# Patient Record
Sex: MALE | Race: WHITE | Employment: OTHER | URBAN - METROPOLITAN AREA
[De-identification: names, ages, dates, MRNs, and addresses within clinical notes are randomized per-mention and may not be internally consistent; named-entity substitution may affect disease eponyms.]

---

## 2018-06-25 ENCOUNTER — HOSPITAL ENCOUNTER (OUTPATIENT)
Dept: GENERAL RADIOLOGY | Age: 78
Discharge: HOME | End: 2018-06-27
Payer: MEDICARE

## 2018-06-25 ENCOUNTER — HOSPITAL ENCOUNTER (OUTPATIENT)
Age: 78
Discharge: HOME | End: 2018-06-27
Payer: MEDICARE

## 2018-06-25 DIAGNOSIS — M25.551 PAIN IN RIGHT HIP: ICD-10-CM

## 2018-06-25 PROCEDURE — 73502 X-RAY EXAM HIP UNI 2-3 VIEWS: CPT

## 2018-07-19 ENCOUNTER — OFFICE VISIT (OUTPATIENT)
Dept: VASCULAR SURGERY | Age: 78
End: 2018-07-19
Payer: MEDICARE

## 2018-07-19 ENCOUNTER — HOSPITAL ENCOUNTER (OUTPATIENT)
Dept: CARDIOLOGY | Age: 78
Discharge: HOME | End: 2018-07-19
Admitting: SURGERY
Payer: MEDICARE

## 2018-07-19 DIAGNOSIS — I71.40 ABDOMINAL AORTIC ANEURYSM (AAA) WITHOUT RUPTURE: ICD-10-CM

## 2018-07-19 DIAGNOSIS — R09.89 CAROTID BRUIT, UNSPECIFIED LATERALITY: ICD-10-CM

## 2018-07-19 DIAGNOSIS — Z98.890 S/P CAROTID ENDARTERECTOMY: Primary | ICD-10-CM

## 2018-07-19 PROCEDURE — G8419 CALC BMI OUT NRM PARAM NOF/U: HCPCS | Performed by: SURGERY

## 2018-07-19 PROCEDURE — 99213 OFFICE O/P EST LOW 20 MIN: CPT | Performed by: SURGERY

## 2018-07-19 PROCEDURE — G8427 DOCREV CUR MEDS BY ELIG CLIN: HCPCS | Performed by: SURGERY

## 2018-07-19 PROCEDURE — 93880 EXTRACRANIAL BILAT STUDY: CPT

## 2018-07-19 PROCEDURE — G8598 ASA/ANTIPLAT THER USED: HCPCS | Performed by: SURGERY

## 2018-07-19 PROCEDURE — 1123F ACP DISCUSS/DSCN MKR DOCD: CPT | Performed by: SURGERY

## 2018-07-19 PROCEDURE — 1101F PT FALLS ASSESS-DOCD LE1/YR: CPT | Performed by: SURGERY

## 2018-07-19 PROCEDURE — 93978 VASCULAR STUDY: CPT

## 2018-07-19 PROCEDURE — 4040F PNEUMOC VAC/ADMIN/RCVD: CPT | Performed by: SURGERY

## 2018-07-19 PROCEDURE — 1036F TOBACCO NON-USER: CPT | Performed by: SURGERY

## 2018-07-19 NOTE — PROGRESS NOTES
 DIAGNOSTIC CARDIAC CATH LAB PROCEDURE      EYE SURGERY Left Feb 2013, June 2013    glaucoma    EYE SURGERY Left     cataract    HERNIA REPAIR      SIGMOIDOSCOPY      TONSILLECTOMY      VASCULAR SURGERY Right 10/16/2013    RIGHT CAROTID ENDARTERECTOMY       Family History   Problem Relation Age of Onset    Heart Disease Father     Heart Disease Brother        Social History     Social History    Marital status:      Spouse name: N/A    Number of children: N/A    Years of education: N/A     Occupational History    Not on file. Social History Main Topics    Smoking status: Former Smoker     Packs/day: 2.00     Types: Cigarettes     Quit date: 10/9/1992    Smokeless tobacco: Never Used    Alcohol use Yes      Comment: rare    Drug use: No    Sexual activity: Not on file     Other Topics Concern    Not on file     Social History Narrative    No narrative on file       Review of Systems:    Eyes:  No blurring, diplopia or vision loss  Respiratory:  No cough, pleuritic chest pain, dyspnea, or wheezing  Cardiovascular: No angina, palpitations   Musculoskeletal:  No arthritis or weakness  Neurologic:  No paralysis, paresis, paresthesia, seizures or headach        Physical Exam:  General appearance:  Alert, awake, oriented x 3. No distress. Eyes:  Conjunctivae appear normal; PERRL  Neck:  No jugular venous distention, lymphadenopathy or thyromegaly. Carotid bruit noted  Lungs:  Clear to ausculation bilaterally. No rhonchi, crackles, wheezes  Heart:  Regular rate and rhythm. No rub or murmur  Abdomen:  Soft, non-tender. No masses, organomegaly. Minimally prominent pulse noted, nontender  Musculoskeletal : No joint effusions, tenderness swelling    Neuro: Speech is intact. Moving all extremities. No focal motor or sensory deficits      Extremities:  Both feet are warm to touch.  The color of both feet is normal.        Pulses Right  Left    Brachial 3 3    Radial    3=normal   Femoral 2 2 2=diminished   Popliteal    1=barely palpable   Dorsalis pedis    0=absent   Posterior tibial    4=aneurysmal             Other pertinent information:1. The past medical records were reviewed. Assessment:    1. S/P carotid endarterectomy    2. Carotid bruit, unspecified laterality    3. Abdominal aortic aneurysm (AAA) without rupture ([facility] [address]              Plan:       Discussed the patient regarding the results of the ultrasound abdominal aorta, small abdominal aortic aneurysm 3.1 cm, no change since last year, carotid ultrasound, minimal disease on the left side, right side widely patent, unchanged prostate, patient is reassured and was instructed to call and come to the hospital if any abdominal or back pain          Patient was instructed to continue walking program and to call if any worsening of symptoms and to call if any focal lateralizing neurological symptoms like loss of speech, vision or loss of function of extremity. All the questions were answered. Indicated follow-up: Return in about 1 year (around 7/19/2019), or if symptoms worsen or fail to improve.

## 2018-07-19 NOTE — PATIENT INSTRUCTIONS
artery over time. This damage may be caused by high blood pressure, high cholesterol, diabetes, or smoking. Sometimes plaque can break loose from the carotid artery and move to the brain. This can cause a stroke or transient ischemic attack (TIA). The goal of treatment is to lower your risk of having a stroke or TIA. You can lower your risk by making healthy lifestyle changes and taking medicine. Sometimes a surgery or procedure is done. Follow-up care is a key part of your treatment and safety. Be sure to make and go to all appointments, and call your doctor if you are having problems. It's also a good idea to know your test results and keep a list of the medicines you take. How can you care for yourself at home? · Take your medicines exactly as prescribed. Call your doctor if you think you are having a problem with your medicine. You may take medicine to lower your blood pressure, to lower your cholesterol, or to prevent blood clots. · If you take a blood thinner, such as aspirin, be sure to get instructions about how to take your medicine safely. Blood thinners can cause serious bleeding problems. · Do not smoke. People who smoke have a higher chance of stroke than those who quit. If you need help quitting, talk to your doctor about stop-smoking programs and medicines. These can increase your chances of quitting for good. · Eat a healthy diet that is low in saturated fat and salt. Eat lots of fresh fruits and vegetables and foods high in fiber. · Stay at a healthy weight. Lose weight if you need to. · Talk to your doctor about starting an exercise program. Regular exercise lowers your chance of stroke. · Limit alcohol to 2 drinks a day for men and 1 drink a day for women. Too much alcohol can cause health problems. · Work with your doctor to control high blood pressure, high cholesterol, diabetes, and other conditions that increase your chance of a stroke.  A healthy diet, exercise, weight loss (if needed), and medicines can help. · Avoid colds and flu. Get the flu vaccine every year. When should you call for help? Call 911 anytime you think you may need emergency care. For example, call if:    · You passed out (lost consciousness).     · You have symptoms of a stroke. These may include:  ¨ Sudden numbness, tingling, weakness, or loss of movement in your face, arm, or leg, especially on only one side of your body. ¨ Sudden vision changes. ¨ Sudden trouble speaking. ¨ Sudden confusion or trouble understanding simple statements. ¨ Sudden problems with walking or balance. ¨ A sudden, severe headache that is different from past headaches.    Call your doctor now or seek immediate medical care if:    · You are dizzy or lightheaded, or you feel like you may faint.    Watch closely for changes in your health, and be sure to contact your doctor if you have any problems. Where can you learn more? Go to [email or web address] and sign in to your travelfox account. Enter J499 in the [employer] box to learn more about \"Carotid Stenosis: Care Instructions. \"     If you do not have an account, please click on the \"Sign Up Now\" link. Current as of: December 6, 2017  Content Version: 11.6  © 6868-2921 [employer]. Care instructions adapted under license by [facility] ([facility]). If you have questions about a medical condition or this instruction, always ask your healthcare professional. [place] [address] any warranty or liability for your use of this information.

## 2019-01-29 ENCOUNTER — OFFICE VISIT (OUTPATIENT)
Dept: CARDIOLOGY CLINIC | Age: 79
End: 2019-01-29
Payer: MEDICARE

## 2019-01-29 VITALS
DIASTOLIC BLOOD PRESSURE: 70 MMHG | HEIGHT: 73 IN | WEIGHT: 215 LBS | SYSTOLIC BLOOD PRESSURE: 122 MMHG | BODY MASS INDEX: 28.49 KG/M2 | HEART RATE: 56 BPM | RESPIRATION RATE: 16 BRPM

## 2019-01-29 DIAGNOSIS — I25.10 CORONARY ARTERY DISEASE INVOLVING NATIVE CORONARY ARTERY WITHOUT ANGINA PECTORIS, UNSPECIFIED WHETHER NATIVE OR TRANSPLANTED HEART: Primary | ICD-10-CM

## 2019-01-29 PROCEDURE — 99214 OFFICE O/P EST MOD 30 MIN: CPT | Performed by: INTERNAL MEDICINE

## 2019-01-29 PROCEDURE — 93000 ELECTROCARDIOGRAM COMPLETE: CPT | Performed by: INTERNAL MEDICINE

## 2019-07-22 DIAGNOSIS — I65.23 BILATERAL CAROTID ARTERY STENOSIS: ICD-10-CM

## 2019-07-22 DIAGNOSIS — I71.40 ABDOMINAL AORTIC ANEURYSM (AAA) WITHOUT RUPTURE: Primary | ICD-10-CM

## 2019-07-25 ENCOUNTER — HOSPITAL ENCOUNTER (OUTPATIENT)
Dept: CARDIOLOGY | Age: 79
Discharge: HOME | End: 2019-07-25
Payer: MEDICARE

## 2019-07-25 ENCOUNTER — OFFICE VISIT (OUTPATIENT)
Dept: VASCULAR SURGERY | Age: 79
End: 2019-07-25
Payer: MEDICARE

## 2019-07-25 DIAGNOSIS — I71.40 ABDOMINAL AORTIC ANEURYSM (AAA) WITHOUT RUPTURE: ICD-10-CM

## 2019-07-25 DIAGNOSIS — R09.89 CAROTID BRUIT, UNSPECIFIED LATERALITY: Primary | ICD-10-CM

## 2019-07-25 DIAGNOSIS — Z98.890 S/P CAROTID ENDARTERECTOMY: ICD-10-CM

## 2019-07-25 DIAGNOSIS — I65.23 BILATERAL CAROTID ARTERY STENOSIS: ICD-10-CM

## 2019-07-25 PROCEDURE — 93880 EXTRACRANIAL BILAT STUDY: CPT

## 2019-07-25 PROCEDURE — 93978 VASCULAR STUDY: CPT

## 2019-07-25 PROCEDURE — 99213 OFFICE O/P EST LOW 20 MIN: CPT | Performed by: SURGERY

## 2019-07-25 NOTE — PROGRESS NOTES
[facility] Heart & Vascular Lab - [facility]    This is a pre read worksheet - prior to official physician interpretation    Antony Do  1940  Date of study: 7/25/19  [id number]    Indication for study:  Carotid artery stenosis  Study : Bilateral Carotid Artery Duplex Examination    Duplex examination of the RIGHT carotid artery system identifies atherosclerotic plaque. The peak systolic velocity in internal carotid artery was 112 centimeters / second. The maximum end diastolic velocity was 30 centimeters / second. The ICA/CCA ratio is 1.3. The right vertebral artery has antegrade flow. Duplex examination of the LEFT carotid artery system identifies atherosclerotic plaque. The peak systolic velocity in internal carotid artery was 158 centimeters / second. The maximum end diastolic velocity was 36 centimeters / second. The ICA/CCA ratio is 1.8. The left vertebral artery has antegrade flow.     RIGHT mild thickening  LEFT 30%   Distal ICA tortuous        LAST STUDY  7/19/2018  Rt mild  Lt 30

## 2019-07-25 NOTE — PROGRESS NOTES
or weakness  Neurologic:  No paralysis, paresis, paresthesia, seizures or headach        Physical Exam:  General appearance:  Alert, awake, oriented x 3. No distress. Eyes:  Conjunctivae appear normal; PERRL  Neck:  No jugular venous distention, lymphadenopathy or thyromegaly. Carotid bruit noted  Lungs:  Clear to ausculation bilaterally. No rhonchi, crackles, wheezes  Heart:  Regular rate and rhythm. No rub or murmur  Abdomen:  Soft, non-tender. No masses, organomegaly. Slightly prominent pulse noted  Musculoskeletal : No joint effusions, tenderness swelling    Neuro: Speech is intact. Moving all extremities. No focal motor or sensory deficits      Extremities:  Both feet are warm to touch. The color of both feet is normal.        Pulses Right  Left    Brachial 3 3    Radial    3=normal   Femoral 2 2  2=diminished   Popliteal    1=barely palpable   Dorsalis pedis    0=absent   Posterior tibial    4=aneurysmal             Other pertinent information:1. The past medical records were reviewed. Assessment:    1. Carotid bruit, unspecified laterality    2. Abdominal aortic aneurysm (AAA) without rupture ([address]    3. S/P carotid endarterectomy              Plan:       Discussed with the patient regarding the results of the ultrasound, stable aneurysm 3.1 cm, unchanged from last year, the carotid ultrasound revealed widely patent artery on the right with 30% stenosis on the left, unchanged from last year    Patient was reassured, instructed to call and come to the hospital with any abdominal pain or back pain          Patient was instructed to continue walking program and to call if any worsening of symptoms and to call if any focal lateralizing neurological symptoms like loss of speech, vision or loss of function of extremity. All the questions were answered. No orders of the defined types were placed in this encounter.     No orders of the defined types were placed in this

## 2019-07-26 NOTE — PROCEDURES
[address]                  [address] [facility],  [facility]                                VASCULAR REPORT    PATIENT NAME: Tk Culp                     :        1940  MED REC NO:   [id number]                            ROOM:  ACCOUNT NO:   [de-identified]                           ADMIT DATE: 2019  PROVIDER:     Favio Lr MD    DATE OF PROCEDURE:  2019    ULTRASOUND OF ABDOMINAL AORTA    INDICATION:  History of abdominal aortic aneurysm. FINDINGS:  Duplex scanning of the abdominal aorta revealed dilatation of  the aorta 3.1 x 3.1 cm in diameter without involvement of the iliac  artery. IMPRESSION:  Abdominal aortic aneurysm with a maximum diameter of 3.1 cm  diameter without any significant change from last year.         Joshua Montero MD    D: 2019 18:31:21       T: 2019 3:16:13     [email or web address]  Job#: [id number]     Doc#: [id number]    CC:

## 2019-10-18 ENCOUNTER — OFFICE VISIT (OUTPATIENT)
Dept: SURGERY | Age: 79
End: 2019-10-18
Payer: MEDICARE

## 2019-10-18 ENCOUNTER — HOSPITAL ENCOUNTER (OUTPATIENT)
Age: 79
Discharge: HOME | End: 2019-10-20
Payer: MEDICARE

## 2019-10-18 VITALS
DIASTOLIC BLOOD PRESSURE: 85 MMHG | HEIGHT: 73 IN | SYSTOLIC BLOOD PRESSURE: 159 MMHG | OXYGEN SATURATION: 98 % | RESPIRATION RATE: 16 BRPM | HEART RATE: 48 BPM | TEMPERATURE: 97.8 F | BODY MASS INDEX: 27.83 KG/M2 | WEIGHT: 210 LBS

## 2019-10-18 DIAGNOSIS — L98.9 SKIN LESION: Primary | ICD-10-CM

## 2019-10-18 PROCEDURE — 11102 TANGNTL BX SKIN SINGLE LES: CPT | Performed by: PHYSICIAN ASSISTANT

## 2019-10-18 PROCEDURE — 88305 TISSUE EXAM BY PATHOLOGIST: CPT

## 2019-10-18 PROCEDURE — 99203 OFFICE O/P NEW LOW 30 MIN: CPT | Performed by: PHYSICIAN ASSISTANT

## 2019-10-18 RX ORDER — LIDOCAINE HYDROCHLORIDE AND EPINEPHRINE 10; 10 MG/ML; UG/ML
3 INJECTION, SOLUTION INFILTRATION; PERINEURAL ONCE
Status: COMPLETED | OUTPATIENT
Start: 2019-10-18 | End: 2019-10-18

## 2019-10-18 RX ADMIN — LIDOCAINE HYDROCHLORIDE AND EPINEPHRINE 3 ML: 10; 10 INJECTION, SOLUTION INFILTRATION; PERINEURAL at 14:03

## 2019-10-25 ENCOUNTER — OFFICE VISIT (OUTPATIENT)
Dept: SURGERY | Age: 79
End: 2019-10-25
Payer: MEDICARE

## 2019-10-25 ENCOUNTER — HOSPITAL ENCOUNTER (OUTPATIENT)
Age: 79
Discharge: HOME | End: 2019-10-27
Payer: MEDICARE

## 2019-10-25 VITALS
BODY MASS INDEX: 28.49 KG/M2 | HEART RATE: 80 BPM | RESPIRATION RATE: 12 BRPM | DIASTOLIC BLOOD PRESSURE: 76 MMHG | HEIGHT: 73 IN | TEMPERATURE: 98.1 F | WEIGHT: 215 LBS | SYSTOLIC BLOOD PRESSURE: 136 MMHG | OXYGEN SATURATION: 97 %

## 2019-10-25 DIAGNOSIS — L98.9 SKIN LESION OF RIGHT ARM: Primary | ICD-10-CM

## 2019-10-25 PROCEDURE — 11102 TANGNTL BX SKIN SINGLE LES: CPT | Performed by: PHYSICIAN ASSISTANT

## 2019-10-25 PROCEDURE — 99212 OFFICE O/P EST SF 10 MIN: CPT | Performed by: PHYSICIAN ASSISTANT

## 2019-10-25 PROCEDURE — 88305 TISSUE EXAM BY PATHOLOGIST: CPT

## 2019-10-25 RX ORDER — LIDOCAINE HYDROCHLORIDE AND EPINEPHRINE 10; 10 MG/ML; UG/ML
20 INJECTION, SOLUTION INFILTRATION; PERINEURAL ONCE
Status: COMPLETED | OUTPATIENT
Start: 2019-10-25 | End: 2019-10-25

## 2019-10-25 RX ADMIN — LIDOCAINE HYDROCHLORIDE AND EPINEPHRINE 20 ML: 10; 10 INJECTION, SOLUTION INFILTRATION; PERINEURAL at 14:27

## 2019-11-07 ENCOUNTER — TELEPHONE (OUTPATIENT)
Dept: SURGERY | Age: 79
End: 2019-11-07

## 2019-11-13 ENCOUNTER — TELEPHONE (OUTPATIENT)
Dept: SURGERY | Age: 79
End: 2019-11-13

## 2019-12-10 ENCOUNTER — ANESTHESIA EVENT (OUTPATIENT)
Dept: OPERATING ROOM | Age: 79
End: 2019-12-10
Payer: MEDICARE

## 2019-12-10 ENCOUNTER — HOSPITAL ENCOUNTER (OUTPATIENT)
Age: 79
Setting detail: OUTPATIENT SURGERY
Discharge: HOME | End: 2019-12-10
Attending: PLASTIC SURGERY | Admitting: PLASTIC SURGERY
Payer: MEDICARE

## 2019-12-10 ENCOUNTER — ANESTHESIA (OUTPATIENT)
Dept: OPERATING ROOM | Age: 79
End: 2019-12-10
Payer: MEDICARE

## 2019-12-10 VITALS
HEIGHT: 73 IN | TEMPERATURE: 98 F | WEIGHT: 208 LBS | BODY MASS INDEX: 27.57 KG/M2 | SYSTOLIC BLOOD PRESSURE: 140 MMHG | RESPIRATION RATE: 17 BRPM | HEART RATE: 59 BPM | OXYGEN SATURATION: 94 % | DIASTOLIC BLOOD PRESSURE: 45 MMHG

## 2019-12-10 VITALS
RESPIRATION RATE: 17 BRPM | SYSTOLIC BLOOD PRESSURE: 122 MMHG | OXYGEN SATURATION: 98 % | DIASTOLIC BLOOD PRESSURE: 64 MMHG

## 2019-12-10 DIAGNOSIS — G89.18 POST-OP PAIN: Primary | ICD-10-CM

## 2019-12-10 PROCEDURE — 88305 TISSUE EXAM BY PATHOLOGIST: CPT

## 2019-12-10 PROCEDURE — 88331 PATH CONSLTJ SURG 1 BLK 1SPC: CPT

## 2019-12-10 PROCEDURE — 2500000003 HC RX 250 WO HCPCS: Performed by: PLASTIC SURGERY

## 2019-12-10 PROCEDURE — 11402 EXC TR-EXT B9+MARG 1.1-2 CM: CPT | Performed by: PLASTIC SURGERY

## 2019-12-10 PROCEDURE — 3600000012 HC SURGERY LEVEL 2 ADDTL 15MIN: Performed by: PLASTIC SURGERY

## 2019-12-10 PROCEDURE — 7100000011 HC PHASE II RECOVERY - ADDTL 15 MIN: Performed by: PLASTIC SURGERY

## 2019-12-10 PROCEDURE — 13121 CMPLX RPR S/A/L 2.6-7.5 CM: CPT | Performed by: PLASTIC SURGERY

## 2019-12-10 PROCEDURE — 3700000000 HC ANESTHESIA ATTENDED CARE: Performed by: PLASTIC SURGERY

## 2019-12-10 PROCEDURE — 14040 TIS TRNFR F/C/C/M/N/A/G/H/F: CPT | Performed by: PLASTIC SURGERY

## 2019-12-10 PROCEDURE — 2709999900 HC NON-CHARGEABLE SUPPLY: Performed by: PLASTIC SURGERY

## 2019-12-10 PROCEDURE — 6360000002 HC RX W HCPCS

## 2019-12-10 PROCEDURE — 7100000010 HC PHASE II RECOVERY - FIRST 15 MIN: Performed by: PLASTIC SURGERY

## 2019-12-10 PROCEDURE — 6360000002 HC RX W HCPCS: Performed by: PHYSICIAN ASSISTANT

## 2019-12-10 PROCEDURE — 3700000001 HC ADD 15 MINUTES (ANESTHESIA): Performed by: PLASTIC SURGERY

## 2019-12-10 PROCEDURE — 3600000002 HC SURGERY LEVEL 2 BASE: Performed by: PLASTIC SURGERY

## 2019-12-10 PROCEDURE — 2580000003 HC RX 258: Performed by: PHYSICIAN ASSISTANT

## 2019-12-10 RX ORDER — SODIUM CHLORIDE 9 MG/ML
INJECTION, SOLUTION INTRAVENOUS CONTINUOUS
Status: DISCONTINUED | OUTPATIENT
Start: 2019-12-10 | End: 2019-12-10 | Stop reason: HOSPADM

## 2019-12-10 RX ORDER — HYDROCODONE BITARTRATE AND ACETAMINOPHEN 5; 325 MG/1; MG/1
1 TABLET ORAL EVERY 6 HOURS PRN
Qty: 12 TABLET | Refills: 0 | Status: SHIPPED | OUTPATIENT
Start: 2019-12-10 | End: 2019-12-17

## 2019-12-10 RX ORDER — FENTANYL CITRATE 50 UG/ML
INJECTION, SOLUTION INTRAMUSCULAR; INTRAVENOUS PRN
Status: DISCONTINUED | OUTPATIENT
Start: 2019-12-10 | End: 2019-12-10 | Stop reason: SDUPTHER

## 2019-12-10 RX ORDER — PROPOFOL 10 MG/ML
INJECTION, EMULSION INTRAVENOUS CONTINUOUS PRN
Status: DISCONTINUED | OUTPATIENT
Start: 2019-12-10 | End: 2019-12-10 | Stop reason: SDUPTHER

## 2019-12-10 RX ORDER — LIDOCAINE HYDROCHLORIDE AND EPINEPHRINE 10; 10 MG/ML; UG/ML
INJECTION, SOLUTION INFILTRATION; PERINEURAL PRN
Status: DISCONTINUED | OUTPATIENT
Start: 2019-12-10 | End: 2019-12-10 | Stop reason: ALTCHOICE

## 2019-12-10 RX ORDER — CEPHALEXIN 500 MG/1
500 CAPSULE ORAL 4 TIMES DAILY
Qty: 28 CAPSULE | Refills: 0 | Status: SHIPPED | OUTPATIENT
Start: 2019-12-10 | End: 2019-12-17

## 2019-12-10 RX ORDER — MIDAZOLAM HYDROCHLORIDE 1 MG/ML
INJECTION INTRAMUSCULAR; INTRAVENOUS PRN
Status: DISCONTINUED | OUTPATIENT
Start: 2019-12-10 | End: 2019-12-10 | Stop reason: SDUPTHER

## 2019-12-10 RX ORDER — SODIUM CHLORIDE 0.9 % (FLUSH) 0.9 %
10 SYRINGE (ML) INJECTION EVERY 12 HOURS SCHEDULED
Status: DISCONTINUED | OUTPATIENT
Start: 2019-12-10 | End: 2019-12-10 | Stop reason: HOSPADM

## 2019-12-10 RX ORDER — BACITRACIN 500 [USP'U]/G
OINTMENT OPHTHALMIC 3 TIMES DAILY
Qty: 1 TUBE | Refills: 0 | Status: SHIPPED | OUTPATIENT
Start: 2019-12-10 | End: 2019-12-20

## 2019-12-10 RX ORDER — CEFAZOLIN SODIUM 2 G/50ML
2 SOLUTION INTRAVENOUS
Status: COMPLETED | OUTPATIENT
Start: 2019-12-10 | End: 2019-12-10

## 2019-12-10 RX ORDER — SODIUM CHLORIDE 0.9 % (FLUSH) 0.9 %
10 SYRINGE (ML) INJECTION PRN
Status: DISCONTINUED | OUTPATIENT
Start: 2019-12-10 | End: 2019-12-10 | Stop reason: HOSPADM

## 2019-12-10 RX ADMIN — SODIUM CHLORIDE: 9 INJECTION, SOLUTION INTRAVENOUS at 06:53

## 2019-12-10 RX ADMIN — FENTANYL CITRATE 50 MCG: 50 INJECTION, SOLUTION INTRAMUSCULAR; INTRAVENOUS at 08:13

## 2019-12-10 RX ADMIN — PROPOFOL 120 MCG/KG/MIN: 10 INJECTION, EMULSION INTRAVENOUS at 08:04

## 2019-12-10 RX ADMIN — MIDAZOLAM 2 MG: 1 INJECTION INTRAMUSCULAR; INTRAVENOUS at 07:59

## 2019-12-10 RX ADMIN — CEFAZOLIN SODIUM 2 G: 2 SOLUTION INTRAVENOUS at 08:07

## 2019-12-10 ASSESSMENT — PULMONARY FUNCTION TESTS
PIF_VALUE: 0
PIF_VALUE: 1
PIF_VALUE: 25
PIF_VALUE: 0
PIF_VALUE: 1
PIF_VALUE: 0
PIF_VALUE: 1
PIF_VALUE: 0
PIF_VALUE: 45
PIF_VALUE: 0
PIF_VALUE: 0
PIF_VALUE: 1
PIF_VALUE: 0
PIF_VALUE: 1
PIF_VALUE: 0
PIF_VALUE: 1
PIF_VALUE: 0
PIF_VALUE: 24
PIF_VALUE: 0
PIF_VALUE: 1
PIF_VALUE: 0

## 2019-12-10 ASSESSMENT — PAIN SCALES - GENERAL
PAINLEVEL_OUTOF10: 0

## 2019-12-10 ASSESSMENT — PAIN - FUNCTIONAL ASSESSMENT: PAIN_FUNCTIONAL_ASSESSMENT: 0-10

## 2019-12-20 ENCOUNTER — OFFICE VISIT (OUTPATIENT)
Dept: SURGERY | Age: 79
End: 2019-12-20

## 2019-12-20 VITALS
HEIGHT: 73 IN | BODY MASS INDEX: 27.57 KG/M2 | TEMPERATURE: 97.2 F | DIASTOLIC BLOOD PRESSURE: 70 MMHG | SYSTOLIC BLOOD PRESSURE: 145 MMHG | WEIGHT: 208 LBS | RESPIRATION RATE: 16 BRPM | OXYGEN SATURATION: 98 % | HEART RATE: 53 BPM

## 2019-12-20 DIAGNOSIS — C44.310 BCC (BASAL CELL CARCINOMA), FACE: Primary | ICD-10-CM

## 2019-12-20 PROCEDURE — 99024 POSTOP FOLLOW-UP VISIT: CPT | Performed by: PHYSICIAN ASSISTANT

## 2020-01-30 NOTE — PROGRESS NOTES
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 Denies fever, nausea, vomiting, leg pain or swelling, pain is absent. He presents today for wound examination. He states he has not used any Efudex. He states that he has gone to a new dermatologist who did several \"freezing of the lesions\". Objective: There were no vitals taken for this visit. Right brow Wound: Clean, dry, and intact, no drainage. Well healed. Right forearm Wound: Clean, dry, and intact, no drainage. Well healed.     Assessment:    Patient Active Problem List   Diagnosis    CAD (coronary artery disease)    LV dysfunction    Hyperlipidemia    Hypertension    Aneurysm of abdominal aorta (HCC)    Glaucoma    S/P carotid endarterectomy    Carotid bruit    H/O: CVA (cerebrovascular accident)    Post-op pain       Labs: CBC:   Lab Results   Component Value Date    WBC 8.5 10/17/2013    RBC 4.09 10/17/2013    HGB 13.2 10/17/2013    HCT 38.3 10/17/2013    MCV 93.5 10/17/2013    MCH 32.2 10/17/2013    MCHC 34.4 10/17/2013    RDW 13.4 10/17/2013     10/17/2013    MPV 10.2 10/17/2013     BMP:    Lab Results   Component Value Date     10/17/2013    K 3.9 10/17/2013     10/17/2013    CO2 26 10/17/2013    BUN 11 10/17/2013    LABALBU 4.2 10/09/2013    LABALBU 4.6 06/03/2012    CREATININE 0.7 10/17/2013    CALCIUM 8.7 10/17/2013    LABGLOM >60 10/17/2013    GLUCOSE 126 10/17/2013    GLUCOSE 125 06/03/2012         Pathology Report- [address] [facility] [address]     [address]            [address]                                                   [address]  [place], [address] [place] [address]         part is labeled with the patient's name Bri Yuen. \"  A. \"right brow basal cell carcinoma. \"  The specimen consists of an ovoid  portion of tan-gray skin measuring 1.4 x 1.2 x 0.3 cm.  The specimen has  previously been inked red-superior, black-inferior, orange-lateral and  blue-medial.  The specimen has been previously sectioned to reveal a  tan-gray cut surface.  The specimen is entirely submitted following  frozen section consultation as follows:  AFS1: representative quadrant margins following frozen section  consultation  A1-A2: remainder of the specimen    B. \"right arm squamous cell. \"  The specimen consists of an unoriented  tan-gray, wrinkled, elliptical portion of skin that measures 4.2 x 1.7 x  0.3 cm.  On the cutaneous surface is a poorly-circumscribed,  hypopigmented area circled in purple marker that measures 1.1 x 0.8 cm  that comes within 0.4 cm from the closest margin.  The surgical resection  margin is inked blue and the specimen is sectioned to reveal a tan-gray  cut surface.  The specimen is entirely submitted for processing as  follows:  B1: longitudinally sectioned tip of ellipse  B2-B5: transversely sectioned mid portion  B6: opposite longitudinally sectioned tip of ellipse     ([address])    CODES:   [id number]; 24020;    Plan:     Status post excision of right brow and right forearm squamous cell carcinoma with complex closure. As the patient states he has not started Efudex as previously directed and has started care with a new dermatologist I informed him that he should continue treatment care with his dermatologist at this time and he can follow-up with our office as needed. Scar Care Instructions      Sunscreen Recommendations for Scars   We recommend that all patients use a sunscreen when outside but especially on new scars (that are exposed to sunlight) for a year after their procedure.  The SPF should be at least 28. Follow the application directions on the bottle.    Why is it so Important to Use Sunscreen on Scars?  A scar is new and more fragile than the surrounding skin.  If you do not use sunscreen, the scar line will react differently to the sun than the surrounding skin.  If you don't use sunscreen, the scar tissue will become darker than surrounding skin. This is a hyper-pigmented scar and will remain darker than the other skin.  After one year, the scar and surrounding skin should react equally to sun. Superficial Scar Massage   Scar massage desensitizes and reduces scar adhesions so skin glides freely.  Rub in a circular motion on and around the scar with firm, even pressure for 5 minutes four times per day    You can start scar massage once incision is completely healed and strong enough to handle the motion (usually 10 - 14 days post operatively).  You use lotion to do the scar massage to allow ease with motion over the scar and prevent friction at the area. Patient had no further Questions. Paper instructions given to patient. Educated patient that his wounds are well healed. F/U PRN    Call office with concerns or signs of infection.     Francoise Zamora

## 2020-01-31 ENCOUNTER — OFFICE VISIT (OUTPATIENT)
Dept: SURGERY | Age: 80
End: 2020-01-31

## 2020-01-31 VITALS
TEMPERATURE: 97.9 F | OXYGEN SATURATION: 97 % | HEART RATE: 54 BPM | SYSTOLIC BLOOD PRESSURE: 126 MMHG | DIASTOLIC BLOOD PRESSURE: 70 MMHG | RESPIRATION RATE: 16 BRPM

## 2020-01-31 PROCEDURE — 99024 POSTOP FOLLOW-UP VISIT: CPT | Performed by: PHYSICIAN ASSISTANT

## 2020-02-06 ENCOUNTER — OFFICE VISIT (OUTPATIENT)
Dept: CARDIOLOGY CLINIC | Age: 80
End: 2020-02-06
Payer: MEDICARE

## 2020-02-06 VITALS
BODY MASS INDEX: 27.7 KG/M2 | DIASTOLIC BLOOD PRESSURE: 64 MMHG | HEIGHT: 73 IN | SYSTOLIC BLOOD PRESSURE: 128 MMHG | WEIGHT: 209 LBS | HEART RATE: 51 BPM

## 2020-02-06 PROCEDURE — 99214 OFFICE O/P EST MOD 30 MIN: CPT | Performed by: INTERNAL MEDICINE

## 2020-02-06 PROCEDURE — 93000 ELECTROCARDIOGRAM COMPLETE: CPT | Performed by: INTERNAL MEDICINE

## 2020-02-06 NOTE — PROGRESS NOTES
Financial resource strain: Not on file    Food insecurity:     Worry: Not on file     Inability: Not on file    Transportation needs:     Medical: Not on file     Non-medical: Not on file   Tobacco Use    Smoking status: Former Smoker     Packs/day: 2.00     Types: Cigarettes     Last attempt to quit: 10/9/1992     Years since quittin.3    Smokeless tobacco: Never Used   Substance and Sexual Activity    Alcohol use: Yes     Comment: rare    Drug use: No    Sexual activity: Not on file   Lifestyle    Physical activity:     Days per week: Not on file     Minutes per session: Not on file    Stress: Not on file   Relationships    Social connections:     Talks on phone: Not on file     Gets together: Not on file     Attends [employer] service: Not on file     Active member of club or organization: Not on file     Attends meetings of clubs or organizations: Not on file     Relationship status: Not on file    Intimate partner violence:     Fear of current or ex partner: Not on file     Emotionally abused: Not on file     Physically abused: Not on file     Forced sexual activity: Not on file   Other Topics Concern    Not on file   Social History Narrative    Not on file       Family History   Problem Relation Age of Onset    Heart Disease Father     Heart Disease Brother      Review of Systems:  Heart: as above   Lungs: as above   Eyes: denies changes in vision or discharge. Ears: denies changes in hearing or pain. Nose: denies epistaxis or masses   Throat: denies sore throat or trouble swallowing. Neuro: denies numbness, tingling, tremors. Skin: denies rashes or itching. : denies hematuria, dysuria   GI: denies vomiting, diarrhea   Psych: denies mood changed, anxiety, depression. Physical Exam   /64   Pulse 51   Ht 6' 1\" (1.854 m)   Wt 209 lb (94.8 kg)   BMI 27.57 kg/m²   Constitutional: Oriented to person, place, and time. Well-developed and well-nourished. No distress.     Head:

## 2020-07-30 ENCOUNTER — OFFICE VISIT (OUTPATIENT)
Dept: VASCULAR SURGERY | Age: 80
End: 2020-07-30
Payer: MEDICARE

## 2020-07-30 ENCOUNTER — HOSPITAL ENCOUNTER (OUTPATIENT)
Dept: CARDIOLOGY | Age: 80
Discharge: HOME | End: 2020-07-30
Payer: MEDICARE

## 2020-07-30 PROCEDURE — 93978 VASCULAR STUDY: CPT

## 2020-07-30 PROCEDURE — 99213 OFFICE O/P EST LOW 20 MIN: CPT | Performed by: SURGERY

## 2020-07-30 PROCEDURE — 93880 EXTRACRANIAL BILAT STUDY: CPT

## 2020-07-30 NOTE — PROGRESS NOTES
[facility] Heart & Vascular Lab - [facility]    This is a pre read worksheet - prior to official physician interpretation    Missy Cornelius  1940  Date of study: 7/30/20  [id number]    Indication for study:  Carotid artery stenosis  Study : Bilateral Carotid Artery Duplex Examination    Duplex examination of the RIGHT carotid artery system identifies atherosclerotic plaque. The peak systolic velocity in internal carotid artery was 118 centimeters / second. The maximum end diastolic velocity was 28 centimeters / second. The ICA/CCA ratio is 1.6. The right vertebral artery has antegrade flow. Duplex examination of the LEFT carotid artery system identifies atherosclerotic plaque. The peak systolic velocity in internal carotid artery was 146 centimeters / second. The maximum end diastolic velocity was 35 centimeters / second. The ICA/CCA ratio is 1.7. The left vertebral artery has antegrade flow.     RIGHT mild thickening  LEFT 30%   Distal ICA tortuous        LAST STUDY  7/25/2019  Rt mild  Lt 30

## 2020-07-30 NOTE — PROGRESS NOTES
[facility] Heart & Vascular Lab - [facility]    This is a pre read worksheet - prior to official physician interpretation    Liam Steven  1940  Date of study: 7/30/20  [id number]    Indication for study:  AAA  Study :  Aortic Ultrasound    Diameter Aorta:     Proximal:   cm     Mid:   cm     Distal:  3.2 x 3.1 cm     Right iliac: 1.3 x 1.3 cm     Left iliac: 1.1 x 1.2 cm    Additional comments: slightly limited d/t bowel gas      LAST STUDY  7/25/2019  3.1 cm  Rt 1.3  Lt 1.3

## 2020-07-30 NOTE — PROGRESS NOTES
Chief Complaint:   Chief Complaint   Patient presents with    Circulatory Problem     Follow up carotid stenosis, AAA         HPI: Patient came to the office for evaluation of multiple vascular issues, history of right carotid surgery many years ago, with mild left carotid stenosis, also has small abdominal aortic aneurysm doing well, denies any abdominal pain or back pain    Patient works in a golf course      Patient denies any focal lateralizing neurological symptoms like loss of speech, vision or loss of function of extremity    Patient can walk a few blocks , and denies any symptoms of rest pain    No Known Allergies    Current Outpatient Medications   Medication Sig Dispense Refill    metoprolol succinate (TOPROL XL) 25 MG extended release tablet TAKE 1 TABLET DAILY 90 tablet 3    magnesium oxide (MAG-OX) 400 MG tablet Take 400 mg by mouth daily.  amLODIPine (NORVASC) 2.5 MG tablet Take 2.5 mg by mouth daily.  aspirin 81 MG chewable tablet Take 81 mg by mouth daily       omeprazole (PRILOSEC) 20 MG capsule Take 10 mg by mouth daily.  atorvastatin (LIPITOR) 10 MG tablet Take 10 mg by mouth daily. No current facility-administered medications for this visit.         Past Medical History:   Diagnosis Date    Aneurysm [facility])     AAA    [facility]18/2013    CAD (coronary artery disease)     Carotid bruit 5/14/2014    Carotid stenosis, right 9/18/2013    GERD (gastroesophageal reflux disease)     Glaucoma     (L) eye    H/O: CVA (cerebrovascular accident) 6/23/2016    Hyperlipidemia     Hypertension     [facility]ipital infarction [facility] [address] 8/26/2015    S/P carotid endarterectomy 10/24/2013    TIA (transient ischemic attack) 10/9/2013    Unspecified cerebral artery occlusion with cerebral infarction     TIA       Past Surgical History:   Procedure Laterality Date    CAROTID ENDARTERECTOMY Right     Dr. Andrei Garza  Not on file   Social History Narrative    Not on file       Review of Systems:    Eyes:  No blurring, diplopia or vision loss. Respiratory:  No cough, pleuritic chest pain, dyspnea, or wheezing. Cardiovascular: No angina, palpitations . Musculoskeletal:  No arthritis or weakness. Neurologic:  No paralysis, paresis, paresthesia, seizures or headache. Physical Exam:  General appearance:  Alert, awake, oriented x 3. No distress. Eyes:  Conjunctivae appear normal; PERRL  Neck:  No jugular venous distention, lymphadenopathy or thyromegaly. Faint left carotid bruit  Lungs:  Clear to ausculation bilaterally. No rhonchi, crackles, wheezes  Heart:  Regular rate and rhythm. No rub or murmur  Abdomen:  Soft, non-tender. No masses, organomegaly. Prominent pulse noted  Musculoskeletal : No joint effusions, tenderness swelling    Neuro: Speech is intact. Moving all extremities. No focal motor or sensory deficits      Extremities:  Both feet are warm to touch. The color of both feet is normal.        Pulses Right  Left    Brachial 3 3    Radial    3=normal   Femoral 2 2  2=diminished   Popliteal    1=barely palpable   Dorsalis pedis    0=absent   Posterior tibial    4=aneurysmal             Other pertinent information:1. The past medical records were reviewed. Assessment:    1. Abdominal aortic aneurysm (AAA) without rupture (HCC)    2. Carotid bruit, unspecified laterality    3.  S/P carotid endarterectomy              Plan:       Discussed the patient regarding results of the ultrasound abdomen, stable aortic aneurysm 3.1 x 3.2 cm, minimal increase of 1 mm, patient is reassured, was instructed call and come to the hospital if any severe abdominal pain or back pain    The carotid ultrasound, did not change, normal on the right, 30% was on the left, patient was reassured              Patient was instructed to continue walking program and to call if any worsening of symptoms and to call if any focal lateralizing neurological symptoms like loss of speech, vision or loss of function of extremity. All the questions were answered. Indicated follow-up: Return in about 1 year (around 7/30/2021), or if symptoms worsen or fail to improve.

## 2020-08-02 NOTE — PROCEDURES
[address]                  [address] [facility]  [facility]                                VASCULAR REPORT    PATIENT NAME: Jan Ac                     :        1940  MED REC NO:   [id number]                            ROOM:  ACCOUNT NO:   [de-identified]                           ADMIT DATE: 2020  PROVIDER:     Brandin Rivera MD    DATE OF PROCEDURE:  2020    CAROTID ULTRASOUND REPORT    INDICATION:  Carotid bruit, post carotid endarterectomy. FINDINGS:  Duplex scanning of the right carotid artery revealed the  patient has mild intimal thickening with a peak internal carotid  velocity of 357, diastolic velocity of 30 cm/sec with a widely patent  right carotid artery. Duplex scanning of the left carotid artery revealed moderate intimal  thickening with a peak internal carotid velocity of 035, diastolic  velocity of 35 cm/sec with 30% stenosis. IMPRESSION:  Widely patent right carotid artery, post right carotid  endarterectomy associated with 30% stenosis on the left, unchanged from  last year. Princess Leo MD    D: 2020 17:38:17       T: 2020 19:58:14     DREW/[email or web address]  Job#: [id number]     Doc#: [id number]    CC:    Michael Velasquez.  Maritza Ac MD

## 2020-08-02 NOTE — PROCEDURES
[address]                  [address] [facility]  [facility]                                VASCULAR REPORT    PATIENT NAME: Domenic Hardin                     :        1940  MED REC NO:   [id number]                            ROOM:  ACCOUNT NO:   [de-identified]                           ADMIT DATE: 2020  PROVIDER:     Marlee Marks MD    DATE OF PROCEDURE:  2020    ULTRASOUND OF THE ABDOMINAL AORTA    INDICATION:  History of abdominal aortic aneurysm. FINDINGS:  Duplex scanning of the abdominal aorta revealed dilatation of  the aorta, 3.1 x 3.2 cm, without involving the iliac artery. IMPRESSION:  Abdominal aortic aneurysm, maximum diameter of 3.2 cm,  without any significant change compared to last year. Faye Valentine MD    D: 2020 17:37:29       T: 2020 19:39:41     DREW/[email or web address]_SALTY  Job#: [id number]     Doc#: [id number]    CC:    Zayda Jameson.  Arvel Spurling, MD

## 2020-08-18 ENCOUNTER — HOSPITAL ENCOUNTER (OUTPATIENT)
Dept: CT IMAGING | Age: 80
Discharge: HOME | End: 2020-08-20
Payer: MEDICARE

## 2020-08-18 PROCEDURE — 2580000003 HC RX 258: Performed by: RADIOLOGY

## 2020-08-18 PROCEDURE — 6360000004 HC RX CONTRAST MEDICATION: Performed by: RADIOLOGY

## 2020-08-18 PROCEDURE — 74177 CT ABD & PELVIS W/CONTRAST: CPT

## 2020-08-18 RX ORDER — SODIUM CHLORIDE 0.9 % (FLUSH) 0.9 %
10 SYRINGE (ML) INJECTION 2 TIMES DAILY
Status: DISCONTINUED | OUTPATIENT
Start: 2020-08-18 | End: 2020-08-21 | Stop reason: HOSPADM

## 2020-08-18 RX ADMIN — Medication 10 ML: at 11:00

## 2020-08-18 RX ADMIN — IOHEXOL 50 ML: 240 INJECTION, SOLUTION INTRATHECAL; INTRAVASCULAR; INTRAVENOUS; ORAL at 11:00

## 2020-08-18 RX ADMIN — IOPAMIDOL 100 ML: 755 INJECTION, SOLUTION INTRAVENOUS at 11:00

## 2020-08-24 ENCOUNTER — HOSPITAL ENCOUNTER (OUTPATIENT)
Age: 80
Discharge: HOME | End: 2020-08-26
Payer: MEDICARE

## 2020-08-24 PROCEDURE — U0003 INFECTIOUS AGENT DETECTION BY NUCLEIC ACID (DNA OR RNA); SEVERE ACUTE RESPIRATORY SYNDROME CORONAVIRUS 2 (SARS-COV-2) (CORONAVIRUS DISEASE [COVID-19]), AMPLIFIED PROBE TECHNIQUE, MAKING USE OF HIGH THROUGHPUT TECHNOLOGIES AS DESCRIBED BY CMS-2020-01-R: HCPCS

## 2020-08-26 ENCOUNTER — PREP FOR PROCEDURE (OUTPATIENT)
Dept: SURGERY | Age: 80
End: 2020-08-26

## 2020-08-26 LAB
SARS-COV-2: NOT DETECTED
SOURCE: NORMAL

## 2020-08-26 RX ORDER — SODIUM CHLORIDE 0.9 % (FLUSH) 0.9 %
10 SYRINGE (ML) INJECTION EVERY 12 HOURS SCHEDULED
Status: CANCELLED | OUTPATIENT
Start: 2020-08-26

## 2020-08-26 RX ORDER — SODIUM CHLORIDE, SODIUM LACTATE, POTASSIUM CHLORIDE, CALCIUM CHLORIDE 600; 310; 30; 20 MG/100ML; MG/100ML; MG/100ML; MG/100ML
INJECTION, SOLUTION INTRAVENOUS CONTINUOUS
Status: CANCELLED | OUTPATIENT
Start: 2020-08-26

## 2020-08-26 NOTE — PROGRESS NOTES
[facility] PRE-ADMISSION TESTING INSTRUCTIONS    The Preadmission Testing patient is instructed accordingly using the following criteria (check applicable):    ARRIVAL INSTRUCTIONS:  [x] Parking the day of Surgery is located in the Main Entrance lot. Upon entering the door, make an immediate right to the surgery reception desk    [x] Bring photo ID and insurance card    [] Bring in a copy of Living will or Durable Power of  papers. [x] Please be sure to arrange transportation to and from the hospital    [x] Please arrange for someone to be with you the remainder of the day due to having anesthesia      GENERAL INSTRUCTIONS:    [x] Nothing by mouth after midnight, including gum, candy, mints or water    [x] You may brush your teeth, but do not swallow any water    [x] Take medications as instructed with 1-2 oz of water    [x] Stop herbal supplements and vitamins 5 days prior to procedure    [x] Follow preop dosing of blood thinners per physician instructions    [] Do not take insulin or oral diabetic medications    [] If diabetic and have low blood sugar or feel symptomatic, take 1-2oz apple juice or glucose tablets    [] Bring inhalers day of surgery    [] Bring C-PAP/ Bi-Pap day of surgery    [] Bring urine specimen day of surgery    [x] Antibacterial Soap shower or bath AM of Surgery, no lotion, powders or creams to surgical site    [] Follow bowel prep as instructed per surgeon    [] No tobacco products within 24 hours of surgery     [] No alcohol or illegal drug use within 24 hours of surgery.     [x] Jewelry, body piercing's, eyeglasses, contact lenses and dentures are not permitted into surgery (bring cases)      [] Please do not wear any nail polish or make up on the day of surgery    [x] If not already done, you can expect a call from registration    [x] If surgeon requests a time change you will be notified the day prior to surgery    [x] If you receive a survey after

## 2020-08-26 NOTE — PROGRESS NOTES
Have you been tested for COVID  Yes           Have you been told you were positive for COVID No  Have you had any known exposure to someone that is positive for COVID No  Do you have a cough                   No              Do you have shortness of breath No                 Do you have a sore throat            No                Are you having chills                    No                Are you having muscle aches. No                    Please come to the hospital wearing a mask and have your significant other wear a mask as well. Both of you should check your temperature before leaving to come here,  if it is 100 or higher please call [phone] for instruction.

## 2020-08-27 ENCOUNTER — ANESTHESIA EVENT (OUTPATIENT)
Dept: OPERATING ROOM | Age: 80
End: 2020-08-27
Payer: MEDICARE

## 2020-08-28 ENCOUNTER — HOSPITAL ENCOUNTER (OUTPATIENT)
Age: 80
Setting detail: OUTPATIENT SURGERY
Discharge: HOME | End: 2020-08-28
Attending: SURGERY | Admitting: SURGERY
Payer: MEDICARE

## 2020-08-28 ENCOUNTER — ANESTHESIA (OUTPATIENT)
Dept: OPERATING ROOM | Age: 80
End: 2020-08-28
Payer: MEDICARE

## 2020-08-28 VITALS
BODY MASS INDEX: 26.41 KG/M2 | DIASTOLIC BLOOD PRESSURE: 71 MMHG | OXYGEN SATURATION: 94 % | HEART RATE: 44 BPM | SYSTOLIC BLOOD PRESSURE: 143 MMHG | WEIGHT: 195 LBS | TEMPERATURE: 96.8 F | HEIGHT: 72 IN | RESPIRATION RATE: 20 BRPM

## 2020-08-28 VITALS
SYSTOLIC BLOOD PRESSURE: 136 MMHG | RESPIRATION RATE: 3 BRPM | OXYGEN SATURATION: 99 % | DIASTOLIC BLOOD PRESSURE: 68 MMHG

## 2020-08-28 LAB
ANION GAP SERPL CALCULATED.3IONS-SCNC: 5 MMOL/L (ref 7–16)
BUN BLDV-MCNC: 21 MG/DL (ref 8–23)
CALCIUM SERPL-MCNC: 9.1 MG/DL (ref 8.6–10.2)
CHLORIDE BLD-SCNC: 105 MMOL/L (ref 98–107)
CO2: 29 MMOL/L (ref 22–29)
CREAT SERPL-MCNC: 0.9 MG/DL (ref 0.7–1.2)
EKG ATRIAL RATE: 52 BPM
EKG P AXIS: 37 DEGREES
EKG P-R INTERVAL: 184 MS
EKG Q-T INTERVAL: 404 MS
EKG QRS DURATION: 88 MS
EKG QTC CALCULATION (BAZETT): 375 MS
EKG R AXIS: 12 DEGREES
EKG T AXIS: 93 DEGREES
EKG VENTRICULAR RATE: 52 BPM
GFR AFRICAN AMERICAN: >60
GFR NON-AFRICAN AMERICAN: >60 ML/MIN/1.73
GLUCOSE BLD-MCNC: 97 MG/DL (ref 74–99)
HCT VFR BLD CALC: 39.1 % (ref 37–54)
HEMOGLOBIN: 13.3 G/DL (ref 12.5–16.5)
MCH RBC QN AUTO: 31 PG (ref 26–35)
MCHC RBC AUTO-ENTMCNC: 34 % (ref 32–34.5)
MCV RBC AUTO: 91.1 FL (ref 80–99.9)
PDW BLD-RTO: 13.4 FL (ref 11.5–15)
PLATELET # BLD: 145 E9/L (ref 130–450)
PMV BLD AUTO: 12 FL (ref 7–12)
POTASSIUM SERPL-SCNC: 4.2 MMOL/L (ref 3.5–5)
RBC # BLD: 4.29 E12/L (ref 3.8–5.8)
SODIUM BLD-SCNC: 139 MMOL/L (ref 132–146)
WBC # BLD: 4.9 E9/L (ref 4.5–11.5)

## 2020-08-28 PROCEDURE — C1773 RET DEV, INSERTABLE: HCPCS | Performed by: SURGERY

## 2020-08-28 PROCEDURE — 88305 TISSUE EXAM BY PATHOLOGIST: CPT

## 2020-08-28 PROCEDURE — 87081 CULTURE SCREEN ONLY: CPT

## 2020-08-28 PROCEDURE — 2580000003 HC RX 258

## 2020-08-28 PROCEDURE — 7100000010 HC PHASE II RECOVERY - FIRST 15 MIN: Performed by: SURGERY

## 2020-08-28 PROCEDURE — 85027 COMPLETE CBC AUTOMATED: CPT

## 2020-08-28 PROCEDURE — 6360000002 HC RX W HCPCS

## 2020-08-28 PROCEDURE — 88342 IMHCHEM/IMCYTCHM 1ST ANTB: CPT

## 2020-08-28 PROCEDURE — 3700000001 HC ADD 15 MINUTES (ANESTHESIA): Performed by: SURGERY

## 2020-08-28 PROCEDURE — 88341 IMHCHEM/IMCYTCHM EA ADD ANTB: CPT

## 2020-08-28 PROCEDURE — 80048 BASIC METABOLIC PNL TOTAL CA: CPT

## 2020-08-28 PROCEDURE — 7100000011 HC PHASE II RECOVERY - ADDTL 15 MIN: Performed by: SURGERY

## 2020-08-28 PROCEDURE — 93010 ELECTROCARDIOGRAM REPORT: CPT | Performed by: INTERNAL MEDICINE

## 2020-08-28 PROCEDURE — 3600000004 HC SURGERY LEVEL 4 BASE: Performed by: SURGERY

## 2020-08-28 PROCEDURE — 7100000001 HC PACU RECOVERY - ADDTL 15 MIN: Performed by: SURGERY

## 2020-08-28 PROCEDURE — 36415 COLL VENOUS BLD VENIPUNCTURE: CPT

## 2020-08-28 PROCEDURE — 7100000000 HC PACU RECOVERY - FIRST 15 MIN: Performed by: SURGERY

## 2020-08-28 PROCEDURE — 2709999900 HC NON-CHARGEABLE SUPPLY: Performed by: SURGERY

## 2020-08-28 PROCEDURE — 2500000003 HC RX 250 WO HCPCS

## 2020-08-28 PROCEDURE — 93005 ELECTROCARDIOGRAM TRACING: CPT | Performed by: SURGERY

## 2020-08-28 PROCEDURE — 6360000002 HC RX W HCPCS: Performed by: SURGERY

## 2020-08-28 PROCEDURE — 3600000014 HC SURGERY LEVEL 4 ADDTL 15MIN: Performed by: SURGERY

## 2020-08-28 PROCEDURE — 3700000000 HC ANESTHESIA ATTENDED CARE: Performed by: SURGERY

## 2020-08-28 PROCEDURE — 2720000010 HC SURG SUPPLY STERILE: Performed by: SURGERY

## 2020-08-28 PROCEDURE — 88360 TUMOR IMMUNOHISTOCHEM/MANUAL: CPT

## 2020-08-28 RX ORDER — MIDAZOLAM HYDROCHLORIDE 1 MG/ML
INJECTION INTRAMUSCULAR; INTRAVENOUS PRN
Status: DISCONTINUED | OUTPATIENT
Start: 2020-08-28 | End: 2020-08-28 | Stop reason: SDUPTHER

## 2020-08-28 RX ORDER — SODIUM CHLORIDE 0.9 % (FLUSH) 0.9 %
10 SYRINGE (ML) INJECTION EVERY 12 HOURS SCHEDULED
Status: DISCONTINUED | OUTPATIENT
Start: 2020-08-28 | End: 2020-08-28 | Stop reason: HOSPADM

## 2020-08-28 RX ORDER — DEXAMETHASONE SODIUM PHOSPHATE 4 MG/ML
INJECTION, SOLUTION INTRA-ARTICULAR; INTRALESIONAL; INTRAMUSCULAR; INTRAVENOUS; SOFT TISSUE PRN
Status: DISCONTINUED | OUTPATIENT
Start: 2020-08-28 | End: 2020-08-28 | Stop reason: SDUPTHER

## 2020-08-28 RX ORDER — FENTANYL CITRATE 50 UG/ML
INJECTION, SOLUTION INTRAMUSCULAR; INTRAVENOUS PRN
Status: DISCONTINUED | OUTPATIENT
Start: 2020-08-28 | End: 2020-08-28 | Stop reason: SDUPTHER

## 2020-08-28 RX ORDER — GLYCOPYRROLATE 1 MG/5 ML
SYRINGE (ML) INTRAVENOUS PRN
Status: DISCONTINUED | OUTPATIENT
Start: 2020-08-28 | End: 2020-08-28 | Stop reason: SDUPTHER

## 2020-08-28 RX ORDER — OXYCODONE HYDROCHLORIDE AND ACETAMINOPHEN 5; 325 MG/1; MG/1
1 TABLET ORAL EVERY 6 HOURS PRN
Qty: 28 TABLET | Refills: 0 | Status: SHIPPED | OUTPATIENT
Start: 2020-08-28 | End: 2020-09-04

## 2020-08-28 RX ORDER — PROPOFOL 10 MG/ML
INJECTION, EMULSION INTRAVENOUS PRN
Status: DISCONTINUED | OUTPATIENT
Start: 2020-08-28 | End: 2020-08-28 | Stop reason: SDUPTHER

## 2020-08-28 RX ORDER — ONDANSETRON 2 MG/ML
INJECTION INTRAMUSCULAR; INTRAVENOUS PRN
Status: DISCONTINUED | OUTPATIENT
Start: 2020-08-28 | End: 2020-08-28 | Stop reason: SDUPTHER

## 2020-08-28 RX ORDER — ROCURONIUM BROMIDE 10 MG/ML
INJECTION, SOLUTION INTRAVENOUS PRN
Status: DISCONTINUED | OUTPATIENT
Start: 2020-08-28 | End: 2020-08-28 | Stop reason: SDUPTHER

## 2020-08-28 RX ORDER — LIDOCAINE HYDROCHLORIDE 20 MG/ML
INJECTION, SOLUTION EPIDURAL; INFILTRATION; INTRACAUDAL; PERINEURAL PRN
Status: DISCONTINUED | OUTPATIENT
Start: 2020-08-28 | End: 2020-08-28 | Stop reason: SDUPTHER

## 2020-08-28 RX ORDER — SODIUM CHLORIDE, SODIUM LACTATE, POTASSIUM CHLORIDE, CALCIUM CHLORIDE 600; 310; 30; 20 MG/100ML; MG/100ML; MG/100ML; MG/100ML
INJECTION, SOLUTION INTRAVENOUS CONTINUOUS
Status: DISCONTINUED | OUTPATIENT
Start: 2020-08-28 | End: 2020-08-28 | Stop reason: HOSPADM

## 2020-08-28 RX ORDER — NEOSTIGMINE METHYLSULFATE 1 MG/ML
INJECTION, SOLUTION INTRAVENOUS PRN
Status: DISCONTINUED | OUTPATIENT
Start: 2020-08-28 | End: 2020-08-28 | Stop reason: SDUPTHER

## 2020-08-28 RX ORDER — SODIUM CHLORIDE, SODIUM LACTATE, POTASSIUM CHLORIDE, CALCIUM CHLORIDE 600; 310; 30; 20 MG/100ML; MG/100ML; MG/100ML; MG/100ML
INJECTION, SOLUTION INTRAVENOUS CONTINUOUS PRN
Status: DISCONTINUED | OUTPATIENT
Start: 2020-08-28 | End: 2020-08-28 | Stop reason: SDUPTHER

## 2020-08-28 RX ADMIN — LIDOCAINE HYDROCHLORIDE 80 MG: 20 INJECTION, SOLUTION EPIDURAL; INFILTRATION; INTRACAUDAL; PERINEURAL at 09:44

## 2020-08-28 RX ADMIN — SODIUM CHLORIDE, POTASSIUM CHLORIDE, SODIUM LACTATE AND CALCIUM CHLORIDE: 600; 310; 30; 20 INJECTION, SOLUTION INTRAVENOUS at 10:00

## 2020-08-28 RX ADMIN — DEXAMETHASONE SODIUM PHOSPHATE 10 MG: 4 INJECTION, SOLUTION INTRAMUSCULAR; INTRAVENOUS at 09:44

## 2020-08-28 RX ADMIN — ONDANSETRON 4 MG: 2 INJECTION INTRAMUSCULAR; INTRAVENOUS at 09:51

## 2020-08-28 RX ADMIN — PROPOFOL 180 MG: 10 INJECTION, EMULSION INTRAVENOUS at 09:44

## 2020-08-28 RX ADMIN — FENTANYL CITRATE 100 MCG: 50 INJECTION, SOLUTION INTRAMUSCULAR; INTRAVENOUS at 09:44

## 2020-08-28 RX ADMIN — Medication 2 G: at 09:43

## 2020-08-28 RX ADMIN — SODIUM CHLORIDE, POTASSIUM CHLORIDE, SODIUM LACTATE AND CALCIUM CHLORIDE: 600; 310; 30; 20 INJECTION, SOLUTION INTRAVENOUS at 09:36

## 2020-08-28 RX ADMIN — Medication 0.6 MG: at 10:14

## 2020-08-28 RX ADMIN — Medication 3 MG: at 10:14

## 2020-08-28 RX ADMIN — MIDAZOLAM 2 MG: 1 INJECTION INTRAMUSCULAR; INTRAVENOUS at 09:37

## 2020-08-28 RX ADMIN — ROCURONIUM BROMIDE 40 MG: 10 INJECTION, SOLUTION INTRAVENOUS at 09:44

## 2020-08-28 ASSESSMENT — PULMONARY FUNCTION TESTS
PIF_VALUE: 22
PIF_VALUE: 20
PIF_VALUE: 23
PIF_VALUE: 20
PIF_VALUE: 6
PIF_VALUE: 22
PIF_VALUE: 22
PIF_VALUE: 3
PIF_VALUE: 2
PIF_VALUE: 22
PIF_VALUE: 6
PIF_VALUE: 0
PIF_VALUE: 12
PIF_VALUE: 17
PIF_VALUE: 23
PIF_VALUE: 23
PIF_VALUE: 17
PIF_VALUE: 23
PIF_VALUE: 22
PIF_VALUE: 22
PIF_VALUE: 21
PIF_VALUE: 5
PIF_VALUE: 23
PIF_VALUE: 1
PIF_VALUE: 19
PIF_VALUE: 6
PIF_VALUE: 1
PIF_VALUE: 17
PIF_VALUE: 1
PIF_VALUE: 6
PIF_VALUE: 17
PIF_VALUE: 6
PIF_VALUE: 14
PIF_VALUE: 24
PIF_VALUE: 18
PIF_VALUE: 17
PIF_VALUE: 22
PIF_VALUE: 22
PIF_VALUE: 13
PIF_VALUE: 2
PIF_VALUE: 13
PIF_VALUE: 17
PIF_VALUE: 22
PIF_VALUE: 22
PIF_VALUE: 16
PIF_VALUE: 7
PIF_VALUE: 22
PIF_VALUE: 1
PIF_VALUE: 6
PIF_VALUE: 22
PIF_VALUE: 23
PIF_VALUE: 2
PIF_VALUE: 22

## 2020-08-28 ASSESSMENT — PAIN DESCRIPTION - LOCATION
LOCATION: ABDOMEN

## 2020-08-28 ASSESSMENT — PAIN SCALES - GENERAL
PAINLEVEL_OUTOF10: 2

## 2020-08-28 ASSESSMENT — PAIN DESCRIPTION - PAIN TYPE
TYPE: SURGICAL PAIN

## 2020-08-28 ASSESSMENT — PAIN - FUNCTIONAL ASSESSMENT: PAIN_FUNCTIONAL_ASSESSMENT: 0-10

## 2020-08-28 NOTE — PROGRESS NOTES
CLINICAL PHARMACY NOTE: MEDS TO [address] Select Patient?: No  Total # of Prescriptions Filled: 1   The following medications were delivered to the patient:  · Percocet 5-325 mg  Total # of Interventions Completed: 7  Time Spent (min): 60    Additional Documentation:

## 2020-08-28 NOTE — ANESTHESIA POSTPROCEDURE EVALUATION
Department of Anesthesiology  Postprocedure Note    Patient: Paul Xie  MRN: [id number]  YOB: 1940  Date of evaluation: 8/28/2020  Time:  3:09 PM     Procedure Summary     Date:  08/28/20 Room / Location:  [facility] OR 10 / [facility]    Anesthesia Start:  9716 Anesthesia Stop:  2563    Procedure:  DIAGNOSTIC LAPAROSCOPY WITH BIOPSY (N/A Abdomen) Diagnosis:  (ABNORMAL CT SCAN)    Surgeon:  Fernanda Peterson MD Responsible Provider:  Misael Prieto DO    Anesthesia Type:  general ASA Status:  3          Anesthesia Type: general    Will Phase I: Will Score: 10    Will Phase II: Will Score: 10    Last vitals: Reviewed and per EMR flowsheets.        Anesthesia Post Evaluation    Patient location during evaluation: PACU  Patient participation: complete - patient participated  Level of consciousness: awake and alert  Airway patency: patent  Nausea & Vomiting: no nausea and no vomiting  Complications: no  Cardiovascular status: hemodynamically stable  Respiratory status: acceptable  Hydration status: euvolemic

## 2020-08-28 NOTE — OP NOTE
Operative Note      Patient: Antione Faustin  YOB: 1940  MRN: [id number]    Date of Procedure: 8/28/2020    Pre-Op Diagnosis: ABNORMAL CT SCAN    Post-Op Diagnosis: Same       Procedure(s):  DIAGNOSTIC LAPAROSCOPY WITH BIOPSY    Surgeon(s):  Hilda Lugo MD    Assistant:   Resident: Adriano Bacon MD    Anesthesia: General    Estimated Blood Loss (mL): Minimal    Complications: None    Specimens:   ID Type Source Tests Collected by Time Destination   A : PERITONEAL IMPLANT  Specimen Abdomen SURGICAL PATHOLOGY Hilda Lugo MD 8/28/2020 1003    B : OMENTAL MASS Specimen Abdomen SURGICAL PATHOLOGY Hilda Lugo MD 8/28/2020 1008        Implants:  * No implants in log *      Drains: * No LDAs found *    Findings: peritoneal studding, omental caking    INDICATIONS FOR PROCEDURE:  Antione Faustin is a 67 y.o. male with abdominal masses. Diagnostic laparoscopy with biopsies was recommended to the patient. The risks, benefits, and alternatives were discussed. He stated his understanding and agreed to proceed. PROCEDURE: The patient was brought to the operating room and positioned supine on the OR table. Sequential compression devices were placed on the patient's lower extremities and functioning. Preoperative antibiotics were administered. Anesthesia was obtained without complication as per the anesthesia record. Immediately prior to the procedure a time-out was called and the surgical checklist was reviewed and agreed upon by all present. The patient was prepped and draped in the usual sterile fashion and the procedure went forth with strict aseptic technique under maximal barrier precautions. A 5 mm left upper quadrant incision was made. The Veress needle was inserted through the incision into the peritoneal cavity and the abdomen was insufflated to 15mm Hg. A 5 mm trocar was inserted into the peritoneal cavity. Two more 5 mm ports were placed in the left side of the abdomen.  The abdomen was inspected and multiple areas of peritoneal studding were noted. A large omental mass was noted as well. A biopsy of the peritoneum was taken sharply and sent for pathology. 2 pieces of the omentum were taken sharply and sent for pathology. Hemostasis was noted. The ports were removed. The port sites were then closed using  4-0 Vicryl sutures. Dermaflex was applied to the incisions. Needle, sponge, and instrument counts were reported as correct x2. The patient tolerated the procedure well without complications. Dr. Gisselle Castro was present and scrubbed throughout the case. DISPOSITION: Anesthesia was discontinued and the patient was extubated prior to leaving the OR. He was transferred to the recovery area in good condition. Discharge is expected following appropriate post-anesthesia recovery.       Electronically signed by Nba Cai MD on 8/28/2020 at 10:26 AM

## 2020-08-28 NOTE — ANESTHESIA PRE PROCEDURE
Department of Anesthesiology  Preprocedure Note       Name:  James Perez   Age:  67 y.o.  :  1940                                          MRN:  [id number]         Date:  2020      Surgeon: Reagan Aguilar):  Smooth Decker MD    Procedure: DIAGNOSTIC LAPAROSCOPY WITH BIOPSY (N/A Abdomen)    Medications prior to admission:   Prior to Admission medications    Medication Sig Start Date End Date Taking? Authorizing Provider   metoprolol succinate (TOPROL XL) 25 MG extended release tablet TAKE 1 TABLET DAILY 20  Yes Sinan Cruz MD   magnesium oxide (MAG-OX) 400 MG tablet Take 400 mg by mouth daily. Yes Historical Provider, MD   amLODIPine (NORVASC) 2.5 MG tablet Take 2.5 mg by mouth daily. Yes Historical Provider, MD   aspirin 81 MG chewable tablet Take 81 mg by mouth daily    Yes Historical Provider, MD   omeprazole (PRILOSEC) 20 MG capsule Take 10 mg by mouth daily. Yes Historical Provider, MD   atorvastatin (LIPITOR) 10 MG tablet Take 10 mg by mouth daily.      Yes Historical Provider, MD       Current medications:    Current Facility-Administered Medications   Medication Dose Route Frequency Provider Last Rate Last Dose    ceFAZolin (ANCEF) 2 g in sterile water 20 mL IV syringe  2 g Intravenous On Call to [facility] Maureen Abdalla MD        lactated ringers infusion   Intravenous Continuous Smooth Decker MD        sodium chloride flush 0.9 % injection 10 mL  10 mL Intravenous 2 times per day Smooth Decker MD           Allergies:  No Known Allergies    Problem List:    Patient Active Problem List   Diagnosis Code    CAD (coronary artery disease) I25.10    LV dysfunction I51.9    Hyperlipidemia E78.5    Hypertension I10    Aneurysm of abdominal aorta (HCC) I71.4    Glaucoma H40.9    S/P carotid endarterectomy Z98.890    Carotid bruit R09.89    H/O: CVA (cerebrovascular accident) Z86.73    Post-op pain G89.18       Past Medical History:        Diagnosis Date    Aneurysm of abdominal

## 2020-08-28 NOTE — INTERVAL H&P NOTE
Update History & Physical    The patient's History and Physical of August 26, was reviewed with the patient and I examined the patient. There was no change. The surgical site was confirmed by the patient and me. Plan: The risks, benefits, expected outcome, and alternative to the recommended procedure have been discussed with the patient. Patient understands and wants to proceed with the procedure.      Electronically signed by Cliff Biggs MD on 8/28/2020 at 9:14 AM

## 2020-08-29 LAB — MRSA CULTURE ONLY: NORMAL

## 2020-09-30 ENCOUNTER — HOSPITAL ENCOUNTER (INPATIENT)
Age: 80
LOS: 4 days | Discharge: ANOTHER HOSPITAL | DRG: 845 | End: 2020-10-04
Attending: SURGERY | Admitting: SURGERY
Payer: MEDICARE

## 2020-09-30 ENCOUNTER — APPOINTMENT (OUTPATIENT)
Dept: CT IMAGING | Age: 80
DRG: 845 | End: 2020-09-30
Attending: SURGERY
Payer: MEDICARE

## 2020-09-30 PROBLEM — R10.9 ABDOMINAL PAIN: Status: ACTIVE | Noted: 2020-09-30

## 2020-09-30 LAB
ANION GAP SERPL CALCULATED.3IONS-SCNC: 11 MMOL/L (ref 7–16)
BASOPHILS ABSOLUTE: 0.01 E9/L (ref 0–0.2)
BASOPHILS RELATIVE PERCENT: 0.1 % (ref 0–2)
BUN BLDV-MCNC: 26 MG/DL (ref 8–23)
CALCIUM SERPL-MCNC: 9.3 MG/DL (ref 8.6–10.2)
CHLORIDE BLD-SCNC: 99 MMOL/L (ref 98–107)
CO2: 27 MMOL/L (ref 22–29)
CREAT SERPL-MCNC: 0.7 MG/DL (ref 0.7–1.2)
EOSINOPHILS ABSOLUTE: 0 E9/L (ref 0.05–0.5)
EOSINOPHILS RELATIVE PERCENT: 0 % (ref 0–6)
GFR AFRICAN AMERICAN: >60
GFR NON-AFRICAN AMERICAN: >60 ML/MIN/1.73
GLUCOSE BLD-MCNC: 116 MG/DL (ref 74–99)
HCT VFR BLD CALC: 46.7 % (ref 37–54)
HEMOGLOBIN: 16 G/DL (ref 12.5–16.5)
IMMATURE GRANULOCYTES #: 0.04 E9/L
IMMATURE GRANULOCYTES %: 0.5 % (ref 0–5)
LYMPHOCYTES ABSOLUTE: 0.41 E9/L (ref 1.5–4)
LYMPHOCYTES RELATIVE PERCENT: 4.8 % (ref 20–42)
MCH RBC QN AUTO: 30.7 PG (ref 26–35)
MCHC RBC AUTO-ENTMCNC: 34.3 % (ref 32–34.5)
MCV RBC AUTO: 89.6 FL (ref 80–99.9)
MONOCYTES ABSOLUTE: 0.59 E9/L (ref 0.1–0.95)
MONOCYTES RELATIVE PERCENT: 6.9 % (ref 2–12)
NEUTROPHILS ABSOLUTE: 7.46 E9/L (ref 1.8–7.3)
NEUTROPHILS RELATIVE PERCENT: 87.7 % (ref 43–80)
PDW BLD-RTO: 13.2 FL (ref 11.5–15)
PLATELET # BLD: 184 E9/L (ref 130–450)
PMV BLD AUTO: 12 FL (ref 7–12)
POTASSIUM SERPL-SCNC: 4.3 MMOL/L (ref 3.5–5)
RBC # BLD: 5.21 E12/L (ref 3.8–5.8)
RBC # BLD: NORMAL 10*6/UL
SODIUM BLD-SCNC: 137 MMOL/L (ref 132–146)
WBC # BLD: 8.5 E9/L (ref 4.5–11.5)

## 2020-09-30 PROCEDURE — 85025 COMPLETE CBC W/AUTO DIFF WBC: CPT

## 2020-09-30 PROCEDURE — 1200000000 HC SEMI PRIVATE

## 2020-09-30 PROCEDURE — 80048 BASIC METABOLIC PNL TOTAL CA: CPT

## 2020-09-30 PROCEDURE — 36415 COLL VENOUS BLD VENIPUNCTURE: CPT

## 2020-09-30 PROCEDURE — 2580000003 HC RX 258: Performed by: STUDENT IN AN ORGANIZED HEALTH CARE EDUCATION/TRAINING PROGRAM

## 2020-09-30 PROCEDURE — 2580000003 HC RX 258: Performed by: RADIOLOGY

## 2020-09-30 PROCEDURE — 6370000000 HC RX 637 (ALT 250 FOR IP): Performed by: STUDENT IN AN ORGANIZED HEALTH CARE EDUCATION/TRAINING PROGRAM

## 2020-09-30 PROCEDURE — 74177 CT ABD & PELVIS W/CONTRAST: CPT

## 2020-09-30 PROCEDURE — 6360000004 HC RX CONTRAST MEDICATION: Performed by: RADIOLOGY

## 2020-09-30 RX ORDER — BACLOFEN 10 MG/1
10 TABLET ORAL 3 TIMES DAILY
Status: DISCONTINUED | OUTPATIENT
Start: 2020-09-30 | End: 2020-10-04 | Stop reason: HOSPADM

## 2020-09-30 RX ORDER — PANTOPRAZOLE SODIUM 40 MG/1
40 TABLET, DELAYED RELEASE ORAL
Status: DISCONTINUED | OUTPATIENT
Start: 2020-10-01 | End: 2020-10-04

## 2020-09-30 RX ORDER — ACETAMINOPHEN 325 MG/1
650 TABLET ORAL EVERY 4 HOURS PRN
Status: DISCONTINUED | OUTPATIENT
Start: 2020-09-30 | End: 2020-10-04 | Stop reason: HOSPADM

## 2020-09-30 RX ORDER — SODIUM CHLORIDE 0.9 % (FLUSH) 0.9 %
10 SYRINGE (ML) INJECTION ONCE
Status: COMPLETED | OUTPATIENT
Start: 2020-09-30 | End: 2020-09-30

## 2020-09-30 RX ORDER — ONDANSETRON 2 MG/ML
4 INJECTION INTRAMUSCULAR; INTRAVENOUS EVERY 6 HOURS PRN
Status: DISCONTINUED | OUTPATIENT
Start: 2020-09-30 | End: 2020-10-04 | Stop reason: HOSPADM

## 2020-09-30 RX ORDER — SODIUM CHLORIDE 0.9 % (FLUSH) 0.9 %
10 SYRINGE (ML) INJECTION EVERY 12 HOURS SCHEDULED
Status: DISCONTINUED | OUTPATIENT
Start: 2020-09-30 | End: 2020-10-04 | Stop reason: HOSPADM

## 2020-09-30 RX ORDER — SODIUM CHLORIDE 0.9 % (FLUSH) 0.9 %
10 SYRINGE (ML) INJECTION PRN
Status: DISCONTINUED | OUTPATIENT
Start: 2020-09-30 | End: 2020-10-04 | Stop reason: HOSPADM

## 2020-09-30 RX ORDER — ONDANSETRON 4 MG/1
4 TABLET, ORALLY DISINTEGRATING ORAL EVERY 8 HOURS PRN
Status: DISCONTINUED | OUTPATIENT
Start: 2020-09-30 | End: 2020-10-04 | Stop reason: HOSPADM

## 2020-09-30 RX ORDER — METOPROLOL SUCCINATE 25 MG/1
25 TABLET, EXTENDED RELEASE ORAL DAILY
Status: DISCONTINUED | OUTPATIENT
Start: 2020-09-30 | End: 2020-10-04 | Stop reason: HOSPADM

## 2020-09-30 RX ORDER — SODIUM CHLORIDE, SODIUM LACTATE, POTASSIUM CHLORIDE, CALCIUM CHLORIDE 600; 310; 30; 20 MG/100ML; MG/100ML; MG/100ML; MG/100ML
INJECTION, SOLUTION INTRAVENOUS CONTINUOUS
Status: DISCONTINUED | OUTPATIENT
Start: 2020-09-30 | End: 2020-10-02

## 2020-09-30 RX ORDER — OXYCODONE HYDROCHLORIDE 5 MG/1
5 TABLET ORAL EVERY 4 HOURS PRN
Status: DISCONTINUED | OUTPATIENT
Start: 2020-09-30 | End: 2020-10-01

## 2020-09-30 RX ORDER — AMLODIPINE BESYLATE 2.5 MG/1
2.5 TABLET ORAL DAILY
Status: DISCONTINUED | OUTPATIENT
Start: 2020-09-30 | End: 2020-10-04 | Stop reason: HOSPADM

## 2020-09-30 RX ADMIN — SODIUM CHLORIDE, PRESERVATIVE FREE 10 ML: 5 INJECTION INTRAVENOUS at 19:12

## 2020-09-30 RX ADMIN — SODIUM CHLORIDE, POTASSIUM CHLORIDE, SODIUM LACTATE AND CALCIUM CHLORIDE: 600; 310; 30; 20 INJECTION, SOLUTION INTRAVENOUS at 17:31

## 2020-09-30 RX ADMIN — BACLOFEN 10 MG: 10 TABLET ORAL at 21:00

## 2020-09-30 RX ADMIN — IOPAMIDOL 110 ML: 755 INJECTION, SOLUTION INTRAVENOUS at 19:11

## 2020-09-30 RX ADMIN — OXYCODONE HYDROCHLORIDE 5 MG: 5 TABLET ORAL at 21:01

## 2020-09-30 ASSESSMENT — PAIN SCALES - GENERAL
PAINLEVEL_OUTOF10: 4
PAINLEVEL_OUTOF10: 8

## 2020-09-30 NOTE — PLAN OF CARE
Problem: Pain:  Description: Pain management should include both nonpharmacologic and pharmacologic interventions.   Goal: Pain level will decrease  Description: Pain level will decrease  Outcome: Not Met This Shift  Goal: Control of acute pain  Description: Control of acute pain  Outcome: Not Met This Shift  Goal: Control of chronic pain  Description: Control of chronic pain  Outcome: Not Met This Shift

## 2020-09-30 NOTE — H&P
Josie Funez MD at [facility] VASCULAR SURGERY Right 10/16/2013    RIGHT CAROTID ENDARTERECTOMY       Prior to Admission medications    Medication Sig Start Date End Date Taking? Authorizing Provider   metoprolol succinate (TOPROL XL) 25 MG extended release tablet TAKE 1 TABLET DAILY 20   Lulu Matthews MD   magnesium oxide (MAG-OX) 400 MG tablet Take 500 mg by mouth daily     Historical Provider, MD   amLODIPine (NORVASC) 2.5 MG tablet Take 2.5 mg by mouth daily. Historical Provider, MD   aspirin 81 MG chewable tablet Take 81 mg by mouth daily     Historical Provider, MD   omeprazole (PRILOSEC) 20 MG capsule Take 10 mg by mouth daily. Historical Provider, MD   atorvastatin (LIPITOR) 10 MG tablet Take 10 mg by mouth daily. Historical Provider, MD       No Known Allergies    Family History   Problem Relation Age of Onset    Heart Disease Father     Heart Disease Brother        Social History     Tobacco Use    Smoking status: Former Smoker     Packs/day: 2.00     Types: Cigarettes     Last attempt to quit: 10/9/1992     Years since quittin.9    Smokeless tobacco: Never Used   Substance Use Topics    Alcohol use: Yes     Comment: rare    Drug use: No         Review of Systems: pertinent ROS listed in HPI, all others negative       PHYSICAL EXAM:    Vitals:    20 1600   BP: (!) 150/68   Pulse: 61   Resp: 16   Temp: 98.4 °F (36.9 °C)   SpO2: 98%       GENERAL:  NAD. A&Ox3. LUNGS:  No increased work of breathing. CARDIOVASCULAR: RR  ABDOMEN:  Soft, non-distended, non-tender. No guarding, rigidity, rebound. LABS:  CBC  No results for input(s): WBC, HGB, HCT, PLT in the last 72 hours. BMP  No results for input(s): NA, K, CL, CO2, BUN, CREATININE, CALCIUM in the last 72 hours. Invalid input(s): GLU  Liver Function  No results for input(s): AMYLASE, LIPASE, BILITOT, BILIDIR, AST, ALT, ALKPHOS, PROT, LABALBU in the last 72 hours.   No results for input(s): LACTATE in the last 72 hours. No results for input(s): INR, PTT in the last 72 hours. Invalid input(s): PT    RADIOLOGY    No results found. ASSESSMENT/PLAN:  79 y.o. male with abdominal pain, anorexia with known metastatic neuroendocrine neoplasm with carcinomatosis. - CT abd/pelv  - NPO until imaging  - pain/nausea control prn  - restart home medications      Plan discussed with Dr. Bret Mehta.     Astrid Ramos MD  Resident, PGY-2  9/30/2020  4:48 PM

## 2020-09-30 NOTE — PROGRESS NOTES
Page out to Dr. Justen Rene for admission orders. [facility] also notified that pt is here and on the floor.    Electronically signed by Femi Yen RN on 9/30/2020 at 4:42 PM

## 2020-10-01 LAB
ALBUMIN SERPL-MCNC: 3.4 G/DL (ref 3.5–5.2)
ALP BLD-CCNC: 108 U/L (ref 40–129)
ALT SERPL-CCNC: 6 U/L (ref 0–40)
ANION GAP SERPL CALCULATED.3IONS-SCNC: 8 MMOL/L (ref 7–16)
AST SERPL-CCNC: 12 U/L (ref 0–39)
BASOPHILS ABSOLUTE: 0.01 E9/L (ref 0–0.2)
BASOPHILS RELATIVE PERCENT: 0.1 % (ref 0–2)
BILIRUB SERPL-MCNC: 0.7 MG/DL (ref 0–1.2)
BUN BLDV-MCNC: 28 MG/DL (ref 8–23)
CALCIUM SERPL-MCNC: 9 MG/DL (ref 8.6–10.2)
CHLORIDE BLD-SCNC: 100 MMOL/L (ref 98–107)
CO2: 25 MMOL/L (ref 22–29)
CREAT SERPL-MCNC: 0.7 MG/DL (ref 0.7–1.2)
EOSINOPHILS ABSOLUTE: 0.01 E9/L (ref 0.05–0.5)
EOSINOPHILS RELATIVE PERCENT: 0.1 % (ref 0–6)
GFR AFRICAN AMERICAN: >60
GFR NON-AFRICAN AMERICAN: >60 ML/MIN/1.73
GLUCOSE BLD-MCNC: 121 MG/DL (ref 74–99)
HCT VFR BLD CALC: 43.4 % (ref 37–54)
HEMOGLOBIN: 14.9 G/DL (ref 12.5–16.5)
IMMATURE GRANULOCYTES #: 0.05 E9/L
IMMATURE GRANULOCYTES %: 0.6 % (ref 0–5)
LYMPHOCYTES ABSOLUTE: 0.45 E9/L (ref 1.5–4)
LYMPHOCYTES RELATIVE PERCENT: 5.1 % (ref 20–42)
MCH RBC QN AUTO: 30.3 PG (ref 26–35)
MCHC RBC AUTO-ENTMCNC: 34.3 % (ref 32–34.5)
MCV RBC AUTO: 88.4 FL (ref 80–99.9)
MONOCYTES ABSOLUTE: 0.69 E9/L (ref 0.1–0.95)
MONOCYTES RELATIVE PERCENT: 7.8 % (ref 2–12)
NEUTROPHILS ABSOLUTE: 7.66 E9/L (ref 1.8–7.3)
NEUTROPHILS RELATIVE PERCENT: 86.3 % (ref 43–80)
PDW BLD-RTO: 13.2 FL (ref 11.5–15)
PLATELET # BLD: 175 E9/L (ref 130–450)
PMV BLD AUTO: 11.4 FL (ref 7–12)
POTASSIUM REFLEX MAGNESIUM: 4.4 MMOL/L (ref 3.5–5)
RBC # BLD: 4.91 E12/L (ref 3.8–5.8)
SODIUM BLD-SCNC: 133 MMOL/L (ref 132–146)
TOTAL PROTEIN: 6.2 G/DL (ref 6.4–8.3)
WBC # BLD: 8.9 E9/L (ref 4.5–11.5)

## 2020-10-01 PROCEDURE — 6370000000 HC RX 637 (ALT 250 FOR IP): Performed by: STUDENT IN AN ORGANIZED HEALTH CARE EDUCATION/TRAINING PROGRAM

## 2020-10-01 PROCEDURE — 85025 COMPLETE CBC W/AUTO DIFF WBC: CPT

## 2020-10-01 PROCEDURE — 80053 COMPREHEN METABOLIC PANEL: CPT

## 2020-10-01 PROCEDURE — 2580000003 HC RX 258: Performed by: STUDENT IN AN ORGANIZED HEALTH CARE EDUCATION/TRAINING PROGRAM

## 2020-10-01 PROCEDURE — 1200000000 HC SEMI PRIVATE

## 2020-10-01 RX ORDER — OXYCODONE HYDROCHLORIDE 5 MG/1
5 TABLET ORAL EVERY 4 HOURS PRN
Status: DISCONTINUED | OUTPATIENT
Start: 2020-10-01 | End: 2020-10-02

## 2020-10-01 RX ORDER — OXYCODONE HYDROCHLORIDE 5 MG/1
10 TABLET ORAL EVERY 4 HOURS PRN
Status: DISCONTINUED | OUTPATIENT
Start: 2020-10-01 | End: 2020-10-04 | Stop reason: HOSPADM

## 2020-10-01 RX ADMIN — OXYCODONE HYDROCHLORIDE 10 MG: 5 TABLET ORAL at 10:48

## 2020-10-01 RX ADMIN — OXYCODONE HYDROCHLORIDE 10 MG: 5 TABLET ORAL at 19:37

## 2020-10-01 RX ADMIN — BACLOFEN 10 MG: 10 TABLET ORAL at 15:00

## 2020-10-01 RX ADMIN — BACLOFEN 10 MG: 10 TABLET ORAL at 19:37

## 2020-10-01 RX ADMIN — OXYCODONE HYDROCHLORIDE 10 MG: 5 TABLET ORAL at 06:21

## 2020-10-01 RX ADMIN — METOPROLOL SUCCINATE 25 MG: 25 TABLET, EXTENDED RELEASE ORAL at 08:15

## 2020-10-01 RX ADMIN — OXYCODONE HYDROCHLORIDE 5 MG: 5 TABLET ORAL at 01:02

## 2020-10-01 RX ADMIN — BACLOFEN 10 MG: 10 TABLET ORAL at 08:15

## 2020-10-01 RX ADMIN — PANTOPRAZOLE SODIUM 40 MG: 40 TABLET, DELAYED RELEASE ORAL at 06:00

## 2020-10-01 RX ADMIN — SODIUM CHLORIDE, POTASSIUM CHLORIDE, SODIUM LACTATE AND CALCIUM CHLORIDE: 600; 310; 30; 20 INJECTION, SOLUTION INTRAVENOUS at 23:46

## 2020-10-01 RX ADMIN — AMLODIPINE BESYLATE 2.5 MG: 2.5 TABLET ORAL at 08:14

## 2020-10-01 ASSESSMENT — PAIN DESCRIPTION - PAIN TYPE: TYPE: ACUTE PAIN

## 2020-10-01 ASSESSMENT — PAIN SCALES - GENERAL
PAINLEVEL_OUTOF10: 10
PAINLEVEL_OUTOF10: 10
PAINLEVEL_OUTOF10: 8
PAINLEVEL_OUTOF10: 6
PAINLEVEL_OUTOF10: 6
PAINLEVEL_OUTOF10: 8

## 2020-10-01 ASSESSMENT — PAIN - FUNCTIONAL ASSESSMENT: PAIN_FUNCTIONAL_ASSESSMENT: ACTIVITIES ARE NOT PREVENTED

## 2020-10-01 ASSESSMENT — PAIN DESCRIPTION - DESCRIPTORS: DESCRIPTORS: ACHING;DISCOMFORT;SHARP

## 2020-10-01 ASSESSMENT — PAIN DESCRIPTION - FREQUENCY: FREQUENCY: CONTINUOUS

## 2020-10-01 ASSESSMENT — PAIN DESCRIPTION - PROGRESSION: CLINICAL_PROGRESSION: GRADUALLY WORSENING

## 2020-10-01 ASSESSMENT — PAIN DESCRIPTION - ONSET: ONSET: ON-GOING

## 2020-10-01 ASSESSMENT — PAIN DESCRIPTION - LOCATION: LOCATION: ABDOMEN

## 2020-10-01 NOTE — CARE COORDINATION
Met with patient at bedside, introduced myself and explained my role as RN case manager. Pt stated that he had increased stomach pain and was directed to the hospital for admission. Discussed plan of care (general surgery consult, labs/testing, medications, discharge planning, etc.). Pt verbalized understanding. Pt noted that he lives at home with his wife. He stated that he is independent and does not use at DME. Pt stated that he follows with the [facility], however he has an appointment with oncology Monday at the [facility]. Pt denies any discharge needs.      Informed patient that case management and social work will continue to follow to assist with the transition of care

## 2020-10-01 NOTE — PROGRESS NOTES
[facility] Quality Flow/Interdisciplinary Rounds Progress Note        Quality Flow Rounds held on October 1, 2020    Disciplines Attending:  Bedside Nurse, ,  and Nursing Unit Leadership    Africa Moody was admitted on 9/30/2020  3:49 PM    Anticipated Discharge Date:  Expected Discharge Date: 10/05/20    Disposition:    Reid Score:  Reid Scale Score: 20    Readmission Risk              Risk of Unplanned Readmission:        9           Discussed patient goal for the day, patient clinical progression, and barriers to discharge.   The following Goal(s) of the Day/Commitment(s) have been identified:  Diagnostics - Report Results and Labs - Report Results      Makeda Benavides  October 1, 2020

## 2020-10-01 NOTE — PLAN OF CARE
Problem: Pain:  Description: Pain management should include both nonpharmacologic and pharmacologic interventions.   Goal: Pain level will decrease  Description: Pain level will decrease  10/1/2020 0755 by Liz Reyes RN  Outcome: Met This Shift  10/1/2020 0444 by Debbi King RN  Outcome: Met This Shift  9/30/2020 1825 by Liz Reyes RN  Outcome: Not Met This Shift  Goal: Control of acute pain  Description: Control of acute pain  10/1/2020 0755 by Liz Reyes RN  Outcome: Met This Shift  10/1/2020 0444 by Debbi King RN  Outcome: Met This Shift  9/30/2020 1825 by Liz Reyes RN  Outcome: Not Met This Shift  Goal: Control of chronic pain  Description: Control of chronic pain  10/1/2020 0755 by Liz Reyes RN  Outcome: Met This Shift  9/30/2020 1825 by Liz Reyes RN  Outcome: Not Met This Shift

## 2020-10-01 NOTE — PROGRESS NOTES
GENERAL SURGERY  DAILY PROGRESS NOTE  10/1/2020  CC: abdominal pain    Subjective:  Still with pain and hiccups. Same as yesterday. No BM  Objective:  BP (!) 154/88 Comment: manual  Pulse 66   Temp 98.9 °F (37.2 °C) (Oral)   Resp 16   Ht 6' (1.829 m)   Wt 187 lb (84.8 kg)   SpO2 98%   BMI 25.36 kg/m²     GENERAL:  Laying in bed, awake, alert, cooperative  HEAD: Normocephalic, atraumatic  EYES: No sclera icterus, pupils equal  LUNGS:  No increased work of breathing  CARDIOVASCULAR:  RRR  ABDOMEN:  Soft, mild tender TP , non-distended  EXTREMITIES: No edema or swelling  SKIN: Warm and dry    Assessment/Plan:  79 y.o. male with abdominal pain, anorexia with known metastatic neuroendocrine neoplasm with carcinomatosis.      - will discuss diet  - pain/nausea control prn  - restart home medications  - baclofen for hiccups     Electronically signed by Dominick Whitten MD on 10/1/2020 at 6:03 AM       The patient was seen and examined  The chart was reviewed  Agree with the assessment and plan  Advance diet

## 2020-10-01 NOTE — PLAN OF CARE
Problem: Pain:  Goal: Pain level will decrease  Description: Pain level will decrease  10/1/2020 0444 by Fely Rose RN  Outcome: Met This Shift     Problem: Pain:  Goal: Control of acute pain  Description: Control of acute pain  10/1/2020 0444 by Fely Rose RN  Outcome: Met This Shift

## 2020-10-02 LAB
ALBUMIN SERPL-MCNC: 2.9 G/DL (ref 3.5–5.2)
ALP BLD-CCNC: 88 U/L (ref 40–129)
ALT SERPL-CCNC: 6 U/L (ref 0–40)
ANION GAP SERPL CALCULATED.3IONS-SCNC: 8 MMOL/L (ref 7–16)
AST SERPL-CCNC: 11 U/L (ref 0–39)
BASOPHILS ABSOLUTE: 0.01 E9/L (ref 0–0.2)
BASOPHILS RELATIVE PERCENT: 0.2 % (ref 0–2)
BILIRUB SERPL-MCNC: 0.7 MG/DL (ref 0–1.2)
BUN BLDV-MCNC: 27 MG/DL (ref 8–23)
CALCIUM SERPL-MCNC: 8.6 MG/DL (ref 8.6–10.2)
CHLORIDE BLD-SCNC: 101 MMOL/L (ref 98–107)
CO2: 27 MMOL/L (ref 22–29)
CREAT SERPL-MCNC: 0.8 MG/DL (ref 0.7–1.2)
EOSINOPHILS ABSOLUTE: 0.03 E9/L (ref 0.05–0.5)
EOSINOPHILS RELATIVE PERCENT: 0.5 % (ref 0–6)
GFR AFRICAN AMERICAN: >60
GFR NON-AFRICAN AMERICAN: >60 ML/MIN/1.73
GLUCOSE BLD-MCNC: 97 MG/DL (ref 74–99)
HCT VFR BLD CALC: 39.6 % (ref 37–54)
HEMOGLOBIN: 13.5 G/DL (ref 12.5–16.5)
IMMATURE GRANULOCYTES #: 0.03 E9/L
IMMATURE GRANULOCYTES %: 0.5 % (ref 0–5)
LYMPHOCYTES ABSOLUTE: 0.66 E9/L (ref 1.5–4)
LYMPHOCYTES RELATIVE PERCENT: 10 % (ref 20–42)
MCH RBC QN AUTO: 30.8 PG (ref 26–35)
MCHC RBC AUTO-ENTMCNC: 34.1 % (ref 32–34.5)
MCV RBC AUTO: 90.2 FL (ref 80–99.9)
MONOCYTES ABSOLUTE: 0.75 E9/L (ref 0.1–0.95)
MONOCYTES RELATIVE PERCENT: 11.4 % (ref 2–12)
NEUTROPHILS ABSOLUTE: 5.1 E9/L (ref 1.8–7.3)
NEUTROPHILS RELATIVE PERCENT: 77.4 % (ref 43–80)
PDW BLD-RTO: 13.2 FL (ref 11.5–15)
PLATELET # BLD: 161 E9/L (ref 130–450)
PMV BLD AUTO: 12 FL (ref 7–12)
POTASSIUM REFLEX MAGNESIUM: 4 MMOL/L (ref 3.5–5)
RBC # BLD: 4.39 E12/L (ref 3.8–5.8)
SODIUM BLD-SCNC: 136 MMOL/L (ref 132–146)
TOTAL PROTEIN: 5.2 G/DL (ref 6.4–8.3)
WBC # BLD: 6.6 E9/L (ref 4.5–11.5)

## 2020-10-02 PROCEDURE — 85025 COMPLETE CBC W/AUTO DIFF WBC: CPT

## 2020-10-02 PROCEDURE — 1200000000 HC SEMI PRIVATE

## 2020-10-02 PROCEDURE — 80053 COMPREHEN METABOLIC PANEL: CPT

## 2020-10-02 PROCEDURE — 99221 1ST HOSP IP/OBS SF/LOW 40: CPT | Performed by: STUDENT IN AN ORGANIZED HEALTH CARE EDUCATION/TRAINING PROGRAM

## 2020-10-02 PROCEDURE — 6370000000 HC RX 637 (ALT 250 FOR IP): Performed by: STUDENT IN AN ORGANIZED HEALTH CARE EDUCATION/TRAINING PROGRAM

## 2020-10-02 PROCEDURE — 2580000003 HC RX 258: Performed by: STUDENT IN AN ORGANIZED HEALTH CARE EDUCATION/TRAINING PROGRAM

## 2020-10-02 PROCEDURE — 6360000002 HC RX W HCPCS: Performed by: STUDENT IN AN ORGANIZED HEALTH CARE EDUCATION/TRAINING PROGRAM

## 2020-10-02 RX ORDER — SENNA AND DOCUSATE SODIUM 50; 8.6 MG/1; MG/1
2 TABLET, FILM COATED ORAL DAILY PRN
Status: DISCONTINUED | OUTPATIENT
Start: 2020-10-02 | End: 2020-10-02

## 2020-10-02 RX ORDER — POLYETHYLENE GLYCOL 3350 17 G/17G
17 POWDER, FOR SOLUTION ORAL DAILY
Status: DISCONTINUED | OUTPATIENT
Start: 2020-10-02 | End: 2020-10-04 | Stop reason: HOSPADM

## 2020-10-02 RX ORDER — BISACODYL 10 MG
10 SUPPOSITORY, RECTAL RECTAL DAILY
Status: DISCONTINUED | OUTPATIENT
Start: 2020-10-02 | End: 2020-10-02

## 2020-10-02 RX ORDER — SENNA AND DOCUSATE SODIUM 50; 8.6 MG/1; MG/1
1 TABLET, FILM COATED ORAL 2 TIMES DAILY
Status: DISCONTINUED | OUTPATIENT
Start: 2020-10-02 | End: 2020-10-04 | Stop reason: HOSPADM

## 2020-10-02 RX ORDER — SENNA AND DOCUSATE SODIUM 50; 8.6 MG/1; MG/1
1 TABLET, FILM COATED ORAL 2 TIMES DAILY
Qty: 60 TABLET | Refills: 0 | Status: CANCELLED | OUTPATIENT
Start: 2020-10-02 | End: 2020-11-01

## 2020-10-02 RX ORDER — DOCUSATE SODIUM 100 MG/1
100 CAPSULE, LIQUID FILLED ORAL 2 TIMES DAILY
Status: DISCONTINUED | OUTPATIENT
Start: 2020-10-02 | End: 2020-10-02

## 2020-10-02 RX ORDER — POLYETHYLENE GLYCOL 3350 17 G/17G
17 POWDER, FOR SOLUTION ORAL DAILY
Qty: 527 G | Refills: 1 | Status: CANCELLED | OUTPATIENT
Start: 2020-10-03 | End: 2020-11-02

## 2020-10-02 RX ADMIN — PANTOPRAZOLE SODIUM 40 MG: 40 TABLET, DELAYED RELEASE ORAL at 06:03

## 2020-10-02 RX ADMIN — ACETAMINOPHEN 650 MG: 325 TABLET ORAL at 17:59

## 2020-10-02 RX ADMIN — DOCUSATE SODIUM 50 MG AND SENNOSIDES 8.6 MG 1 TABLET: 8.6; 5 TABLET, FILM COATED ORAL at 09:52

## 2020-10-02 RX ADMIN — MAGNESIUM HYDROXIDE 30 ML: 400 SUSPENSION ORAL at 08:21

## 2020-10-02 RX ADMIN — OXYCODONE HYDROCHLORIDE 10 MG: 5 TABLET ORAL at 09:52

## 2020-10-02 RX ADMIN — ONDANSETRON 4 MG: 2 INJECTION INTRAMUSCULAR; INTRAVENOUS at 14:30

## 2020-10-02 RX ADMIN — AMLODIPINE BESYLATE 2.5 MG: 2.5 TABLET ORAL at 08:21

## 2020-10-02 RX ADMIN — POLYETHYLENE GLYCOL 3350 17 G: 17 POWDER, FOR SOLUTION ORAL at 08:21

## 2020-10-02 RX ADMIN — DOCUSATE SODIUM 50 MG AND SENNOSIDES 8.6 MG 1 TABLET: 8.6; 5 TABLET, FILM COATED ORAL at 20:25

## 2020-10-02 RX ADMIN — OXYCODONE HYDROCHLORIDE 10 MG: 5 TABLET ORAL at 16:37

## 2020-10-02 RX ADMIN — BACLOFEN 10 MG: 10 TABLET ORAL at 08:21

## 2020-10-02 RX ADMIN — SODIUM CHLORIDE, PRESERVATIVE FREE 10 ML: 5 INJECTION INTRAVENOUS at 19:27

## 2020-10-02 RX ADMIN — SODIUM CHLORIDE, POTASSIUM CHLORIDE, SODIUM LACTATE AND CALCIUM CHLORIDE: 600; 310; 30; 20 INJECTION, SOLUTION INTRAVENOUS at 16:41

## 2020-10-02 RX ADMIN — BACLOFEN 10 MG: 10 TABLET ORAL at 20:25

## 2020-10-02 RX ADMIN — OXYCODONE HYDROCHLORIDE 10 MG: 5 TABLET ORAL at 22:25

## 2020-10-02 RX ADMIN — METOPROLOL SUCCINATE 25 MG: 25 TABLET, EXTENDED RELEASE ORAL at 08:21

## 2020-10-02 RX ADMIN — BACLOFEN 10 MG: 10 TABLET ORAL at 14:30

## 2020-10-02 ASSESSMENT — PAIN - FUNCTIONAL ASSESSMENT: PAIN_FUNCTIONAL_ASSESSMENT: PREVENTS OR INTERFERES SOME ACTIVE ACTIVITIES AND ADLS

## 2020-10-02 ASSESSMENT — PAIN SCALES - GENERAL
PAINLEVEL_OUTOF10: 10
PAINLEVEL_OUTOF10: 9
PAINLEVEL_OUTOF10: 9
PAINLEVEL_OUTOF10: 10
PAINLEVEL_OUTOF10: 7
PAINLEVEL_OUTOF10: 3
PAINLEVEL_OUTOF10: 0

## 2020-10-02 ASSESSMENT — PAIN DESCRIPTION - PAIN TYPE: TYPE: ACUTE PAIN

## 2020-10-02 ASSESSMENT — PAIN DESCRIPTION - FREQUENCY: FREQUENCY: CONTINUOUS

## 2020-10-02 ASSESSMENT — PAIN DESCRIPTION - ORIENTATION: ORIENTATION: LEFT;RIGHT

## 2020-10-02 ASSESSMENT — PAIN DESCRIPTION - DESCRIPTORS: DESCRIPTORS: ACHING;DISCOMFORT;SHARP

## 2020-10-02 ASSESSMENT — PAIN DESCRIPTION - ONSET: ONSET: ON-GOING

## 2020-10-02 ASSESSMENT — PAIN DESCRIPTION - LOCATION: LOCATION: ABDOMEN

## 2020-10-02 ASSESSMENT — PAIN DESCRIPTION - PROGRESSION: CLINICAL_PROGRESSION: NOT CHANGED

## 2020-10-02 NOTE — PROGRESS NOTES
Subjective: The patient is awake and alert. Complains of ongoing abdominal pain. He has not had a bowel movement properly for many weeks. He is having some loose stool liquid output. No problems overnight. Denies chest pain, angina, dyspnea. No nausea or vomiting. Tolerating diet. Objective:    BP (!) 160/73   Pulse 60   Temp 98.8 °F (37.1 °C) (Oral)   Resp 16   Ht 6' (1.829 m)   Wt 190 lb 11.2 oz (86.5 kg)   SpO2 96%   BMI 25.86 kg/m²     General: NAD  HEENT: No thrush or mucositis, EOMI, PERRLA  Heart:  RRR, no murmurs, gallops, or rubs. Lungs:  CTA bilaterally, no wheeze, rales or rhonchi  Abd: Mildly distended and ttp.   BS present, no masses  Extrem:  No clubbing, cyanosis, or edema  Lymphatics: No palpable adenopathy in cervical and supraclavicular regions  Skin: Intact, no petechia or purpura    CBC with Differential:    Lab Results   Component Value Date    WBC 6.6 10/02/2020    RBC 4.39 10/02/2020    HGB 13.5 10/02/2020    HCT 39.6 10/02/2020     10/02/2020    MCV 90.2 10/02/2020    MCH 30.8 10/02/2020    MCHC 34.1 10/02/2020    RDW 13.2 10/02/2020    LYMPHOPCT 10.0 10/02/2020    MONOPCT 11.4 10/02/2020    BASOPCT 0.2 10/02/2020    MONOSABS 0.75 10/02/2020    LYMPHSABS 0.66 10/02/2020    EOSABS 0.03 10/02/2020    BASOSABS 0.01 10/02/2020     CMP:    Lab Results   Component Value Date     10/02/2020    K 4.0 10/02/2020     10/02/2020    CO2 27 10/02/2020    BUN 27 10/02/2020    CREATININE 0.8 10/02/2020    GFRAA >60 10/02/2020    LABGLOM >60 10/02/2020    GLUCOSE 97 10/02/2020    GLUCOSE 125 06/03/2012    PROT 5.2 10/02/2020    LABALBU 2.9 10/02/2020    LABALBU 4.6 06/03/2012    CALCIUM 8.6 10/02/2020    BILITOT 0.7 10/02/2020    ALKPHOS 88 10/02/2020    AST 11 10/02/2020    ALT 6 10/02/2020      [facility]:[id number]}     Current Facility-Administered Medications:     magnesium hydroxide (MILK OF MAGNESIA) 400 MG/5ML suspension 30 mL, 30 mL, Oral, Daily, Windy Cramer, Assessment:    Active Problems:    Abdominal pain  Resolved Problems:    * No resolved hospital problems. 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 2.  Weight 30 to 40 pounds secondary to malignancy. 3.  Constipation. I will plan starting lanreotide next Wednesday. Plan:  He has bulky disease leading to significant amount of abdominal pain symptoms. No surgical intervention necessary at this time. Start Lanreotide as outpatient on Wednesday. Will be going to [facility] next week for a second opinion on Monday. Bowel regimen, enema.    Monitor CBC daily  Pain/nausea control   Full supportive care    Electronically signed by Christiane Montemayor MD on 10/2/2020 at 12:25 PM

## 2020-10-02 NOTE — PLAN OF CARE
Problem: Inadequate oral food/beverage intake (NI-2.1)  Goal: Food and/or Nutrient Delivery  Continue Diet. Will Start Ensure High Pro ONS BID and Magic Cup Frozen ONS BID. Description: Individualized approach for food/nutrient provision.   Outcome: Met This Shift

## 2020-10-02 NOTE — PLAN OF CARE
Problem: Pain:  Goal: Pain level will decrease  Description: Pain level will decrease  10/2/2020 1050 by Kemal Mcqueen RN  Outcome: Met This Shift  10/2/2020 0007 by Osei Batista RN  Outcome: Met This Shift  Goal: Control of acute pain  Description: Control of acute pain  10/2/2020 1050 by Kemal Mcqueen RN  Outcome: Met This Shift  10/2/2020 0007 by Osei Batista RN  Outcome: Met This Shift  Goal: Control of chronic pain  Description: Control of chronic pain  10/2/2020 1050 by Kemal Mcqueen RN  Outcome: Met This Shift  10/2/2020 0007 by Osei Batista RN  Outcome: Met This Shift

## 2020-10-02 NOTE — PROGRESS NOTES
GENERAL SURGERY  DAILY PROGRESS NOTE  10/2/2020  CC: abdominal pain    Subjective:  Improving clinically. Pain improved. Some n/v earlier in the day. Nausea improved over the course of the day. Hiccups gone. Objective:  /61   Pulse 61   Temp 98.4 °F (36.9 °C) (Oral)   Resp 16   Ht 6' (1.829 m)   Wt 187 lb (84.8 kg)   SpO2 99%   BMI 25.36 kg/m²     GENERAL:  Laying in bed, awake, alert, cooperative  HEAD: Normocephalic, atraumatic  EYES: No sclera icterus, pupils equal  LUNGS:  No increased work of breathing  CARDIOVASCULAR:  RRR  ABDOMEN:  Soft, mild tender TTP , mild distension   EXTREMITIES: No edema or swelling  SKIN: Warm and dry    Assessment/Plan:  79 y.o. male with abdominal pain, anorexia with known metastatic neuroendocrine neoplasm with carcinomatosis.     - try FLD  - pain/nausea control prn  - restart home medications  - baclofen for hiccups   - bowel reg  - palliative team consulted     Electronically signed by Filiberto Barrera MD on 10/2/2020 at 6:28 AM     The patient was seen and examined and the chart was reviewed. Overall, the patient states that his symptoms have improved. Unfortunately, the patient has metastatic neuroendocrine cancer. The symptom complex is directly related to the burden of disease involving the pelvis. 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 Since the patient's pain has improved, the patient's diet will be advanced and plans for discharge will be arranged.

## 2020-10-02 NOTE — PROGRESS NOTES
Comprehensive Nutrition Assessment    Type and Reason for Visit:  Initial, Positive Nutrition Screen    Nutrition Recommendations/Plan: Continue Diet. Will Start Ensure High Pro ONS BID and Magic Cup Frozen ONS BID. Nutrition Assessment:  Pt adm w/ abd pain and diarrhea x ~6 months d/t Metastatic Neuroendocrine CA w/ Carcinamatosis per EMR review. Pt at risk d/t increased needs and poor intake w/ subjective ~50# wt loss x ~6 months d/t poor appetite, abd pain and diarrhea 2/2 Metastatic CA. Will Start ONS to aid in PO intake. Malnutrition Assessment:  Malnutrition Status:  Insufficient data    Context:  Chronic Illness     Findings of the 6 clinical characteristics of malnutrition:  Energy Intake:  7 - 75% or less estimated energy requirements for 1 month or longer  Weight Loss:  Unable to assess(2/2 poor EMR wt hx pta)     Body Fat Loss:  Unable to assess     Muscle Mass Loss:  Unable to assess    Fluid Accumulation:  No significant fluid accumulation     Strength:  Not Performed    Estimated Daily Nutrient Needs:  Energy (kcal):  3249-6975; Weight Used for Energy Requirements:  Admission     Protein (g):  110-125(1.3-1.5 gm/kg per CBW); Weight Used for Protein Requirements:  Admission        Fluid (ml/day):  5388-0876(1 ml/kcal); Weight Used for Fluid Requirements:  Admission      Nutrition Related Findings:  A&O, dentition WNL, tender/non-distended Abd, +BS, +N/V at times, No Edema, +I/O's      Wounds:  None       Current Nutrition Therapies:    DIET LOW FIBER;   Dietary Nutrition Supplements: Frozen Oral Supplement  Dietary Nutrition Supplements: Low Calorie High Protein Supplement    Anthropometric Measures:  · Height: 6' (182.9 cm)  · Current Body Weight: 190 lb (86.2 kg)(actual 10/2)   · Admission Body Weight: 182 lb (82.6 kg)(actual 9/30)    · Usual Body Weight: 208 lb (94.3 kg)(stated 12/10/19 per EMR, UTO UBW d/t poor EMR wt hx w/ no actual wt's pta; noted subjective ~50# wt loss x ~6 months however unable to confirm at this time)     · Ideal Body Weight: 178 lbs; % Ideal Body Weight 106.7 %   · BMI: 25.8  · Adjusted Body Weight:  ; No Adjustment   · Adjusted BMI:      · BMI Categories: Normal Weight (BMI 22.0 to 24.9) age over 65(per ABW)       Nutrition Diagnosis:   · Inadequate oral intake related to altered GI function(2/2 Metastatic CA w/ Carcinamatosis) as evidenced by poor intake prior to admission, weight loss, GI abnormality, nausea, vomiting, diarrhea      Nutrition Interventions:   Food and/or Nutrient Delivery:  Continue Current Diet, Start Oral Nutrition Supplement(Continue Diet. Will Start Ensure High Pro ONS BID and Magic Cup Frozen ONS BID.)  Nutrition Education/Counseling:  No recommendation at this time   Coordination of Nutrition Care:  Continued Inpatient Monitoring    Goals:  PO intake >50% of meals/ONS.        Nutrition Monitoring and Evaluation:   Behavioral-Environmental Outcomes:  (n/a)   Food/Nutrient Intake Outcomes:  Diet Advancement/Tolerance, Food and Nutrient Intake, Supplement Intake  Physical Signs/Symptoms Outcomes:  Biochemical Data, Diarrhea, GI Status, Nausea or Vomiting, Fluid Status or Edema, Nutrition Focused Physical Findings, Skin, Weight     Discharge Planning:    Continue current diet, Continue Oral Nutrition Supplement     Electronically signed by Claudio Cortes RD, LD on 10/2/20 at 12:27 PM EDT    Contact: ext [phone]

## 2020-10-02 NOTE — CONSULTS
Dept of Palliative Medicine   Consult Note      PATIENT: Catrachito Mcmahon  : 1940  MRN: [id number]  ADMISSION DATE: 2020  3:49 PM    Clinical Carlo Rowland is a 79 y.o. y/o male with a history of AAA, CAD, HTN, HLD, TIA, h/o CVA and carotid stenosis who presented to [facility]) on 2020 with abdominal pain. He was diagnosed with metastatic neuroendocrine neoplasm with carcinomatosis. Palliative Medicine was consulted on hospital day 2 for assistance with Symptom management     Assessment / Recommendations    Pertinent Hospital Diagnoses      Abdominal pain   Anorexia   H/o Metetastatic neuroendocrine neoplasm     Pain due to neoplasm   OARRS reviewed, pain in abdomen, not constant. tramadol didn't help when used at home. - Cont oxycodone 10mg q4hr prn, he has used 10mgs 3 times, and 5mg once in past 24 hours. Says 10mgs is more effective. Nausea  He had nausea/vomiting once last night. I reminded him that he has mediciation on board if needed. - Cont Zofran po & IV PRN    Constipation  Last solid BM was 4 days ago, as he is receiving opioids, will start senna   - start Senna-s 2 tabs BID  - cont Miralax daily     Thank you for the opportunity to participate in the care of Catrachito Mcmahon. Magnus Duarte MD  Palliative Medicine     Subjective     Met with patient this morning at bedside. Explained my role as a palliative physician. He describes his pain has been 9 out of 10 when severe, goes down to 4 out of 10 after taking pain medication. He was prescribed tramadol by Dr. Racquel Paige. This did not relieve his pain. Nor did Tylenol relieve his pain. the pain is not constant, it feels more like \"bubbles\" in his abdomen. When he took 5 mg of oxycodone, he asked nurse for another dose for pain relief. He has been using 10 mg of oxycodone with significant pain relief. No side effects from oxycodone have been noted, he currently denies having nausea or vomiting.   Last night he did vomit once. No constipation, his last bowel movement was 4 days ago. He has been on liquid diet. He is looking forward to go to [facility] next week for his neuroendocrine work-up. Information for this consult was obtained from: Chart reviewed     Objective Data  BP (!) 160/73   Pulse 60   Temp 98.8 °F (37.1 °C) (Oral)   Resp 16   Ht 6' (1.829 m)   Wt 190 lb 11.2 oz (86.5 kg)   SpO2 96%   BMI 25.86 kg/m²       Physical Examination:  Gen: elderly, thin, NAD, awake, alert   HEENT: normocephalic, atraumatic, PERRL, EOMI,   Neck: trachea midline, no JVD  Lungs: respirations easy and not labored,   Heart: regular rate and rhythm, distant heart tones,   Abdomen: Distended abdomen   Extremities: no clubbing, cyanosis or edema, moving all extremities    Skin: warm, dry without rashes, lesions, bruising  Neuro: awake, alert, oriented x 3, follows commands,       I/O last 3 completed shifts: In: 360 [P.O.:360]  Out: -     Recent Labs     09/30/20  1733 10/01/20  0416 10/02/20  0355   HGB 16.0 14.9 13.5   HCT 46.7 43.4 39.6   WBC 8.5 8.9 6.6    175 161    133 136   K 4.3 4.4 4.0   CL 99 100 101   CREATININE 0.7 0.7 0.8   BUN 26* 28* 27*       Time/Communication  Greater than 50% of time spent, total 25 minutes in counseling and coordination of care at the bedside regarding symptom management.

## 2020-10-03 ENCOUNTER — APPOINTMENT (OUTPATIENT)
Dept: GENERAL RADIOLOGY | Age: 80
DRG: 845 | End: 2020-10-03
Attending: SURGERY
Payer: MEDICARE

## 2020-10-03 PROCEDURE — 2500000003 HC RX 250 WO HCPCS: Performed by: INTERNAL MEDICINE

## 2020-10-03 PROCEDURE — 6360000002 HC RX W HCPCS: Performed by: SURGERY

## 2020-10-03 PROCEDURE — 6370000000 HC RX 637 (ALT 250 FOR IP): Performed by: STUDENT IN AN ORGANIZED HEALTH CARE EDUCATION/TRAINING PROGRAM

## 2020-10-03 PROCEDURE — 71045 X-RAY EXAM CHEST 1 VIEW: CPT

## 2020-10-03 PROCEDURE — 2580000003 HC RX 258: Performed by: SURGERY

## 2020-10-03 PROCEDURE — 2580000003 HC RX 258: Performed by: STUDENT IN AN ORGANIZED HEALTH CARE EDUCATION/TRAINING PROGRAM

## 2020-10-03 PROCEDURE — 1200000000 HC SEMI PRIVATE

## 2020-10-03 RX ORDER — SODIUM CHLORIDE, SODIUM LACTATE, POTASSIUM CHLORIDE, CALCIUM CHLORIDE 600; 310; 30; 20 MG/100ML; MG/100ML; MG/100ML; MG/100ML
INJECTION, SOLUTION INTRAVENOUS CONTINUOUS
Status: DISCONTINUED | OUTPATIENT
Start: 2020-10-03 | End: 2020-10-04 | Stop reason: HOSPADM

## 2020-10-03 RX ORDER — METOPROLOL TARTRATE 5 MG/5ML
5 INJECTION INTRAVENOUS EVERY 6 HOURS
Status: DISCONTINUED | OUTPATIENT
Start: 2020-10-03 | End: 2020-10-04 | Stop reason: HOSPADM

## 2020-10-03 RX ADMIN — HYDROMORPHONE HYDROCHLORIDE 0.5 MG: 1 INJECTION, SOLUTION INTRAMUSCULAR; INTRAVENOUS; SUBCUTANEOUS at 15:44

## 2020-10-03 RX ADMIN — OXYCODONE HYDROCHLORIDE 10 MG: 5 TABLET ORAL at 06:49

## 2020-10-03 RX ADMIN — HYDROMORPHONE HYDROCHLORIDE 0.5 MG: 1 INJECTION, SOLUTION INTRAMUSCULAR; INTRAVENOUS; SUBCUTANEOUS at 20:28

## 2020-10-03 RX ADMIN — SODIUM CHLORIDE, PRESERVATIVE FREE 10 ML: 5 INJECTION INTRAVENOUS at 08:47

## 2020-10-03 RX ADMIN — SODIUM CHLORIDE, POTASSIUM CHLORIDE, SODIUM LACTATE AND CALCIUM CHLORIDE: 600; 310; 30; 20 INJECTION, SOLUTION INTRAVENOUS at 22:44

## 2020-10-03 RX ADMIN — SODIUM CHLORIDE, POTASSIUM CHLORIDE, SODIUM LACTATE AND CALCIUM CHLORIDE: 600; 310; 30; 20 INJECTION, SOLUTION INTRAVENOUS at 08:47

## 2020-10-03 RX ADMIN — SODIUM CHLORIDE, POTASSIUM CHLORIDE, SODIUM LACTATE AND CALCIUM CHLORIDE: 600; 310; 30; 20 INJECTION, SOLUTION INTRAVENOUS at 14:53

## 2020-10-03 RX ADMIN — ONDANSETRON 4 MG: 4 TABLET, ORALLY DISINTEGRATING ORAL at 04:39

## 2020-10-03 RX ADMIN — PANTOPRAZOLE SODIUM 40 MG: 40 TABLET, DELAYED RELEASE ORAL at 06:49

## 2020-10-03 RX ADMIN — METOPROLOL TARTRATE 5 MG: 5 INJECTION INTRAVENOUS at 17:52

## 2020-10-03 ASSESSMENT — PAIN DESCRIPTION - ONSET
ONSET: ON-GOING
ONSET: GRADUAL
ONSET: GRADUAL

## 2020-10-03 ASSESSMENT — PAIN DESCRIPTION - FREQUENCY
FREQUENCY: CONTINUOUS

## 2020-10-03 ASSESSMENT — PAIN DESCRIPTION - PROGRESSION
CLINICAL_PROGRESSION: GRADUALLY WORSENING
CLINICAL_PROGRESSION: GRADUALLY WORSENING
CLINICAL_PROGRESSION: NOT CHANGED

## 2020-10-03 ASSESSMENT — PAIN DESCRIPTION - DESCRIPTORS
DESCRIPTORS: DISCOMFORT;SHARP;ACHING
DESCRIPTORS: SHARP;ACHING
DESCRIPTORS: DISCOMFORT;SHARP;ACHING

## 2020-10-03 ASSESSMENT — PAIN SCALES - GENERAL
PAINLEVEL_OUTOF10: 10
PAINLEVEL_OUTOF10: 7
PAINLEVEL_OUTOF10: 8
PAINLEVEL_OUTOF10: 2
PAINLEVEL_OUTOF10: 0
PAINLEVEL_OUTOF10: 6

## 2020-10-03 ASSESSMENT — PAIN DESCRIPTION - PAIN TYPE
TYPE: ACUTE PAIN

## 2020-10-03 ASSESSMENT — PAIN DESCRIPTION - ORIENTATION
ORIENTATION: LOWER
ORIENTATION: LOWER;LEFT;RIGHT
ORIENTATION: LOWER

## 2020-10-03 ASSESSMENT — PAIN DESCRIPTION - LOCATION
LOCATION: ABDOMEN

## 2020-10-03 ASSESSMENT — PAIN - FUNCTIONAL ASSESSMENT
PAIN_FUNCTIONAL_ASSESSMENT: ACTIVITIES ARE NOT PREVENTED

## 2020-10-03 NOTE — PROGRESS NOTES
Spoke to Dr. Sindhu Ro who read xray report for NG placement, he said advancing the NG 4-5 cm would be helpful. Will advance NG as directed and get repeat xray to verify placement.

## 2020-10-03 NOTE — PROGRESS NOTES
infusion, , Intravenous, Continuous, Blanca Terry MD, Last Rate: 125 mL/hr at 10/03/20 0847    magnesium hydroxide (MILK OF MAGNESIA) 400 MG/5ML suspension 30 mL, 30 mL, Oral, Daily, Jose Prince MD, 30 mL at 10/02/20 8576    polyethylene glycol (GLYCOLAX) packet 17 g, 17 g, Oral, Daily, Margaret Jacob MD, 17 g at 10/02/20 3099    sennosides-docusate sodium (SENOKOT-S) 8.6-50 MG tablet 1 tablet, 1 tablet, Oral, BID, Eda Raza MD, 1 tablet at 10/02/20 2025    oxyCODONE (ROXICODONE) immediate release tablet 10 mg, 10 mg, Oral, Q4H PRN, Jose Prince MD, 10 mg at 10/03/20 7535    sodium chloride flush 0.9 % injection 10 mL, 10 mL, Intravenous, 2 times per day, Thi Johns MD, 10 mL at 10/03/20 0847    sodium chloride flush 0.9 % injection 10 mL, 10 mL, Intravenous, PRN, Thi Johns MD    ondansetron (ZOFRAN-ODT) disintegrating tablet 4 mg, 4 mg, Oral, Q8H PRN, 4 mg at 10/03/20 0439 **OR** ondansetron (ZOFRAN) injection 4 mg, 4 mg, Intravenous, Q6H PRN, Thi Johns MD, 4 mg at 10/02/20 1430    enoxaparin (LOVENOX) injection 40 mg, 40 mg, Subcutaneous, Daily, Thi Johns MD    acetaminophen (TYLENOL) tablet 650 mg, 650 mg, Oral, Q4H PRN, Thi Johns MD, 650 mg at 10/02/20 1759    amLODIPine (NORVASC) tablet 2.5 mg, 2.5 mg, Oral, Daily, Thi Johns MD, 2.5 mg at 10/02/20 1198    metoprolol succinate (TOPROL XL) extended release tablet 25 mg, 25 mg, Oral, Daily, Thi Johns MD, 25 mg at 10/02/20 9930    pantoprazole (PROTONIX) tablet 40 mg, 40 mg, Oral, QAM AC, Thi Johns MD, 40 mg at 10/03/20 5102    baclofen (LIORESAL) tablet 10 mg, 10 mg, Oral, TID, Thi Johns MD, 10 mg at 10/02/20 2025    Assessment:    Active Problems:    Abdominal pain  Resolved Problems:    * No resolved hospital problems.  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 2.  Weight 30 to 40 pounds secondary to malignancy. Admit to the hospital for abdominal pain and evidence of small bowel obstruction. 9/30/2020 CT scan abdomen pelvis  Diffuse abdominal ascites which may be malignant. Minimal evidence for    omental caking at this time.         Small bowel obstruction appears present to the level of the right    lower quadrant where scarring may be present.         Distended gallbladder suggesting cystic duct obstruction. Clinically    correlate for acute cholecystitis. Patient is followed by surgery. Plan:  Metastatic intestinal neuroendocrine tumor. Patient now has evidence of small bowel obstruction. He had NG tube in place. Patient has been not able to eat for many days. He probably need TPN  Continue current supportive care. He was supposed to start lanreotide injections next week and to be seen at [facility] for second opinion. Plan is on hold for now. Hopefully it will be restarted with resolution of SBO.         Electronically signed by Fany Dowling MD on 10/3/2020 at 11:57 AM

## 2020-10-03 NOTE — PROGRESS NOTES
Department of Surgery - Adult  General Surgery  Dr. Francisco Lucas's Progress Note      SUBJECTIVE: No significant change in the patient's clinical status    OBJECTIVE      Physical    VITALS:  /60   Pulse 68   Temp 97.7 °F (36.5 °C) (Oral)   Resp 18   Ht 6' (1.829 m)   Wt 193 lb 1.6 oz (87.6 kg)   SpO2 93%   BMI 26.19 kg/m²   INTAKE/OUTPUT:      Intake/Output Summary (Last 24 hours) at 10/3/2020 0835  Last data filed at 10/2/2020 1927  Gross per 24 hour   Intake 10 ml   Output --   Net 10 ml     TEMPERATURE:  Current - Temp: 97.7 °F (36.5 °C); Max - Temp  Av.8 °F (36.6 °C)  Min: 97.7 °F (36.5 °C)  Max: 97.9 °F (36.6 °C)  RESPIRATIONS RANGE: Resp  Av.5  Min: 17  Max: 18  PULSE RANGE: Pulse  Av  Min: 60  Max: 68  BLOOD PRESSURE RANGE:  Systolic (41WRB), UGQ:951 , Min:122 , OCS:792   ; Diastolic (00MQB), OFR:08, Min:60, Max:79    PULSE OXIMETRY RANGE: SpO2  Av %  Min: 93 %  Max: 93 %  CONSTITUTIONAL:  awake, alert, cooperative, no apparent distress, and appears stated age  ABDOMEN: Unfortunately, the patient remains distended and tympanitic. The patient has a minimal abdominal tenderness.   Data  CBC with Differential:    Lab Results   Component Value Date    WBC 6.6 10/02/2020    RBC 4.39 10/02/2020    HGB 13.5 10/02/2020    HCT 39.6 10/02/2020     10/02/2020    MCV 90.2 10/02/2020    MCH 30.8 10/02/2020    MCHC 34.1 10/02/2020    RDW 13.2 10/02/2020    LYMPHOPCT 10.0 10/02/2020    MONOPCT 11.4 10/02/2020    BASOPCT 0.2 10/02/2020    MONOSABS 0.75 10/02/2020    LYMPHSABS 0.66 10/02/2020    EOSABS 0.03 10/02/2020    BASOSABS 0.01 10/02/2020     CMP:    Lab Results   Component Value Date     10/02/2020    K 4.0 10/02/2020     10/02/2020    CO2 27 10/02/2020    BUN 27 10/02/2020    CREATININE 0.8 10/02/2020    GFRAA >60 10/02/2020    LABGLOM >60 10/02/2020    GLUCOSE 97 10/02/2020    GLUCOSE 125 2012    PROT 5.2 10/02/2020    LABALBU 2.9 10/02/2020    LABALBU 4.6 06/03/2012    CALCIUM 8.6 10/02/2020    BILITOT 0.7 10/02/2020    ALKPHOS 88 10/02/2020    AST 11 10/02/2020    ALT 6 10/02/2020     BMP:  Hepatic Function Panel:  Ionized Calcium:  No results found for: IONCA  Magnesium:  No results found for: MG  Phosphorus:  No results found for: PHOS    Current Inpatient Medications    Current Facility-Administered Medications: lactated ringers infusion, , Intravenous, Continuous  magnesium hydroxide (MILK OF MAGNESIA) 400 MG/5ML suspension 30 mL, 30 mL, Oral, Daily  polyethylene glycol (GLYCOLAX) packet 17 g, 17 g, Oral, Daily  sennosides-docusate sodium (SENOKOT-S) 8.6-50 MG tablet 1 tablet, 1 tablet, Oral, BID  oxyCODONE (ROXICODONE) immediate release tablet 10 mg, 10 mg, Oral, Q4H PRN  sodium chloride flush 0.9 % injection 10 mL, 10 mL, Intravenous, 2 times per day  sodium chloride flush 0.9 % injection 10 mL, 10 mL, Intravenous, PRN  ondansetron (ZOFRAN-ODT) disintegrating tablet 4 mg, 4 mg, Oral, Q8H PRN **OR** ondansetron (ZOFRAN) injection 4 mg, 4 mg, Intravenous, Q6H PRN  enoxaparin (LOVENOX) injection 40 mg, 40 mg, Subcutaneous, Daily  acetaminophen (TYLENOL) tablet 650 mg, 650 mg, Oral, Q4H PRN  amLODIPine (NORVASC) tablet 2.5 mg, 2.5 mg, Oral, Daily  metoprolol succinate (TOPROL XL) extended release tablet 25 mg, 25 mg, Oral, Daily  pantoprazole (PROTONIX) tablet 40 mg, 40 mg, Oral, QAM AC  baclofen (LIORESAL) tablet 10 mg, 10 mg, Oral, TID    ASSESSMENT:    79 y.o. male with a malignant small bowel obstruction. The patient continues to have singultus and eructations. PLAN:  Placement of nasogastric tube to low intermittent suction  TPN  Oncology for further treatment.   Zi Davenport 10/3/53231:35 AM

## 2020-10-04 VITALS
SYSTOLIC BLOOD PRESSURE: 156 MMHG | HEART RATE: 68 BPM | TEMPERATURE: 98.6 F | OXYGEN SATURATION: 94 % | WEIGHT: 192.4 LBS | DIASTOLIC BLOOD PRESSURE: 77 MMHG | HEIGHT: 72 IN | BODY MASS INDEX: 26.06 KG/M2 | RESPIRATION RATE: 18 BRPM

## 2020-10-04 LAB
ALBUMIN SERPL-MCNC: 3.2 G/DL (ref 3.5–5.2)
ALP BLD-CCNC: 99 U/L (ref 40–129)
ALT SERPL-CCNC: 9 U/L (ref 0–40)
ANION GAP SERPL CALCULATED.3IONS-SCNC: 9 MMOL/L (ref 7–16)
AST SERPL-CCNC: 16 U/L (ref 0–39)
BILIRUB SERPL-MCNC: 1.2 MG/DL (ref 0–1.2)
BUN BLDV-MCNC: 29 MG/DL (ref 8–23)
CALCIUM SERPL-MCNC: 8.5 MG/DL (ref 8.6–10.2)
CHLORIDE BLD-SCNC: 99 MMOL/L (ref 98–107)
CO2: 29 MMOL/L (ref 22–29)
CREAT SERPL-MCNC: 0.8 MG/DL (ref 0.7–1.2)
GFR AFRICAN AMERICAN: >60
GFR NON-AFRICAN AMERICAN: >60 ML/MIN/1.73
GLUCOSE BLD-MCNC: 88 MG/DL (ref 74–99)
HCT VFR BLD CALC: 40.7 % (ref 37–54)
HEMOGLOBIN: 13.6 G/DL (ref 12.5–16.5)
MCH RBC QN AUTO: 30.6 PG (ref 26–35)
MCHC RBC AUTO-ENTMCNC: 33.4 % (ref 32–34.5)
MCV RBC AUTO: 91.7 FL (ref 80–99.9)
PDW BLD-RTO: 13.2 FL (ref 11.5–15)
PLATELET # BLD: 165 E9/L (ref 130–450)
PMV BLD AUTO: 11.6 FL (ref 7–12)
POTASSIUM SERPL-SCNC: 3.8 MMOL/L (ref 3.5–5)
RBC # BLD: 4.44 E12/L (ref 3.8–5.8)
SODIUM BLD-SCNC: 137 MMOL/L (ref 132–146)
TOTAL PROTEIN: 5.6 G/DL (ref 6.4–8.3)
WBC # BLD: 6.5 E9/L (ref 4.5–11.5)

## 2020-10-04 PROCEDURE — 2500000003 HC RX 250 WO HCPCS: Performed by: INTERNAL MEDICINE

## 2020-10-04 PROCEDURE — C9113 INJ PANTOPRAZOLE SODIUM, VIA: HCPCS | Performed by: INTERNAL MEDICINE

## 2020-10-04 PROCEDURE — 36415 COLL VENOUS BLD VENIPUNCTURE: CPT

## 2020-10-04 PROCEDURE — 2580000003 HC RX 258: Performed by: INTERNAL MEDICINE

## 2020-10-04 PROCEDURE — 2580000003 HC RX 258: Performed by: SURGERY

## 2020-10-04 PROCEDURE — 85027 COMPLETE CBC AUTOMATED: CPT

## 2020-10-04 PROCEDURE — 6360000002 HC RX W HCPCS: Performed by: INTERNAL MEDICINE

## 2020-10-04 PROCEDURE — 6360000002 HC RX W HCPCS: Performed by: SURGERY

## 2020-10-04 PROCEDURE — 6360000002 HC RX W HCPCS: Performed by: STUDENT IN AN ORGANIZED HEALTH CARE EDUCATION/TRAINING PROGRAM

## 2020-10-04 PROCEDURE — 80053 COMPREHEN METABOLIC PANEL: CPT

## 2020-10-04 RX ORDER — PANTOPRAZOLE SODIUM 40 MG/10ML
40 INJECTION, POWDER, LYOPHILIZED, FOR SOLUTION INTRAVENOUS DAILY
Status: DISCONTINUED | OUTPATIENT
Start: 2020-10-04 | End: 2020-10-04 | Stop reason: HOSPADM

## 2020-10-04 RX ORDER — SODIUM CHLORIDE 9 MG/ML
10 INJECTION INTRAVENOUS DAILY
Status: DISCONTINUED | OUTPATIENT
Start: 2020-10-04 | End: 2020-10-04 | Stop reason: HOSPADM

## 2020-10-04 RX ADMIN — ONDANSETRON 4 MG: 2 INJECTION INTRAMUSCULAR; INTRAVENOUS at 05:18

## 2020-10-04 RX ADMIN — HYDROMORPHONE HYDROCHLORIDE 0.5 MG: 1 INJECTION, SOLUTION INTRAMUSCULAR; INTRAVENOUS; SUBCUTANEOUS at 13:41

## 2020-10-04 RX ADMIN — SODIUM CHLORIDE, PRESERVATIVE FREE 10 ML: 5 INJECTION INTRAVENOUS at 09:24

## 2020-10-04 RX ADMIN — METOPROLOL TARTRATE 5 MG: 5 INJECTION INTRAVENOUS at 05:19

## 2020-10-04 RX ADMIN — SODIUM CHLORIDE, POTASSIUM CHLORIDE, SODIUM LACTATE AND CALCIUM CHLORIDE: 600; 310; 30; 20 INJECTION, SOLUTION INTRAVENOUS at 05:25

## 2020-10-04 RX ADMIN — HYDROMORPHONE HYDROCHLORIDE 0.5 MG: 1 INJECTION, SOLUTION INTRAMUSCULAR; INTRAVENOUS; SUBCUTANEOUS at 05:20

## 2020-10-04 RX ADMIN — METOPROLOL TARTRATE 5 MG: 5 INJECTION INTRAVENOUS at 11:50

## 2020-10-04 RX ADMIN — HYDROMORPHONE HYDROCHLORIDE 0.5 MG: 1 INJECTION, SOLUTION INTRAMUSCULAR; INTRAVENOUS; SUBCUTANEOUS at 00:39

## 2020-10-04 RX ADMIN — PANTOPRAZOLE SODIUM 40 MG: 40 INJECTION, POWDER, FOR SOLUTION INTRAVENOUS at 09:24

## 2020-10-04 RX ADMIN — ONDANSETRON 4 MG: 2 INJECTION INTRAMUSCULAR; INTRAVENOUS at 11:50

## 2020-10-04 RX ADMIN — METOPROLOL TARTRATE 5 MG: 5 INJECTION INTRAVENOUS at 00:38

## 2020-10-04 ASSESSMENT — PAIN DESCRIPTION - PAIN TYPE
TYPE: ACUTE PAIN
TYPE: ACUTE PAIN

## 2020-10-04 ASSESSMENT — PAIN DESCRIPTION - DESCRIPTORS
DESCRIPTORS: SHARP;DISCOMFORT;ACHING
DESCRIPTORS: SHARP;ACHING

## 2020-10-04 ASSESSMENT — PAIN SCALES - GENERAL
PAINLEVEL_OUTOF10: 7
PAINLEVEL_OUTOF10: 7
PAINLEVEL_OUTOF10: 0
PAINLEVEL_OUTOF10: 7
PAINLEVEL_OUTOF10: 0
PAINLEVEL_OUTOF10: 0

## 2020-10-04 ASSESSMENT — PAIN - FUNCTIONAL ASSESSMENT
PAIN_FUNCTIONAL_ASSESSMENT: ACTIVITIES ARE NOT PREVENTED
PAIN_FUNCTIONAL_ASSESSMENT: ACTIVITIES ARE NOT PREVENTED

## 2020-10-04 ASSESSMENT — PAIN DESCRIPTION - ORIENTATION
ORIENTATION: LOWER
ORIENTATION: LOWER

## 2020-10-04 ASSESSMENT — PAIN DESCRIPTION - PROGRESSION
CLINICAL_PROGRESSION: NOT CHANGED
CLINICAL_PROGRESSION: GRADUALLY WORSENING

## 2020-10-04 ASSESSMENT — PAIN DESCRIPTION - LOCATION
LOCATION: ABDOMEN
LOCATION: ABDOMEN

## 2020-10-04 ASSESSMENT — PAIN DESCRIPTION - FREQUENCY
FREQUENCY: CONTINUOUS
FREQUENCY: CONTINUOUS

## 2020-10-04 ASSESSMENT — PAIN DESCRIPTION - ONSET
ONSET: PROGRESSIVE
ONSET: GRADUAL

## 2020-10-04 NOTE — PROGRESS NOTES
[facility] arranged for transfer to [facility]. States they will be here in about 90 minutes. Electronically signed by Tolu Watson RN on 10/4/2020 at 1:49 PM      Dr. Meredith Villarreal made aware.   Electronically signed by Tolu Watson RN on 10/4/2020 at 2:01 PM

## 2020-10-04 NOTE — PROGRESS NOTES
Subjective:    Patient has fatigue and tiredness. Still has NG tube to suction. He has hiccups. No chest pain or dyspnea. No significant nausea. Abdominal pain seems to be better today. The swelling also improved. Still no flatus. Objective:    BP (!) 148/73   Pulse 69   Temp 99 °F (37.2 °C) (Oral)   Resp 18   Ht 6' (1.829 m)   Wt 192 lb 6.4 oz (87.3 kg)   SpO2 94%   BMI 26.09 kg/m²     General: NAD  HEENT: No thrush or mucositis, EOMI, PERRLA  Heart:  RRR, no murmurs, gallops, or rubs. Lungs:  CTA bilaterally, no wheeze, rales or rhonchi  Abd: Mildly distended, no significant tenderness on palpation. No rebound. Bowel sounds very active in the lower quadrants.   Extrem:  No clubbing, cyanosis, or edema  Lymphatics: No palpable adenopathy in cervical and supraclavicular regions  Skin: Intact, no petechia or purpura    CBC with Differential:    Lab Results   Component Value Date    WBC 6.5 10/04/2020    RBC 4.44 10/04/2020    HGB 13.6 10/04/2020    HCT 40.7 10/04/2020     10/04/2020    MCV 91.7 10/04/2020    MCH 30.6 10/04/2020    MCHC 33.4 10/04/2020    RDW 13.2 10/04/2020    LYMPHOPCT 10.0 10/02/2020    MONOPCT 11.4 10/02/2020    BASOPCT 0.2 10/02/2020    MONOSABS 0.75 10/02/2020    LYMPHSABS 0.66 10/02/2020    EOSABS 0.03 10/02/2020    BASOSABS 0.01 10/02/2020     CMP:    Lab Results   Component Value Date     10/04/2020    K 3.8 10/04/2020    K 4.0 10/02/2020    CL 99 10/04/2020    CO2 29 10/04/2020    BUN 29 10/04/2020    CREATININE 0.8 10/04/2020    GFRAA >60 10/04/2020    LABGLOM >60 10/04/2020    GLUCOSE 88 10/04/2020    GLUCOSE 125 06/03/2012    PROT 5.6 10/04/2020    LABALBU 3.2 10/04/2020    LABALBU 4.6 06/03/2012    CALCIUM 8.5 10/04/2020    BILITOT 1.2 10/04/2020    ALKPHOS 99 10/04/2020    AST 16 10/04/2020    ALT 9 10/04/2020              Current Facility-Administered Medications:     pantoprazole (PROTONIX) injection 40 mg, 40 mg, Intravenous, Daily, 40 mg at 10/04/20 0924 **AND** sodium chloride (PF) 0.9 % injection 10 mL, 10 mL, Intravenous, Daily, Flores Villavicencio DO, 10 mL at 10/04/20 3826    lactated ringers infusion, , Intravenous, Continuous, Mimi Manzo MD, Last Rate: 125 mL/hr at 10/04/20 0525    HYDROmorphone (DILAUDID) injection 0.5 mg, 0.5 mg, Intravenous, Q3H PRN, Mimi Manzo MD, 0.5 mg at 10/04/20 0520    metoprolol (LOPRESSOR) injection 5 mg, 5 mg, Intravenous, Q6H, Flores Villavicencio DO, 5 mg at 10/04/20 9369    magnesium hydroxide (MILK OF MAGNESIA) 400 MG/5ML suspension 30 mL, 30 mL, Oral, Daily, Nidia Negro MD, 30 mL at 10/02/20 1621    polyethylene glycol (GLYCOLAX) packet 17 g, 17 g, Oral, Daily, Mitch Butler MD, 17 g at 10/02/20 8828    sennosides-docusate sodium (SENOKOT-S) 8.6-50 MG tablet 1 tablet, 1 tablet, Oral, BID, Davian Watkins MD, 1 tablet at 10/02/20 2025    oxyCODONE (ROXICODONE) immediate release tablet 10 mg, 10 mg, Oral, Q4H PRN, Nidia Negro MD, 10 mg at 10/03/20 5068    sodium chloride flush 0.9 % injection 10 mL, 10 mL, Intravenous, 2 times per day, Denise Su MD, 10 mL at 10/03/20 0847    sodium chloride flush 0.9 % injection 10 mL, 10 mL, Intravenous, PRN, Denise Su MD    ondansetron (ZOFRAN-ODT) disintegrating tablet 4 mg, 4 mg, Oral, Q8H PRN, 4 mg at 10/03/20 0439 **OR** ondansetron (ZOFRAN) injection 4 mg, 4 mg, Intravenous, Q6H PRN, Denise Su MD, 4 mg at 10/04/20 0518    enoxaparin (LOVENOX) injection 40 mg, 40 mg, Subcutaneous, Daily, Denise Su MD    acetaminophen (TYLENOL) tablet 650 mg, 650 mg, Oral, Q4H PRN, Denise Su MD, 650 mg at 10/02/20 1759    amLODIPine (NORVASC) tablet 2.5 mg, 2.5 mg, Oral, Daily, Denise Su MD, 2.5 mg at 10/02/20 3856    metoprolol succinate (TOPROL XL) extended release tablet 25 mg, 25 mg, Oral, Daily, Denise Su MD, 25 mg at 10/02/20 6773    baclofen (LIORESAL) tablet 10 mg, 10 mg, Oral, TID, Denise Su, MD, 10 mg at 10/02/20 2025     9/30/2020 CT scan abdomen pelvis  Diffuse abdominal ascites which may be malignant. Minimal evidence for    omental caking at this time.         Small bowel obstruction appears present to the level of the right    lower quadrant where scarring may be present.         Distended gallbladder suggesting cystic duct obstruction. Clinically    correlate for acute cholecystitis. Assessment:    Active Problems:    Abdominal pain  Resolved Problems:    * No resolved hospital problems. 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    2.  Weight 30 to 40 pounds secondary to malignancy. Admit to the hospital for abdominal pain and evidence of small bowel obstruction. Status post NG tube placement. Plan:  Patient will be started on TPN today. Blood counts and complete metabolic profile are adequate so far except for mild decrease in albumin. Continue Lovenox prophylaxis  Patient not requiring pain medication so far. Continue current supportive care.         Electronically signed by Vicki Sheldon MD on 10/4/2020 at 11:42 AM

## 2020-10-04 NOTE — PLAN OF CARE
Problem: Pain:  Goal: Pain level will decrease  Description: Pain level will decrease  10/4/2020 0102 by Lidia Humphreys  Outcome: Ongoing  10/3/2020 1212 by Jeff Lopez RN  Outcome: Met This Shift  10/3/2020 1210 by Jeff Lopez RN  Outcome: Met This Shift  Goal: Control of acute pain  Description: Control of acute pain  10/4/2020 0102 by Lidia Humphreys  Outcome: Ongoing  10/3/2020 1212 by Jeff Lopez RN  Outcome: Met This Shift  10/3/2020 1210 by Jeff Lopez RN  Outcome: Met This Shift  Goal: Control of chronic pain  Description: Control of chronic pain  10/4/2020 0102 by Lidia Humphreys  Outcome: Ongoing  10/3/2020 1212 by Jeff Lopez RN  Outcome: Met This Shift  10/3/2020 1210 by Jeff Lopez RN  Outcome: Met This Shift

## 2020-10-04 NOTE — CONSULTS
[address] [address]                                  CONSULTATION    PATIENT NAME: Francisca Pascual                     :        1940  MED REC NO:   [id number]                            ROOM:       [id number]  ACCOUNT NO:   [de-identified]                           ADMIT DATE: 2020  PROVIDER:     Praful Cohen DO    CONSULT DATE:  10/04/2020    ADMITTING PHYSICIAN:  Darlene Lucas M.D. REASON FOR CONSULTATION:  Medical management. CHIEF COMPLAINT:  Abdominal pain. HISTORY OF PRESENT ILLNESS:  The patient is a 61-year-old   male, who is admitted to the hospital on 2020 by Surgery for  complaints of abdominal pain, loss of appetite, and weight loss. He has  a recent diagnosis of abdominal carcinomatosis from neuroendocrine  neoplasm. Diagnostic imaging revealed findings consistent with small  bowel obstruction. The patient had an NG tube inserted on this  admission and is now n.p.o. PAST MEDICAL HISTORY:  Neuroendocrine neoplasm, abdominal aortic  aneurysm, coronary artery disease, colon cancer, GERD, left eye  glaucoma, hyperlipidemia, hypertension, and TIA. PAST SURGICAL HISTORY:  Bilateral carotid endarterectomy, heart  catheterization, left eye glaucoma surgery, bilateral eye cataract  surgery, skin cancer removal from face, umbilical hernia repair, and  tonsillectomy. MEDICATIONS PRIOR TO ADMISSION:  Toprol-XL, Mag-Ox, Norvasc, aspirin,  Prilosec, and Lipitor. SOCIAL HISTORY:  The patient quit tobacco.  Occasional alcohol. REVIEW OF SYSTEMS:  Remarkable for above-stated chief complaint plus  allergies none known plus current hiccups. PHYSICAL EXAMINATION:  GENERAL:  Physical exam reveals a 61-year-old  male, who is  alert and oriented x3, cooperative and a good historian. VITAL SIGNS:  Temperature 98.4, pulse 69, respirations 16, blood  pressure 168/80.   HEENT:

## 2020-10-04 NOTE — PROGRESS NOTES
Spoke with [facility], Pt is going to [facility], [address] Bed 38. Nurse to nurse number was provided, [phone].

## 2020-10-04 NOTE — PROGRESS NOTES
GLUCOSE 125 06/03/2012    PROT 5.6 10/04/2020    LABALBU 3.2 10/04/2020    LABALBU 4.6 06/03/2012    CALCIUM 8.5 10/04/2020    BILITOT 1.2 10/04/2020    ALKPHOS 99 10/04/2020    AST 16 10/04/2020    ALT 9 10/04/2020     BMP:  Hepatic Function Panel:  Ionized Calcium:  No results found for: IONCA  Magnesium:  No results found for: MG  Phosphorus:  No results found for: PHOS    Current Inpatient Medications    Current Facility-Administered Medications: pantoprazole (PROTONIX) injection 40 mg, 40 mg, Intravenous, Daily **AND** sodium chloride (PF) 0.9 % injection 10 mL, 10 mL, Intravenous, Daily  lactated ringers infusion, , Intravenous, Continuous  HYDROmorphone (DILAUDID) injection 0.5 mg, 0.5 mg, Intravenous, Q3H PRN  metoprolol (LOPRESSOR) injection 5 mg, 5 mg, Intravenous, Q6H  magnesium hydroxide (MILK OF MAGNESIA) 400 MG/5ML suspension 30 mL, 30 mL, Oral, Daily  polyethylene glycol (GLYCOLAX) packet 17 g, 17 g, Oral, Daily  sennosides-docusate sodium (SENOKOT-S) 8.6-50 MG tablet 1 tablet, 1 tablet, Oral, BID  oxyCODONE (ROXICODONE) immediate release tablet 10 mg, 10 mg, Oral, Q4H PRN  sodium chloride flush 0.9 % injection 10 mL, 10 mL, Intravenous, 2 times per day  sodium chloride flush 0.9 % injection 10 mL, 10 mL, Intravenous, PRN  ondansetron (ZOFRAN-ODT) disintegrating tablet 4 mg, 4 mg, Oral, Q8H PRN **OR** ondansetron (ZOFRAN) injection 4 mg, 4 mg, Intravenous, Q6H PRN  enoxaparin (LOVENOX) injection 40 mg, 40 mg, Subcutaneous, Daily  acetaminophen (TYLENOL) tablet 650 mg, 650 mg, Oral, Q4H PRN  amLODIPine (NORVASC) tablet 2.5 mg, 2.5 mg, Oral, Daily  metoprolol succinate (TOPROL XL) extended release tablet 25 mg, 25 mg, Oral, Daily  baclofen (LIORESAL) tablet 10 mg, 10 mg, Oral, TID    ASSESSMENT:    79 y.o. male with a small bowel obstruction related to carcinomatosis. PLAN:    The patient will undergo a contrasted CT of the abdomen and pelvis tomorrow.   Any further evaluation and treatment will be

## 2020-10-04 NOTE — PROGRESS NOTES
Spoke with pt's family members, Mandy Garvey and Yumiko Rodriguez, and updated them on the patients room assignment. Yumiko Rodriguez had stated that she was going to talk to pt's wife and update her.

## 2020-10-09 NOTE — PROGRESS NOTES
Physician Progress Note      PATIENT:               Viridiana Mendoza  CSN #:                  [id number]  :                       1940  ADMIT DATE:       2020 3:49 PM  [address] [place] DATE:        10/4/2020 4:00 PM  RESPONDING  PROVIDER #:        Jaye Nissen MD          QUERY TEXT:    Dear Surgical Attending,    Pt admitted with abdominal pain and anorexia. 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 If possible, please   document in progress notes and discharge summary the relationship, if any,   between abdominal pain  and metastatic neuroendocrine neoplasm with   carcinomatosis    The medical record reflects the following:  Risk Factors:  metastatic neuroendocrine neoplasm with carcinomatosis,   decreased po intake ,wt loss  Clinical Indicators: Per H/P,\". .diffuse abdominal pain that has been ongoing   for several days, associated with loss of appetite, nausea, dry heaves as well   as emesis. Patient states he has had diarrhea for the last couple of weeks. Patient states he is lost approximately 50 pounds over the last 6 months. Patient is complaining of hiccups. ..  abdominal pain, anorexia with known   metastatic neuroendocrine neoplasm with carcinomatosis. ?\"  Treatment: pain /nausea control, NPO ,IVF    Thank you,  Lux Friedman RN [facility]  Clinical Documentation Improvement Specialist  [phone]  Options provided:  -- Abdominal pain due to metastatic neuroendocrine neoplasm with   carcinomatosis  -- Abdominal pain unrelated to metastatic neuroendocrine neoplasm with   carcinomatosis  -- Other - I will add my own diagnosis  -- Disagree - Not applicable / Not valid  -- Disagree - Clinically unable to determine / Unknown  -- Refer to Clinical Documentation Reviewer    PROVIDER RESPONSE TEXT:    This patient has abdominal pain due to metastatic neuroendocrine neoplasm with   carcinomatosis .     Query created by: Jones Peterson on 10/1/2020 4:10 PM      Electronically signed by:  Andrea Dunham MD 10/9/2020 12:58 PM

## 2020-11-13 NOTE — DISCHARGE SUMMARY
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 male who presents for evaluation of abdominal pain, loss of appetite, weight loss. Patient is known to his service, having been seen in August, diagnostic laparoscopy showed carcinomatosis, biopsy-proven metastatic neuroendocrine neoplasm. At that time patient was complaining of abdominal pain as well as anorexia and weight loss. Patient is currently being followed at the [facility], saw his oncologist who got an x-ray. Oncologist talked with Dr. Bret Mehta and it was recommended that he come to the hospital.  Patient complains of diffuse abdominal pain that has been ongoing for several days, associated with loss of appetite, nausea, dry heaves as well as emesis. Patient states he has had diarrhea for the last couple of weeks. Patient states he is lost approximately 50 pounds over the last 6 months. Patient is complaining of hiccups. Hospital Course:   9/30: admitted from clinic with abdominal pain, anorexia, known metastatic neuroendocrine cancer with carcinomatosis  10/1: started on diet.  Oncology and palliative care consulted  10/2: oncology saw - plan to start lanreotide as outpatient. Bowel regimen increased  10/3: NG placed for burping and hiccups  10/4: [facility] and further care    Lab Results   Component Value Date    WBC 6.5 10/04/2020    HGB 13.6 10/04/2020     10/04/2020     10/04/2020    CL 99 10/04/2020    K 3.8 10/04/2020    K 4.0 10/02/2020    BUN 29 10/04/2020    CREATININE 0.8 10/04/2020    GLUCOSE 88 10/04/2020    GLUCOSE 125 06/03/2012    LABGLOM >60 10/04/2020    PROTIME 12.6 10/09/2013    PROTIME 11.7 06/03/2012    INR 1.2 10/09/2013    LABALBU 3.2 10/04/2020    LABALBU 4.6 06/03/2012    PROT 5.6 10/04/2020    CALCIUM 8.5 10/04/2020    BILITOT 1.2 10/04/2020    BILIDIR 0.2 06/03/2012    ALKPHOS 99 10/04/2020    AST 16 10/04/2020    ALT 9 10/04/2020       Disposition: transfer to another acute hospital       Medication List      ASK your doctor about these medications    amLODIPine 2.5 MG tablet  Commonly known as:  NORVASC     aspirin 81 MG chewable tablet     atorvastatin 10 MG tablet  Commonly known as:  LIPITOR     magnesium oxide 400 MG tablet  Commonly known as:  MAG-OX     metoprolol succinate 25 MG extended release tablet  Commonly known as:  TOPROL XL  TAKE 1 TABLET DAILY     omeprazole 20 MG delayed release capsule  Commonly known as:  PRILOSEC            Patient Instructions:   Constipation: Care Instructions  Your Care Instructions     Constipation means that you have a hard time passing stools (bowel movements). People pass stools from 3 times a day to once every 3 days. What is normal for you may be different. Constipation may occur with pain in the rectum and cramping. The pain may get worse when you try to pass stools. Sometimes there are small amounts of bright red blood on toilet paper or the surface of stools. This is because of enlarged veins near the rectum (hemorrhoids). A few changes in your diet and lifestyle may help you avoid ongoing constipation.  Your doctor may also prescribe medicine to help loosen your stool. Some medicines can cause constipation. These include pain medicines and antidepressants. Tell your doctor about all the medicines you take. Your doctor may want to make a medicine change to ease your symptoms. Follow-up care is a key part of your treatment and safety. Be sure to make and go to all appointments, and call your doctor if you are having problems. It's also a good idea to know your test results and keep a list of the medicines you take. How can you care for yourself at home? · Drink plenty of fluids, enough so that your urine is light yellow or clear like water. If you have kidney, heart, or liver disease and have to limit fluids, talk with your doctor before you increase the amount of fluids you drink. · Include high-fiber foods in your diet each day. These include fruits, vegetables, beans, and whole grains. · Get at least 30 minutes of exercise on most days of the week. Walking is a good choice. You also may want to do other activities, such as running, swimming, cycling, or playing tennis or team sports. · Take a fiber supplement, such as Citrucel or Metamucil, every day. Read and follow all instructions on the label. · Schedule time each day for a bowel movement. A daily routine may help. Take your time having your bowel movement. · Support your feet with a small step stool when you sit on the toilet. This helps flex your hips and places your pelvis in a squatting position. · Your doctor may recommend an over-the-counter laxative to relieve your constipation. Examples are Milk of Magnesia and MiraLax. Read and follow all instructions on the label. Do not use laxatives on a long-term basis. When should you call for help? Call your doctor now or seek immediate medical care if:  · You have new or worse belly pain. · You have new or worse nausea or vomiting. · You have blood in your stools.   Watch closely for changes in your health, and be sure to contact your doctor if:  · Your constipation is getting worse. · You do not get better as expected. Where can you learn more? Go to https://[email or web address].[email or web address]. org and sign in to your [employer] account. Enter 21 [phone] in the [facility] box to learn more about \"Constipation: Care Instructions. \"     If you do not have an account, please click on the \"Sign Up Now\" link. Current as of: June 26, 2019               Content Version: 12.5  © 1521-9618 [employer]. Care instructions adapted under license by [facility] ([facility]). If you have questions about a medical condition or this instruction, always ask your healthcare professional. [address] any warranty or liability for your use of this information.       Leighton Schmitt  11/13/2020  11:07 AM

## 2021-03-08 ENCOUNTER — APPOINTMENT (OUTPATIENT)
Dept: CT IMAGING | Age: 81
End: 2021-03-08
Payer: MEDICARE

## 2021-03-08 ENCOUNTER — HOSPITAL ENCOUNTER (EMERGENCY)
Age: 81
Discharge: HOME | End: 2021-03-08
Attending: EMERGENCY MEDICINE
Payer: MEDICARE

## 2021-03-08 VITALS
TEMPERATURE: 97.2 F | RESPIRATION RATE: 18 BRPM | HEART RATE: 59 BPM | SYSTOLIC BLOOD PRESSURE: 170 MMHG | HEIGHT: 72 IN | BODY MASS INDEX: 26.14 KG/M2 | DIASTOLIC BLOOD PRESSURE: 83 MMHG | WEIGHT: 193 LBS | OXYGEN SATURATION: 94 %

## 2021-03-08 DIAGNOSIS — K52.9 ILEITIS: ICD-10-CM

## 2021-03-08 DIAGNOSIS — C80.0 CARCINOMATOSIS (HCC): Primary | ICD-10-CM

## 2021-03-08 LAB
ALBUMIN SERPL-MCNC: 3.6 G/DL (ref 3.5–5.2)
ALP BLD-CCNC: 92 U/L (ref 40–129)
ALT SERPL-CCNC: <5 U/L (ref 0–40)
ANION GAP SERPL CALCULATED.3IONS-SCNC: 9 MMOL/L (ref 7–16)
ANISOCYTOSIS: ABNORMAL
AST SERPL-CCNC: 10 U/L (ref 0–39)
BASOPHILS ABSOLUTE: 0.02 E9/L (ref 0–0.2)
BASOPHILS RELATIVE PERCENT: 0.4 % (ref 0–2)
BILIRUB SERPL-MCNC: 0.4 MG/DL (ref 0–1.2)
BUN BLDV-MCNC: 18 MG/DL (ref 8–23)
CALCIUM SERPL-MCNC: 8.7 MG/DL (ref 8.6–10.2)
CHLORIDE BLD-SCNC: 101 MMOL/L (ref 98–107)
CO2: 27 MMOL/L (ref 22–29)
CREAT SERPL-MCNC: 0.9 MG/DL (ref 0.7–1.2)
EOSINOPHILS ABSOLUTE: 0.05 E9/L (ref 0.05–0.5)
EOSINOPHILS RELATIVE PERCENT: 1 % (ref 0–6)
GFR AFRICAN AMERICAN: >60
GFR NON-AFRICAN AMERICAN: >60 ML/MIN/1.73
GLUCOSE BLD-MCNC: 111 MG/DL (ref 74–99)
HCT VFR BLD CALC: 43.1 % (ref 37–54)
HEMOGLOBIN: 14.4 G/DL (ref 12.5–16.5)
IMMATURE GRANULOCYTES #: 0.02 E9/L
IMMATURE GRANULOCYTES %: 0.4 % (ref 0–5)
LACTIC ACID, SEPSIS: 1.1 MMOL/L (ref 0.5–1.9)
LIPASE: 22 U/L (ref 13–60)
LYMPHOCYTES ABSOLUTE: 0.58 E9/L (ref 1.5–4)
LYMPHOCYTES RELATIVE PERCENT: 11.2 % (ref 20–42)
MCH RBC QN AUTO: 29.9 PG (ref 26–35)
MCHC RBC AUTO-ENTMCNC: 33.4 % (ref 32–34.5)
MCV RBC AUTO: 89.6 FL (ref 80–99.9)
MONOCYTES ABSOLUTE: 0.64 E9/L (ref 0.1–0.95)
MONOCYTES RELATIVE PERCENT: 12.4 % (ref 2–12)
NEUTROPHILS ABSOLUTE: 3.85 E9/L (ref 1.8–7.3)
NEUTROPHILS RELATIVE PERCENT: 74.6 % (ref 43–80)
PDW BLD-RTO: 13.1 FL (ref 11.5–15)
PLATELET # BLD: 159 E9/L (ref 130–450)
PMV BLD AUTO: 12.5 FL (ref 7–12)
POTASSIUM REFLEX MAGNESIUM: 3.6 MMOL/L (ref 3.5–5)
RBC # BLD: 4.81 E12/L (ref 3.8–5.8)
SODIUM BLD-SCNC: 137 MMOL/L (ref 132–146)
TOTAL PROTEIN: 6.4 G/DL (ref 6.4–8.3)
WBC # BLD: 5.2 E9/L (ref 4.5–11.5)

## 2021-03-08 PROCEDURE — 83690 ASSAY OF LIPASE: CPT

## 2021-03-08 PROCEDURE — 80053 COMPREHEN METABOLIC PANEL: CPT

## 2021-03-08 PROCEDURE — 83605 ASSAY OF LACTIC ACID: CPT

## 2021-03-08 PROCEDURE — 96374 THER/PROPH/DIAG INJ IV PUSH: CPT

## 2021-03-08 PROCEDURE — 74177 CT ABD & PELVIS W/CONTRAST: CPT

## 2021-03-08 PROCEDURE — 96361 HYDRATE IV INFUSION ADD-ON: CPT

## 2021-03-08 PROCEDURE — 85025 COMPLETE CBC W/AUTO DIFF WBC: CPT

## 2021-03-08 PROCEDURE — 36415 COLL VENOUS BLD VENIPUNCTURE: CPT

## 2021-03-08 PROCEDURE — 6360000004 HC RX CONTRAST MEDICATION: Performed by: RADIOLOGY

## 2021-03-08 PROCEDURE — 2580000003 HC RX 258: Performed by: GENERAL PRACTICE

## 2021-03-08 PROCEDURE — 6360000002 HC RX W HCPCS: Performed by: GENERAL PRACTICE

## 2021-03-08 PROCEDURE — 99285 EMERGENCY DEPT VISIT HI MDM: CPT

## 2021-03-08 RX ORDER — FENTANYL CITRATE 50 UG/ML
75 INJECTION, SOLUTION INTRAMUSCULAR; INTRAVENOUS ONCE
Status: COMPLETED | OUTPATIENT
Start: 2021-03-08 | End: 2021-03-08

## 2021-03-08 RX ORDER — 0.9 % SODIUM CHLORIDE 0.9 %
500 INTRAVENOUS SOLUTION INTRAVENOUS ONCE
Status: COMPLETED | OUTPATIENT
Start: 2021-03-08 | End: 2021-03-08

## 2021-03-08 RX ORDER — OXYCODONE HYDROCHLORIDE 10 MG/1
10 TABLET ORAL EVERY 6 HOURS PRN
Qty: 20 TABLET | Refills: 0 | Status: SHIPPED | OUTPATIENT
Start: 2021-03-08 | End: 2021-03-15

## 2021-03-08 RX ORDER — ONDANSETRON 2 MG/ML
4 INJECTION INTRAMUSCULAR; INTRAVENOUS EVERY 6 HOURS PRN
COMMUNITY
Start: 2020-10-10 | End: 2021-05-20

## 2021-03-08 RX ADMIN — FENTANYL CITRATE 75 MCG: 50 INJECTION, SOLUTION INTRAMUSCULAR; INTRAVENOUS at 02:22

## 2021-03-08 RX ADMIN — SODIUM CHLORIDE 500 ML: 9 INJECTION, SOLUTION INTRAVENOUS at 02:22

## 2021-03-08 RX ADMIN — IOPAMIDOL 75 ML: 755 INJECTION, SOLUTION INTRAVENOUS at 03:56

## 2021-03-08 ASSESSMENT — ENCOUNTER SYMPTOMS
EYE PAIN: 0
VOMITING: 0
BACK PAIN: 0
DIARRHEA: 0
ABDOMINAL PAIN: 1
WHEEZING: 0
EYE DISCHARGE: 0
ABDOMINAL DISTENTION: 1
SORE THROAT: 0
SINUS PRESSURE: 0
COUGH: 0
SHORTNESS OF BREATH: 0
NAUSEA: 1
EYE REDNESS: 0

## 2021-03-08 ASSESSMENT — PAIN DESCRIPTION - ORIENTATION: ORIENTATION: RIGHT;LEFT;LOWER;MID

## 2021-03-08 ASSESSMENT — PAIN DESCRIPTION - ONSET: ONSET: ON-GOING

## 2021-03-08 ASSESSMENT — PAIN DESCRIPTION - FREQUENCY: FREQUENCY: CONTINUOUS

## 2021-03-08 ASSESSMENT — PAIN DESCRIPTION - PAIN TYPE: TYPE: ACUTE PAIN

## 2021-03-08 ASSESSMENT — PAIN SCALES - GENERAL: PAINLEVEL_OUTOF10: 3

## 2021-03-08 ASSESSMENT — PAIN DESCRIPTION - LOCATION: LOCATION: ABDOMEN

## 2021-03-08 NOTE — ED PROVIDER NOTES
ED  Provider Note  Admit Date/RoomTime: 3/8/2021  1:54 AM  ED Room: 24/24     HPI:   Mike Law is a 79 y.o. male presenting to the ED for abdominal pain, beginning days ago. History comes primarily from the patient. Past medical history includes carcinomatosis from metastatic neuroendocrine tumor, being mainly managed at the [facility] clinic, coronary artery disease, hypertension, hyperlipidemia, abdominal aorta aneurysm. The complaint has been persistent, moderate in severity, improved by nothing and worsened by nothing. Associated symptoms include nausea. Felicia Morillo had been managing his cancers processes of his gut with PEG tube placement, which was allowing for drainage from his GI tract to a bag via gravity. This had been working relatively effectively. It was noted by his primary care provider that he was still having bowel movements despite placement of this tube. It was thought that the patient might be able to clamp off this tube and use his native GI system, allowing his bowel contents to move past his tumor. His PEG tube was clamped on Wednesday evening, and since that time the patient has had exactly 1 small bowel movement on Saturday morning. Now, early Monday morning, the patient is experiencing significant sensation of abdominal pain and distention. He has not had any significant bowel movements other than the one on Saturday. Due to persistent nature of his symptoms, he presented to [facility] emergency department for further evaluation and treatment. On exam, the patient was assessed with history, physical exam, imaging studies and laboratory studies, vital signs. Vital signs were stable on arrival and the patient was afebrile. Review of Systems   Constitutional: Positive for activity change, appetite change and fatigue. Negative for chills and fever. HENT: Negative for ear pain, sinus pressure and sore throat.     Eyes: Negative for pain, discharge and reveals no evidence of overt obstruction. Patient does have some evidence of ileitis which is expected given his known diagnosis of carcinomatosis. His PEG tube was attached to a bag and open to drainage, and he was draining some biliary appearing fluid. Patient will be given further pain control. His case was discussed with general surgery, who advised that he was nonoperative at this point time and has no criteria that would meet for admission. They advised pain control and hospice care. Patient was advised of these findings, and understands the plan of care and is amenable to the plan. They were advised to return to the emergency department should they develop fever, chills, night sweats, nausea, vomiting, diarrhea, chest pain, shortness of breath, or worsening of their symptoms despite treatment from this emergency department visit. They were instructed to follow-up with their primary care provider in 2 days. This information was relayed to the patient who understood this plan of care and was amenable to the plan. ED Course as of Mar 08 0602   Mon Mar 08, 2021   8858 Discussed patient with Dr. Mushtaq Srinivasan, who is covering for Dr. Ana Martinez. He advised that the patient has significant abdominal pain primarily from his carcinomatosis, and that without overt bowel obstruction there is no indication for admission at this time. He stated that the patient would be best served by reinstituting hospice care    [RK]      ED Course User Index  [RK] [facility]            ED Course as of Mar 08 0603   Mon Mar 08, 2021   0074 Discussed patient with Dr. Mushtaq Srinivasan, who is covering for Dr. Ana Martinez. He advised that the patient has significant abdominal pain primarily from his carcinomatosis, and that without overt bowel obstruction there is no indication for admission at this time.   He stated that the patient would be best served by reinstituting hospice care    [RK]      ED Course User Index  [RK] Anthony Michaell Olszewski, DO       --------------------------------------------- PAST HISTORY ---------------------------------------------  Past Medical History:  has a past medical history of Aneurysm of abdominal aorta ([facility] CAD (coronary artery disease), [facility] GERD (gastroesophageal reflux disease), Glaucoma, Hyperlipidemia, Hypertension, and Unspecified cerebral artery occlusion with cerebral infarction. Past Surgical History:  has a past surgical history that includes hernia repair; Diagnostic Cardiac Cath Lab Procedure; Tonsillectomy; eye surgery (Left, Feb 2013, June 2013); eye surgery (Left); vascular surgery (Right, 10/16/2013); Carotid endarterectomy (Right, ); Colonoscopy; Sigmoidoscopy; Facial cosmetic surgery (N/A, 12/10/2019); and laparoscopy (N/A, 8/28/2020). Social History:  reports that he quit smoking about 28 years ago. His smoking use included cigarettes. He smoked 2.00 packs per day. He has never used smokeless tobacco. He reports current alcohol use. He reports that he does not use drugs. Family History: family history includes Heart Disease in his brother and father. The patients home medications have been reviewed. Allergies: Patient has no known allergies.     -------------------------------------------------- RESULTS -------------------------------------------------  Labs:  Results for orders placed or performed during the hospital encounter of 03/08/21   CBC Auto Differential   Result Value Ref Range    WBC 5.2 4.5 - 11.5 E9/L    RBC 4.81 3.80 - 5.80 E12/L    Hemoglobin 14.4 12.5 - 16.5 g/dL    Hematocrit 43.1 37.0 - 54.0 %    MCV 89.6 80.0 - 99.9 fL    MCH 29.9 26.0 - 35.0 pg    MCHC 33.4 32.0 - 34.5 %    RDW 13.1 11.5 - 15.0 fL    Platelets 271 934 - 332 E9/L    MPV 12.5 (H) 7.0 - 12.0 fL    Neutrophils % 74.6 43.0 - 80.0 %    Immature Granulocytes % 0.4 0.0 - 5.0 %    Lymphocytes % 11.2 (L) 20.0 - 42.0 %    Monocytes % 12.4 (H) 2.0 - 12.0 %    Eosinophils % 1.0 0.0 - 6.0 %    Basophils % 0.4 0.0 - 2.0 %    Neutrophils Absolute 3.85 1.80 - 7.30 E9/L    Immature Granulocytes # 0.02 E9/L    Lymphocytes Absolute 0.58 (L) 1.50 - 4.00 E9/L    Monocytes Absolute 0.64 0.10 - 0.95 E9/L    Eosinophils Absolute 0.05 0.05 - 0.50 E9/L    Basophils Absolute 0.02 0.00 - 0.20 E9/L    Anisocytosis 1+    Comprehensive Metabolic Panel w/ Reflex to MG   Result Value Ref Range    Sodium 137 132 - 146 mmol/L    Potassium reflex Magnesium 3.6 3.5 - 5.0 mmol/L    Chloride 101 98 - 107 mmol/L    CO2 27 22 - 29 mmol/L    Anion Gap 9 7 - 16 mmol/L    Glucose 111 (H) 74 - 99 mg/dL    BUN 18 8 - 23 mg/dL    CREATININE 0.9 0.7 - 1.2 mg/dL    GFR Non-African American >60 >=60 mL/min/1.73    GFR African American >60     Calcium 8.7 8.6 - 10.2 mg/dL    Total Protein 6.4 6.4 - 8.3 g/dL    Albumin 3.6 3.5 - 5.2 g/dL    Total Bilirubin 0.4 0.0 - 1.2 mg/dL    Alkaline Phosphatase 92 40 - 129 U/L    ALT <5 0 - 40 U/L    AST 10 0 - 39 U/L   Lactate, Sepsis   Result Value Ref Range    Lactic Acid, Sepsis 1.1 0.5 - 1.9 mmol/L   Lipase   Result Value Ref Range    Lipase 22 13 - 60 U/L       Radiology:  CT ABDOMEN PELVIS W IV CONTRAST Additional Contrast? None   Final Result   1. Ileitis involving several loops of distal ileum. Some air-fluid level   seen within colon and small bowel although no convincing bowel obstruction. 2. Mild-to-moderate ascites. Soft tissue nodules along the anterior   peritoneal surface seen which are concerning for peritoneal metastatic   implants. Lesions visualized are similar to previous. 3. There is in unchanged irregular soft tissue mass adjacent to the cecum   which contains calcification. This has not changed significantly but could   also represent tumor. 4. Aortoiliac atherosclerosis with 3 cm distal abdominal aortic aneurysm, not   significantly changed. 5. Moderate prostate enlargement.    6. Coronary artery calcifications.             ------------------------- NURSING NOTES AND VITALS REVIEWED ---------------------------  Date / Time Roomed:  3/8/2021  1:54 AM  ED Bed Assignment:  24/24    The nursing notes within the ED encounter and vital signs as below have been reviewed. BP (!) 170/83   Pulse 59   Temp 97.2 °F (36.2 °C) (Oral)   Resp 18   Ht 6' (1.829 m)   Wt 193 lb (87.5 kg)   SpO2 94%   BMI 26.18 kg/m²   Oxygen Saturation Interpretation: Normal      ------------------------------------------ PROGRESS NOTES ------------------------------------------  6:03 AM EST  I have spoken with the patient and discussed todays results, in addition to providing specific details for the plan of care and counseling regarding the diagnosis and prognosis. Their questions are answered at this time and they are agreeable with the plan. I discussed at length with them reasons for immediate return here for re evaluation. They will followup with their primary care physician by calling their office on Monday.      --------------------------------- ADDITIONAL PROVIDER NOTES ---------------------------------  At this time the patient is without objective evidence of an acute process requiring hospitalization or inpatient management. They have remained hemodynamically stable throughout their entire ED visit and are stable for discharge with outpatient follow-up. The plan has been discussed in detail and they are aware of the specific conditions for emergent return, as well as the importance of follow-up. Discharge Medication List as of 3/8/2021  5:13 AM      START taking these medications    Details   oxyCODONE HCl (OXY-IR) 10 MG immediate release tablet Take 1 tablet by mouth every 6 hours as needed for Pain for up to 7 days. Intended supply: 7 days, Disp-20 tablet, R-0Print             Diagnosis:  1. Carcinomatosis ([facility]    2. Ileitis        Disposition:  Patient's disposition: Discharge to home  Patient's condition is stable.        Melly Doshi 43., DO  Resident  03/08/21

## 2021-03-08 NOTE — ED NOTES
Peg tube attached to styles bag and unclamped to attempt drainage to gravity.      Giuseppe Nguyen RN  03/08/21 3604

## 2021-03-25 ENCOUNTER — HOSPITAL ENCOUNTER (OUTPATIENT)
Dept: CT IMAGING | Age: 81
Discharge: HOME | End: 2021-03-27
Payer: MEDICARE

## 2021-03-25 DIAGNOSIS — C7A.025: ICD-10-CM

## 2021-03-25 PROCEDURE — 6360000004 HC RX CONTRAST MEDICATION: Performed by: RADIOLOGY

## 2021-03-25 PROCEDURE — 74177 CT ABD & PELVIS W/CONTRAST: CPT

## 2021-03-25 PROCEDURE — 71260 CT THORAX DX C+: CPT

## 2021-03-25 RX ADMIN — IOHEXOL 50 ML: 240 INJECTION, SOLUTION INTRATHECAL; INTRAVASCULAR; INTRAVENOUS; ORAL at 15:11

## 2021-03-25 RX ADMIN — IOPAMIDOL 100 ML: 755 INJECTION, SOLUTION INTRAVENOUS at 15:11

## 2021-05-20 ENCOUNTER — OFFICE VISIT (OUTPATIENT)
Dept: CARDIOLOGY CLINIC | Age: 81
End: 2021-05-20
Payer: MEDICARE

## 2021-05-20 VITALS
HEIGHT: 73 IN | WEIGHT: 168 LBS | HEART RATE: 56 BPM | SYSTOLIC BLOOD PRESSURE: 138 MMHG | BODY MASS INDEX: 22.26 KG/M2 | DIASTOLIC BLOOD PRESSURE: 72 MMHG | RESPIRATION RATE: 14 BRPM

## 2021-05-20 DIAGNOSIS — I25.10 CORONARY ARTERY DISEASE INVOLVING NATIVE CORONARY ARTERY WITHOUT ANGINA PECTORIS, UNSPECIFIED WHETHER NATIVE OR TRANSPLANTED HEART: Primary | ICD-10-CM

## 2021-05-20 PROCEDURE — 99214 OFFICE O/P EST MOD 30 MIN: CPT | Performed by: INTERNAL MEDICINE

## 2021-05-20 PROCEDURE — 93000 ELECTROCARDIOGRAM COMPLETE: CPT | Performed by: INTERNAL MEDICINE

## 2021-05-20 RX ORDER — LISINOPRIL 10 MG/1
10 TABLET ORAL DAILY
COMMUNITY
Start: 2021-05-11 | End: 2022-01-12

## 2021-05-20 RX ORDER — DOCUSATE SODIUM 100 MG/1
100 CAPSULE ORAL PRN
Status: ON HOLD | COMMUNITY
Start: 2021-03-01 | End: 2022-04-05 | Stop reason: ALTCHOICE

## 2021-05-20 NOTE — PROGRESS NOTES
CHIEF COMPLAINT: CAD/Carotid disease    HPI: Patient is a 79 y.o. male seen at the request of Corky Hall MD.      Stress 10/13 apical scar, no reversible ischemia and LVEF 65%. No CP or SOB. Past Medical History:   Diagnosis Date    Aneurysm of abdominal aorta ([address] 09/18/2013    follows with Dr. Óscar Crane yearly-2020    CAD (coronary artery disease)     Follows with Dr Berry [facility])     Colon    GERD (gastroesophageal reflux disease)     Glaucoma     (L) eye    Hyperlipidemia     Hypertension     Unspecified cerebral artery occlusion with cerebral infarction 2016    TIA       Patient Active Problem List   Diagnosis    CAD (coronary artery disease)    LV dysfunction    Hyperlipidemia    Hypertension    Aneurysm of abdominal aorta (HCC)    Glaucoma    S/P carotid endarterectomy    Carotid bruit    H/O: CVA (cerebrovascular accident)    Post-op pain    Abdominal pain       No Known Allergies    Current Outpatient Medications   Medication Sig Dispense Refill    COLACE 100 MG capsule       lisinopril (PRINIVIL;ZESTRIL) 10 MG tablet 10 mg daily       Octreotide Acetate (SANDOSTATIN IJ) Inject as directed Once per month      ondansetron (ZOFRAN) 4 MG/2ML injection Infuse 4 mg intravenously every 6 hours as needed (Patient not taking: Reported on 5/20/2021)      enoxaparin (LOVENOX) 40 MG/0.4ML injection Inject 40 mg into the skin every 24 hours (Patient not taking: Reported on 5/20/2021)      metoprolol succinate (TOPROL XL) 25 MG extended release tablet TAKE 1 TABLET DAILY (Patient not taking: Reported on 5/20/2021) 90 tablet 3    magnesium oxide (MAG-OX) 400 MG tablet Take 500 mg by mouth daily  (Patient not taking: Reported on 5/20/2021)      amLODIPine (NORVASC) 2.5 MG tablet Take 2.5 mg by mouth daily.    (Patient not taking: Reported on 5/20/2021)      aspirin 81 MG chewable tablet Take 81 mg by mouth daily  (Patient not taking: Reported on 5/20/2021)      omeprazole (PRILOSEC) 20 MG capsule Take 10 mg by mouth daily. (Patient not taking: Reported on 2021)      atorvastatin (LIPITOR) 10 MG tablet Take 10 mg by mouth daily. (Patient not taking: Reported on 2021)       No current facility-administered medications for this visit. Social History     Socioeconomic History    Marital status:      Spouse name: Not on file    Number of children: Not on file    Years of education: Not on file    Highest education level: Not on file   Occupational History    Not on file   Tobacco Use    Smoking status: Former Smoker     Packs/day: 2.00     Types: Cigarettes     Quit date: 10/9/1992     Years since quittin.6    Smokeless tobacco: Never Used   Vaping Use    Vaping Use: Never used   Substance and Sexual Activity    Alcohol use: Yes     Comment: rare    Drug use: No    Sexual activity: Not on file   Other Topics Concern    Not on file   Social History Narrative    Not on file     Social Determinants of Health     Financial Resource Strain:     Difficulty of Paying Living Expenses:    Food Insecurity:     Worried About [address] in the Last Year:     [address] [employer] in the Last Year:    Transportation Needs:     Lack of Transportation (Medical):      Lack of Transportation (Non-Medical):    Physical Activity:     Days of Exercise per Week:     Minutes of Exercise per Session:    Stress:     Feeling of Stress :    Social Connections:     Frequency of Communication with Friends and Family:     Frequency of Social Gatherings with Friends and Family:     Attends Islam Services:     Active Member of Clubs or Organizations:     Attends Club or Organization Meetings:     Marital Status:    Intimate Partner Violence:     Fear of Current or Ex-Partner:     Emotionally Abused:     Physically Abused:     Sexually Abused:        Family History   Problem Relation Age of Onset    Heart Disease Father     Heart Disease Brother Review of Systems:  Heart: as above   Lungs: as above   Eyes: denies changes in vision or discharge. Ears: denies changes in hearing or pain. Nose: denies epistaxis or masses   Throat: denies sore throat or trouble swallowing. Neuro: denies numbness, tingling, tremors. Skin: denies rashes or itching. : denies hematuria, dysuria   GI: denies vomiting, diarrhea   Psych: denies mood changed, anxiety, depression. Physical Exam   /72   Pulse 56   Resp 14   Ht 6' 1\" (1.854 m)   Wt 168 lb (76.2 kg)   BMI 22.16 kg/m²   Constitutional: Oriented to person, place, and time. Well-developed and well-nourished. No distress. Head: Normocephalic and atraumatic. Eyes: EOM are normal. Pupils are equal, round, and reactive to light. Neck: Normal range of motion. Neck supple. No hepatojugular reflux and no JVD present. Carotid bruit is not present. No tracheal deviation present. No thyromegaly present. Cardiovascular: Normal rate, regular rhythm, normal heart sounds and intact distal pulses. Exam reveals no gallop and no friction rub. No murmur heard. Pulmonary/Chest: Effort normal and breath sounds normal. No respiratory distress. No wheezes. No rales. No tenderness. Abdominal: Soft. Bowel sounds are normal. No distension and no mass. No tenderness. No rebound and no guarding. Musculoskeletal: Normal range of motion. No edema and no tenderness. Lymphadenopathy:   No cervical adenopathy. No groin adenopathy. Neurological: Alert and oriented to person, place, and time. Skin: Skin is warm and dry. No rash noted. Not diaphoretic. No erythema. Psychiatric: Normal mood and affect. Behavior is normal.     EKG: Sinus bradycardia, iLBBB, non-specific changes.      ASSESSMENT AND PLAN:  Patient Active Problem List   Diagnosis    CAD (coronary artery disease)    LV dysfunction    Hyperlipidemia    Hypertension    Aneurysm of abdominal aorta (HCC)    Glaucoma    S/P carotid endarterectomy  Carotid bruit    H/O: CVA (cerebrovascular accident)    Post-op pain    Abdominal pain     1. CAD: Stable stress 10/13. Continue ASA. No BB due to rest bradycardia. Consider statin. 2. Carotid Disease: Follows with vascular. 3. HTN: Observe. 4. Neuroendocrine malignancy: Advanced. Per oncology. Allen Mccain D.O.   Cardiologist  Cardiology, [facility]

## 2021-07-23 ENCOUNTER — TELEPHONE (OUTPATIENT)
Dept: VASCULAR SURGERY | Age: 81
End: 2021-07-23

## 2021-07-23 DIAGNOSIS — I65.23 BILATERAL CAROTID ARTERY STENOSIS: ICD-10-CM

## 2021-07-23 DIAGNOSIS — I71.40 ABDOMINAL AORTIC ANEURYSM (AAA) WITHOUT RUPTURE: Primary | ICD-10-CM

## 2021-07-23 NOTE — TELEPHONE ENCOUNTER
Spoke with patient's wife regarding scheduling carotid u/s and u/s aorta prior to visit with Dr. Javed Mcmillan. She will ask patient to call back to schedule.

## 2021-07-26 ENCOUNTER — HOSPITAL ENCOUNTER (OUTPATIENT)
Dept: ULTRASOUND IMAGING | Age: 81
Discharge: HOME | End: 2021-07-28
Payer: MEDICARE

## 2021-07-26 DIAGNOSIS — I71.40 ABDOMINAL AORTIC ANEURYSM (AAA) WITHOUT RUPTURE: ICD-10-CM

## 2021-07-26 DIAGNOSIS — I65.23 BILATERAL CAROTID ARTERY STENOSIS: ICD-10-CM

## 2021-07-26 PROCEDURE — 76775 US EXAM ABDO BACK WALL LIM: CPT

## 2021-07-26 PROCEDURE — 93880 EXTRACRANIAL BILAT STUDY: CPT

## 2021-08-04 ENCOUNTER — TELEPHONE (OUTPATIENT)
Dept: VASCULAR SURGERY | Age: 81
End: 2021-08-04

## 2021-08-05 ENCOUNTER — OFFICE VISIT (OUTPATIENT)
Dept: VASCULAR SURGERY | Age: 81
End: 2021-08-05
Payer: MEDICARE

## 2021-08-05 ENCOUNTER — TELEPHONE (OUTPATIENT)
Dept: VASCULAR SURGERY | Age: 81
End: 2021-08-05

## 2021-08-05 DIAGNOSIS — R09.89 CAROTID BRUIT, UNSPECIFIED LATERALITY: ICD-10-CM

## 2021-08-05 DIAGNOSIS — I71.40 ABDOMINAL AORTIC ANEURYSM (AAA) WITHOUT RUPTURE: Primary | ICD-10-CM

## 2021-08-05 DIAGNOSIS — Z98.890 S/P CAROTID ENDARTERECTOMY: ICD-10-CM

## 2021-08-05 DIAGNOSIS — R20.2 NUMBNESS AND TINGLING OF BOTH FEET: ICD-10-CM

## 2021-08-05 DIAGNOSIS — Z86.73 H/O: CVA (CEREBROVASCULAR ACCIDENT): ICD-10-CM

## 2021-08-05 DIAGNOSIS — R20.0 NUMBNESS AND TINGLING OF BOTH FEET: ICD-10-CM

## 2021-08-05 DIAGNOSIS — R09.89 DECREASED DORSALIS PEDIS PULSE: ICD-10-CM

## 2021-08-05 PROCEDURE — 99214 OFFICE O/P EST MOD 30 MIN: CPT | Performed by: SURGERY

## 2021-08-05 NOTE — TELEPHONE ENCOUNTER
[place] [facility] [place] at 8:45 am. [place] at 8:15 am.

## 2021-08-05 NOTE — PROGRESS NOTES
Chief Complaint:   Chief Complaint   Patient presents with    Circulatory Problem     Follow up AAA, carotid stenosis. HPI: Patient came to the office, for eval of multiple vascular issues, history of carotid endarterectomy in the past, small abdominal aortic aneurysm of 3.3 cm    Patient tells me that recently his feet are bothering him, with tingling and numbness and wanted me to evaluate the vascular status    Unfortunately patient also significantly oncology issues with what appears metastatic carcinoma stomach, patient does not recall all the details, currently following with Dr. Jagdish Barrett for chemotherapy and with Dr. Lukas Guardado from general surgery point of view      Patient denies any focal lateralizing neurological symptoms like loss of speech, vision or loss of function of extremity    Patient can walk a few blocks slowly, and denies any symptoms of rest pain    No Known Allergies    Current Outpatient Medications   Medication Sig Dispense Refill    COLACE 100 MG capsule       lisinopril (PRINIVIL;ZESTRIL) 10 MG tablet 10 mg daily       Octreotide Acetate (SANDOSTATIN IJ) Inject as directed Once per month      aspirin 81 MG chewable tablet Take 81 mg by mouth daily        No current facility-administered medications for this visit.        Past Medical History:   Diagnosis Date    Aneurysm of abdominal aorta ([facility] 09/18/2013    follows with Dr. Anders Alex yearly-2020    CAD (coronary artery disease)     Follows with Dr Sydnee [facility])     Colon    Decreased dorsalis pedis pulse 8/5/2021    GERD (gastroesophageal reflux disease)     Glaucoma     (L) eye    Hyperlipidemia     Hypertension     [facility]endocrine cancer ([facility]     Numbness and tingling of both feet 8/5/2021    Unspecified cerebral artery occlusion with cerebral infarction 2016    TIA       Past Surgical History:   Procedure Laterality Date    CAROTID ENDARTERECTOMY Right     Dr. 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 -- FROZEN performed by Zac Yates MD at [id number] [address]      LAPAROSCOPY N/A 2020    DIAGNOSTIC LAPAROSCOPY WITH BIOPSY performed by Henna Salazar MD at [address] Right 10/16/2013    RIGHT CAROTID ENDARTERECTOMY       Family History   Problem Relation Age of Onset    Heart Disease Father     Heart Disease Brother        Social History     Socioeconomic History    Marital status:      Spouse name: Not on file    Number of children: Not on file    Years of education: Not on file    Highest education level: Not on file   Occupational History    Not on file   Tobacco Use    Smoking status: Former Smoker     Packs/day: 2.00     Types: Cigarettes     Quit date: 10/9/1992     Years since quittin.8    Smokeless tobacco: Never Used   Vaping Use    Vaping Use: Never used   Substance and Sexual Activity    Alcohol use: Yes     Comment: rare    Drug use: No    Sexual activity: Not on file   Other Topics Concern    Not on file   Social History Narrative    Not on file     Social Determinants of Health     Financial Resource Strain:     Difficulty of Paying Living Expenses:    Food Insecurity:     Worried About Running Out of Food in the Last Year:     [address] in the Last Year:    Transportation Needs:     Lack of Transportation (Medical):      Lack of Transportation (Non-Medical):    Physical Activity:     Days of Exercise per Week:     Minutes of Exercise per Session:    Stress:     Feeling of Stress :    Social Connections:     Frequency of Communication with Friends and Family:     Frequency of Social Gatherings with Friends and Family:     Attends Jainism Services:     Active Member of Clubs or Organizations:     Attends Club or Organization Meetings:     Marital Status:    Intimate Partner Violence:     Fear of Current or Ex-Partner:     Emotionally Abused:     Physically Abused:     Sexually Abused:        Review of Systems:    Eyes:  No blurring, diplopia or vision loss. Respiratory:  No cough, pleuritic chest pain, dyspnea, or wheezing. Cardiovascular: No angina, palpitations . Musculoskeletal:  No arthritis or weakness. Neurologic:  No paralysis, paresis, paresthesia, seizures or headache. Endocrinology:           Physical Exam:  General appearance:  Alert, awake, oriented x 3. No distress. Eyes:  Conjunctivae appear normal; PERRL  Neck:  No jugular venous distention, lymphadenopathy or thyromegaly. Carotid bruit noted  Lungs:  Clear to ausculation bilaterally. No rhonchi, crackles, wheezes  Heart:  Regular rate and rhythm. No rub or murmur  Abdomen:  Soft, non-tender. No masses, organomegaly. Patient does have a PEG tube, slightly prominent pulse noted on deep palpation consistent with abdominal aortic aneurysm  Musculoskeletal : No joint effusions, tenderness swelling    Neuro: Speech is intact. Moving all extremities. No focal motor or sensory deficits      Extremities:  Both feet are warm to touch. The color of both feet is normal.        Pulses Right  Left    Brachial 3 3    Radial    3=normal   Femoral 2 2  2=diminished   Popliteal    1=barely palpable   Dorsalis pedis 1 1  0=absent   Posterior tibial    4=aneurysmal             Other pertinent information:1. The past medical records were reviewed. 2.  The last CT scan of abdomen pelvis revealed stable aneurysm of approximately 3 cm, personally reviewed by me    3. The ultrasound abdominal aorta was personally reviewed by me, no change noted in the size of the aneurysm, approximately 3 cm    4.   Carotid ultrasound was personally reviewed by me, minimal disease, no change from the last year    Assessment:    1. Abdominal aortic aneurysm (AAA) without rupture (HCC)    2. Carotid bruit, unspecified laterality    3. S/P carotid endarterectomy    4. H/O: CVA (cerebrovascular accident)    5. Decreased dorsalis pedis pulse    6. Numbness and tingling of both feet              Plan:       Discussed the patient regarding the risks of the carotid ultrasound and the ultrasound abdominal aortic aneurysm, reassured, no change, call him with any severe abdominal pain or back pain    Because of tingling numbness and markedly diminished pulses in the feet, patient recommended a lower extremity arterial Doppler study      Patient was instructed to continue walking program and to call if any worsening of symptoms and to call if any focal lateralizing neurological symptoms like loss of speech, vision or loss of function of extremity. All the questions were answered. Orders Placed This Encounter   Procedures    VL LOWER EXTREMITY ARTERIAL SEGMENTAL PRESSURES W PPG             Indicated follow-up: Return in about 2 years (around 8/5/2023), or if symptoms worsen or fail to improve.

## 2021-08-11 ENCOUNTER — APPOINTMENT (OUTPATIENT)
Dept: GENERAL RADIOLOGY | Age: 81
End: 2021-08-11
Payer: MEDICARE

## 2021-08-11 ENCOUNTER — HOSPITAL ENCOUNTER (EMERGENCY)
Age: 81
Discharge: HOME | End: 2021-08-11
Attending: EMERGENCY MEDICINE
Payer: MEDICARE

## 2021-08-11 VITALS
HEART RATE: 80 BPM | OXYGEN SATURATION: 98 % | DIASTOLIC BLOOD PRESSURE: 85 MMHG | TEMPERATURE: 97 F | BODY MASS INDEX: 21.74 KG/M2 | RESPIRATION RATE: 18 BRPM | SYSTOLIC BLOOD PRESSURE: 175 MMHG | HEIGHT: 73 IN | WEIGHT: 164 LBS

## 2021-08-11 DIAGNOSIS — T85.528A GASTROJEJUNOSTOMY TUBE DISLODGEMENT: Primary | ICD-10-CM

## 2021-08-11 PROCEDURE — 6360000004 HC RX CONTRAST MEDICATION: Performed by: RADIOLOGY

## 2021-08-11 PROCEDURE — 43762 RPLC GTUBE NO REVJ TRC: CPT

## 2021-08-11 PROCEDURE — 99284 EMERGENCY DEPT VISIT MOD MDM: CPT

## 2021-08-11 PROCEDURE — 74018 RADEX ABDOMEN 1 VIEW: CPT

## 2021-08-11 RX ADMIN — DIATRIZOATE MEGLUMINE AND DIATRIZOATE SODIUM 40 ML: 600; 100 SOLUTION ORAL; RECTAL at 19:17

## 2021-08-11 NOTE — ED PROVIDER NOTES
ED Attending  CC: No                                                                                                                                        Department of Emergency Medicine   ED  Provider Note  Admit Date/RoomTime: 8/11/2021  6:20 PM  ED Room: 33/33        HPI:  8/11/21,   Time: 6:55 PM EDT         Laura Crisostomo is a 79 y.o. male presenting to the ED for accidentally pulled out G Tube, beginning few hours ago. The complaint has been persistent, moderate in severity, and worsened by nothing. The patient presents today because he inadvertently pulled out his G-tube at home. 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 He underwent placement of a G-tube in October of last year. He states that he is able to take in all of his nutrition by mouth but the G-tube is used to drain off any extra fluids and to prevent pain/obstruction. States he has had no issues with the tube. Patient states he was on floor cleaning up and thinks he stepped on the tubing. Pt reports mild bleeding at the site. He is on only ASA daily, no other blood thinners. States he feels well otherwise.    Denies N/V/D.          ROS:     Constitutional: Negative for fever and chills  HENT: Negative for ear pain, sore throat and sinus pressure  Eyes: Negative for pain, discharge and redness  Respiratory:  Negative for shortness of breath, cough and wheezing  Cardiovascular: Negative for CP, edema or palpitations  Gastrointestinal:  See HPI  Genitourinary:  See HPi  Musculoskeletal: Negative for back pain and arthralgia  Skin: Negative for rash and wound  Neurological: Negative for weakness and headaches  Hematological: Negative for adenopathy    All other systems reviewed and are negative      -------------------------------- PAST HISTORY ----------------------------------  Past Medical History:  has a past medical history of Aneurysm of abdominal aorta ([address] CAD (coronary artery disease), [facility] [address] Decreased dorsalis pedis pulse, GERD (gastroesophageal reflux disease), Glaucoma, Hyperlipidemia, Hypertension, Neuro-endocrine cancer ([facility] Numbness and tingling of both feet, and Unspecified cerebral artery occlusion with cerebral infarction. Past Surgical History:  has a past surgical history that includes hernia repair; Diagnostic Cardiac Cath Lab Procedure; Tonsillectomy; eye surgery (Left, Feb 2013, June 2013); eye surgery (Left); vascular surgery (Right, 10/16/2013); Carotid endarterectomy (Right, ); Colonoscopy; Sigmoidoscopy; Facial cosmetic surgery (N/A, 12/10/2019); laparoscopy (N/A, 8/28/2020); and Gastrostomy tube placement. Social History:  reports that he quit smoking about 28 years ago. His smoking use included cigarettes. He smoked 2.00 packs per day. He has never used smokeless tobacco. He reports current alcohol use. He reports that he does not use drugs. Family History: family history includes Heart Disease in his brother and father. The patients home medications have been reviewed. Allergies: Patient has no known allergies. --------------------------------- RESULTS ------------------------------------------  All laboratory and radiology results have been personally reviewed by myself   LABS:  No results found for this visit on 08/11/21. RADIOLOGY:  Interpreted by Radiologist.  XR ABDOMEN FOR NG/OG/NE TUBE PLACEMENT   Final Result   Satisfactory placement of gastric PEG tube.             ----------------- NURSING NOTES AND VITALS REVIEWED ---------------   The nursing notes within the ED encounter and vital signs as below have been reviewed.    BP (!) 183/87   Pulse 88   Temp 97 °F (36.1 °C)   Resp 18   Ht 6' 1\" (1.854 m)   Wt 164 lb (74.4 kg)   SpO2 98%   BMI 21.64 kg/m²   Oxygen Saturation Interpretation: Normal      --------------------------------PHYSICAL EXAM------------------------------------      Constitutional/General: Alert and oriented x3, well appearing, non toxic in NAD  Head: NC/AT  Eyes: PERRL, EOMI  Mouth: Oropharynx clear, handling secretions, no trismus  Neck: Supple, full ROM, no meningeal signs  Pulmonary: Lungs clear to auscultation bilaterally, no wheezes, rales, or rhonchi. Not in respiratory distress  Cardiovascular:  Regular rate and rhythm, no murmurs, gallops, or rubs. 2+ distal pulses  Abdomen: Soft, + BS. No distension. G Tube site noted with mild bleeding/inflammation. See procedure. No palpable tenderness. No palpable rigidity, rebound or guarding  Extremities: Moves all extremities x 4. Warm and well perfused  Skin: warm and dry without rash  Neurologic: GCS 15,  Intact. No focal deficits  Psych: Normal Affect      ------------------------ ED COURSE/MEDICAL DECISION MAKING----------------------  Medications   diatrizoate meglumine-sodium (GASTROGRAFIN) 66-10 % solution 40 mL (40 mLs Per G Tube Given 8/11/21 1917)         Procedure:   G Tube site cleansed with Betadine. 18 [place] replacement tube placed with minimal difficulty. 10 cc saline used to insufflate balloon. The disc was pushed up against abdominal wall. Tolerated well. Performed by [facility]       Medical Decision Making:    G-tube was replaced without difficulty. The x-ray confirms placement at this time. The patient is reassured. He needs to call the general surgeon either here locally or at the [facility] clinic for follow-up. He is aware and agreeable to this plan       Counseling: The emergency provider has spoken with the patient and discussed todays results, in addition to providing specific details for the plan of care and counseling regarding the diagnosis and prognosis.   Questions are answered at this time and they are agreeable with the plan.      ------------------------ IMPRESSION AND DISPOSITION -------------------------------    IMPRESSION  1. Gastrojejunostomy tube dislodgement        DISPOSITION  Disposition: Discharge to home  Patient condition is stable                   Waqar Cannon PA-C  08/11/21 2004

## 2021-08-11 NOTE — ED NOTES
FIRST PROVIDER CONTACT ASSESSMENT NOTE      Department of Emergency Medicine   8/11/21  6:19 PM EDT    Chief Complaint: G Tube Complications (pt pulled peg tube out, bleeding from site)      History of Present Illness:   Roselia Gutierrez is a 79 y.o. male who presents to the ED for patient states he had his PEG tube pulled out when he was using a  and trying to stand up. Medical History:  has a past medical history of Aneurysm of abdominal aorta ([address] CAD (coronary artery disease), Cancer ([address] Decreased dorsalis pedis pulse, GERD (gastroesophageal reflux disease), Glaucoma, Hyperlipidemia, Hypertension, Neuro-endocrine cancer ([address] Numbness and tingling of both feet, and Unspecified cerebral artery occlusion with cerebral infarction. Surgical History:  has a past surgical history that includes hernia repair; Diagnostic Cardiac Cath Lab Procedure; Tonsillectomy; eye surgery (Left, Feb 2013, June 2013); eye surgery (Left); vascular surgery (Right, 10/16/2013); Carotid endarterectomy (Right, ); Colonoscopy; Sigmoidoscopy; Facial cosmetic surgery (N/A, 12/10/2019); laparoscopy (N/A, 8/28/2020); and Gastrostomy tube placement. Social History:  reports that he quit smoking about 28 years ago. His smoking use included cigarettes. He smoked 2.00 packs per day. He has never used smokeless tobacco. He reports current alcohol use. He reports that he does not use drugs. Family History: family history includes Heart Disease in his brother and father. *ALLERGIES*     Patient has no known allergies.      Physical Exam:      VS:  BP (!) 183/87   Pulse 88   Temp 97 °F (36.1 °C)   Resp 18   Ht 6' 1\" (1.854 m)   Wt 164 lb (74.4 kg)   SpO2 98%   BMI 21.64 kg/m²      Initial Plan of Care:  Initiate Treatment-Testing, Proceed toTreatment Area When Bed Available for ED Attending/MLP to Continue Care    -----------------END OF FIRST PROVIDER CONTACT ASSESSMENT NOTE--------------  Electronically signed by TIFFANIE Diaz CNP   DD: 8/11/21             TIFFANIE Diaz CNP  08/11/21 1828

## 2021-09-21 ENCOUNTER — HOSPITAL ENCOUNTER (OUTPATIENT)
Dept: INTERVENTIONAL RADIOLOGY/VASCULAR | Age: 81
Discharge: HOME | End: 2021-09-23
Payer: MEDICARE

## 2021-09-21 DIAGNOSIS — R20.2 NUMBNESS AND TINGLING OF BOTH FEET: ICD-10-CM

## 2021-09-21 DIAGNOSIS — R20.0 NUMBNESS AND TINGLING OF BOTH FEET: ICD-10-CM

## 2021-09-21 DIAGNOSIS — R09.89 DECREASED DORSALIS PEDIS PULSE: ICD-10-CM

## 2021-09-21 PROCEDURE — 93923 UPR/LXTR ART STDY 3+ LVLS: CPT

## 2021-09-23 ENCOUNTER — TELEPHONE (OUTPATIENT)
Dept: VASCULAR SURGERY | Age: 81
End: 2021-09-23

## 2021-09-23 NOTE — TELEPHONE ENCOUNTER
Called to notify patient of arterial doppler study of legs, satisfactory arterial circulation, very mild problem, not to worry, keep 2 year appointment.

## 2021-11-06 ENCOUNTER — HOSPITAL ENCOUNTER (OUTPATIENT)
Dept: CT IMAGING | Age: 81
Discharge: HOME | End: 2021-11-08
Payer: MEDICARE

## 2021-11-06 DIAGNOSIS — C7A.025: ICD-10-CM

## 2021-11-06 PROCEDURE — 6360000004 HC RX CONTRAST MEDICATION: Performed by: RADIOLOGY

## 2021-11-06 PROCEDURE — 71260 CT THORAX DX C+: CPT

## 2021-11-06 PROCEDURE — 74177 CT ABD & PELVIS W/CONTRAST: CPT

## 2021-11-06 RX ADMIN — IOHEXOL 50 ML: 240 INJECTION, SOLUTION INTRATHECAL; INTRAVASCULAR; INTRAVENOUS; ORAL at 08:34

## 2021-11-06 RX ADMIN — IOPAMIDOL 75 ML: 755 INJECTION, SOLUTION INTRAVENOUS at 08:34

## 2021-12-13 ENCOUNTER — HOSPITAL ENCOUNTER (OUTPATIENT)
Age: 81
Discharge: HOME | End: 2021-12-15
Payer: MEDICARE

## 2021-12-13 ENCOUNTER — TELEPHONE (OUTPATIENT)
Dept: CARDIOLOGY CLINIC | Age: 81
End: 2021-12-13

## 2021-12-13 ENCOUNTER — HOSPITAL ENCOUNTER (OUTPATIENT)
Dept: GENERAL RADIOLOGY | Age: 81
Discharge: HOME | End: 2021-12-15
Payer: MEDICARE

## 2021-12-13 DIAGNOSIS — R05.9 COUGH: ICD-10-CM

## 2021-12-13 PROCEDURE — 71046 X-RAY EXAM CHEST 2 VIEWS: CPT

## 2021-12-13 RX ORDER — POTASSIUM CHLORIDE 750 MG/1
10 TABLET, EXTENDED RELEASE ORAL DAILY
Qty: 30 TABLET | Refills: 5 | Status: SHIPPED
Start: 2021-12-13 | End: 2022-02-09 | Stop reason: SDUPTHER

## 2021-12-13 RX ORDER — FUROSEMIDE 20 MG/1
20 TABLET ORAL DAILY
Qty: 30 TABLET | Refills: 5 | Status: ON HOLD
Start: 2021-12-13 | End: 2021-12-22 | Stop reason: HOSPADM

## 2021-12-13 RX ORDER — FUROSEMIDE 20 MG/1
20 TABLET ORAL DAILY
COMMUNITY
End: 2021-12-13 | Stop reason: SDUPTHER

## 2021-12-13 RX ORDER — POTASSIUM CHLORIDE 750 MG/1
10 TABLET, EXTENDED RELEASE ORAL DAILY
COMMUNITY
End: 2021-12-13 | Stop reason: SDUPTHER

## 2021-12-13 NOTE — TELEPHONE ENCOUNTER
Patient states that he has been having edema in his ankles and  SOB for 5days, he says he is coughing when he lays down, he saw pcp on Friday, an ekg and chest x-ray were done(in chart) and patient was given steroids, antibiotics and an inhaler which so far are not helping, please advise

## 2021-12-16 ENCOUNTER — TELEPHONE (OUTPATIENT)
Dept: CARDIOLOGY CLINIC | Age: 81
End: 2021-12-16

## 2021-12-17 ENCOUNTER — HOSPITAL ENCOUNTER (INPATIENT)
Age: 81
LOS: 5 days | Discharge: HOME HEALTH | DRG: 291 | End: 2021-12-22
Attending: EMERGENCY MEDICINE
Payer: MEDICARE

## 2021-12-17 ENCOUNTER — APPOINTMENT (OUTPATIENT)
Dept: ULTRASOUND IMAGING | Age: 81
DRG: 291 | End: 2021-12-17
Payer: MEDICARE

## 2021-12-17 ENCOUNTER — APPOINTMENT (OUTPATIENT)
Dept: GENERAL RADIOLOGY | Age: 81
DRG: 291 | End: 2021-12-17
Payer: MEDICARE

## 2021-12-17 DIAGNOSIS — E87.79 OTHER HYPERVOLEMIA: Primary | ICD-10-CM

## 2021-12-17 DIAGNOSIS — I51.9 LV DYSFUNCTION: ICD-10-CM

## 2021-12-17 PROBLEM — E87.70 VOLUME OVERLOAD: Status: ACTIVE | Noted: 2021-12-17

## 2021-12-17 PROBLEM — E87.6 HYPOKALEMIA: Status: ACTIVE | Noted: 2021-12-17

## 2021-12-17 LAB
ALBUMIN SERPL-MCNC: 3.3 G/DL (ref 3.5–5.2)
ALP BLD-CCNC: 81 U/L (ref 40–129)
ALT SERPL-CCNC: 17 U/L (ref 0–40)
ANION GAP SERPL CALCULATED.3IONS-SCNC: 8 MMOL/L (ref 7–16)
AST SERPL-CCNC: 17 U/L (ref 0–39)
BASOPHILS ABSOLUTE: 0.03 E9/L (ref 0–0.2)
BASOPHILS RELATIVE PERCENT: 0.6 % (ref 0–2)
BILIRUB SERPL-MCNC: 0.8 MG/DL (ref 0–1.2)
BUN BLDV-MCNC: 20 MG/DL (ref 6–23)
CALCIUM SERPL-MCNC: 8.4 MG/DL (ref 8.6–10.2)
CHLORIDE BLD-SCNC: 99 MMOL/L (ref 98–107)
CO2: 35 MMOL/L (ref 22–29)
CREAT SERPL-MCNC: 0.8 MG/DL (ref 0.7–1.2)
EOSINOPHILS ABSOLUTE: 0.04 E9/L (ref 0.05–0.5)
EOSINOPHILS RELATIVE PERCENT: 0.8 % (ref 0–6)
GFR AFRICAN AMERICAN: >60
GFR NON-AFRICAN AMERICAN: >60 ML/MIN/1.73
GLUCOSE BLD-MCNC: 137 MG/DL (ref 74–99)
HCT VFR BLD CALC: 38.2 % (ref 37–54)
HEMOGLOBIN: 12.7 G/DL (ref 12.5–16.5)
IMMATURE GRANULOCYTES #: 0.02 E9/L
IMMATURE GRANULOCYTES %: 0.4 % (ref 0–5)
LYMPHOCYTES ABSOLUTE: 0.62 E9/L (ref 1.5–4)
LYMPHOCYTES RELATIVE PERCENT: 12.4 % (ref 20–42)
MAGNESIUM: 2.1 MG/DL (ref 1.6–2.6)
MCH RBC QN AUTO: 31.1 PG (ref 26–35)
MCHC RBC AUTO-ENTMCNC: 33.2 % (ref 32–34.5)
MCV RBC AUTO: 93.6 FL (ref 80–99.9)
MONOCYTES ABSOLUTE: 0.55 E9/L (ref 0.1–0.95)
MONOCYTES RELATIVE PERCENT: 11 % (ref 2–12)
NEUTROPHILS ABSOLUTE: 3.74 E9/L (ref 1.8–7.3)
NEUTROPHILS RELATIVE PERCENT: 74.8 % (ref 43–80)
PDW BLD-RTO: 15.2 FL (ref 11.5–15)
PLATELET # BLD: 183 E9/L (ref 130–450)
PMV BLD AUTO: 11.8 FL (ref 7–12)
POTASSIUM REFLEX MAGNESIUM: 3.4 MMOL/L (ref 3.5–5)
PRO-BNP: 7289 PG/ML (ref 0–450)
RBC # BLD: 4.08 E12/L (ref 3.8–5.8)
SARS-COV-2, NAAT: NOT DETECTED
SODIUM BLD-SCNC: 142 MMOL/L (ref 132–146)
TOTAL PROTEIN: 5.8 G/DL (ref 6.4–8.3)
TROPONIN, HIGH SENSITIVITY: 33 NG/L (ref 0–11)
TROPONIN, HIGH SENSITIVITY: 33 NG/L (ref 0–11)
WBC # BLD: 5 E9/L (ref 4.5–11.5)

## 2021-12-17 PROCEDURE — 80053 COMPREHEN METABOLIC PANEL: CPT

## 2021-12-17 PROCEDURE — 93970 EXTREMITY STUDY: CPT

## 2021-12-17 PROCEDURE — 99284 EMERGENCY DEPT VISIT MOD MDM: CPT

## 2021-12-17 PROCEDURE — 85025 COMPLETE CBC W/AUTO DIFF WBC: CPT

## 2021-12-17 PROCEDURE — 6360000002 HC RX W HCPCS

## 2021-12-17 PROCEDURE — 71046 X-RAY EXAM CHEST 2 VIEWS: CPT

## 2021-12-17 PROCEDURE — 2060000000 HC ICU INTERMEDIATE R&B

## 2021-12-17 PROCEDURE — 84484 ASSAY OF TROPONIN QUANT: CPT

## 2021-12-17 PROCEDURE — 87635 SARS-COV-2 COVID-19 AMP PRB: CPT

## 2021-12-17 PROCEDURE — 83880 ASSAY OF NATRIURETIC PEPTIDE: CPT

## 2021-12-17 PROCEDURE — 83735 ASSAY OF MAGNESIUM: CPT

## 2021-12-17 PROCEDURE — 93005 ELECTROCARDIOGRAM TRACING: CPT | Performed by: PHYSICIAN ASSISTANT

## 2021-12-17 PROCEDURE — 36415 COLL VENOUS BLD VENIPUNCTURE: CPT

## 2021-12-17 RX ORDER — SODIUM CHLORIDE 0.9 % (FLUSH) 0.9 %
SYRINGE (ML) INJECTION
Status: DISPENSED
Start: 2021-12-17 | End: 2021-12-18

## 2021-12-17 RX ORDER — FUROSEMIDE 10 MG/ML
40 INJECTION INTRAMUSCULAR; INTRAVENOUS ONCE
Status: COMPLETED | OUTPATIENT
Start: 2021-12-17 | End: 2021-12-17

## 2021-12-17 RX ORDER — ALBUTEROL SULFATE 90 UG/1
AEROSOL, METERED RESPIRATORY (INHALATION) PRN
Status: ON HOLD | COMMUNITY
Start: 2021-12-13 | End: 2022-04-05 | Stop reason: ALTCHOICE

## 2021-12-17 RX ADMIN — FUROSEMIDE 40 MG: 10 INJECTION, SOLUTION INTRAVENOUS at 23:10

## 2021-12-17 NOTE — TELEPHONE ENCOUNTER
Double lasix to 40 mg and kdur to 20 meq daily. If worsens then ED, OV next week if improving. Eliecer Hammonds D.O.   Cardiologist  Cardiology, [facility]

## 2021-12-17 NOTE — ED PROVIDER NOTES
FIRST PROVIDER CONTACT ASSESSMENT NOTE           Department of Emergency Medicine                 First Provider Note            21  4:36 PM EST    Date of Encounter: No admission date for patient encounter. Patient Name: Bart Figueroa  : 1940  MRN: [id number]    Chief Complaint: Shortness of Breath (bilateral leg swelling, sent in by dr Tracy Vargas) and Leg Swelling      History of Present Illness:   Bart Figueroa is a [de-identified] y.o. male who presents to the ED for LE swelling and SOB. Hx neuroendocrine tumors. He is getting treated by Dr. Matilda Gonzales.    Has been vaccinated for Covid 19. He does have a cough as well. Its that the cough is worse when he lays flat. No history of blood clots. Focused Physical Exam:  VS:    ED Triage Vitals   BP Temp Temp src Pulse Resp SpO2 Height Weight   -- -- -- -- -- -- -- --        Physical Ex: Constitutional: Alert and non-toxic. Medical History:  has a past medical history of Aneurysm of abdominal aorta ([address] CAD (coronary artery disease), Cancer ([address] Decreased dorsalis pedis pulse, GERD (gastroesophageal reflux disease), Glaucoma, Hyperlipidemia, Hypertension, Neuro-endocrine cancer ([address] Numbness and tingling of both feet, and Unspecified cerebral artery occlusion with cerebral infarction. Surgical History:  has a past surgical history that includes hernia repair; Diagnostic Cardiac Cath Lab Procedure; Tonsillectomy; eye surgery (Left, 2013, 2013); eye surgery (Left); vascular surgery (Right, 10/16/2013); Carotid endarterectomy (Right, ); Colonoscopy; Sigmoidoscopy; Facial cosmetic surgery (N/A, 12/10/2019); laparoscopy (N/A, 2020); and Gastrostomy tube placement. Social History:  reports that he quit smoking about 29 years ago. His smoking use included cigarettes. He smoked 2.00 packs per day. He has never used smokeless tobacco. He reports current alcohol use. He reports that he does not use drugs.   Family History: family history includes Heart Disease in his brother and father. Allergies: Patient has no known allergies.      Initial Plan of Care: Initiate Treatment-Testing, Proceed toTreatment Area When Bed Available for ED Attending/MLP to Continue Care      ---END OF FIRST PROVIDER CONTACT ASSESSMENT NOTE---  Electronically signed by Nona Cushing, PA-C   DD: 12/17/21       Nona Cushing, PA-C  12/17/21 1640

## 2021-12-18 LAB
ANION GAP SERPL CALCULATED.3IONS-SCNC: 8 MMOL/L (ref 7–16)
BASOPHILS ABSOLUTE: 0.03 E9/L (ref 0–0.2)
BASOPHILS RELATIVE PERCENT: 0.5 % (ref 0–2)
BUN BLDV-MCNC: 18 MG/DL (ref 6–23)
CALCIUM SERPL-MCNC: 8.4 MG/DL (ref 8.6–10.2)
CHLORIDE BLD-SCNC: 98 MMOL/L (ref 98–107)
CHOLESTEROL, TOTAL: 166 MG/DL (ref 0–199)
CO2: 36 MMOL/L (ref 22–29)
CREAT SERPL-MCNC: 0.9 MG/DL (ref 0.7–1.2)
EKG ATRIAL RATE: 82 BPM
EKG P AXIS: 45 DEGREES
EKG P-R INTERVAL: 120 MS
EKG Q-T INTERVAL: 418 MS
EKG QRS DURATION: 96 MS
EKG QTC CALCULATION (BAZETT): 488 MS
EKG R AXIS: 15 DEGREES
EKG T AXIS: 157 DEGREES
EKG VENTRICULAR RATE: 82 BPM
EOSINOPHILS ABSOLUTE: 0.05 E9/L (ref 0.05–0.5)
EOSINOPHILS RELATIVE PERCENT: 0.8 % (ref 0–6)
GFR AFRICAN AMERICAN: >60
GFR NON-AFRICAN AMERICAN: >60 ML/MIN/1.73
GLUCOSE BLD-MCNC: 141 MG/DL (ref 74–99)
HCT VFR BLD CALC: 38.1 % (ref 37–54)
HDLC SERPL-MCNC: 53 MG/DL
HEMOGLOBIN: 12.8 G/DL (ref 12.5–16.5)
IMMATURE GRANULOCYTES #: 0.03 E9/L
IMMATURE GRANULOCYTES %: 0.5 % (ref 0–5)
LDL CHOLESTEROL CALCULATED: 97 MG/DL (ref 0–99)
LV EF: 15 %
LVEF MODALITY: NORMAL
LYMPHOCYTES ABSOLUTE: 0.64 E9/L (ref 1.5–4)
LYMPHOCYTES RELATIVE PERCENT: 10.7 % (ref 20–42)
MAGNESIUM: 2.3 MG/DL (ref 1.6–2.6)
MCH RBC QN AUTO: 31.2 PG (ref 26–35)
MCHC RBC AUTO-ENTMCNC: 33.6 % (ref 32–34.5)
MCV RBC AUTO: 92.9 FL (ref 80–99.9)
MONOCYTES ABSOLUTE: 0.53 E9/L (ref 0.1–0.95)
MONOCYTES RELATIVE PERCENT: 8.8 % (ref 2–12)
NEUTROPHILS ABSOLUTE: 4.71 E9/L (ref 1.8–7.3)
NEUTROPHILS RELATIVE PERCENT: 78.7 % (ref 43–80)
PDW BLD-RTO: 15.1 FL (ref 11.5–15)
PLATELET # BLD: 185 E9/L (ref 130–450)
PMV BLD AUTO: 12.3 FL (ref 7–12)
POTASSIUM SERPL-SCNC: 2.9 MMOL/L (ref 3.5–5)
RBC # BLD: 4.1 E12/L (ref 3.8–5.8)
SODIUM BLD-SCNC: 142 MMOL/L (ref 132–146)
TRIGL SERPL-MCNC: 82 MG/DL (ref 0–149)
TROPONIN, HIGH SENSITIVITY: 37 NG/L (ref 0–11)
VLDLC SERPL CALC-MCNC: 16 MG/DL
WBC # BLD: 6 E9/L (ref 4.5–11.5)

## 2021-12-18 PROCEDURE — 80048 BASIC METABOLIC PNL TOTAL CA: CPT

## 2021-12-18 PROCEDURE — 93306 TTE W/DOPPLER COMPLETE: CPT

## 2021-12-18 PROCEDURE — 2700000000 HC OXYGEN THERAPY PER DAY

## 2021-12-18 PROCEDURE — 99223 1ST HOSP IP/OBS HIGH 75: CPT | Performed by: INTERNAL MEDICINE

## 2021-12-18 PROCEDURE — 93010 ELECTROCARDIOGRAM REPORT: CPT | Performed by: INTERNAL MEDICINE

## 2021-12-18 PROCEDURE — 36415 COLL VENOUS BLD VENIPUNCTURE: CPT

## 2021-12-18 PROCEDURE — 6370000000 HC RX 637 (ALT 250 FOR IP): Performed by: PHYSICIAN ASSISTANT

## 2021-12-18 PROCEDURE — 83735 ASSAY OF MAGNESIUM: CPT

## 2021-12-18 PROCEDURE — 6360000002 HC RX W HCPCS: Performed by: PHYSICIAN ASSISTANT

## 2021-12-18 PROCEDURE — 85025 COMPLETE CBC W/AUTO DIFF WBC: CPT

## 2021-12-18 PROCEDURE — 2060000000 HC ICU INTERMEDIATE R&B

## 2021-12-18 PROCEDURE — 2500000003 HC RX 250 WO HCPCS: Performed by: PHYSICIAN ASSISTANT

## 2021-12-18 PROCEDURE — 2580000003 HC RX 258: Performed by: PHYSICIAN ASSISTANT

## 2021-12-18 PROCEDURE — 84484 ASSAY OF TROPONIN QUANT: CPT

## 2021-12-18 PROCEDURE — 6370000000 HC RX 637 (ALT 250 FOR IP): Performed by: INTERNAL MEDICINE

## 2021-12-18 PROCEDURE — 80061 LIPID PANEL: CPT

## 2021-12-18 RX ORDER — POTASSIUM CHLORIDE 20 MEQ/1
40 TABLET, EXTENDED RELEASE ORAL PRN
Status: DISCONTINUED | OUTPATIENT
Start: 2021-12-18 | End: 2021-12-22 | Stop reason: HOSPADM

## 2021-12-18 RX ORDER — ASPIRIN 81 MG/1
81 TABLET, CHEWABLE ORAL DAILY
Status: DISCONTINUED | OUTPATIENT
Start: 2021-12-18 | End: 2021-12-22 | Stop reason: HOSPADM

## 2021-12-18 RX ORDER — SODIUM CHLORIDE 0.9 % (FLUSH) 0.9 %
5-40 SYRINGE (ML) INJECTION PRN
Status: DISCONTINUED | OUTPATIENT
Start: 2021-12-18 | End: 2021-12-22 | Stop reason: HOSPADM

## 2021-12-18 RX ORDER — ACETAMINOPHEN 325 MG/1
650 TABLET ORAL EVERY 6 HOURS PRN
Status: DISCONTINUED | OUTPATIENT
Start: 2021-12-18 | End: 2021-12-22 | Stop reason: HOSPADM

## 2021-12-18 RX ORDER — ALBUTEROL SULFATE 2.5 MG/3ML
2.5 SOLUTION RESPIRATORY (INHALATION) EVERY 4 HOURS PRN
Status: DISCONTINUED | OUTPATIENT
Start: 2021-12-18 | End: 2021-12-22 | Stop reason: HOSPADM

## 2021-12-18 RX ORDER — ONDANSETRON 2 MG/ML
4 INJECTION INTRAMUSCULAR; INTRAVENOUS EVERY 6 HOURS PRN
Status: DISCONTINUED | OUTPATIENT
Start: 2021-12-18 | End: 2021-12-22 | Stop reason: HOSPADM

## 2021-12-18 RX ORDER — BUMETANIDE 0.25 MG/ML
1 INJECTION, SOLUTION INTRAMUSCULAR; INTRAVENOUS DAILY
Status: DISCONTINUED | OUTPATIENT
Start: 2021-12-18 | End: 2021-12-22 | Stop reason: HOSPADM

## 2021-12-18 RX ORDER — ALBUTEROL SULFATE 90 UG/1
2 AEROSOL, METERED RESPIRATORY (INHALATION) EVERY 4 HOURS PRN
Status: DISCONTINUED | OUTPATIENT
Start: 2021-12-18 | End: 2021-12-18 | Stop reason: CLARIF

## 2021-12-18 RX ORDER — SODIUM CHLORIDE 0.9 % (FLUSH) 0.9 %
5-40 SYRINGE (ML) INJECTION EVERY 12 HOURS SCHEDULED
Status: DISCONTINUED | OUTPATIENT
Start: 2021-12-18 | End: 2021-12-22 | Stop reason: HOSPADM

## 2021-12-18 RX ORDER — ONDANSETRON 4 MG/1
4 TABLET, ORALLY DISINTEGRATING ORAL EVERY 8 HOURS PRN
Status: DISCONTINUED | OUTPATIENT
Start: 2021-12-18 | End: 2021-12-22 | Stop reason: HOSPADM

## 2021-12-18 RX ORDER — POLYETHYLENE GLYCOL 3350 17 G/17G
17 POWDER, FOR SOLUTION ORAL DAILY PRN
Status: DISCONTINUED | OUTPATIENT
Start: 2021-12-18 | End: 2021-12-22 | Stop reason: HOSPADM

## 2021-12-18 RX ORDER — POTASSIUM CHLORIDE 7.45 MG/ML
10 INJECTION INTRAVENOUS PRN
Status: DISCONTINUED | OUTPATIENT
Start: 2021-12-18 | End: 2021-12-18

## 2021-12-18 RX ORDER — SODIUM CHLORIDE 9 MG/ML
25 INJECTION, SOLUTION INTRAVENOUS PRN
Status: DISCONTINUED | OUTPATIENT
Start: 2021-12-18 | End: 2021-12-22 | Stop reason: HOSPADM

## 2021-12-18 RX ORDER — LISINOPRIL 10 MG/1
10 TABLET ORAL DAILY
Status: DISCONTINUED | OUTPATIENT
Start: 2021-12-18 | End: 2021-12-22 | Stop reason: HOSPADM

## 2021-12-18 RX ORDER — ACETAMINOPHEN 650 MG/1
650 SUPPOSITORY RECTAL EVERY 6 HOURS PRN
Status: DISCONTINUED | OUTPATIENT
Start: 2021-12-18 | End: 2021-12-22 | Stop reason: HOSPADM

## 2021-12-18 RX ORDER — POTASSIUM CHLORIDE 20 MEQ/1
40 TABLET, EXTENDED RELEASE ORAL EVERY 4 HOURS
Status: COMPLETED | OUTPATIENT
Start: 2021-12-18 | End: 2021-12-18

## 2021-12-18 RX ADMIN — BUMETANIDE 1 MG: 0.25 INJECTION INTRAMUSCULAR; INTRAVENOUS at 08:07

## 2021-12-18 RX ADMIN — POTASSIUM CHLORIDE 40 MEQ: 20 TABLET, EXTENDED RELEASE ORAL at 21:05

## 2021-12-18 RX ADMIN — LISINOPRIL 10 MG: 10 TABLET ORAL at 08:08

## 2021-12-18 RX ADMIN — ASPIRIN 81 MG CHEWABLE TABLET 81 MG: 81 TABLET CHEWABLE at 08:08

## 2021-12-18 RX ADMIN — Medication 10 ML: at 21:07

## 2021-12-18 RX ADMIN — POTASSIUM CHLORIDE 10 MEQ: 7.45 INJECTION INTRAVENOUS at 05:18

## 2021-12-18 RX ADMIN — ENOXAPARIN SODIUM 40 MG: 100 INJECTION SUBCUTANEOUS at 08:07

## 2021-12-18 RX ADMIN — POTASSIUM CHLORIDE 40 MEQ: 20 TABLET, EXTENDED RELEASE ORAL at 16:00

## 2021-12-18 RX ADMIN — POTASSIUM CHLORIDE 40 MEQ: 20 TABLET, EXTENDED RELEASE ORAL at 11:27

## 2021-12-18 RX ADMIN — POTASSIUM CHLORIDE 10 MEQ: 7.45 INJECTION INTRAVENOUS at 10:12

## 2021-12-18 RX ADMIN — Medication 10 ML: at 08:08

## 2021-12-18 RX ADMIN — POTASSIUM CHLORIDE 10 MEQ: 7.45 INJECTION INTRAVENOUS at 08:51

## 2021-12-18 RX ADMIN — POTASSIUM CHLORIDE 10 MEQ: 7.45 INJECTION INTRAVENOUS at 06:35

## 2021-12-18 ASSESSMENT — PAIN SCALES - GENERAL
PAINLEVEL_OUTOF10: 0

## 2021-12-18 ASSESSMENT — ENCOUNTER SYMPTOMS
SHORTNESS OF BREATH: 1
RHINORRHEA: 0
COUGH: 1
PHOTOPHOBIA: 0
VOICE CHANGE: 0
ABDOMINAL PAIN: 0
BLOOD IN STOOL: 0
TROUBLE SWALLOWING: 0
VOMITING: 0
WHEEZING: 0
DIARRHEA: 0

## 2021-12-18 NOTE — H&P
artery occlusion with cerebral infarction 2016    TIA     Past Surgical History:        Procedure Laterality Date    CAROTID ENDARTERECTOMY Right     Dr. Reyna Campbell Left Feb 2013, June 2013    glaucoma    EYE SURGERY Left     cataract    FACIAL COSMETIC SURGERY N/A 12/10/2019    EXCISION OF BASAL CELL CARCINOMA RIGHT BROW,  EXCISION OF SQUAMOUS CELL CARCINOMA RIGHT FOREARM -- FROZEN performed by Zaire Sprague MD at [address]       LAPAROSCOPY N/A 8/28/2020    DIAGNOSTIC LAPAROSCOPY WITH BIOPSY performed by Ciro Harrell MD at [address] Right 10/16/2013    RIGHT CAROTID ENDARTERECTOMY         Medications Prior to Admission:    Medications Prior to Admission: albuterol sulfate  (90 Base) MCG/ACT inhaler,   furosemide (LASIX) 20 MG tablet, Take 1 tablet by mouth daily (Patient taking differently: Take 40 mg by mouth daily )  potassium chloride (KLOR-CON M) 10 MEQ extended release tablet, Take 1 tablet by mouth daily (Patient taking differently: Take 20 mEq by mouth daily )  COLACE 100 MG capsule, 100 mg every other day   lisinopril (PRINIVIL;ZESTRIL) 10 MG tablet, 10 mg daily   Octreotide Acetate (SANDOSTATIN IJ), Inject as directed Once per month  aspirin 81 MG chewable tablet, Take 81 mg by mouth daily     Allergies:  Patient has no known allergies. Social History:   TOBACCO:   reports that he quit smoking about 29 years ago. His smoking use included cigarettes. He smoked 2.00 packs per day. He has never used smokeless tobacco.  ETOH:   reports current alcohol use.   MARITAL STATUS:    OCCUPATION:      Family History:       Problem Relation Age of Onset    Heart Disease Father     Heart Disease Brother        REVIEW OF SYSTEMS:    General ROS: Exertional dyspnea and bilateral lower extremity edema  Hematological and Lymphatic ROS: negative  Endocrine ROS: negative  Respiratory ROS: no cough, shortness of breath, or wheezing  Cardiovascular ROS: no chest pain or dyspnea on exertion  Gastrointestinal ROS: no abdominal pain, change in bowel habits, or black or bloody stools  Genito-Urinary ROS: no dysuria, trouble voiding, or hematuria  Neurological ROS: no TIA or stroke symptoms  negative    Vitals:  BP (!) 138/90   Pulse 80   Temp 97.8 °F (36.6 °C) (Oral)   Resp 16   Ht 6' 1\" (1.854 m)   Wt 158 lb (71.7 kg)   SpO2 97%   BMI 20.85 kg/m²     PHYSICAL EXAM:  General:  Awake, alert, oriented X 3. Well developed, well nourished, well groomed. No apparent distress. HEENT:  Normocephalic, atraumatic. Pupils equal, round, reactive to light. No scleral icterus. No conjunctival injection. Normal lips, teeth, and gums. No nasal discharge. Neck:  Supple, FROM  Heart:  RRR, no murmurs, gallops, rubs, carotid upstroke normal, no carotid bruits  Lungs:  CTA bilaterally, bilat symmetrical expansion, no wheeze, rales, or rhonchi  Abdomen: Bowel sounds present, soft, nontender, no masses, no organomegaly, no peritoneal signs  Extremities: Pitting edema now 1+ after he has received diuresis  Skin:  Warm and dry, no open lesions or rash  Neuro:  Cranial nerves 2-12 intact, no focal deficits  Vascular: Radial and pedal Pulses 2+  Breast: deferred  Rectal: deferred  Genitalia:  deferred      DATA:     Recent Labs     12/17/21 1910 12/18/21  0355   WBC 5.0 6.0   HGB 12.7 12.8    185     Recent Labs     12/17/21 1910 12/18/21  0355    142   K 3.4* 2.9*   BUN 20 18   CREATININE 0.8 0.9     Recent Labs     12/17/21 1910   PROT 5.8*     Recent Labs     12/17/21 1910   AST 17   ALT 17   ALKPHOS 81   BILITOT 0.8     No results for input(s): BNP in the last 72 hours. No results for input(s): CKTOTAL, CKMB, CKMBINDEX, TROPONINI in the last 72 hours.     ASSESSMENT:      Principal Problem:    Volume overload  Active Problems:    Hypertension    Hypokalemia  Resolved Problems:    * No resolved hospital problems.  *        PLAN:    Admit to telemetry for evaluation of acute CHF systolic-CHr EF  IV diuresis with Bumex 1 mg twice daily  Supplement potassium secondary to hypokalemia from aggressive diuresis  Echocardiogram reveals ejection fraction 15% down from 60% in 2015  Will need ischemic evaluation  Cardiology consultation and input appreciated  Goal-directed medical therapy to be initiated  LifeVest to decrease  of cardiology prior to discharge      DVT prophylaxis  PT OT  Discharge plan    Chante Mendoza MD  12/18/2021  3:48 PM

## 2021-12-18 NOTE — CONSULTS
Inpatient Cardiology Consultation      Reason for Consult:  CHF    Consulting Physician: Dr. Ortiz Magallon    Requesting Physician:  ZAIRA Gordon    Date of Consultation: 12/18/2021    HISTORY OF PRESENT ILLNESS:   Mr. Tami Childers is an [de-identified]year old  male who follows with Dr. Ravi Ricks. He was most recently seen in the office with Dr. Ravi Ricks on 05/20/2021. His medical history includes HTN, HLD, GERD, remote tobacco abuse, TIA, AAA, PVD s/p right CEA, Colon CA (s/p PEG tube insertion and weekly chemotherapy). Mr. Tami Childers presented to [facility] ED on 12/17/2021 with complaints of worsening dyspnea. He states that prior to presentation approximately a week earlier he was having dyspnea on exertion, orthopnea and PND, and hemoptysis. He states that he called his PCP and was seen 3 days later for which he received an inhaler. He states upon picking up the inhaler, \" my daughter mention my swelling in my legs, and the pharmacist recommended that I follow-up with cardiology\". Subsequently, he called the office and was placed on Lasix 40 mg daily as well as potassium supplementation. He states over the course of 4 days he had increased urine output however, his dyspnea worsened. He denies accompanying chest pain. Upon arrival to the ED his VS were oral temperature 03-06-/88-95% on room air. EKG SR. Rapid Covid test negative. WBC 5. H&H 12.7/38. 2. . K3.4.  BUN/SCR 20/0.8. Troponin of 33.  proBNP 7289. CXR stable small right pleural effusion and moderate left pleural effusion noted to be increasing in size with left lower lobe atelectasis versus pneumonia. He was admitted to a telemetry monitored unit. Echocardiogram was ordered. Cardiology was consulted for management of CHF. Please note: past medical records were reviewed per electronic medical record (EMR) - see detailed reports under Past Medical/ Surgical History. Past Medical and Surgical History:    1.  Exercise Nuclear Stress Test 10/02/2013: Turner protocol, 8:00 minutes, 10.1 METs with 82% MPHR achieved. Small fixed defect of the distal anterior wall and apex suggestive of prior infarction. EF 65% with hypokinesis of the apex. Low risk exercise treadmill test. Exercise capacity is average. 2. TTE 08/28/2015 (Dr. Delia Montero): Ejection fraction is visually estimated at 60%. No regional wall motion abnormalities seen. Mild left ventricular concentric hypertrophy noted. There is doppler evidence of stage I diastolic dysfunction. Physiologic and/or trace mitral regurgitation is present. 3. HTN  4. HLD  5. Remote tobacco abuse  6. GERD  7. TIA   8. AAA (follows with Dr. Brandyn Gil)  9. PVD s/p Right CEA   10. Colon CA (follows with Dr. Sherley Diop, reportedly on weekly chemotherapy)  11. Glaucoma  12. S/p Bilateral eye cataract removal, hernia repair, tonsillectomy, and G tube placement (currently to gravity)          Medications Prior to admit:  Prior to Admission medications    Medication Sig Start Date End Date Taking?  Authorizing Provider   albuterol sulfate  (90 Base) MCG/ACT inhaler  12/13/21  Yes Historical Provider, MD   furosemide (LASIX) 20 MG tablet Take 1 tablet by mouth daily  Patient taking differently: Take 40 mg by mouth daily  12/13/21  Yes Blima Carbine, DO   potassium chloride (KLOR-CON M) 10 MEQ extended release tablet Take 1 tablet by mouth daily  Patient taking differently: Take 20 mEq by mouth daily  12/13/21  Yes Blima Carbine, DO   COLACE 100 MG capsule 100 mg every other day  3/1/21  Yes Historical Provider, MD   lisinopril (PRINIVIL;ZESTRIL) 10 MG tablet 10 mg daily  5/11/21  Yes Historical Provider, MD   Octreotide Acetate (SANDOSTATIN IJ) Inject as directed Once per month   Yes Historical Provider, MD   aspirin 81 MG chewable tablet Take 81 mg by mouth daily    Yes Historical Provider, MD       Current Medications:    Current Facility-Administered Medications: aspirin chewable tablet 81 mg, 81 mg, Oral, Daily  lisinopril (PRINIVIL;ZESTRIL) tablet 10 mg, 10 mg, Oral, Daily  sodium chloride flush 0.9 % injection 5-40 mL, 5-40 mL, IntraVENous, 2 times per day  sodium chloride flush 0.9 % injection 5-40 mL, 5-40 mL, IntraVENous, PRN  0.9 % sodium chloride infusion, 25 mL, IntraVENous, PRN  ondansetron (ZOFRAN-ODT) disintegrating tablet 4 mg, 4 mg, Oral, Q8H PRN **OR** ondansetron (ZOFRAN) injection 4 mg, 4 mg, IntraVENous, Q6H PRN  polyethylene glycol (GLYCOLAX) packet 17 g, 17 g, Oral, Daily PRN  acetaminophen (TYLENOL) tablet 650 mg, 650 mg, Oral, Q6H PRN **OR** acetaminophen (TYLENOL) suppository 650 mg, 650 mg, Rectal, Q6H PRN  enoxaparin (LOVENOX) injection 40 mg, 40 mg, SubCUTAneous, Daily  perflutren lipid microspheres (DEFINITY) injection 1.65 mg, 1.5 mL, IntraVENous, ONCE PRN  potassium chloride (KLOR-CON M) extended release tablet 40 mEq, 40 mEq, Oral, PRN **OR** potassium bicarb-citric acid (EFFER-K) effervescent tablet 40 mEq, 40 mEq, Oral, PRN **OR** potassium chloride 10 mEq/100 mL IVPB (Peripheral Line), 10 mEq, IntraVENous, PRN  bumetanide (BUMEX) injection 1 mg, 1 mg, IntraVENous, Daily  albuterol (PROVENTIL) nebulizer solution 2.5 mg, 2.5 mg, Nebulization, Q4H PRN **AND** Nebulizer tx intermittent, , , Q4H PRN    Allergies:  Patient has no known allergies. Social History:    Quit smoking in 1992. Prior to that smoked 1 pack/day for 20 years. Alcohol intake includes a very rare drink. Denies illicit drug use. Caffeine intake includes 1-2 cups of coffee a day. Family History:   Please note this information was not obtained at this time, as it is limited in nature due to the patient's advanced age. REVIEW OF SYSTEMS:     · Constitutional: Denies fevers, chills, night sweats, and fatigue  · HEENT: Denies headaches, nose bleeds, and blurred vision,oral pain, abscess or lesion.   · Musculoskeletal: Denies falls, pain to BLE with ambulation and complains of worsening edema to BLE.  · Neurological: Denies dizziness and lightheadedness, numbness and tingling  · Cardiovascular: Denies chest pain, palpitations, and feelings of heart racing. · Respiratory: Complains of worsening OLIVEIRA and orthopnea and PND-see HPI  · Gastrointestinal: Denies heartburn, nausea/vomiting, diarrhea and constipation, black/bloody, and tarry stools. · Genitourinary: Denies dysuria and hematuria  · Hematologic: Denies excessive bruising or bleeding  · Lymphatic: Denies lumps and bumps to neck, axilla, breast, and groin  · Endocrine: Denies excessive thirst. Denies intolerance to hot and cold  · Psychiatric: Denies anxiety and depression. States that he is in the process of relocating to [place] to live with his younger brother since the passing of his wife in September 2021. PHYSICAL EXAM:   BP (!) 159/86   Pulse 83   Temp 97.5 °F (36.4 °C) (Oral)   Resp 16   Ht 6' 1\" (1.854 m)   Wt 158 lb (71.7 kg)   SpO2 94%   BMI 20.85 kg/m²   CONST:  Well developed, thin, frail appearing elderly  male who appears stated age. Awake, alert, cooperative, no apparent distress  HEENT:   Head- Normocephalic, atraumatic   Eyes- Conjunctivae pink, anicteric  Throat- Oral mucosa pink and moist  Neck-  No stridor, trachea midline, no jugular venous distention. No adenopathy   CHEST: Chest symmetrical and non-tender to palpation. No accessory muscle use or intercostal retractions  RESPIRATORY: Lung sounds -diminished throughout fields   CARDIOVASCULAR:     No carotid bruit  Heart Inspection- shows no noted pulsations  Heart Palpation- no heaves or thrills; PMI is non-displaced   Heart Ausculation- Regular rate and rhythm, no murmur. No s3, s4 or rub   PV: No lower extremity edema. No varicosities. Pedal pulses palpable, no clubbing or cyanosis   ABDOMEN: Soft, non-tender to light palpation. Bowel sounds present. No palpable masses no organomegaly; no abdominal bruit.   PEG tube noted to gravity bag  MS: Good muscle strength and tone. No atrophy or abnormal movements. : Deferred  SKIN: Warm and dry no statis dermatitis or ulcers   NEURO / PSYCH: Oriented to person, place and time. Speech clear and appropriate. Follows all commands. Pleasant affect     DATA:    ECG: As above  Tele strips: SR  Diagnostic:      Intake/Output Summary (Last 24 hours) at 12/18/2021 0920  Last data filed at 12/18/2021 6699  Gross per 24 hour   Intake 50 ml   Output 1000 ml   Net -950 ml       Labs:   CBC:   Recent Labs     12/17/21 1910 12/18/21  0355   WBC 5.0 6.0   HGB 12.7 12.8   HCT 38.2 38.1    185     BMP:   Recent Labs     12/17/21 1910 12/18/21  0355    142   K 3.4* 2.9*   CO2 35* 36*   BUN 20 18   CREATININE 0.8 0.9   LABGLOM >60 >60   CALCIUM 8.4* 8.4*     Mag:   Recent Labs     12/17/21 1910 12/18/21  0355   MG 2.1 2.3   FASTING LIPID PANEL:  Lab Results   Component Value Date    CHOL 166 12/18/2021    HDL 53 12/18/2021    LDLCALC 97 12/18/2021    TRIG 82 12/18/2021     LIVER PROFILE:  Recent Labs     12/17/21 1910   AST 17   ALT 17   LABALBU 3.3*     Results for Justus Ugarte (MRN [id number]) as of 12/18/2021 09:25   Ref. Range 12/17/2021 19:10 12/17/2021 23:10 12/18/2021 03:55   Troponin, High Sensitivity Latest Ref Range: 0 - 11 ng/L 33 (H) 33 (H) 37 (H)     12/17/2021 BLE Ultrasound:   No evidence of DVT in either lower extremity. 12/17/2021 CXR:   Moderate left pleural effusion appears slightly larger, again with adjacent  left lower lobe compressive atelectasis and/or pneumonia  Stable small right pleural effusion    IMPRESSION and PLAN to follow per Dr. Homar Leslie    Electronically signed by TIFFANIE Lutz CNP on 12/18/2021 at 9:20 AM     The above documentation has been prepared under my direction and personally reviewed by me in its entirety. I confirm that the note above accurately reflects all work, treatment, procedures, and medical decision making performed by me.     The patient's history was independently obtained. The patient was independently examined. Electrocardiogram, prior and present cardiovascular assessment, and laboratory studies were reviewed. The patient is an 45-year-old white male known to [facility] with primary cardiovascular care provided by Emely Vargas. He has a known history of coronary atherosclerosis most significantly involving the diagonal distribution of the left anterior descending as well as that of disease of the mid left anterior descending and proximal right coronary with medical management recommended, hypertension, hyperlipidemia, chronic obstructive lung disease with significant prior tobacco consumption, and abdominal aortic aneurysm and transient cerebral ischemic episode with prior right carotid enterectomy and a history of a neuroendocrine tumor of the colon with previous placement of a percutaneous gastrostomy and ongoing maintenance chemotherapy. He has undergone most recent objective assessment with an echocardiogram in August, 2015 demonstrating evidence of a normal-sized left ventricular chamber with mild concentric left ventricular hypertrophy and no evidence of regional wall motion ebonized with normal left ventricular systolic function stage I diastolic dysfunction with no significant valvular pathology. He presented with a reported 1 week history of progressive exertional dyspnea associated with orthopnea and paroxysmal nocturnal dyspnea in addition to mild pedal edema with symptoms persistent in spite of initiation of oral diuretics.   At the time of his emergency room evaluation, a resting electrocardiogram reviewed at the time of evaluation demonstrated evidence of sinus rhythm with occasional ventricular ectopy, low voltage within the limb leads and a delayed precordial transition zone as well as that of a normal-sized cardiac silhouette with no evidence of interstitial infiltrates and a small right and small to moderate left pleural effusion not present at the time of most recent CT assessment approximately 1 month earlier in follow-up of his carcinoma. Additional laboratory studies demonstrated mild elevation of high-sensitivity troponin levels in a pattern not suggestive of an acute coronary syndrome and a proBNP level of 7290 pg/mL in the absence of comparison. At the time of evaluation, the patient's medications and allergies were reviewed as well as that of his past medical history and review of systems. On examination, he appears in no discomfort nor distress and is slightly frail in appearance. He is hemodynamically stable with vital signs as document jugular venous pressure is normal with no identified carotid bruits. Lung fields demonstrate diminished breath sounds in both lung bases and are otherwise clear to auscultation. Cardiac examination still very regular rate and rhythm with no gallop rhythm or cardiac murmur. A benign abdominal examination is present with trivial pedal edema presently noted. Diagnostic Assessment and Plan: On a clinical basis, the patient presents with symptoms of progressive dyspnea and orthopnea in the absence of significant present manifestations of volume overload following the initiation of diuretics. While a cardiovascular source is possible and that of an echocardiogram pending, additional concerns are present in the basis of his newly noted, particular that of larger left than right pleural effusion especially in light of with additional findings of his most recent CT scan assessment deferred to primary care. Presently his diuretics will be continued pending the review of his echocardiogram with oral supplementation of his hypokalemia and need of careful monitoring of his electrolyte status. Independent of findings, ongoing aggressive risk factor modification of blood pressure and serum lipids will be essential to reducing risk of future atherosclerotic development.     Thank you for allowing me to participate in your patient's care. Please feel free to contact me if you have any questions or concerns. Malorie Fisher.  Eliceo Lucio, [address] [facility]

## 2021-12-18 NOTE — PROGRESS NOTES
Nutrition Education    · Verbally reviewed information with Patient  · Educated on CHF Heart Healthy Guidelines  · Written educational materials provided. · Contact name and number provided. · Refer to Patient Education activity for more details.     Electronically signed by Lucinda Villatoro RD, LD on 12/18/21 at 8:24 AM EST    Contact: [phone]

## 2021-12-18 NOTE — ED PROVIDER NOTES
[de-identified]year Old male presenting to the emergency department with complaints of leg swelling and shortness of breath x1 week. Sent in from dr. Imtiaz Lim office. Patient has been having shortness of breath x1 week, was seen by Dr. Isaac Ervin in place of Dr. Melyssa Hartman, and was evaluated for blood in sputum and placed on albuterol patient had Lasix added by cardiology on Tuesday, and has reported improvement in leg swelling from previous. Shortness of breath has worsened while laying down, with difficulty sleeping for patient. Patient reports that he has no prior history of CHF. Patient is accompanied by daughter           Review of Systems   Constitutional: Positive for chills and fatigue. Negative for fever. HENT: Negative for congestion, rhinorrhea, trouble swallowing and voice change. Eyes: Negative for photophobia and visual disturbance. Respiratory: Positive for cough and shortness of breath. Negative for wheezing. Cardiovascular: Positive for leg swelling. Negative for chest pain. Gastrointestinal: Negative for abdominal pain, blood in stool, diarrhea and vomiting. Genitourinary: Negative for hematuria. Musculoskeletal: Negative for arthralgias, neck pain and neck stiffness. Skin: Negative for rash and wound. Neurological: Negative for dizziness, syncope, weakness, numbness and headaches. Psychiatric/Behavioral: Negative for behavioral problems and confusion. Physical Exam  Vitals reviewed. Constitutional:       Appearance: He is not toxic-appearing. Interventions: He is not intubated. HENT:      Head: Normocephalic. Mouth/Throat:      Mouth: Mucous membranes are moist.   Eyes:      Pupils: Pupils are equal, round, and reactive to light. Cardiovascular:      Rate and Rhythm: Normal rate and regular rhythm. Heart sounds: No friction rub. No gallop. Pulmonary:      Effort: Pulmonary effort is normal. He is not intubated.       Breath sounds: Examination of the left-upper field reveals decreased breath sounds. Decreased breath sounds present. No wheezing, rhonchi or rales. Chest:      Chest wall: No tenderness. Musculoskeletal:      Cervical back: Normal range of motion and neck supple. Right lower leg: Edema present. Left lower leg: Edema present. Skin:     General: Skin is warm. Capillary Refill: Capillary refill takes less than 2 seconds. Coloration: Skin is not pale. Neurological:      Mental Status: He is alert and oriented to person, place, and time. Motor: No weakness. Psychiatric:         Mood and Affect: Mood normal.         Behavior: Behavior normal.          Procedures     MDM  Number of Diagnoses or Management Options  Diagnosis management comments: 71-year-old male presenting emergency department with complaints of shortness of breath, leg swelling x1 week. Patient sent in by Dr. Leo Martinez. Patient demonstrates elevation of BNP, no previous history of CHF. Negative for Covid. Chest x-ray demonstrates moderate left pleural effusion enlargement. No evidence of DVT in the bilateral lower extremities. Spoke to [facility], will plan to admit patient at this time. Patient given dose of Lasix in ER. Amount and/or Complexity of Data Reviewed  Decide to obtain previous medical records or to obtain history from someone other than the patient: yes                  --------------------------------------------- PAST HISTORY ---------------------------------------------  Past Medical History:  has a past medical history of Aneurysm of abdominal aorta ([facility] [address] CAD (coronary artery disease), Cancer ([facility] [address] Decreased dorsalis pedis pulse, GERD (gastroesophageal reflux disease), Glaucoma, Hyperlipidemia, Hypertension, Neuro-endocrine cancer ([facility] [address] Numbness and tingling of both feet, and Unspecified cerebral artery occlusion with cerebral infarction.     Past Surgical History:  has a past surgical history that includes hernia repair; Diagnostic Cardiac Cath Lab Procedure; Tonsillectomy; eye surgery (Left, Feb 2013, June 2013); eye surgery (Left); vascular surgery (Right, 10/16/2013); Carotid endarterectomy (Right, ); Colonoscopy; Sigmoidoscopy; Facial cosmetic surgery (N/A, 12/10/2019); laparoscopy (N/A, 8/28/2020); and Gastrostomy tube placement. Social History:  reports that he quit smoking about 29 years ago. His smoking use included cigarettes. He smoked 2.00 packs per day. He has never used smokeless tobacco. He reports current alcohol use. He reports that he does not use drugs. Family History: family history includes Heart Disease in his brother and father. The patients home medications have been reviewed. Allergies: Patient has no known allergies.     -------------------------------------------------- RESULTS -------------------------------------------------    LABS:  Results for orders placed or performed during the hospital encounter of 12/17/21   COVID-19, Rapid    Specimen: Nasopharyngeal Swab   Result Value Ref Range    SARS-CoV-2, NAAT Not Detected Not Detected   CBC Auto Differential   Result Value Ref Range    WBC 5.0 4.5 - 11.5 E9/L    RBC 4.08 3.80 - 5.80 E12/L    Hemoglobin 12.7 12.5 - 16.5 g/dL    Hematocrit 38.2 37.0 - 54.0 %    MCV 93.6 80.0 - 99.9 fL    MCH 31.1 26.0 - 35.0 pg    MCHC 33.2 32.0 - 34.5 %    RDW 15.2 (H) 11.5 - 15.0 fL    Platelets 068 153 - 412 E9/L    MPV 11.8 7.0 - 12.0 fL    Neutrophils % 74.8 43.0 - 80.0 %    Immature Granulocytes % 0.4 0.0 - 5.0 %    Lymphocytes % 12.4 (L) 20.0 - 42.0 %    Monocytes % 11.0 2.0 - 12.0 %    Eosinophils % 0.8 0.0 - 6.0 %    Basophils % 0.6 0.0 - 2.0 %    Neutrophils Absolute 3.74 1.80 - 7.30 E9/L    Immature Granulocytes # 0.02 E9/L    Lymphocytes Absolute 0.62 (L) 1.50 - 4.00 E9/L    Monocytes Absolute 0.55 0.10 - 0.95 E9/L    Eosinophils Absolute 0.04 (L) 0.05 - 0.50 E9/L    Basophils Absolute 0.03 0.00 - 0.20 E9/L   Comprehensive Metabolic Panel w/ Reflex to MG Result Value Ref Range    Sodium 142 132 - 146 mmol/L    Potassium reflex Magnesium 3.4 (L) 3.5 - 5.0 mmol/L    Chloride 99 98 - 107 mmol/L    CO2 35 (H) 22 - 29 mmol/L    Anion Gap 8 7 - 16 mmol/L    Glucose 137 (H) 74 - 99 mg/dL    BUN 20 6 - 23 mg/dL    CREATININE 0.8 0.7 - 1.2 mg/dL    GFR Non-African American >60 >=60 mL/min/1.73    GFR African American >60     Calcium 8.4 (L) 8.6 - 10.2 mg/dL    Total Protein 5.8 (L) 6.4 - 8.3 g/dL    Albumin 3.3 (L) 3.5 - 5.2 g/dL    Total Bilirubin 0.8 0.0 - 1.2 mg/dL    Alkaline Phosphatase 81 40 - 129 U/L    ALT 17 0 - 40 U/L    AST 17 0 - 39 U/L   Troponin   Result Value Ref Range    Troponin, High Sensitivity 33 (H) 0 - 11 ng/L   Brain Natriuretic Peptide   Result Value Ref Range    Pro-BNP 7,289 (H) 0 - 450 pg/mL   Magnesium   Result Value Ref Range    Magnesium 2.1 1.6 - 2.6 mg/dL   Troponin   Result Value Ref Range    Troponin, High Sensitivity 33 (H) 0 - 11 ng/L   Basic Metabolic Panel   Result Value Ref Range    Sodium 142 132 - 146 mmol/L    Potassium 2.9 (L) 3.5 - 5.0 mmol/L    Chloride 98 98 - 107 mmol/L    CO2 36 (H) 22 - 29 mmol/L    Anion Gap 8 7 - 16 mmol/L    Glucose 141 (H) 74 - 99 mg/dL    BUN 18 6 - 23 mg/dL    CREATININE 0.9 0.7 - 1.2 mg/dL    GFR Non-African American >60 >=60 mL/min/1.73    GFR African American >60     Calcium 8.4 (L) 8.6 - 10.2 mg/dL   Magnesium   Result Value Ref Range    Magnesium 2.3 1.6 - 2.6 mg/dL   CBC auto differential   Result Value Ref Range    WBC 6.0 4.5 - 11.5 E9/L    RBC 4.10 3.80 - 5.80 E12/L    Hemoglobin 12.8 12.5 - 16.5 g/dL    Hematocrit 38.1 37.0 - 54.0 %    MCV 92.9 80.0 - 99.9 fL    MCH 31.2 26.0 - 35.0 pg    MCHC 33.6 32.0 - 34.5 %    RDW 15.1 (H) 11.5 - 15.0 fL    Platelets 727 495 - 411 E9/L    MPV 12.3 (H) 7.0 - 12.0 fL    Neutrophils % 78.7 43.0 - 80.0 %    Immature Granulocytes % 0.5 0.0 - 5.0 %    Lymphocytes % 10.7 (L) 20.0 - 42.0 %    Monocytes % 8.8 2.0 - 12.0 %    Eosinophils % 0.8 0.0 - 6.0 % Basophils % 0.5 0.0 - 2.0 %    Neutrophils Absolute 4.71 1.80 - 7.30 E9/L    Immature Granulocytes # 0.03 E9/L    Lymphocytes Absolute 0.64 (L) 1.50 - 4.00 E9/L    Monocytes Absolute 0.53 0.10 - 0.95 E9/L    Eosinophils Absolute 0.05 0.05 - 0.50 E9/L    Basophils Absolute 0.03 0.00 - 0.20 E9/L   Lipid panel - fasting   Result Value Ref Range    Cholesterol, Total 166 0 - 199 mg/dL    Triglycerides 82 0 - 149 mg/dL    HDL 53 >40 mg/dL    LDL Calculated 97 0 - 99 mg/dL    VLDL Cholesterol Calculated 16 mg/dL   Troponin   Result Value Ref Range    Troponin, High Sensitivity 37 (H) 0 - 11 ng/L   EKG 12 Lead   Result Value Ref Range    Ventricular Rate 82 BPM    Atrial Rate 82 BPM    P-R Interval 120 ms    QRS Duration 96 ms    Q-T Interval 418 ms    QTc Calculation (Bazett) 488 ms    P Axis 45 degrees    R Axis 15 degrees    T Axis 157 degrees       RADIOLOGY:  XR CHEST (2 VW)   Final Result   Moderate left pleural effusion appears slightly larger, again with adjacent   left lower lobe compressive atelectasis and/or pneumonia      Stable small right pleural effusion         US DUP LOWER EXTREMITIES BILATERAL VENOUS   Final Result   No evidence of DVT in either lower extremity. RECOMMENDATIONS:   Unavailable             EKG:  This EKG is signed and interpreted by me. Rate: 82  Rhythm: Sinus  Interpretation: Sinus rhythm with premature ventricular contractions  Comparison: changes compared to previous EKG      ------------------------- NURSING NOTES AND VITALS REVIEWED ---------------------------  Date / Time Roomed:  12/17/2021  6:49 PM  ED Bed Assignment:  [id number]    The nursing notes within the ED encounter and vital signs as below have been reviewed.      Patient Vitals for the past 24 hrs:   BP Temp Temp src Pulse Resp SpO2 Height Weight   12/18/21 0745 (!) 159/86 97.5 °F (36.4 °C) Oral 83 16 94 % -- --   12/18/21 0015 (!) 153/87 97.6 °F (36.4 °C) Oral 86 16 95 % -- 158 lb (71.7 kg)   12/17/21 1823 (!) 140/74 97.8 °F (36.6 °C) -- 86 16 95 % -- --   12/17/21 1636 (!) 148/88 97 °F (36.1 °C) Temporal 81 16 95 % 6' 1\" (1.854 m) 164 lb (74.4 kg)       Oxygen Saturation Interpretation: Oxygen fluctuations, room falling to 88, and improving spontaneously    ------------------------------------------ PROGRESS NOTES ------------------------------------------  Re-evaluation(s):  Patients symptoms show no change  Repeat physical examination is not changed    Counseling:  I have spoken with the patient and discussed todays results, in addition to providing specific details for the plan of care and counseling regarding the diagnosis and prognosis. Their questions are answered at this time and they are agreeable with the plan of admission.    --------------------------------- ADDITIONAL PROVIDER NOTES ---------------------------------  Consultations:  Spoke with Jerri. Discussed case. They will admit the patient. This patient's ED course included: a personal history and physicial examination, re-evaluation prior to disposition, multiple bedside re-evaluations, IV medications, cardiac monitoring and continuous pulse oximetry    This patient has remained hemodynamically stable during their ED course. Diagnosis:  1. Other hypervolemia        Disposition:  Patient's disposition: Admit  Patient's condition is stable.              Tyler Michelle DO  Resident  12/18/21 2287

## 2021-12-19 LAB
ANION GAP SERPL CALCULATED.3IONS-SCNC: 9 MMOL/L (ref 7–16)
BUN BLDV-MCNC: 20 MG/DL (ref 6–23)
CALCIUM SERPL-MCNC: 8.9 MG/DL (ref 8.6–10.2)
CHLORIDE BLD-SCNC: 100 MMOL/L (ref 98–107)
CO2: 33 MMOL/L (ref 22–29)
CREAT SERPL-MCNC: 0.9 MG/DL (ref 0.7–1.2)
GFR AFRICAN AMERICAN: >60
GFR NON-AFRICAN AMERICAN: >60 ML/MIN/1.73
GLUCOSE BLD-MCNC: 113 MG/DL (ref 74–99)
MAGNESIUM: 2.3 MG/DL (ref 1.6–2.6)
POTASSIUM SERPL-SCNC: 4.3 MMOL/L (ref 3.5–5)
SODIUM BLD-SCNC: 142 MMOL/L (ref 132–146)

## 2021-12-19 PROCEDURE — 83735 ASSAY OF MAGNESIUM: CPT

## 2021-12-19 PROCEDURE — 2580000003 HC RX 258: Performed by: PHYSICIAN ASSISTANT

## 2021-12-19 PROCEDURE — 6370000000 HC RX 637 (ALT 250 FOR IP): Performed by: PHYSICIAN ASSISTANT

## 2021-12-19 PROCEDURE — 6370000000 HC RX 637 (ALT 250 FOR IP): Performed by: INTERNAL MEDICINE

## 2021-12-19 PROCEDURE — 36415 COLL VENOUS BLD VENIPUNCTURE: CPT

## 2021-12-19 PROCEDURE — 2500000003 HC RX 250 WO HCPCS: Performed by: PHYSICIAN ASSISTANT

## 2021-12-19 PROCEDURE — 6360000002 HC RX W HCPCS: Performed by: PHYSICIAN ASSISTANT

## 2021-12-19 PROCEDURE — 2060000000 HC ICU INTERMEDIATE R&B

## 2021-12-19 PROCEDURE — 99233 SBSQ HOSP IP/OBS HIGH 50: CPT | Performed by: INTERNAL MEDICINE

## 2021-12-19 PROCEDURE — 97161 PT EVAL LOW COMPLEX 20 MIN: CPT

## 2021-12-19 PROCEDURE — 2700000000 HC OXYGEN THERAPY PER DAY

## 2021-12-19 PROCEDURE — 80048 BASIC METABOLIC PNL TOTAL CA: CPT

## 2021-12-19 RX ORDER — ATORVASTATIN CALCIUM 40 MG/1
40 TABLET, FILM COATED ORAL NIGHTLY
Status: DISCONTINUED | OUTPATIENT
Start: 2021-12-19 | End: 2021-12-22 | Stop reason: HOSPADM

## 2021-12-19 RX ORDER — SPIRONOLACTONE 25 MG/1
12.5 TABLET ORAL DAILY
Status: DISCONTINUED | OUTPATIENT
Start: 2021-12-19 | End: 2021-12-21

## 2021-12-19 RX ORDER — CARVEDILOL 6.25 MG/1
6.25 TABLET ORAL 2 TIMES DAILY WITH MEALS
Status: DISCONTINUED | OUTPATIENT
Start: 2021-12-19 | End: 2021-12-22 | Stop reason: HOSPADM

## 2021-12-19 RX ADMIN — CARVEDILOL 6.25 MG: 6.25 TABLET, FILM COATED ORAL at 17:51

## 2021-12-19 RX ADMIN — Medication 10 ML: at 23:26

## 2021-12-19 RX ADMIN — SPIRONOLACTONE 12.5 MG: 25 TABLET ORAL at 17:51

## 2021-12-19 RX ADMIN — ENOXAPARIN SODIUM 40 MG: 100 INJECTION SUBCUTANEOUS at 08:18

## 2021-12-19 RX ADMIN — ASPIRIN 81 MG CHEWABLE TABLET 81 MG: 81 TABLET CHEWABLE at 08:18

## 2021-12-19 RX ADMIN — BUMETANIDE 1 MG: 0.25 INJECTION INTRAMUSCULAR; INTRAVENOUS at 08:18

## 2021-12-19 RX ADMIN — CARVEDILOL 6.25 MG: 6.25 TABLET, FILM COATED ORAL at 08:18

## 2021-12-19 RX ADMIN — ATORVASTATIN CALCIUM 40 MG: 40 TABLET, FILM COATED ORAL at 23:26

## 2021-12-19 RX ADMIN — LISINOPRIL 10 MG: 10 TABLET ORAL at 08:18

## 2021-12-19 RX ADMIN — Medication 10 ML: at 08:19

## 2021-12-19 ASSESSMENT — PAIN SCALES - GENERAL
PAINLEVEL_OUTOF10: 0

## 2021-12-19 NOTE — PROGRESS NOTES
Physical Therapy    Facility/Department: [address] INTERMEDIATE 1  Initial Assessment    NAME: Aubree Maciel  : 1940  MRN: [id number]    Date of Service: 2021       REQUIRES PT FOLLOW UP: Yes       Patient Diagnosis(es): The encounter diagnosis was Other hypervolemia. has a past medical history of Aneurysm of abdominal aorta ([facility] [address] CAD (coronary artery disease), Cancer ([facility] [address] Decreased dorsalis pedis pulse, GERD (gastroesophageal reflux disease), Glaucoma, Hyperlipidemia, Hypertension, Neuro-endocrine cancer ([facility] [address] Numbness and tingling of both feet, and Unspecified cerebral artery occlusion with cerebral infarction. has a past surgical history that includes hernia repair; Diagnostic Cardiac Cath Lab Procedure; Tonsillectomy; eye surgery (Left, 2013, 2013); eye surgery (Left); vascular surgery (Right, 10/16/2013); Carotid endarterectomy (Right, ); Colonoscopy; Sigmoidoscopy; Facial cosmetic surgery (N/A, 12/10/2019); laparoscopy (N/A, 2020); and Gastrostomy tube placement. Evaluating Therapist: Zenon Hernandez PT     Referring Provider:  ZAIRA Kelly    PT order :  PT eval and treat     Room #:  445   DIAGNOSIS:  Volume overload     PRECAUTIONS: falls     Social:  Pt lives alone  in a  1  floor plan  4  steps and  no rails to enter. Laundry in basement   Prior to admission pt walked with  No AD, independent. HAs cane      Initial Evaluation  Date:  2021  Treatment      Short Term/ Long Term   Goals   Was pt agreeable to Eval/treatment?   yes      Does pt have pain?  denies      Bed Mobility  Rolling:  Independent   Supine to sit:  independenet   Sit to supine:  Independent   Scooting:  Independent    independent    Transfers Sit to stand:  S/I   Stand to sit:  S/I   Stand pivot:  S/I    independent    Ambulation     240  feet with  No AD with  Supervision    400  feet with no AD  with  Independent        Stair negotiation: ascended and descended NT    4  steps with no rail with  Independent    LE ROM  WFL     LE strength  WFL      AM- PAC RAW score  20/ 24            Pt is alert and Oriented x  3      Balance:  Supervision, mildly unsteady, no LOB   Endurance: decreased   Bed/Chair alarm: no      ASSESSMENT  Pt displays functional ability as noted in the objective portion of this evaluation. Conditions Requiring Skilled Therapeutic Intervention:    [x]Decreased strength     []Decreased ROM  [x]Decreased functional mobility  [x]Decreased balance   [x]Decreased endurance   []Decreased posture  []Decreased sensation  []Decreased coordination   []Decreased vision  []Decreased safety awareness   []Increased pain         Treatment/Education:    Pt in bed upon arrival . O2@ 3 LNC. Pusle ox 94%. Removed O2 and pulse ox at rest on RA 93%, decreased to 91% with gait, recovered to 94%. Placed O2 back @ 2 LNC, pulse ox 95%. RN informed . Instructed pt in proper breathing technique     Pt educated on fall risk,  Safety with mobiltiy       Patient response to education:   Pt verbalized understanding Pt demonstrated skill Pt requires further education in this area   x  x       Comments:  Pt left  In bed per pt request after session, with call light in reach. Rehab potential is Good for reaching above PT goals. Pts/ family goals   1.  breahte better     Patient and or family understand(s) diagnosis, prognosis, and plan of care. -  Yes     PLAN  PT care will be provided in accordance with the objectives noted above. Whenever appropriate, clear delegation orders will be provided for nursing staff. Exercises and functional mobility practice will be used as well as appropriate assistive devices or modalities to obtain goals. Patient and family education will also be administered as needed.         PLAN OF CARE:    Current Treatment Recommendations     [x] Strengthening to improve independence with functional mobility   [] ROM to improve independence with functional mobility   [x] Balance Training to improve static/dynamic balance and to reduce fall risk  [x] Endurance Training to improve activity tolerance during functional mobility   [x] Transfer Training to improve safety and independence with all functional transfers   [x] Gait Training to improve gait mechanics, endurance and assess need for appropriate assistive device  [x] Stair Training in preparation for safe discharge home and/or into the community   [x] Positioning to prevent skin breakdown and contractures  [x] Safety and Education Training   [x] Patient/Caregiver Education   [] HEP  [] Other     Frequency of treatments will be 2-5x/week x 7 days. Time in: 0835  Time out:  0855      Evaluation Time includes thorough review of current medical information, gathering information on past medical history/social history and prior level of function, completion of standardized testing/informal observation of tasks, assessment of data and education on plan of care and goals.     CPT codes:  [x] Low Complexity PT evaluation 28884  [] Moderate Complexity PT evaluation 28745  [] High Complexity PT evaluation 21228  [] PT Re-evaluation 55899  [] Gait training 70370  minutes  [] Therapeutic activities 83224  minutes  [] Therapeutic exercises 77688  minutes  [] Neuromuscular reeducation 47358  minutes       Laureen 18 number:  PT 2686

## 2021-12-19 NOTE — PROGRESS NOTES
Subjective: The patient is awake and alert. He feels better after being diuresed  Daughter at bedside, case discussed    Objective:    BP (!) 143/92   Pulse 73   Temp 97.3 °F (36.3 °C) (Oral)   Resp 20   Ht 6' 1\" (1.854 m)   Wt 165 lb (74.8 kg)   SpO2 95%   BMI 21.77 kg/m²     No intake/output data recorded. No intake/output data recorded. General appearance: NAD, conversant  HEENT: AT/NC, MMM  Neck: FROM, supple  Lungs: Clear to auscultation  CV: RRR, no MRGs  Vasc: Radial pulses 2+  Abdomen: Soft, non-tender; no masses or HSM  Extremities: No peripheral edema or digital cyanosis  Skin: no rash, lesions or ulcers  Psych: Alert and oriented to person, place and time  Neuro: Alert and interactive     Recent Labs     12/17/21 1910 12/18/21  0355   WBC 5.0 6.0   HGB 12.7 12.8   HCT 38.2 38.1    185       Recent Labs     12/17/21 1910 12/18/21  0355 12/19/21  0600    142 142   K 3.4* 2.9* 4.3   CL 99 98 100   CO2 35* 36* 33*   BUN 20 18 20   CREATININE 0.8 0.9 0.9   CALCIUM 8.4* 8.4* 8.9       Assessment:    Principal Problem:    Volume overload  Active Problems:    Hypertension    Hypokalemia  Resolved Problems:    * No resolved hospital problems.  *      Plan:    Admit to telemetry for evaluation of acute CHF systolic-CHr EF  IV diuresis with Bumex 1 mg twice daily  Supplement potassium secondary to hypokalemia from aggressive diuresis  Echocardiogram reveals ejection fraction 15% down from 60% in 2015  Will need ischemic evaluation  From cardiology note, it appears no plan for ischemic eval, continue goal-directed medical therapy  Goal-directed medical therapy to be initiated-high intensity statin, Aldactone, ACE inhibitor, diuretic, beta-blocker  LifeVest at discretion of cardiology prior to discharge     DVT Prophylaxis   PT/OT  Discharge planning           Micaela Benjamin MD  3:53 PM  12/19/2021

## 2021-12-19 NOTE — PROGRESS NOTES
INPATIENT CARDIOLOGY FOLLOW-UP    Name: Markel De Oliveira    Age: [de-identified] y.o. Date of Admission: 12/17/2021  6:49 PM    Date of Service: 12/19/2021    Chief Complaint: Follow-up for coronary atherosclerosis, ischemic cardiomyopathy, acute systolic heart failure, hypertension, chronic obstructive lung disease, abdominal aortic aneurysm, neuroendocrine tumor of the colon, hypokalemia    Interim History: The patient relates mild symptomatic improvement inclusive of slightly less orthopnea and with incomplete maintenance of intake and output limiting adequate assessment of his response to present diuretics. An echocardiogram demonstrates a normal-sized left ventricular chamber with the combination of apical regional wall motion abnormalities and severe generalized left ventricular systolic dysfunction with overall severe left ventricular systolic dysfunction estimated ejection fraction of 15% in addition to that of right ventricular dysfunction. Mild mitral regurgitation is present with a small anterior pericardial effusion and moderate left pleural effusion. Repeat assessment of renal function and electrolytes are presently pending. Review of Systems: The remainder of a complete multisystem review including consitutional, central nervous, respiratory, circulatory, gastrointestinal, genitourinary, endocrinologic, hematologic, musculoskeletal and psychiatric are negative.     Problem List:  Patient Active Problem List   Diagnosis    CAD (coronary artery disease)    LV dysfunction    Hyperlipidemia    Hypertension    Abdominal aortic aneurysm (AAA) without rupture (HCC)    Glaucoma    S/P carotid endarterectomy    Carotid bruit    H/O: CVA (cerebrovascular accident)    Post-op pain    Abdominal pain    Decreased dorsalis pedis pulse    Numbness and tingling of both feet    Volume overload    Hypokalemia       Allergies:  No Known Allergies    Current Medications:  Current Facility-Administered Medications   Medication Dose Route Frequency Provider Last Rate Last Admin    carvedilol (COREG) tablet 6.25 mg  6.25 mg Oral BID  Dave Chen MD        aspirin chewable tablet 81 mg  81 mg Oral Daily Coty Behzad, PA   81 mg at 12/18/21 6530    lisinopril (PRINIVIL;ZESTRIL) tablet 10 mg  10 mg Oral Daily Coty Behzad, PA   10 mg at 12/18/21 8303    sodium chloride flush 0.9 % injection 5-40 mL  5-40 mL IntraVENous 2 times per day Coty Behzad, PA   10 mL at 12/18/21 2107    sodium chloride flush 0.9 % injection 5-40 mL  5-40 mL IntraVENous PRN Coty Behzad, PA        0.9 % sodium chloride infusion  25 mL IntraVENous PRN Coty Behzad, PA        ondansetron (ZOFRAN-ODT) disintegrating tablet 4 mg  4 mg Oral Q8H PRN Coty Behzad, PA        Or    ondansetron (ZOFRAN) injection 4 mg  4 mg IntraVENous Q6H PRN Coty Behzad, PA        polyethylene glycol (GLYCOLAX) packet 17 g  17 g Oral Daily PRN Coty Behzad, PA        acetaminophen (TYLENOL) tablet 650 mg  650 mg Oral Q6H PRN Coty Behzad, PA        Or    acetaminophen (TYLENOL) suppository 650 mg  650 mg Rectal Q6H PRN Coty Behzad, PA        enoxaparin (LOVENOX) injection 40 mg  40 mg SubCUTAneous Daily Coty Behzad, PA   40 mg at 12/18/21 2514    perflutren lipid microspheres (DEFINITY) injection 1.65 mg  1.5 mL IntraVENous ONCE PRN Coty Behzad, PA        potassium chloride (KLOR-CON M) extended release tablet 40 mEq  40 mEq Oral PRN Coty Behzad, PA        Or    potassium bicarb-citric acid (EFFER-K) effervescent tablet 40 mEq  40 mEq Oral PRN Coty Behzad, PA        bumetanide (BUMEX) injection 1 mg  1 mg IntraVENous Daily Coty Behzad, PA   1 mg at 12/18/21 0807    albuterol (PROVENTIL) nebulizer solution 2.5 mg  2.5 mg Nebulization Q4H PRN Coty Behzad, PA          sodium chloride         Physical Exam:  BP (!) 146/83   Pulse 79   Temp 97.9 °F (36.6 °C) (Oral)   Resp 16   Ht 6' 1\" (1.854 m)   Wt 165 lb (74.8 kg)   SpO2 98%   BMI 21.77 kg/m²   Weight change: 1 lb (0.454 kg)  Wt Readings from Last 3 Encounters:   12/19/21 165 lb (74.8 kg)   08/11/21 164 lb (74.4 kg)   05/20/21 168 lb (76.2 kg)     The patient is awake, alert and in no discomfort or distress. No gross musculoskeletal deformity is present. No significant skin or nail changes are present. Gross examination of head, eyes, nose and throat are negative. Jugular venous pressure is normal and no carotid bruits are present. Normal respiratory effort is noted with no accessory muscle usage present. Lung fields are clear to ascultation with diminished breath sounds present in both lung bases. Cardiac examination is notable for a regular rate and rhythm with no palpable thrill. No gallop rhythm or cardiac murmur are identified. A benign abdominal examination is present with no masses or organomegaly. Intact pulses are present throughout all extremities and no peripheral edema is present. No focal neurologic deficits are present. Intake/Output:    Intake/Output Summary (Last 24 hours) at 12/19/2021 0648  Last data filed at 12/18/2021 2100  Gross per 24 hour   Intake --   Output 450 ml   Net -450 ml     No intake/output data recorded. Laboratory Tests:  Lab Results   Component Value Date    CREATININE 0.9 12/18/2021    BUN 18 12/18/2021     12/18/2021    K 2.9 (L) 12/18/2021    CL 98 12/18/2021    CO2 36 (H) 12/18/2021     No results for input(s): CKTOTAL, CKMB in the last 72 hours.     Invalid input(s): TROPONONI  No results found for: BNP  Lab Results   Component Value Date    WBC 6.0 12/18/2021    RBC 4.10 12/18/2021    HGB 12.8 12/18/2021    HCT 38.1 12/18/2021    MCV 92.9 12/18/2021    MCH 31.2 12/18/2021    MCHC 33.6 12/18/2021    RDW 15.1 12/18/2021     12/18/2021    MPV 12.3 12/18/2021     Recent Labs     12/17/21  1910   ALKPHOS 81   ALT 17   AST 17   PROT 5.8*   BILITOT 0.8   LABALBU 3.3*     Lab Results Component Value Date    MG 2.3 12/18/2021     Lab Results   Component Value Date    PROTIME 12.6 10/09/2013    PROTIME 11.7 06/03/2012    INR 1.2 10/09/2013     No results found for: TSH  No components found for: CHLPL  Lab Results   Component Value Date    TRIG 82 12/18/2021     Lab Results   Component Value Date    HDL 53 12/18/2021     Lab Results   Component Value Date    LDLCALC 97 12/18/2021       Cardiac Tests:  Telemetry findings reviewed: sinus rhythm with a transient episode of paroxysmal supraventricular tachycardia, no new tachy/bradyarrhythmias overnight  Last Echocardiogram: An echocardiogram demonstrates evidence of a normal-sized left ventricular chamber with with regional motion abnormalities and akinesis of the apical segment with severe generalized hypokinesis of remaining myocardial segments with severe left ventricular systolic dysfunction estimated ejection fraction of approximately 15% with associated right ventricular dysfunction. Mild left atrial enlargement is present with mitral annular calcification mild mitral regurgitation as well as that of a small anterior pericardial effusion and moderate left pleural effusion      ASSESSMENT / PLAN: On a clinical basis, the patient appears slightly improved with a slight improvement but persistence of dyspnea in the face of identified severe left ventricular systolic dysfunction contributing to his volume overload as well as that of his pleural effusions. Presently intravenous diuresis will be maintained with need of careful monitoring of both renal function and electrolytes with optimization of his medical regimen and present addition of a beta-blocker to that of his oral anticoagulation as appropriate based on renal function and electrolytes the potential additional benefits of spironolactone.   At the time of assessment, this was extensively discussed with him and plans of attempts of optimal medical therapy with potential conversion of his angiotensin-converting enzyme inhibitor to that of sacubitril/valsartan following further stabilization and on outpatient basis, especially in light of needs of temporary interruption of therapy at the time of conversion. Continued appropriate monitoring of his volume status as well as that of his renal function and electrolytes will be necessary during further optimization of his medical regimen. Following stabilization, and likely on an outpatient basis additional assessment of his coronary circulation will be necessary in the evaluation of the origin of the deterioration of left ventricular systolic function. Ongoing aggressive risk factor modification of blood pressure and serum lipids will remain essential to reducing risk of future atherosclerotic development. Note: This report was completed utilizing computer voice recognition software. Every effort has been made to ensure accuracy, however; inadvertent computerized transcription errors may be present. Raffy Solis.  Richa Sullivan, [facility]

## 2021-12-20 LAB
ANION GAP SERPL CALCULATED.3IONS-SCNC: 7 MMOL/L (ref 7–16)
BUN BLDV-MCNC: 21 MG/DL (ref 6–23)
CALCIUM SERPL-MCNC: 8.7 MG/DL (ref 8.6–10.2)
CHLORIDE BLD-SCNC: 99 MMOL/L (ref 98–107)
CO2: 35 MMOL/L (ref 22–29)
CREAT SERPL-MCNC: 1 MG/DL (ref 0.7–1.2)
GFR AFRICAN AMERICAN: >60
GFR NON-AFRICAN AMERICAN: >60 ML/MIN/1.73
GLUCOSE BLD-MCNC: 116 MG/DL (ref 74–99)
MAGNESIUM: 2.4 MG/DL (ref 1.6–2.6)
POTASSIUM SERPL-SCNC: 4.1 MMOL/L (ref 3.5–5)
SODIUM BLD-SCNC: 141 MMOL/L (ref 132–146)

## 2021-12-20 PROCEDURE — 2700000000 HC OXYGEN THERAPY PER DAY

## 2021-12-20 PROCEDURE — 6360000002 HC RX W HCPCS: Performed by: PHYSICIAN ASSISTANT

## 2021-12-20 PROCEDURE — 2060000000 HC ICU INTERMEDIATE R&B

## 2021-12-20 PROCEDURE — 36415 COLL VENOUS BLD VENIPUNCTURE: CPT

## 2021-12-20 PROCEDURE — 6370000000 HC RX 637 (ALT 250 FOR IP): Performed by: INTERNAL MEDICINE

## 2021-12-20 PROCEDURE — 83735 ASSAY OF MAGNESIUM: CPT

## 2021-12-20 PROCEDURE — 80048 BASIC METABOLIC PNL TOTAL CA: CPT

## 2021-12-20 PROCEDURE — 2500000003 HC RX 250 WO HCPCS: Performed by: PHYSICIAN ASSISTANT

## 2021-12-20 PROCEDURE — 6370000000 HC RX 637 (ALT 250 FOR IP): Performed by: PHYSICIAN ASSISTANT

## 2021-12-20 PROCEDURE — 99233 SBSQ HOSP IP/OBS HIGH 50: CPT | Performed by: INTERNAL MEDICINE

## 2021-12-20 PROCEDURE — 2580000003 HC RX 258: Performed by: PHYSICIAN ASSISTANT

## 2021-12-20 PROCEDURE — 97165 OT EVAL LOW COMPLEX 30 MIN: CPT

## 2021-12-20 RX ADMIN — CARVEDILOL 6.25 MG: 6.25 TABLET, FILM COATED ORAL at 08:12

## 2021-12-20 RX ADMIN — ENOXAPARIN SODIUM 40 MG: 100 INJECTION SUBCUTANEOUS at 08:12

## 2021-12-20 RX ADMIN — ATORVASTATIN CALCIUM 40 MG: 40 TABLET, FILM COATED ORAL at 21:26

## 2021-12-20 RX ADMIN — Medication 10 ML: at 08:12

## 2021-12-20 RX ADMIN — CARVEDILOL 6.25 MG: 6.25 TABLET, FILM COATED ORAL at 15:10

## 2021-12-20 RX ADMIN — BUMETANIDE 1 MG: 0.25 INJECTION INTRAMUSCULAR; INTRAVENOUS at 08:12

## 2021-12-20 RX ADMIN — Medication 10 ML: at 21:26

## 2021-12-20 RX ADMIN — ASPIRIN 81 MG CHEWABLE TABLET 81 MG: 81 TABLET CHEWABLE at 08:12

## 2021-12-20 RX ADMIN — SPIRONOLACTONE 12.5 MG: 25 TABLET ORAL at 08:12

## 2021-12-20 ASSESSMENT — PAIN SCALES - GENERAL
PAINLEVEL_OUTOF10: 0

## 2021-12-20 NOTE — PROGRESS NOTES
Occupational Therapy  OCCUPATIONAL THERAPY INITIAL EVALUATION      Date:2021  Patient Name: Minh Kemp  MRN: [id number]  : 1940  Room: 4826/0764-Y    OCCUPATIONAL THERAPY INITIAL EVALUATION    [facility] [address] [facility], [place]         MIQE:                                                  Patient Name: Minh Kemp    MRN: [id number]    : 1940    Room: 48/4654-F      Evaluating OT: Yolanda Chace OTR/L   [id number]      Referring ZAIRA Farias    Specific Provider Orders/Date:OT eval and treat 2021      Diagnosis:  Volume overload [E87.70]     Pertinent Medical History: colon CA,G tube placement     Precautions:  Fall Risk, O2     Assessment of current deficits    [x] Functional mobility  [x]ADLs  [x] Strength               []Cognition    [x] Functional transfers   [x] IADLs         [x] Safety Awareness   [x]Endurance    [] Fine Coordination              [x] Balance      [] Vision/perception   []Sensation     []Gross Motor Coordination  [] ROM  [] Delirium                   [] Motor Control     OT PLAN OF CARE   OT POC based on physician orders, patient diagnosis and results of clinical assessment    Frequency/Duration  1-3 days/wk for 2 weeks PRN   Specific OT Treatment Interventions to include:   ADL retraining/adapted techniques and AE recommendations to increase functional independence within precautions                    Energy conservation techniques to improve tolerance for selfcare routine   Functional transfer/mobility training/DME recommendations for increased independence, safety and fall prevention         Patient/family education to increase safety and functional independence             Environmental modifications for safe mobility and completion of ADLs                             Therapeutic activity to improve functional performance during ADLs. decreased independence and safety during completion of ADL/functional transfer/mobility tasks. Pt would benefit from continued skilled OT to increase safety and independence with completion of ADL/IADL tasks for functional independence and quality of life. Rehab Potential: good for established goals     Patient / Family Goal: return home       Patient and/or family were instructed on functional diagnosis, prognosis/goals and OT plan of care. Demonstrated good  understanding. Eval Complexity: Low    Time In: 1225  Time Out: 1240    Min Units   OT Eval Low 97165 x  1   OT Eval Medium 90473      OT Eval High 29719      OT Re-Eval [id number]       Therapeutic Ex 64870       Therapeutic Activities 67932       ADL/Self Care 17422       Orthotic Management 30401       Manual 33484     Neuro Re-Ed 82297       Non-Billable Time          Evaluation Time additionally includes thorough review of current medical information, gathering information on past medical history/social history and prior level of function, interpretation of standardized testing/informal observation of tasks, assessment of data and development of plan of care and goals.             Antionette Gilliland  OTR/L  OT [id number]                         '

## 2021-12-20 NOTE — PROGRESS NOTES
Spoke w/ Gomez Simmonds regarding patient's IV Bumex regarding whether medication is to be daily or BID.

## 2021-12-20 NOTE — PROGRESS NOTES
were reviewed by me independently. The case was discussed in detail and plans for care were established. Review of Asya MORENO- CNP, documentation was conducted and revisions were made as appropriate directly by me. I agree with the above documented exam, problem list, and plan of care.      Rani uQach MD  6:52 PM  12/20/2021

## 2021-12-20 NOTE — CARE COORDINATION
CASE MANAGEMENT. ... COVID NEG 12/17. Met with patient at the bedside. He is wearing o2 2lnc which is new for him. Nursing to wean as tolerated. Mr Mendel Pintos confirmed with me that he is independent from home alone. Wife passed away in September. He uses a cane. Has no other dme other than peg tube and supplies which he cares for himself. Per patient, peg tube is in place for his colon cancer. Patient tolerates po diet. No history with [facility]. PT 20 OT 20. He is interested in [facility]. Choices provided and he will review with his daughter. Cardio following for CHF. Med changes made accordingly. Anticipate need for Life Vest. Will need to notify Justyna Acharya with [place] [phone]. Nursing aware to clarify iv bumex frequency with pcp. PCP is Dr Sujit Sanchez. Will follow.

## 2021-12-20 NOTE — PROGRESS NOTES
[facility] Cardiology Inpatient Progress Note    Patient is a [de-identified] y.o. male of Vamsi Hernandez MD seen in hospital follow up. Chief complaint: CHF/CAD    HPI: No CP or SOB.      Patient Active Problem List   Diagnosis    CAD (coronary artery disease)    LV dysfunction    Hyperlipidemia    Hypertension    Abdominal aortic aneurysm (AAA) without rupture (HCC)    Glaucoma    S/P carotid endarterectomy    Carotid bruit    H/O: CVA (cerebrovascular accident)    Post-op pain    Abdominal pain    Decreased dorsalis pedis pulse    Numbness and tingling of both feet    Volume overload    Hypokalemia       No Known Allergies    Current Facility-Administered Medications   Medication Dose Route Frequency Provider Last Rate Last Admin    carvedilol (COREG) tablet 6.25 mg  6.25 mg Oral BID WC Sulema Dudley MD   6.25 mg at 12/20/21 6318    atorvastatin (LIPITOR) tablet 40 mg  40 mg Oral Nightly Sulema Dudley MD   40 mg at 12/19/21 2326    spironolactone (ALDACTONE) tablet 12.5 mg  12.5 mg Oral Daily Sulema Dudley MD   12.5 mg at 12/20/21 0457    aspirin chewable tablet 81 mg  81 mg Oral Daily ZAIRA Llanes   81 mg at 12/20/21 1181    lisinopril (PRINIVIL;ZESTRIL) tablet 10 mg  10 mg Oral Daily ZAIRA Llanes   10 mg at 12/19/21 0818    sodium chloride flush 0.9 % injection 5-40 mL  5-40 mL IntraVENous 2 times per day ZAIRA Llanes   10 mL at 12/20/21 0836    sodium chloride flush 0.9 % injection 5-40 mL  5-40 mL IntraVENous PRN ZAIRA Llanes        0.9 % sodium chloride infusion  25 mL IntraVENous PRN ZAIRA Llanes        ondansetron (ZOFRAN-ODT) disintegrating tablet 4 mg  4 mg Oral Q8H PRN ZAIRA Llanes        Or    ondansetron (ZOFRAN) injection 4 mg  4 mg IntraVENous Q6H PRN ZAIRA Llanes        polyethylene glycol (GLYCOLAX) packet 17 g  17 g Oral Daily PRN ZAIRA Llanes        acetaminophen (TYLENOL) tablet 650 mg  650 mg Oral Q6H PRN Clara Girt, PA        Or    acetaminophen (TYLENOL) suppository 650 mg  650 mg Rectal Q6H PRN Clara Girt, PA        enoxaparin (LOVENOX) injection 40 mg  40 mg SubCUTAneous Daily Clara Girt, PA   40 mg at 12/20/21 5351    perflutren lipid microspheres (DEFINITY) injection 1.65 mg  1.5 mL IntraVENous ONCE PRN Clara Girt, PA        potassium chloride (KLOR-CON M) extended release tablet 40 mEq  40 mEq Oral PRN Clara Girt, PA        Or    potassium bicarb-citric acid (EFFER-K) effervescent tablet 40 mEq  40 mEq Oral PRN Clara Girt, PA        bumetanide (BUMEX) injection 1 mg  1 mg IntraVENous Daily Clara Girt, PA   1 mg at 12/20/21 3940    albuterol (PROVENTIL) nebulizer solution 2.5 mg  2.5 mg Nebulization Q4H PRN Clara Girt, PA           Review of systems:   Heart: as above   Lungs: as above   Eyes: denies changes in vision or discharge. Ears: denies changes in hearing or pain. Nose: denies epistaxis or masses   Throat: denies sore throat or trouble swallowing. Neuro: denies numbness, tingling, tremors. Skin: denies rashes or itching. : denies hematuria, dysuria   GI: denies vomiting, diarrhea   Psych: denies mood changed, anxiety, depression. Physical Exam   BP 98/70   Pulse 58   Temp 97.3 °F (36.3 °C) (Oral)   Resp 20   Ht 6' 1\" (1.854 m)   Wt 165 lb (74.8 kg)   SpO2 97%   BMI 21.77 kg/m²   Constitutional: Oriented to person, place, and time. No distress. Well developed. Head: Normocephalic and atraumatic. Neck: Neck supple. No hepatojugular reflux. No JVD present. Carotid bruit is not present. No tracheal deviation present. No thyromegaly present. Cardiovascular: Normal rate, regular rhythm, normal heart sounds. intact distal pulses. No gallop and no friction rub. No murmur heard. Pulmonary: Breath sounds normal. No respiratory distress. No wheezes. No rales. Chest: Effort normal. No tenderness. Abdominal: Soft.  Bowel sounds are normal. No distension or mass. No tenderness, rebound or guarding. Musculoskeletal: . No tenderness. No clubbing or cyanosis. Extremitites: Intact distal pulses. No edema  Neurological: Alert and oriented to person, place, and time. Skin: Skin is warm and dry. No rash noted. Not diaphoretic. No erythema. Psychiatric: Normal mood and affect. Behavior is normal.   Lymphadenopathy: No cervical adenopathy. No groin adenopathy. CBC:   Lab Results   Component Value Date    WBC 6.0 12/18/2021    RBC 4.10 12/18/2021    HGB 12.8 12/18/2021    HCT 38.1 12/18/2021    MCV 92.9 12/18/2021    MCH 31.2 12/18/2021    MCHC 33.6 12/18/2021    RDW 15.1 12/18/2021     12/18/2021    MPV 12.3 12/18/2021     BMP:   Lab Results   Component Value Date     12/20/2021    K 4.1 12/20/2021    K 3.4 12/17/2021    CL 99 12/20/2021    CO2 35 12/20/2021    BUN 21 12/20/2021    LABALBU 3.3 12/17/2021    LABALBU 4.6 06/03/2012    CREATININE 1.0 12/20/2021    CALCIUM 8.7 12/20/2021    GFRAA >60 12/20/2021    LABGLOM >60 12/20/2021     Magnesium:    Lab Results   Component Value Date    MG 2.4 12/20/2021     Cardiac Enzymes:   Lab Results   Component Value Date    CKTOTAL 90 06/03/2012    CKMB 1.6 06/03/2012    TROPHS 37 (H) 12/18/2021    TROPHS 33 (H) 12/17/2021    TROPHS 33 (H) 12/17/2021      PT/INR:    Lab Results   Component Value Date    PROTIME 12.6 10/09/2013    PROTIME 11.7 06/03/2012    INR 1.2 10/09/2013     TSH:  No results found for: TSH    Rhythm Strip: sinus bradycardia. Echo Summary 12/18/2021:   LVEF 15%   Left ventricular internal dimensions were normal in diastole and dilated  in systole. Regional wall motion abnormality with akinesis of apical segment and severe generalized hypokinesis of remaining segments. Severely reduced left ventricular ejection fraction. The left atrium is mildly dilated. Right ventricle global systolic function is reduced . Mild mitral annular calcification.    Mild mitral

## 2021-12-21 ENCOUNTER — APPOINTMENT (OUTPATIENT)
Dept: NUCLEAR MEDICINE | Age: 81
DRG: 291 | End: 2021-12-21
Payer: MEDICARE

## 2021-12-21 ENCOUNTER — APPOINTMENT (OUTPATIENT)
Dept: NON INVASIVE DIAGNOSTICS | Age: 81
DRG: 291 | End: 2021-12-21
Payer: MEDICARE

## 2021-12-21 LAB
ANION GAP SERPL CALCULATED.3IONS-SCNC: 7 MMOL/L (ref 7–16)
BASOPHILS ABSOLUTE: 0.02 E9/L (ref 0–0.2)
BASOPHILS RELATIVE PERCENT: 0.4 % (ref 0–2)
BUN BLDV-MCNC: 24 MG/DL (ref 6–23)
CALCIUM SERPL-MCNC: 8.5 MG/DL (ref 8.6–10.2)
CHLORIDE BLD-SCNC: 100 MMOL/L (ref 98–107)
CO2: 31 MMOL/L (ref 22–29)
CREAT SERPL-MCNC: 1 MG/DL (ref 0.7–1.2)
EOSINOPHILS ABSOLUTE: 0.06 E9/L (ref 0.05–0.5)
EOSINOPHILS RELATIVE PERCENT: 1.3 % (ref 0–6)
GFR AFRICAN AMERICAN: >60
GFR NON-AFRICAN AMERICAN: >60 ML/MIN/1.73
GLUCOSE BLD-MCNC: 108 MG/DL (ref 74–99)
HCT VFR BLD CALC: 35.7 % (ref 37–54)
HEMOGLOBIN: 11.5 G/DL (ref 12.5–16.5)
IMMATURE GRANULOCYTES #: 0.03 E9/L
IMMATURE GRANULOCYTES %: 0.6 % (ref 0–5)
LV EF: 25 %
LVEF MODALITY: NORMAL
LYMPHOCYTES ABSOLUTE: 0.69 E9/L (ref 1.5–4)
LYMPHOCYTES RELATIVE PERCENT: 14.8 % (ref 20–42)
MAGNESIUM: 2.2 MG/DL (ref 1.6–2.6)
MCH RBC QN AUTO: 30.5 PG (ref 26–35)
MCHC RBC AUTO-ENTMCNC: 32.2 % (ref 32–34.5)
MCV RBC AUTO: 94.7 FL (ref 80–99.9)
MONOCYTES ABSOLUTE: 0.47 E9/L (ref 0.1–0.95)
MONOCYTES RELATIVE PERCENT: 10.1 % (ref 2–12)
NEUTROPHILS ABSOLUTE: 3.38 E9/L (ref 1.8–7.3)
NEUTROPHILS RELATIVE PERCENT: 72.8 % (ref 43–80)
PDW BLD-RTO: 14.7 FL (ref 11.5–15)
PLATELET # BLD: 162 E9/L (ref 130–450)
PMV BLD AUTO: 11.9 FL (ref 7–12)
POTASSIUM SERPL-SCNC: 4.4 MMOL/L (ref 3.5–5)
RBC # BLD: 3.77 E12/L (ref 3.8–5.8)
SODIUM BLD-SCNC: 138 MMOL/L (ref 132–146)
WBC # BLD: 4.7 E9/L (ref 4.5–11.5)

## 2021-12-21 PROCEDURE — 93016 CV STRESS TEST SUPVJ ONLY: CPT | Performed by: INTERNAL MEDICINE

## 2021-12-21 PROCEDURE — 83735 ASSAY OF MAGNESIUM: CPT

## 2021-12-21 PROCEDURE — 6370000000 HC RX 637 (ALT 250 FOR IP): Performed by: PHYSICIAN ASSISTANT

## 2021-12-21 PROCEDURE — 2700000000 HC OXYGEN THERAPY PER DAY

## 2021-12-21 PROCEDURE — 93018 CV STRESS TEST I&R ONLY: CPT | Performed by: INTERNAL MEDICINE

## 2021-12-21 PROCEDURE — 2060000000 HC ICU INTERMEDIATE R&B

## 2021-12-21 PROCEDURE — 85025 COMPLETE CBC W/AUTO DIFF WBC: CPT

## 2021-12-21 PROCEDURE — 78452 HT MUSCLE IMAGE SPECT MULT: CPT

## 2021-12-21 PROCEDURE — A9500 TC99M SESTAMIBI: HCPCS | Performed by: RADIOLOGY

## 2021-12-21 PROCEDURE — 2580000003 HC RX 258: Performed by: PHYSICIAN ASSISTANT

## 2021-12-21 PROCEDURE — 6360000002 HC RX W HCPCS: Performed by: INTERNAL MEDICINE

## 2021-12-21 PROCEDURE — 80048 BASIC METABOLIC PNL TOTAL CA: CPT

## 2021-12-21 PROCEDURE — 36415 COLL VENOUS BLD VENIPUNCTURE: CPT

## 2021-12-21 PROCEDURE — 3430000000 HC RX DIAGNOSTIC RADIOPHARMACEUTICAL: Performed by: RADIOLOGY

## 2021-12-21 PROCEDURE — 78452 HT MUSCLE IMAGE SPECT MULT: CPT | Performed by: INTERNAL MEDICINE

## 2021-12-21 PROCEDURE — 6370000000 HC RX 637 (ALT 250 FOR IP): Performed by: INTERNAL MEDICINE

## 2021-12-21 PROCEDURE — 2500000003 HC RX 250 WO HCPCS: Performed by: PHYSICIAN ASSISTANT

## 2021-12-21 PROCEDURE — 93017 CV STRESS TEST TRACING ONLY: CPT

## 2021-12-21 PROCEDURE — 99233 SBSQ HOSP IP/OBS HIGH 50: CPT | Performed by: INTERNAL MEDICINE

## 2021-12-21 PROCEDURE — 6360000002 HC RX W HCPCS: Performed by: PHYSICIAN ASSISTANT

## 2021-12-21 RX ORDER — SPIRONOLACTONE 25 MG/1
25 TABLET ORAL DAILY
Status: DISCONTINUED | OUTPATIENT
Start: 2021-12-22 | End: 2021-12-22 | Stop reason: HOSPADM

## 2021-12-21 RX ADMIN — LISINOPRIL 10 MG: 10 TABLET ORAL at 07:46

## 2021-12-21 RX ADMIN — BUMETANIDE 1 MG: 0.25 INJECTION INTRAMUSCULAR; INTRAVENOUS at 07:47

## 2021-12-21 RX ADMIN — ASPIRIN 81 MG CHEWABLE TABLET 81 MG: 81 TABLET CHEWABLE at 07:47

## 2021-12-21 RX ADMIN — REGADENOSON 0.4 MG: 0.08 INJECTION, SOLUTION INTRAVENOUS at 10:15

## 2021-12-21 RX ADMIN — SPIRONOLACTONE 12.5 MG: 25 TABLET ORAL at 07:46

## 2021-12-21 RX ADMIN — Medication 10 MILLICURIE: at 08:35

## 2021-12-21 RX ADMIN — Medication 10 ML: at 07:47

## 2021-12-21 RX ADMIN — ENOXAPARIN SODIUM 40 MG: 100 INJECTION SUBCUTANEOUS at 07:47

## 2021-12-21 RX ADMIN — CARVEDILOL 6.25 MG: 6.25 TABLET, FILM COATED ORAL at 16:50

## 2021-12-21 RX ADMIN — ATORVASTATIN CALCIUM 40 MG: 40 TABLET, FILM COATED ORAL at 21:09

## 2021-12-21 RX ADMIN — Medication 30 MILLICURIE: at 08:35

## 2021-12-21 ASSESSMENT — PAIN SCALES - GENERAL
PAINLEVEL_OUTOF10: 0
PAINLEVEL_OUTOF10: 0

## 2021-12-21 NOTE — CARE COORDINATION
CASE MANAGEMENT. ... Patient s/p stress test from this am. Met with patient at the bedside. He is tolerating room air. Continues on iv bumex qd and other po meds per cardio. Mr Raman Byrd is anticipating discharge tomorrow. States he will be getting life vest. No order for life vest noted at this time, but referral called to Alisha Monroe with [place] [phone] and patient information given to him. Patient is interested in [facility]. States that [address] was supposed to start services prior to his admission. I called and spoke with Radha Ryan at [address] States patient is accepted, but she will check when services can start. Patient updated and is still requesting [address] at [place] despite when services can begin. Will cont to follow along and assist with needs accordingly.     1500. Toño Gutter Toño Gutter Toño Gutter Order noted for life Aj SHRESTHA notified.

## 2021-12-21 NOTE — PROCEDURES
[facility] Nuclear Stress Test:    Cardiologist: Dr. Tawny Cuevas MD    Date: 12/21/2021    Indications for study: Chest pain    1. No chest pain  2. No new arrhythmias  3. Non-diagnostic stress EKG due to baseline STT changes  4.  Nuclear images pending    Ricardo Reyes MD  [facility]) Cardiology

## 2021-12-21 NOTE — PROGRESS NOTES
[facility] Inpatient Progress Note    Patient is a [de-identified] y.o. male of Sita Cerrato MD seen in hospital follow up. Chief complaint: CHF/CAD    HPI: No CP or SOB.      Patient Active Problem List   Diagnosis    CAD (coronary artery disease)    LV dysfunction    Hyperlipidemia    Hypertension    Abdominal aortic aneurysm (AAA) without rupture (HCC)    Glaucoma    S/P carotid endarterectomy    Carotid bruit    H/O: CVA (cerebrovascular accident)    Post-op pain    Abdominal pain    Decreased dorsalis pedis pulse    Numbness and tingling of both feet    Volume overload    Hypokalemia       No Known Allergies    Current Facility-Administered Medications   Medication Dose Route Frequency Provider Last Rate Last Admin    carvedilol (COREG) tablet 6.25 mg  6.25 mg Oral BID WC Zhou Busby MD   6.25 mg at 12/20/21 1510    atorvastatin (LIPITOR) tablet 40 mg  40 mg Oral Nightly Zhou Busby MD   40 mg at 12/20/21 2126    spironolactone (ALDACTONE) tablet 12.5 mg  12.5 mg Oral Daily Zhou Busby MD   12.5 mg at 12/21/21 0746    aspirin chewable tablet 81 mg  81 mg Oral Daily ZAIRA Aviles   81 mg at 12/21/21 0747    lisinopril (PRINIVIL;ZESTRIL) tablet 10 mg  10 mg Oral Daily ZAIRA Aviles   10 mg at 12/21/21 0746    sodium chloride flush 0.9 % injection 5-40 mL  5-40 mL IntraVENous 2 times per day ZAIRA Aviles   10 mL at 12/21/21 0747    sodium chloride flush 0.9 % injection 5-40 mL  5-40 mL IntraVENous PRN ZAIRA Aviles        0.9 % sodium chloride infusion  25 mL IntraVENous PRN ZAIRA Aviles        ondansetron (ZOFRAN-ODT) disintegrating tablet 4 mg  4 mg Oral Q8H PRN ZAIRA Aviles        Or    ondansetron (ZOFRAN) injection 4 mg  4 mg IntraVENous Q6H PRN ZAIRA Aviles        polyethylene glycol (GLYCOLAX) packet 17 g  17 g Oral Daily PRN ZAIRA Aviles        acetaminophen (TYLENOL) tablet 650 mg  650 mg Oral Q6H PRN Coty Behzad, PA        Or    acetaminophen (TYLENOL) suppository 650 mg  650 mg Rectal Q6H PRN Coty Behzad, PA        enoxaparin (LOVENOX) injection 40 mg  40 mg SubCUTAneous Daily Coty Behzad, PA   40 mg at 12/21/21 0747    perflutren lipid microspheres (DEFINITY) injection 1.65 mg  1.5 mL IntraVENous ONCE PRN Coty Behzad, PA        potassium chloride (KLOR-CON M) extended release tablet 40 mEq  40 mEq Oral PRN Coty Behzad, PA        Or    potassium bicarb-citric acid (EFFER-K) effervescent tablet 40 mEq  40 mEq Oral PRN Coty Behzad, PA        bumetanide (BUMEX) injection 1 mg  1 mg IntraVENous Daily Coty Behzad, PA   1 mg at 12/21/21 0747    albuterol (PROVENTIL) nebulizer solution 2.5 mg  2.5 mg Nebulization Q4H PRN Coty Behzad, PA           Review of systems:   Heart: as above   Lungs: as above   Eyes: denies changes in vision or discharge. Ears: denies changes in hearing or pain. Nose: denies epistaxis or masses   Throat: denies sore throat or trouble swallowing. Neuro: denies numbness, tingling, tremors. Skin: denies rashes or itching. : denies hematuria, dysuria   GI: denies vomiting, diarrhea   Psych: denies mood changed, anxiety, depression. Physical Exam   BP (!) 140/78   Pulse 57   Temp 97.8 °F (36.6 °C) (Oral)   Resp 18   Ht 6' 1\" (1.854 m)   Wt 165 lb (74.8 kg)   SpO2 95%   BMI 21.77 kg/m²   Constitutional: Oriented to person, place, and time. No distress. Well developed. Head: Normocephalic and atraumatic. Neck: Neck supple. No hepatojugular reflux. No JVD present. Carotid bruit is not present. No tracheal deviation present. No thyromegaly present. Cardiovascular: Normal rate, regular rhythm, normal heart sounds. intact distal pulses. No gallop and no friction rub. No murmur heard. Pulmonary: Breath sounds normal. No respiratory distress. No wheezes. No rales. Chest: Effort normal. No tenderness. Abdominal: Soft.  Bowel sounds mitral regurgitation is present. There is a small anterior pericardial effusion noted. Moderate left pleural effusion. ASSESSMENT & PLAN:    Patient Active Problem List   Diagnosis    CAD (coronary artery disease)    LV dysfunction    Hyperlipidemia    Hypertension    Abdominal aortic aneurysm (AAA) without rupture (HCC)    Glaucoma    S/P carotid endarterectomy    Carotid bruit    H/O: CVA (cerebrovascular accident)    Post-op pain    Abdominal pain    Decreased dorsalis pedis pulse    Numbness and tingling of both feet    Volume overload    Hypokalemia     1. Acute Systolic CHF:     BB/ACEI/aldactone/bumex. Consider conversion to entresto. Pharm stress with fixed apical scar. Lifevest on discharge. 2. Hx of CAD:      Continue ASA/BB/statin. 3. Carotid Disease: ASA/statin. 4. HTN: Observe. 5. Lipids: Statin. 6. Hx of CVA    7. Neuroendocrine malignancy: Advanced. Per oncology. Brooklyn Hammer D.O.   Cardiologist  Cardiology, [facility]

## 2021-12-21 NOTE — CONSULTS
Patient currently admitted with diagnosis of Systolic heart failure. Patient was awake and alert, sitting at the side of the bed during the consultation. He was engaged and asked appropriate questions throughout the education session. He is agreeable to heart failure education and follow up in the CHF clinic. Patient to be discharged with life vest and follow up with Dr Mario Moore 01/05/2022. Future Appointments   Date Time Provider Gretchen Lorenzo   1/5/2022 12:45 PM DO Curtis Ramos [facility]   8/3/2023  9:00 AM Karina Khalil MD [facility]          We reviewed the introduction to Heart Failure, the HF zones, signs and symptoms to report on day 1 of onset, medications, medication compliance, the importance of obtaining daily weights, following a low sodium diet, reading food labels for the sodium content, keeping physician appointments, and smoking cessation. We discussed writing / tracking daily weights on a calendar / log. You can also take your charted weights to your doctor appointments to be reviewed. Contributing risk factors for Heart Failure are identified as Shortness of Breath, bilateral leg swelling. I advised patient they can reduce the risk for Heart Failure exacerbations by modifying / controlling the risk factors. We discussed self-managed care which includes the following:  to take medications as prescribed, report any intolerable side effects of medications to the cardiologist / doctor, do not just stop taking the medication; follow a cardiac heart healthy / low sodium diet; weigh yourself daily, exercise regularly- per doctor recommendation and not to smoke or use an excess amount of alcohol. We discussed calling the cardiologist / doctor with a weight gain of 3 pounds in one day or a total of 5 pounds or more in one week.  Also, if you should have a significant weight loss of 3# or more in one day to call the doctor, they may need to decrease or hold the diuretic dose. On days you feels nauseated and not eating / drinking, having emesis or diarrhea,  informed to call the cardiologist  / doctor, they may need to decrease or hold diuretic to avoid dehydration. I stressed the importance of informing their cardiologist the first day of onset of any of the signs and symptoms in the \"Yellow Zone\" of the HF Zones. Patient verbalizes understanding. Greater than 30 minutes was spent educating patient. BNP:   Lab Results   Component Value Date    PROBNP 7,289 (H) 12/17/2021     History of:    has a past medical history of Aneurysm of abdominal aorta ([address] CAD (coronary artery disease), Cancer ([address] Decreased dorsalis pedis pulse, GERD (gastroesophageal reflux disease), Glaucoma, Hyperlipidemia, Hypertension, Neuro-endocrine cancer ([address] Numbness and tingling of both feet, and Unspecified cerebral artery occlusion with cerebral infarction. has a past surgical history that includes hernia repair; Diagnostic Cardiac Cath Lab Procedure; Tonsillectomy; eye surgery (Left, Feb 2013, June 2013); eye surgery (Left); vascular surgery (Right, 10/16/2013); Carotid endarterectomy (Right, ); Colonoscopy; Sigmoidoscopy; Facial cosmetic surgery (N/A, 12/10/2019); laparoscopy (N/A, 8/28/2020); and Gastrostomy tube placement. Medications:    carvedilol  6.25 mg Oral BID WC    atorvastatin  40 mg Oral Nightly    spironolactone  12.5 mg Oral Daily    aspirin  81 mg Oral Daily    lisinopril  10 mg Oral Daily    sodium chloride flush  5-40 mL IntraVENous 2 times per day    enoxaparin  40 mg SubCUTAneous Daily    bumetanide  1 mg IntraVENous Daily      sodium chloride       Code Status:Full Code    The patient is ordered:  Diet: ADULT DIET; Regular;  Low Sodium (2 gm)   Sodium controlled diet Yes  Fluid restriction daily ordered (fluid restriction recommended if patient is hyponatremic and/or diuretic is initiated or increased) No  FR:   Daily Weights:   Patient Vitals for the past 96 hrs (Last 3 readings):   Weight   12/19/21 0134 165 lb (74.8 kg)   12/18/21 0015 158 lb (71.7 kg)   12/17/21 1636 164 lb (74.4 kg)     I/O:     Intake/Output Summary (Last 24 hours) at 12/21/2021 1240  Last data filed at 12/20/2021 1811  Gross per 24 hour   Intake 420 ml   Output --   Net 420 ml        The Heart Failure Booklet given to the patient with additional patient education addressing:  · What is Heart Failure? · Things You Can Do to Live Well with HF  · How to Take Your Medications  · How to Eat Less Salt  · Exercising Well with Heart Failure  · Signs and symptoms of HF to report  · Weight Yourself Each Day  · Home Self Management- activity, weight tracking, taking medications as prescribed, meals /diet planning (sodium and fluid restriction), how to read food labels, keeping physician follow ups, smoking cessation, follow the Heart Failure Zones  · The Heart Failure zones  · Sodium content pamphlet  · What foods I should avoid tip sheet    Instructed  to call 911 if you have any of the following symptoms:    ·    Struggling to breathe unrelieved with rest,  ·    Having chest pain, confusion or can't think clearly  ·    Have confusion or cant think clearly    Readmission Risk Score: 9.9 ( )    Discharge Plan:  I placed the Heart Failure Home Instructions in patient's discharge instructions. Per Heart Failure GWTG, the patient should have a follow-up appointment made within 7 days of discharge.     Glory Arevalo RN BSN, RN  Heart Failure Navigator    CONGESTIVE HEART FAILURE (CHF) GUIDELINES  (Must be completed for Primary Diagnosis CHF or History of CHF)    Type of CHF:    [] Acute   [] Chronic     [x] Acute on Chronic     Ventricular Function Assessment (check):        [] HFpEF  [] HFpEF, borderline  [] HFpEF, improved  [x] HFrEF    Echocardiogram:12/18/2021  Ejection Fraction (%):  15%                                                     Angiotensin-Converting-Enzyme (ACE) inhibitor ordered:  [x] Yes  [] No (specify contraindication):  [] Renal Insufficiency  [] Cough  [] Hypotension  [] Allergy/angioedema  [] No left ventricular systolic dysfunction (LVSD)  [] Hyperkalemia  [] Moderate to severe aortic stenosis  [] Other (Specify): Angiotensin II receptor blockers (ARB) ordered:  [] Yes  [x] No (specify contraindication):  [] Renal Insufficiency  [] Hypotension  [] Allergy/angioedema  [] No LVSD  [] Hyperkalemia  [] Moderate to severe aortic stenosis  [] Other (Specify): May order ARNI at D/C      ARNI - Angiotensin Receptor Neprilysin Inhibitor ordered:  [] Yes  [x] No- Will start when able     ACC/AHA Guidelines Beta Blocker (Carvedilol, Metoprolol Succinate, or Bisoprolol) ordered:    [x] Yes  [] No (specify contraindication):  [] Bradycardia  [] Hypotension  [] LVD  [] 2nd or 3rd degree heart block  [] Bronchospastic airway disease  [] Decompensated CHF  [] Other (Specify):

## 2021-12-21 NOTE — PROGRESS NOTES
Pulse ox was 91% on room air at rest. Ambulated patient on room air. Oxygen saturation was 96% on room air while ambulating.

## 2021-12-21 NOTE — PROGRESS NOTES
Subjective: The patient is awake and alert. He feels better after being diuresed  No acute events overnight  Patient had stress today. Discussed results      Objective:    /73   Pulse 59   Temp 97.7 °F (36.5 °C) (Oral)   Resp 18   Ht 6' 1\" (1.854 m)   Wt 165 lb (74.8 kg)   SpO2 93%   BMI 21.77 kg/m²     No intake/output data recorded. No intake/output data recorded. General appearance: NAD, conversant  HEENT: AT/NC, MMM  Neck: FROM, supple  Lungs: Clear to auscultation  CV: RRR, no MRGs  Vasc: Radial pulses 2+  Abdomen: Soft, non-tender; no masses or HSM  Extremities: No peripheral edema or digital cyanosis  Skin: no rash, lesions or ulcers  Psych: Alert and oriented to person, place and time  Neuro: Alert and interactive     Recent Labs     12/21/21  0434   WBC 4.7   HGB 11.5*   HCT 35.7*          Recent Labs     12/19/21  0600 12/20/21  1001 12/21/21  0434    141 138   K 4.3 4.1 4.4    99 100   CO2 33* 35* 31*   BUN 20 21 24*   CREATININE 0.9 1.0 1.0   CALCIUM 8.9 8.7 8.5*       Assessment:    Principal Problem:    Volume overload  Active Problems:    Hypertension    Hypokalemia  Resolved Problems:    * No resolved hospital problems.  *      Plan:    Admit to telemetry for evaluation of acute CHF systolic-CHr EF  IV diuresis with Bumex 1 mg twice daily - will change to po on discharge  Supplement potassium secondary to hypokalemia from aggressive diuresis  Echocardiogram reveals ejection fraction 25% down from 60% in 2015  Will need ischemic evaluation- await cards to decide [facility] v stress - stress negative  Goal-directed medical therapy to be initiated-high intensity statin, Aldactone, ACE inhibitor, diuretic, beta-blocker  LifeVest at discretion of cardiology prior to discharge- will be available tomorrow 12/22  Stress test today - no new findings.       DVT Prophylaxis   PT/OT - [facility] 20/24  Discharge planning - home tomorrow after life vest is placed    Trivnet, TIFFANIE - CNP  5:11 PM  12/21/2021     Above note edited to reflect my thoughts     I personally saw, examined and provided care for the patient. Radiographs, labs and medication list were reviewed by me independently. The case was discussed in detail and plans for care were established. Review of Asya MORENO- CNP, documentation was conducted and revisions were made as appropriate directly by me. I agree with the above documented exam, problem list, and plan of care.      Catalino Durbin MD  7:22 PM  12/21/2021

## 2021-12-22 VITALS
RESPIRATION RATE: 18 BRPM | BODY MASS INDEX: 21.87 KG/M2 | OXYGEN SATURATION: 94 % | DIASTOLIC BLOOD PRESSURE: 65 MMHG | SYSTOLIC BLOOD PRESSURE: 109 MMHG | TEMPERATURE: 97.9 F | HEART RATE: 60 BPM | HEIGHT: 73 IN | WEIGHT: 165 LBS

## 2021-12-22 LAB
ANION GAP SERPL CALCULATED.3IONS-SCNC: 9 MMOL/L (ref 7–16)
BUN BLDV-MCNC: 24 MG/DL (ref 6–23)
CALCIUM SERPL-MCNC: 8.8 MG/DL (ref 8.6–10.2)
CHLORIDE BLD-SCNC: 97 MMOL/L (ref 98–107)
CO2: 32 MMOL/L (ref 22–29)
CREAT SERPL-MCNC: 1 MG/DL (ref 0.7–1.2)
GFR AFRICAN AMERICAN: >60
GFR NON-AFRICAN AMERICAN: >60 ML/MIN/1.73
GLUCOSE BLD-MCNC: 121 MG/DL (ref 74–99)
MAGNESIUM: 2.7 MG/DL (ref 1.6–2.6)
POTASSIUM SERPL-SCNC: 4.8 MMOL/L (ref 3.5–5)
SODIUM BLD-SCNC: 138 MMOL/L (ref 132–146)

## 2021-12-22 PROCEDURE — 2500000003 HC RX 250 WO HCPCS: Performed by: PHYSICIAN ASSISTANT

## 2021-12-22 PROCEDURE — 36415 COLL VENOUS BLD VENIPUNCTURE: CPT

## 2021-12-22 PROCEDURE — 6370000000 HC RX 637 (ALT 250 FOR IP): Performed by: INTERNAL MEDICINE

## 2021-12-22 PROCEDURE — 6370000000 HC RX 637 (ALT 250 FOR IP): Performed by: PHYSICIAN ASSISTANT

## 2021-12-22 PROCEDURE — 83735 ASSAY OF MAGNESIUM: CPT

## 2021-12-22 PROCEDURE — 2580000003 HC RX 258: Performed by: PHYSICIAN ASSISTANT

## 2021-12-22 PROCEDURE — 99233 SBSQ HOSP IP/OBS HIGH 50: CPT | Performed by: INTERNAL MEDICINE

## 2021-12-22 PROCEDURE — 80048 BASIC METABOLIC PNL TOTAL CA: CPT

## 2021-12-22 PROCEDURE — 6360000002 HC RX W HCPCS: Performed by: PHYSICIAN ASSISTANT

## 2021-12-22 RX ORDER — CARVEDILOL 6.25 MG/1
6.25 TABLET ORAL 2 TIMES DAILY WITH MEALS
Qty: 60 TABLET | Refills: 3 | Status: SHIPPED | OUTPATIENT
Start: 2021-12-22

## 2021-12-22 RX ORDER — BUMETANIDE 2 MG/1
1 TABLET ORAL DAILY
Qty: 30 TABLET | Refills: 3 | Status: ON HOLD | OUTPATIENT
Start: 2021-12-22 | End: 2022-04-05

## 2021-12-22 RX ORDER — SPIRONOLACTONE 25 MG/1
25 TABLET ORAL DAILY
Qty: 30 TABLET | Refills: 3 | Status: SHIPPED | OUTPATIENT
Start: 2021-12-23 | End: 2022-01-05

## 2021-12-22 RX ORDER — ATORVASTATIN CALCIUM 40 MG/1
40 TABLET, FILM COATED ORAL NIGHTLY
Qty: 30 TABLET | Refills: 3 | Status: SHIPPED | OUTPATIENT
Start: 2021-12-22

## 2021-12-22 RX ADMIN — ASPIRIN 81 MG CHEWABLE TABLET 81 MG: 81 TABLET CHEWABLE at 09:01

## 2021-12-22 RX ADMIN — CARVEDILOL 6.25 MG: 6.25 TABLET, FILM COATED ORAL at 09:02

## 2021-12-22 RX ADMIN — ENOXAPARIN SODIUM 40 MG: 100 INJECTION SUBCUTANEOUS at 08:58

## 2021-12-22 RX ADMIN — BUMETANIDE 1 MG: 0.25 INJECTION INTRAMUSCULAR; INTRAVENOUS at 08:58

## 2021-12-22 RX ADMIN — Medication 10 ML: at 08:58

## 2021-12-22 RX ADMIN — SPIRONOLACTONE 25 MG: 25 TABLET ORAL at 09:02

## 2021-12-22 RX ADMIN — LISINOPRIL 10 MG: 10 TABLET ORAL at 09:02

## 2021-12-22 ASSESSMENT — PAIN SCALES - GENERAL: PAINLEVEL_OUTOF10: 0

## 2021-12-22 NOTE — CARE COORDINATION
CASE MANAGEMENT. .. Confirmed with Rosemarie Rossi from [place] [id number] that Life vest has been approved and will be delivered to the hospital today.

## 2021-12-22 NOTE — CARE COORDINATION
CASE MANAGEMENT. .. Alison Hatfield Confirmed with [facility] that patient will be seen at home. Start date is unknown at this time d/t staffing issues, but per my conversation with patient yesterday he is still agreeable to [facility]. [facility] order obtained.

## 2021-12-22 NOTE — PROGRESS NOTES
[facility] Cardiology Inpatient Progress Note    Patient is a [de-identified] y.o. male of Irene Burton MD seen in hospital follow up. Chief complaint: CHF/CAD    HPI: No CP or SOB.      Patient Active Problem List   Diagnosis    CAD (coronary artery disease)    LV dysfunction    Hyperlipidemia    Hypertension    Abdominal aortic aneurysm (AAA) without rupture (HCC)    Glaucoma    S/P carotid endarterectomy    Carotid bruit    H/O: CVA (cerebrovascular accident)    Post-op pain    Abdominal pain    Decreased dorsalis pedis pulse    Numbness and tingling of both feet    Volume overload    Hypokalemia       No Known Allergies    Current Facility-Administered Medications   Medication Dose Route Frequency Provider Last Rate Last Admin    spironolactone (ALDACTONE) tablet 25 mg  25 mg Oral Daily Elfrieda Public, DO   25 mg at 12/22/21 0902    carvedilol (COREG) tablet 6.25 mg  6.25 mg Oral BID WC Clara Ortiz MD   6.25 mg at 12/22/21 0902    atorvastatin (LIPITOR) tablet 40 mg  40 mg Oral Nightly Clara Ortiz MD   40 mg at 12/21/21 2109    aspirin chewable tablet 81 mg  81 mg Oral Daily Buren Drop, PA   81 mg at 12/22/21 0901    lisinopril (PRINIVIL;ZESTRIL) tablet 10 mg  10 mg Oral Daily Buren Drop, PA   10 mg at 12/22/21 0902    sodium chloride flush 0.9 % injection 5-40 mL  5-40 mL IntraVENous 2 times per day Buren Drop, PA   10 mL at 12/22/21 0858    sodium chloride flush 0.9 % injection 5-40 mL  5-40 mL IntraVENous PRN ZAIRA Boogie        0.9 % sodium chloride infusion  25 mL IntraVENous PRN Buren Drop, PA        ondansetron (ZOFRAN-ODT) disintegrating tablet 4 mg  4 mg Oral Q8H PRN Buren Drop, PA        Or    ondansetron (ZOFRAN) injection 4 mg  4 mg IntraVENous Q6H PRN Buren Drop, PA        polyethylene glycol (GLYCOLAX) packet 17 g  17 g Oral Daily PRN Buren Drop, PA        acetaminophen (TYLENOL) tablet 650 mg  650 mg Oral Q6H PRN Buren Drop, PA        Or acetaminophen (TYLENOL) suppository 650 mg  650 mg Rectal Q6H PRN Sanju Solid, PA        enoxaparin (LOVENOX) injection 40 mg  40 mg SubCUTAneous Daily Sanju Solid, PA   40 mg at 12/22/21 7452    perflutren lipid microspheres (DEFINITY) injection 1.65 mg  1.5 mL IntraVENous ONCE PRN Sanju Solid, PA        potassium chloride (KLOR-CON M) extended release tablet 40 mEq  40 mEq Oral PRN Sanju Solid, PA        Or    potassium bicarb-citric acid (EFFER-K) effervescent tablet 40 mEq  40 mEq Oral PRN Sanju Solid, PA        bumetanide (BUMEX) injection 1 mg  1 mg IntraVENous Daily Sanju Solid, PA   1 mg at 12/22/21 0858    albuterol (PROVENTIL) nebulizer solution 2.5 mg  2.5 mg Nebulization Q4H PRN Sanju Solid, PA           Review of systems:   Heart: as above   Lungs: as above   Eyes: denies changes in vision or discharge. Ears: denies changes in hearing or pain. Nose: denies epistaxis or masses   Throat: denies sore throat or trouble swallowing. Neuro: denies numbness, tingling, tremors. Skin: denies rashes or itching. : denies hematuria, dysuria   GI: denies vomiting, diarrhea   Psych: denies mood changed, anxiety, depression. Physical Exam   /74   Pulse 58   Temp 97.9 °F (36.6 °C) (Oral)   Resp 20   Ht 6' 1\" (1.854 m)   Wt 165 lb (74.8 kg)   SpO2 93%   BMI 21.77 kg/m²   Constitutional: Oriented to person, place, and time. No distress. Well developed. Head: Normocephalic and atraumatic. Neck: Neck supple. No hepatojugular reflux. No JVD present. Carotid bruit is not present. No tracheal deviation present. No thyromegaly present. Cardiovascular: Normal rate, regular rhythm, normal heart sounds. intact distal pulses. No gallop and no friction rub. No murmur heard. Pulmonary: Breath sounds normal. No respiratory distress. No wheezes. No rales. Chest: Effort normal. No tenderness. Abdominal: Soft. Bowel sounds are normal. No distension or mass.  No tenderness, rebound or guarding. Musculoskeletal: . No tenderness. No clubbing or cyanosis. Extremitites: Intact distal pulses. No edema  Neurological: Alert and oriented to person, place, and time. Skin: Skin is warm and dry. No rash noted. Not diaphoretic. No erythema. Psychiatric: Normal mood and affect. Behavior is normal.   Lymphadenopathy: No cervical adenopathy. No groin adenopathy. CBC:   Lab Results   Component Value Date    WBC 4.7 12/21/2021    RBC 3.77 12/21/2021    HGB 11.5 12/21/2021    HCT 35.7 12/21/2021    MCV 94.7 12/21/2021    MCH 30.5 12/21/2021    MCHC 32.2 12/21/2021    RDW 14.7 12/21/2021     12/21/2021    MPV 11.9 12/21/2021     BMP:   Lab Results   Component Value Date     12/22/2021    K 4.8 12/22/2021    K 3.4 12/17/2021    CL 97 12/22/2021    CO2 32 12/22/2021    BUN 24 12/22/2021    LABALBU 3.3 12/17/2021    LABALBU 4.6 06/03/2012    CREATININE 1.0 12/22/2021    CALCIUM 8.8 12/22/2021    GFRAA >60 12/22/2021    LABGLOM >60 12/22/2021     Magnesium:    Lab Results   Component Value Date    MG 2.7 12/22/2021     Cardiac Enzymes:   Lab Results   Component Value Date    CKTOTAL 90 06/03/2012    CKMB 1.6 06/03/2012    TROPHS 37 (H) 12/18/2021    TROPHS 33 (H) 12/17/2021    TROPHS 33 (H) 12/17/2021      PT/INR:    Lab Results   Component Value Date    PROTIME 12.6 10/09/2013    PROTIME 11.7 06/03/2012    INR 1.2 10/09/2013     TSH:  No results found for: TSH    Rhythm Strip: sinus bradycardia. Echo Summary 12/18/2021:   LVEF 15%   Left ventricular internal dimensions were normal in diastole and dilated  in systole. Regional wall motion abnormality with akinesis of apical segment and severe generalized hypokinesis of remaining segments. Severely reduced left ventricular ejection fraction. The left atrium is mildly dilated. Right ventricle global systolic function is reduced . Mild mitral annular calcification. Mild mitral regurgitation is present.    There is a small anterior pericardial effusion noted. Moderate left pleural effusion. ASSESSMENT & PLAN:    Patient Active Problem List   Diagnosis    CAD (coronary artery disease)    LV dysfunction    Hyperlipidemia    Hypertension    Abdominal aortic aneurysm (AAA) without rupture (HCC)    Glaucoma    S/P carotid endarterectomy    Carotid bruit    H/O: CVA (cerebrovascular accident)    Post-op pain    Abdominal pain    Decreased dorsalis pedis pulse    Numbness and tingling of both feet    Volume overload    Hypokalemia     1. Acute Systolic CHF:     BB/ACEI/aldactone/bumex. Consider conversion to entresto. Pharm stress with fixed apical scar. Lifevest on discharge. 2. Hx of CAD:      Continue ASA/BB/statin. 3. Carotid Disease: ASA/statin. 4. HTN: Observe. 5. Lipids: Statin. 6. Hx of CVA    7. Neuroendocrine malignancy: Advanced. Per oncology. Loli Mckeon D.O.   Cardiologist  Cardiology, [facility]

## 2021-12-23 ENCOUNTER — TELEPHONE (OUTPATIENT)
Dept: ADMINISTRATIVE | Age: 81
End: 2021-12-23

## 2021-12-23 NOTE — TELEPHONE ENCOUNTER
Pt calling for HFU apt/timing with Dr. Nydia King discharged 20/97/40 dx:acute systolic CHF/CAD. He is currently scheduled for a f/u with [facility] on: 1/5/22 - he is not sure if he needs seen sooner than that? Please return his call. Thank you.

## 2021-12-23 NOTE — TELEPHONE ENCOUNTER
I spoke with patient and informed him that he is scheduled on 1/5 and if he has any issue to contact the office.

## 2021-12-27 ASSESSMENT — EJECTION FRACTION
EF_SOURCE: 2D ECHO
EF_VALUE: 15%

## 2021-12-30 ENCOUNTER — TELEPHONE (OUTPATIENT)
Dept: CARDIOLOGY CLINIC | Age: 81
End: 2021-12-30

## 2021-12-30 NOTE — TELEPHONE ENCOUNTER
Wear as much as he can tolerate. We will discuss further on OV. Zeke Olson D.O.   Cardiologist  Cardiology, [facility]

## 2021-12-30 NOTE — TELEPHONE ENCOUNTER
Patient has been wearing  A life vest since hospital discharge 12/17 and says it is very uncomfortable and digging in his skin, he wants to take it off, he is scheduled for OV next week but says he might not be able to keep it on that long, please advise

## 2022-01-04 ENCOUNTER — HOSPITAL ENCOUNTER (OUTPATIENT)
Dept: OTHER | Age: 82
Setting detail: THERAPIES SERIES
Discharge: HOME | End: 2022-01-04
Payer: MEDICARE

## 2022-01-04 VITALS
RESPIRATION RATE: 18 BRPM | HEART RATE: 58 BPM | BODY MASS INDEX: 18.34 KG/M2 | OXYGEN SATURATION: 97 % | WEIGHT: 139 LBS | SYSTOLIC BLOOD PRESSURE: 103 MMHG | DIASTOLIC BLOOD PRESSURE: 57 MMHG

## 2022-01-04 LAB
ANION GAP SERPL CALCULATED.3IONS-SCNC: 8 MMOL/L (ref 7–16)
BUN BLDV-MCNC: 51 MG/DL (ref 6–23)
CALCIUM SERPL-MCNC: 9.2 MG/DL (ref 8.6–10.2)
CHLORIDE BLD-SCNC: 98 MMOL/L (ref 98–107)
CO2: 33 MMOL/L (ref 22–29)
CREAT SERPL-MCNC: 1.4 MG/DL (ref 0.7–1.2)
GFR AFRICAN AMERICAN: 59
GFR NON-AFRICAN AMERICAN: 49 ML/MIN/1.73
GLUCOSE BLD-MCNC: 144 MG/DL (ref 74–99)
POTASSIUM SERPL-SCNC: 4.6 MMOL/L (ref 3.5–5)
PRO-BNP: 569 PG/ML (ref 0–450)
SODIUM BLD-SCNC: 139 MMOL/L (ref 132–146)

## 2022-01-04 PROCEDURE — 83880 ASSAY OF NATRIURETIC PEPTIDE: CPT

## 2022-01-04 PROCEDURE — 36415 COLL VENOUS BLD VENIPUNCTURE: CPT

## 2022-01-04 PROCEDURE — 80048 BASIC METABOLIC PNL TOTAL CA: CPT

## 2022-01-04 PROCEDURE — 99204 OFFICE O/P NEW MOD 45 MIN: CPT

## 2022-01-04 ASSESSMENT — PAIN SCALES - GENERAL: PAINLEVEL_OUTOF10: 0

## 2022-01-04 NOTE — PROGRESS NOTES
Congestive Heart Failure [facility] [address]   1940    Referring Provider:   Primary Care Physician: Dr Phil Bañuelos  Cardiologist: Dr Marisol Yeh  Nephrologist:     History of Present Illness:     Heather Sahu is a [de-identified] y.o. male with a history of HFrEF, most recent EF 15% 12/18/2021. Patient Story:    He does  have dyspnea with exertion, shortness of breath, or decline in overall functional capacity. He does not have orthopnea, PND, nocturnal cough or hemoptysis. He does not have abdominal distention or bloating, early satiety, anorexia/change in appetite. He does have a good urinary response to oral diuretic. He does have trace lower extremity edema. He denies lightheadedness, dizziness. He denies palpitations, syncope or near syncope. He does not complain of chest pain, pressure, discomfort. No Known Allergies      No outpatient medications have been marked as taking for the 1/4/22 encounter [facility] Encounter) with [facility] ROOM 1.     Guideline directed medical:  ARNI/ACE I/ARB: Yes  Beta blocker: Yes  Aldosterone antagonist:  No    Physical Examination:     BP (!) 103/57   Pulse 58   Resp 18   Wt 139 lb (63 kg)   SpO2 97%   BMI 18.34 kg/m²     Assessment  Charting Type: Shift assessment    Neurological  Level of Consciousness: Alert (0)  Orientation Level: Oriented X4     HEENT  HEENT (WDL): Exceptions to WDL  Right Eye: Impaired vision  Left Eye: Impaired vision    Respiratory  Respiratory Pattern: Regular  Respiratory Depth: Normal  Respiratory Quality/Effort: Unlabored  Chest Assessment: Chest expansion symmetrical  L Breath Sounds: Diminished  R Breath Sounds: Diminished        Cardiac  Cardiac Regularity: Regular  Cardiac Rhythm: Sinus heidy    Rhythm Interpretation  Pulse: 58     Gastrointestinal  Abdominal (WDL): Exceptions to WDL  Abdomen Inspection: Gastrostomy,Soft  Tenderness: Soft  RUQ Bowel Sounds:  Active  LUQ Bowel Sounds: Active  RLQ Bowel Sounds: Active  LLQ Bowel Sounds: Active      Bowel Sounds  RUQ Bowel Sounds: Active  LUQ Bowel Sounds: Active  RLQ Bowel Sounds: Active  LLQ Bowel Sounds: Active    Peripheral Vascular  Peripheral Vascular (WDL): Exceptions to WDL  Edema: Right lower extremity,Left lower extremity  RLE Edema: Trace  LLE Edema: Trace                 Pain Assessment  Pain Level: 0           Pulse: 58           LAB DATA:  Last 3 BMP      Sodium (mmol/L)   Date Value   12/22/2021 138   12/21/2021 138   12/20/2021 141     Potassium (mmol/L)   Date Value   12/22/2021 4.8   12/21/2021 4.4   12/20/2021 4.1     Potassium reflex Magnesium (mmol/L)   Date Value   12/17/2021 3.4 (L)   03/08/2021 3.6   10/02/2020 4.0     Chloride (mmol/L)   Date Value   12/22/2021 97 (L)   12/21/2021 100   12/20/2021 99     CO2 (mmol/L)   Date Value   12/22/2021 32 (H)   12/21/2021 31 (H)   12/20/2021 35 (H)     BUN (mg/dL)   Date Value   12/22/2021 24 (H)   12/21/2021 24 (H)   12/20/2021 21     Glucose (mg/dL)   Date Value   12/22/2021 121 (H)   12/21/2021 108 (H)   12/20/2021 116 (H)   06/03/2012 125 (H)     Calcium (mg/dL)   Date Value   12/22/2021 8.8   12/21/2021 8.5 (L)   12/20/2021 8.7       Last 3 BNP       Pro-BNP (pg/mL)   Date Value   12/17/2021 7,289 (H)      CBC: No results for input(s): WBC, HGB, PLT in the last 72 hours. BMP:  No results for input(s): NA, K, CL, CO2, BUN, CREATININE, GLUCOSE in the last 72 hours. Hepatic: No results for input(s): AST, ALT, ALB, BILITOT, ALKPHOS in the last 72 hours. Troponin: No results for input(s): TROPONINI in the last 72 hours. BNP: No results for input(s): BNP in the last 72 hours. Lipids: No results for input(s): CHOL, HDL in the last 72 hours. Invalid input(s): LDLCALCU  INR: No results for input(s): INR in the last 72 hours.     WEIGHTS:    Wt Readings from Last 3 Encounters:   01/04/22 139 lb (63 kg)   12/19/21 165 lb (74.8 kg)   08/11/21 164 lb (74.4 kg) TELEMETRY:  Cardiac Regularity: Regular  Cardiac Rhythm/Interpretation: SB    ASSESSMENT:  Jennifer Meehan is evolemic with weight loss. He has lost about 30 pounds in the last month. He is wearing his life vest today. He would like to discuss discontinuation of the life vest with Dr Camacho Gil as he is a DNR at his time. He has an appointment with him tomorrow. He is compliant with all of his medications and takes them as directed. The patient is going to be moving in with his sister in the near future. Next appointment made for next week. We discussed the heart failure zones and what to look for at home. We talked about when to call the doctor. We also talked about weighing daily and charting the weights. A new scale was provided to the patient. Interventions completed this visit:  IV diuretics given no  Lab work obtained yes, BNP, BMP   Reviewed currently prescribed medications with patient, educated on importance of compliance and answered any questions regarding their medication  Educated on signs and symptoms of HF  Educated on low sodium diet    PLAN:  Scheduled to follow up in CHF clinic on  01/12/2022 @ 1500  Future Appointments   Date Time Provider Gretchen Lorenzo   1/5/2022 12:45 PM Dipti Betancourt DO [address] [facility] [place]   1/12/2022  3:00 PM [facility] ROOM 1 [facility]   8/3/2023  9:00 AM Valerio White MD [facility]     Given clinic phone number and aware of signs and symptoms to call with any HF change in symptoms.

## 2022-01-04 NOTE — PLAN OF CARE
Problem: Activity:  Goal: Capacity to carry out activities will improve  Description: Capacity to carry out activities will improve  Outcome: Ongoing     Problem:  Activity:  Goal: Will verbalize the importance of balancing activity with adequate rest periods  Description: Will verbalize the importance of balancing activity with adequate rest periods  Outcome: Ongoing     Problem: Cardiac:  Goal: Hemodynamic stability will improve  Description: Hemodynamic stability will improve  Outcome: Ongoing     Problem: Cardiac:  Goal: Ability to maintain an adequate cardiac output will improve  Description: Ability to maintain an adequate cardiac output will improve  Outcome: Ongoing     Problem: Fluid Volume:  Goal: Maintenance of adequate hydration will improve  Description: Maintenance of adequate hydration will improve  Outcome: Ongoing     Problem: Fluid Volume:  Goal: Will show no signs and symptoms of electrolyte imbalance  Description: Will show no signs and symptoms of electrolyte imbalance  Outcome: Ongoing

## 2022-01-05 ENCOUNTER — OFFICE VISIT (OUTPATIENT)
Dept: CARDIOLOGY CLINIC | Age: 82
End: 2022-01-05
Payer: MEDICARE

## 2022-01-05 ENCOUNTER — TELEPHONE (OUTPATIENT)
Dept: CARDIOLOGY CLINIC | Age: 82
End: 2022-01-05

## 2022-01-05 VITALS
HEIGHT: 73 IN | HEART RATE: 75 BPM | WEIGHT: 139 LBS | SYSTOLIC BLOOD PRESSURE: 130 MMHG | DIASTOLIC BLOOD PRESSURE: 68 MMHG | BODY MASS INDEX: 18.42 KG/M2

## 2022-01-05 DIAGNOSIS — I25.10 CORONARY ARTERY DISEASE INVOLVING NATIVE CORONARY ARTERY WITHOUT ANGINA PECTORIS, UNSPECIFIED WHETHER NATIVE OR TRANSPLANTED HEART: Primary | ICD-10-CM

## 2022-01-05 PROCEDURE — 99214 OFFICE O/P EST MOD 30 MIN: CPT | Performed by: INTERNAL MEDICINE

## 2022-01-05 PROCEDURE — 93000 ELECTROCARDIOGRAM COMPLETE: CPT | Performed by: INTERNAL MEDICINE

## 2022-01-05 NOTE — TELEPHONE ENCOUNTER
Patient's daughter called to let you know patient has been taking aldactone 25mg daily since hospital discharge

## 2022-01-06 RX ORDER — SPIRONOLACTONE 25 MG/1
25 TABLET ORAL DAILY
COMMUNITY
End: 2022-01-21

## 2022-01-12 ENCOUNTER — HOSPITAL ENCOUNTER (OUTPATIENT)
Dept: OTHER | Age: 82
Setting detail: THERAPIES SERIES
Discharge: HOME | End: 2022-01-12
Payer: MEDICARE

## 2022-01-12 VITALS
OXYGEN SATURATION: 98 % | BODY MASS INDEX: 18.07 KG/M2 | DIASTOLIC BLOOD PRESSURE: 40 MMHG | SYSTOLIC BLOOD PRESSURE: 80 MMHG | HEART RATE: 58 BPM | WEIGHT: 137 LBS | RESPIRATION RATE: 18 BRPM

## 2022-01-12 LAB
ANION GAP SERPL CALCULATED.3IONS-SCNC: 9 MMOL/L (ref 7–16)
BUN BLDV-MCNC: 47 MG/DL (ref 6–23)
CALCIUM SERPL-MCNC: 8.7 MG/DL (ref 8.6–10.2)
CHLORIDE BLD-SCNC: 97 MMOL/L (ref 98–107)
CO2: 28 MMOL/L (ref 22–29)
CREAT SERPL-MCNC: 1.2 MG/DL (ref 0.7–1.2)
GFR AFRICAN AMERICAN: >60
GFR NON-AFRICAN AMERICAN: 58 ML/MIN/1.73
GLUCOSE BLD-MCNC: 102 MG/DL (ref 74–99)
POTASSIUM SERPL-SCNC: 4.2 MMOL/L (ref 3.5–5)
PRO-BNP: 472 PG/ML (ref 0–450)
SODIUM BLD-SCNC: 134 MMOL/L (ref 132–146)

## 2022-01-12 PROCEDURE — 80048 BASIC METABOLIC PNL TOTAL CA: CPT

## 2022-01-12 PROCEDURE — 36415 COLL VENOUS BLD VENIPUNCTURE: CPT

## 2022-01-12 PROCEDURE — 99214 OFFICE O/P EST MOD 30 MIN: CPT

## 2022-01-12 PROCEDURE — 83880 ASSAY OF NATRIURETIC PEPTIDE: CPT

## 2022-01-12 ASSESSMENT — PAIN SCALES - GENERAL
PAINLEVEL_OUTOF10: 0
PAINLEVEL_OUTOF10: 0

## 2022-01-12 NOTE — PLAN OF CARE
Problem: Cardiac:  Goal: Hemodynamic stability will improve  Description: Hemodynamic stability will improve  Outcome: Ongoing   Continue plan of care

## 2022-01-14 NOTE — PROGRESS NOTES
Dr Natalya Salazar notified of B/P 82/41Left arm and 80/40 right arm, that patient is asymtamatic Dr. White Necessary ordered to stop lisinopril and to hold Bumex until 1-14-22 and for patient to hydrate.

## 2022-01-16 NOTE — DISCHARGE SUMMARY
Physician Discharge Summary     Patient ID:  Shannen Prakash  [id number]  [de-identified] y.o.  1940    Admit date: 12/17/2021    Discharge date and time: 12/22/2021    Admission Diagnoses:   Patient Active Problem List   Diagnosis    CAD (coronary artery disease)    LV dysfunction    Hyperlipidemia    Hypertension    Abdominal aortic aneurysm (AAA) without rupture (HCC)    Glaucoma    S/P carotid endarterectomy    Carotid bruit    H/O: CVA (cerebrovascular accident)    Post-op pain    Abdominal pain    Decreased dorsalis pedis pulse    Numbness and tingling of both feet    Volume overload    Hypokalemia       Discharge Diagnoses: CHF    Consults: cardiology    Procedures:     Hospital Course: The patient is a [de-identified] y.o. male of Denis Bauer MD with significant past medical history of neuroendocrine tumors, follows with oncology, no cardiac history who presents with complaints of exertional shortness of breath and marked swelling of bilateral lower extremities that has been ongoing for approximately 10 days now. Patient got to the point where he was unable to do minimal activity without becoming winded. He proceeded to the emergency department and was found to have elevated BNP of greater than 7000. Bilateral lower extremity edema was also appreciated. Chest x-ray in emergency department reveals moderate left pleural effusion and small right pleural effusion. Oxygen saturations within normal limits in the mid 90s on room air. Laboratory data  Essentially unremarkable except a BNP of 7300, potassium 3.4 troponin 33. Patient denies any chest pain or palpitations, only shortness of breath is present. Echocardiogram has been done since admission and ejection fraction is currently notably 15% compared to greater than 60% on 2D echo from 2015 which is the most recent on record.     IV diuresis with Bumex 1 mg twice daily - will change to po on discharge  Supplement potassium secondary to hypokalemia from aggressive diuresis  Echocardiogram reveals ejection fraction 25% down from 60% in 2015  Will need ischemic evaluation- await cards to decide lhc v stress - stress negative  Goal-directed medical therapy to be initiated-high intensity statin, Aldactone, ACE inhibitor, diuretic, beta-blocker  LifeVest at discretion of cardiology prior to discharge- will be available tomorrow 12/22  Stress test today - no new findings. Patient discharged home in stable condition with medications listed below. Patient was instructed to follow up with all consults and PCP within the next two week. No results for input(s): WBC, HGB, HCT, PLT in the last 72 hours. No results for input(s): NA, K, CL, CO2, BUN, CREATININE, GLU, CALCIUM in the last 72 hours. No results found. Discharge Exam:    HEENT: NCAT,  PERRLA, No JVD  Heart:  RRR, no murmurs, gallops, or rubs.   Lungs:  CTA bilaterally, no wheeze, rales or rhonchi  Abd: bowel sounds present, nontender, nondistended, no masses  Extrem:  No clubbing, cyanosis, or edema    Disposition: home     Patient Condition at Discharge: sTABLE    Patient Instructions:      Medication List        START taking these medications      atorvastatin 40 MG tablet  Commonly known as: LIPITOR  Take 1 tablet by mouth nightly     bumetanide 2 MG tablet  Commonly known as: BUMEX  Take 0.5 tablets by mouth daily     carvedilol 6.25 MG tablet  Commonly known as: COREG  Take 1 tablet by mouth 2 times daily (with meals)            CONTINUE taking these medications      albuterol sulfate  (90 Base) MCG/ACT inhaler     aspirin 81 MG chewable tablet     Colace 100 MG capsule  Generic drug: docusate sodium     potassium chloride 10 MEQ extended release tablet  Commonly known as: KLOR-CON M  Take 1 tablet by mouth daily     SANDOSTATIN IJ            STOP taking these medications      furosemide 20 MG tablet  Commonly known as: LASIX               Where to Get Your Medications        These medications were sent to [facility],[address], [facility] [phone] Joshua Vizcaino [phone] [address] [facility]      Phone: [phone]   atorvastatin 40 MG tablet  bumetanide 2 MG tablet  carvedilol 6.25 MG tablet       Activity: activity as tolerated  Diet: cardiac diet    Pt has been advised to: Follow-up with Vamsi Hernandez MD in 1 week. Follow-up with consultants as recommended by them    Note that over 30 minutes was spent in preparing discharge papers, discussing discharge with patient, medication review, etc.    Signed:  TIFFANIE Farias CNP  1/16/2022  3:50 AM    Above note edited to reflect my thoughts     I personally saw, examined and provided care for the patient. Radiographs, labs and medication list were reviewed by me independently. The case was discussed in detail and plans for care were established. Review of Asya CANADA CNP, documentation was conducted and revisions were made as appropriate directly by me. I agree with the above documented exam, problem list, and plan of care.      Angela Mcclure MD  4:05 AM  1/16/2022

## 2022-01-18 ENCOUNTER — TELEPHONE (OUTPATIENT)
Dept: CARDIOLOGY CLINIC | Age: 82
End: 2022-01-18

## 2022-01-18 NOTE — TELEPHONE ENCOUNTER
Hold aldactone, other medications same. Clarence Hernandez D.O.   Cardiologist  Cardiology, [facility]

## 2022-01-21 ENCOUNTER — HOSPITAL ENCOUNTER (OUTPATIENT)
Dept: OTHER | Age: 82
Setting detail: THERAPIES SERIES
Discharge: HOME | End: 2022-01-21
Payer: MEDICARE

## 2022-01-21 VITALS
RESPIRATION RATE: 18 BRPM | TEMPERATURE: 97.2 F | HEART RATE: 60 BPM | DIASTOLIC BLOOD PRESSURE: 58 MMHG | OXYGEN SATURATION: 95 % | SYSTOLIC BLOOD PRESSURE: 133 MMHG | WEIGHT: 140 LBS | BODY MASS INDEX: 18.47 KG/M2

## 2022-01-21 LAB
ANION GAP SERPL CALCULATED.3IONS-SCNC: 8 MMOL/L (ref 7–16)
BUN BLDV-MCNC: 37 MG/DL (ref 6–23)
CALCIUM SERPL-MCNC: 9 MG/DL (ref 8.6–10.2)
CHLORIDE BLD-SCNC: 98 MMOL/L (ref 98–107)
CO2: 32 MMOL/L (ref 22–29)
CREAT SERPL-MCNC: 1.2 MG/DL (ref 0.7–1.2)
GFR AFRICAN AMERICAN: >60
GFR NON-AFRICAN AMERICAN: 58 ML/MIN/1.73
GLUCOSE BLD-MCNC: 148 MG/DL (ref 74–99)
POTASSIUM SERPL-SCNC: 4.1 MMOL/L (ref 3.5–5)
PRO-BNP: 701 PG/ML (ref 0–450)
SODIUM BLD-SCNC: 138 MMOL/L (ref 132–146)

## 2022-01-21 PROCEDURE — 99214 OFFICE O/P EST MOD 30 MIN: CPT

## 2022-01-21 PROCEDURE — 83880 ASSAY OF NATRIURETIC PEPTIDE: CPT

## 2022-01-21 PROCEDURE — 80048 BASIC METABOLIC PNL TOTAL CA: CPT

## 2022-01-21 PROCEDURE — 36415 COLL VENOUS BLD VENIPUNCTURE: CPT

## 2022-01-21 NOTE — PROGRESS NOTES
Congestive Heart Failure [facility] [address]   1940    Referring Provider:   Primary Care Physician:  [facility]  Cardiologist: Dr Liang Tran  Nephrologist:     History of Present Illness:     Salazar Whittington is a [de-identified] y.o. male with a history of HFrEF, most recent EF 15% 12/18/2021. Patient Story:    He does not have dyspnea with exertion, shortness of breath, or decline in overall functional capacity. He does not have orthopnea, PND, nocturnal cough or hemoptysis. He does not have abdominal distention or bloating, early satiety, anorexia/change in appetite. He does have a good urinary response to oral diuretic. He does not have  lower extremity edema. He denies lightheadedness, dizziness. He denies palpitations, syncope or near syncope. He does not complain of chest pain, pressure, discomfort. No Known Allergies      Outpatient Medications Marked as Taking for the 1/21/22 encounter [facility] Encounter) with [facility] ROOM 1   Medication Sig Dispense Refill    carvedilol (COREG) 6.25 MG tablet Take 1 tablet by mouth 2 times daily (with meals) 60 tablet 3    atorvastatin (LIPITOR) 40 MG tablet Take 1 tablet by mouth nightly 30 tablet 3    bumetanide (BUMEX) 2 MG tablet Take 0.5 tablets by mouth daily 30 tablet 3    albuterol sulfate  (90 Base) MCG/ACT inhaler as needed       potassium chloride (KLOR-CON M) 10 MEQ extended release tablet Take 1 tablet by mouth daily 30 tablet 5    COLACE 100 MG capsule 100 mg every other day       Octreotide Acetate (SANDOSTATIN IJ) Inject as directed Once per month      aspirin 81 MG chewable tablet Take 81 mg by mouth daily        Guideline directed medical:  ARNI/ACE I/ARB: No  Beta blocker:   Yes  Aldosterone antagonist: no      Physical Examination:     BP (!) 133/58   Pulse 60   Temp 97.2 °F (36.2 °C) (Infrared)   Resp 18   Wt 140 lb (63.5 kg)   SpO2 95%   BMI 18.47 kg/m²     Assessment  Charting Type: Shift assessment (chf clinic)    Neurological  Level of Consciousness: Alert (0)  Orientation Level: Oriented X4  Cognition: Appropriate judgement,Appropriate safety awareness,Appropriate attention/concentration,Follows commands  Language: Clear          Respiratory  Respiratory Pattern: Regular  Respiratory Depth: Normal  Respiratory Quality/Effort: Unlabored  L Breath Sounds: Clear  R Breath Sounds: Clear        Cardiac  Cardiac Regularity: Regular  Heart Sounds: S1, S2  Cardiac Rhythm: Sinus rhythm    Rhythm Interpretation  Pulse: 60  Cardiac Monitor  Telemetry Monitor On: Yes  Gastrointestinal  Abdominal (WDL): Within Defined Limits  Abdomen Inspection: Soft,Flat  Tenderness: Soft  RUQ Bowel Sounds: Active  LUQ Bowel Sounds: Active  RLQ Bowel Sounds: Active  LLQ Bowel Sounds: Active      Bowel Sounds  RUQ Bowel Sounds: Active  LUQ Bowel Sounds: Active  RLQ Bowel Sounds: Active  LLQ Bowel Sounds:  Active    Peripheral Vascular  Peripheral Vascular (WDL): Within Defined Limits  Edema: None           Genitourinary  Genitourinary (WDL): Within Defined Limits                 Pulse: 60           LAB DATA:  Last 3 BMP      Sodium (mmol/L)   Date Value   01/21/2022 138   01/12/2022 134   01/04/2022 139     Potassium (mmol/L)   Date Value   01/21/2022 4.1   01/12/2022 4.2   01/04/2022 4.6     Potassium reflex Magnesium (mmol/L)   Date Value   12/17/2021 3.4 (L)   03/08/2021 3.6   10/02/2020 4.0     Chloride (mmol/L)   Date Value   01/21/2022 98   01/12/2022 97 (L)   01/04/2022 98     CO2 (mmol/L)   Date Value   01/21/2022 32 (H)   01/12/2022 28   01/04/2022 33 (H)     BUN (mg/dL)   Date Value   01/21/2022 37 (H)   01/12/2022 47 (H)   01/04/2022 51 (H)     Glucose (mg/dL)   Date Value   01/21/2022 148 (H)   01/12/2022 102 (H)   01/04/2022 144 (H)   06/03/2012 125 (H)     Calcium (mg/dL)   Date Value   01/21/2022 9.0   01/12/2022 8.7   01/04/2022 9.2       Last 3 BNP       Pro-BNP (pg/mL)   Date Value   01/21/2022 701 (H) 01/12/2022 472 (H)   01/04/2022 569 (H)      CBC: No results for input(s): WBC, HGB, PLT in the last 72 hours. BMP:    Recent Labs     01/21/22  0848      K 4.1   CL 98   CO2 32*   BUN 37*   CREATININE 1.2   GLUCOSE 148*     Hepatic: No results for input(s): AST, ALT, ALB, BILITOT, ALKPHOS in the last 72 hours. Troponin: No results for input(s): TROPONINI in the last 72 hours. BNP: No results for input(s): BNP in the last 72 hours. Lipids: No results for input(s): CHOL, HDL in the last 72 hours. Invalid input(s): LDLCALCU  INR: No results for input(s): INR in the last 72 hours. WEIGHTS:    Wt Readings from Last 3 Encounters:   01/21/22 140 lb (63.5 kg)   01/12/22 137 lb (62.1 kg)   01/05/22 139 lb (63 kg)       TELEMETRY:  Cardiac Regularity: Regular  Cardiac Rhythm/Interpretation: SR    ASSESSMENT:  Negrito Smith is evolemic with stable weights. Daughter Darlyn Jerome accompanying him today. Lisinopril and spironolactone stopped and BP has improved today , 111/63. He is still working on increasing his hydration he says to help his fatigue. Interventions completed this visit:  IV diuretics given no  Lab work obtained yes, BNP, BMP   Reviewed currently prescribed medications with patient, educated on importance of compliance and answered any questions regarding their medication  Educated on signs and symptoms of HF  Educated on low sodium diet    PLAN:  Scheduled to follow up in CHF clinic on    Future Appointments   Date Time Provider Gretchen Lorenzo   1/26/2022 12:00 PM [facility] ROOM 1 [facility]   2/2/2022 12:00 PM [facility] ROOM 1 [facility]   8/3/2023  9:00 AM Jimmy Juarez MD [facility]     Given clinic phone number [phone] and aware of signs and symptoms to call with any HF change in symptoms.

## 2022-01-26 ENCOUNTER — HOSPITAL ENCOUNTER (OUTPATIENT)
Dept: OTHER | Age: 82
Setting detail: THERAPIES SERIES
Discharge: HOME | End: 2022-01-26
Payer: MEDICARE

## 2022-01-26 VITALS
HEART RATE: 54 BPM | OXYGEN SATURATION: 97 % | BODY MASS INDEX: 18.73 KG/M2 | SYSTOLIC BLOOD PRESSURE: 142 MMHG | DIASTOLIC BLOOD PRESSURE: 64 MMHG | RESPIRATION RATE: 20 BRPM | WEIGHT: 142 LBS

## 2022-01-26 LAB
ANION GAP SERPL CALCULATED.3IONS-SCNC: 9 MMOL/L (ref 7–16)
BUN BLDV-MCNC: 29 MG/DL (ref 6–23)
CALCIUM SERPL-MCNC: 8.4 MG/DL (ref 8.6–10.2)
CHLORIDE BLD-SCNC: 101 MMOL/L (ref 98–107)
CO2: 30 MMOL/L (ref 22–29)
CREAT SERPL-MCNC: 1 MG/DL (ref 0.7–1.2)
GFR AFRICAN AMERICAN: >60
GFR NON-AFRICAN AMERICAN: >60 ML/MIN/1.73
GLUCOSE BLD-MCNC: 153 MG/DL (ref 74–99)
POTASSIUM SERPL-SCNC: 3.5 MMOL/L (ref 3.5–5)
PRO-BNP: 924 PG/ML (ref 0–450)
SODIUM BLD-SCNC: 140 MMOL/L (ref 132–146)

## 2022-01-26 PROCEDURE — 99214 OFFICE O/P EST MOD 30 MIN: CPT

## 2022-01-26 PROCEDURE — 36415 COLL VENOUS BLD VENIPUNCTURE: CPT

## 2022-01-26 PROCEDURE — 80048 BASIC METABOLIC PNL TOTAL CA: CPT

## 2022-01-26 PROCEDURE — 83880 ASSAY OF NATRIURETIC PEPTIDE: CPT

## 2022-01-26 ASSESSMENT — PAIN SCALES - GENERAL: PAINLEVEL_OUTOF10: 0

## 2022-01-26 NOTE — PROGRESS NOTES
Congestive Heart Failure [address]   1940    Referring Provider:   Primary Care Physician: Dr Aki Franz  Cardiologist: Dr Clay Khalil  Nephrologist:     History of Present Illness:     Nina Posadas is a [de-identified] y.o. male with a history of HFrEF, most recent EF 15% 12/18/2021. Patient Story:    He does not have dyspnea with exertion, shortness of breath, or decline in overall functional capacity. He does not have orthopnea, PND, nocturnal cough or hemoptysis. He does not have abdominal distention or bloating, early satiety, anorexia/change in appetite. He does have a good urinary response to oral diuretic. He does  have trace lower extremity edema. He denies lightheadedness, dizziness. He denies palpitations, syncope or near syncope. He does not complain of chest pain, pressure, discomfort. No Known Allergies      Outpatient Medications Marked as Taking for the 1/26/22 encounter [facility] Encounter) with [facility] ROOM 1   Medication Sig Dispense Refill    carvedilol (COREG) 6.25 MG tablet Take 1 tablet by mouth 2 times daily (with meals) 60 tablet 3    atorvastatin (LIPITOR) 40 MG tablet Take 1 tablet by mouth nightly 30 tablet 3    bumetanide (BUMEX) 2 MG tablet Take 0.5 tablets by mouth daily 30 tablet 3    albuterol sulfate  (90 Base) MCG/ACT inhaler as needed       potassium chloride (KLOR-CON M) 10 MEQ extended release tablet Take 1 tablet by mouth daily 30 tablet 5    COLACE 100 MG capsule 100 mg as needed       Octreotide Acetate (SANDOSTATIN IJ) Inject as directed Once per month      aspirin 81 MG chewable tablet Take 81 mg by mouth daily        Guideline directed medical:  ARNI/ACE I/ARB: No  Beta blocker:   Yes  Aldosterone antagonist: no      Physical Examination:     BP (!) 142/64   Pulse 54   Resp 20   Wt 142 lb (64.4 kg)   SpO2 97%   BMI 18.73 kg/m² (patient didn't take meds this am)    Assessment  Charting Type: Shift assessment (chf clinic)    Neurological  Level of Consciousness: Alert (0)          Respiratory  Chest Assessment: Chest expansion symmetrical        Cardiac  Cardiac Rhythm: Sinus heidy    Rhythm Interpretation  Pulse: 54     Gastrointestinal  Abdominal (WDL): Within Defined Limits  Abdomen Inspection: Soft           Peripheral Vascular  Peripheral Vascular (WDL): Exceptions to WDL  Edema: Right lower extremity,Left lower extremity  RLE Edema: Trace  LLE Edema: Trace           Genitourinary  Genitourinary (WDL): Within Defined Limits  Psychosocial  Psychosocial (WDL): Within Defined Limits  Pain Assessment  Pain Assessment: 0-10  Pain Level: 0           Pulse: 54           LAB DATA:  Last 3 BMP      Sodium (mmol/L)   Date Value   01/21/2022 138   01/12/2022 134   01/04/2022 139     Potassium (mmol/L)   Date Value   01/21/2022 4.1   01/12/2022 4.2   01/04/2022 4.6     Potassium reflex Magnesium (mmol/L)   Date Value   12/17/2021 3.4 (L)   03/08/2021 3.6   10/02/2020 4.0     Chloride (mmol/L)   Date Value   01/21/2022 98   01/12/2022 97 (L)   01/04/2022 98     CO2 (mmol/L)   Date Value   01/21/2022 32 (H)   01/12/2022 28   01/04/2022 33 (H)     BUN (mg/dL)   Date Value   01/21/2022 37 (H)   01/12/2022 47 (H)   01/04/2022 51 (H)     Glucose (mg/dL)   Date Value   01/21/2022 148 (H)   01/12/2022 102 (H)   01/04/2022 144 (H)   06/03/2012 125 (H)     Calcium (mg/dL)   Date Value   01/21/2022 9.0   01/12/2022 8.7   01/04/2022 9.2       Last 3 BNP       Pro-BNP (pg/mL)   Date Value   01/21/2022 701 (H)   01/12/2022 472 (H)   01/04/2022 569 (H)      CBC: No results for input(s): WBC, HGB, PLT in the last 72 hours. BMP:    No results for input(s): NA, K, CL, CO2, BUN, CREATININE, GLUCOSE in the last 72 hours. Hepatic: No results for input(s): AST, ALT, ALB, BILITOT, ALKPHOS in the last 72 hours. Troponin: No results for input(s): TROPONINI in the last 72 hours.   BNP: No results for input(s): BNP in the last 72 hours. Lipids: No results for input(s): CHOL, HDL in the last 72 hours. Invalid input(s): LDLCALCU  INR: No results for input(s): INR in the last 72 hours. WEIGHTS:    Wt Readings from Last 3 Encounters:   01/26/22 142 lb (64.4 kg)   01/21/22 140 lb (63.5 kg)   01/12/22 137 lb (62.1 kg)       TELEMETRY:  Cardiac Regularity: Regular  Cardiac Rhythm/Interpretation: Sinus heidy    ASSESSMENT:  Natalie Tse is evolemic with stable weights. Daughter Kristina Worrell accompanying him today. Patient reeducated on importance of weighing self daily and verbalizes understanding. Patient enc. To increase water intake states he hasnt been drinking enough. 142/64 however patient has not taken any medication today and will take when he gets home  . Interventions completed this visit:  IV diuretics given no  Lab work obtained yes, BNP, BMP   Reviewed currently prescribed medications with patient, educated on importance of compliance and answered any questions regarding their medication  Educated on signs and symptoms of HF  Educated on low sodium diet    PLAN:  Future Appointments   Date Time Provider Gretchen Lorenzo   2/9/2022 10:30 AM [facility] ROOM 1 [facility]   8/3/2023  9:00 AM Prashant Martinez MD [facility]       Scheduled to follow up in CHF clinic on    Future Appointments   Date Time Provider Gretchen Lorenzo   2/9/2022 10:30 AM [facility] ROOM 1 [facility]   8/3/2023  9:00 AM Prashant Martinez MD [facility]     Given clinic phone number [phone] and aware of signs and symptoms to call with any HF change in symptoms.

## 2022-01-26 NOTE — PLAN OF CARE
Problem: Cardiac:  Goal: Ability to maintain an adequate cardiac output will improve  Description: Ability to maintain an adequate cardiac output will improve  Outcome: Ongoing   Continue care of plan

## 2022-02-09 ENCOUNTER — HOSPITAL ENCOUNTER (OUTPATIENT)
Dept: OTHER | Age: 82
Setting detail: THERAPIES SERIES
Discharge: HOME | End: 2022-02-09
Payer: MEDICARE

## 2022-02-09 ENCOUNTER — TELEPHONE (OUTPATIENT)
Dept: CARDIOLOGY CLINIC | Age: 82
End: 2022-02-09

## 2022-02-09 VITALS
BODY MASS INDEX: 18.81 KG/M2 | SYSTOLIC BLOOD PRESSURE: 94 MMHG | HEART RATE: 52 BPM | WEIGHT: 142.6 LBS | RESPIRATION RATE: 20 BRPM | DIASTOLIC BLOOD PRESSURE: 55 MMHG

## 2022-02-09 LAB
ANION GAP SERPL CALCULATED.3IONS-SCNC: 9 MMOL/L (ref 7–16)
BUN BLDV-MCNC: 22 MG/DL (ref 6–23)
CALCIUM SERPL-MCNC: 7.8 MG/DL (ref 8.6–10.2)
CHLORIDE BLD-SCNC: 99 MMOL/L (ref 98–107)
CO2: 34 MMOL/L (ref 22–29)
CREAT SERPL-MCNC: 1.1 MG/DL (ref 0.7–1.2)
GFR AFRICAN AMERICAN: >60
GFR NON-AFRICAN AMERICAN: >60 ML/MIN/1.73
GLUCOSE BLD-MCNC: 104 MG/DL (ref 74–99)
POTASSIUM SERPL-SCNC: 3 MMOL/L (ref 3.5–5)
PRO-BNP: 1240 PG/ML (ref 0–450)
SODIUM BLD-SCNC: 142 MMOL/L (ref 132–146)

## 2022-02-09 PROCEDURE — 36415 COLL VENOUS BLD VENIPUNCTURE: CPT

## 2022-02-09 PROCEDURE — 80048 BASIC METABOLIC PNL TOTAL CA: CPT

## 2022-02-09 PROCEDURE — 83880 ASSAY OF NATRIURETIC PEPTIDE: CPT

## 2022-02-09 RX ORDER — POTASSIUM CHLORIDE 750 MG/1
10 TABLET, EXTENDED RELEASE ORAL 2 TIMES DAILY
Qty: 180 TABLET | Refills: 3 | Status: SHIPPED
Start: 2022-02-09 | End: 2022-03-04 | Stop reason: SDUPTHER

## 2022-02-09 NOTE — TELEPHONE ENCOUNTER
----- Message from Jason Martínez DO sent at 2/9/2022 11:52 AM EST -----  Potassium low. Ask patient to take kdur 40 meq today and increase to kdur 20 meq daily starting tomorrow please. Jason Martínez D.O.   Cardiologist  Cardiology, [facility]

## 2022-02-09 NOTE — PROGRESS NOTES
Congestive Heart Failure [address]   1940    Referring Provider: Dr. Marisela Esparza  Primary Care Physician: Dr. Farmer School  Cardiologist: Dr. Marisela Esparza  Nephrologist:     History of Present Illness:     Deny Kowalski is a [de-identified] y.o. male with a history of HFrEF, most recent EF 15% 12/18/2021. Patient Story:    He does not have dyspnea with exertion, shortness of breath, or decline in overall functional capacity. He does not have orthopnea, PND, nocturnal cough or hemoptysis. He does not have abdominal distention or bloating, early satiety, anorexia/change in appetite. He does have a good urinary response to oral diuretic. He does  have trace lower extremity edema. He does have lightheadedness, dizziness. He denies palpitations, syncope or near syncope. He does not complain of chest pain, pressure, discomfort. No Known Allergies      Outpatient Medications Marked as Taking for the 2/9/22 encounter [facility] Encounter) with [facility] ROOM 1   Medication Sig Dispense Refill    carvedilol (COREG) 6.25 MG tablet Take 1 tablet by mouth 2 times daily (with meals) 60 tablet 3    atorvastatin (LIPITOR) 40 MG tablet Take 1 tablet by mouth nightly 30 tablet 3    bumetanide (BUMEX) 2 MG tablet Take 0.5 tablets by mouth daily 30 tablet 3    albuterol sulfate  (90 Base) MCG/ACT inhaler as needed       [DISCONTINUED] potassium chloride (KLOR-CON M) 10 MEQ extended release tablet Take 1 tablet by mouth daily (Patient taking differently: Take 10 mEq by mouth 2 times daily ) 30 tablet 5    COLACE 100 MG capsule 100 mg as needed       Octreotide Acetate (SANDOSTATIN IJ) Inject as directed Once per month      aspirin 81 MG chewable tablet Take 81 mg by mouth daily        Guideline directed medical:  ARNI/ACE I/ARB: No  Beta blocker:   Yes  Aldosterone antagonist: no      Physical Examination:     BP (!) 94/55   Pulse 52   Resp 20   Wt 142 lb 9.6 oz (64.7 kg)   BMI 18.81 kg/m² (patient didn't take meds this am)    Assessment  Charting Type: Shift assessment (chf clinic)    Neurological  Level of Consciousness: Alert (0)  Orientation Level: Oriented X4  Cognition: Appropriate judgement,Appropriate safety awareness,Appropriate attention/concentration,Follows commands  Language: Clear     HEENT  HEENT (WDL): Exceptions to WDL  Right Eye: Impaired vision  Left Eye: Impaired vision    Respiratory  Respiratory Pattern: Regular  Respiratory Depth: Normal  Respiratory Quality/Effort: Unlabored  Chest Assessment: Chest expansion symmetrical  L Breath Sounds: Clear  R Breath Sounds: Clear        Cardiac  Cardiac Regularity: Regular  Heart Sounds: S1, S2  Cardiac Rhythm: Sinus heidy    Rhythm Interpretation  Pulse: 52  Cardiac Monitor  Telemetry Monitor On: Yes  Gastrointestinal  Abdominal (WDL): Within Defined Limits  Abdomen Inspection: Soft  Tenderness: Soft  RUQ Bowel Sounds: Active  LUQ Bowel Sounds: Active  RLQ Bowel Sounds: Active  LLQ Bowel Sounds: Active      Bowel Sounds  RUQ Bowel Sounds: Active  LUQ Bowel Sounds: Active  RLQ Bowel Sounds: Active  LLQ Bowel Sounds:  Active    Peripheral Vascular  Peripheral Vascular (WDL): Exceptions to WDL  Edema: Right lower extremity,Left lower extremity  RLE Edema: +1,Pitting  LLE Edema: +1,Pitting           Genitourinary  Genitourinary (WDL): Within Defined Limits  Psychosocial  Psychosocial (WDL): Within Defined Limits              Pulse: 52           LAB DATA:  Last 3 BMP      Sodium (mmol/L)   Date Value   02/09/2022 142   01/26/2022 140   01/21/2022 138     Potassium (mmol/L)   Date Value   02/09/2022 3.0 (L)   01/26/2022 3.5   01/21/2022 4.1     Potassium reflex Magnesium (mmol/L)   Date Value   12/17/2021 3.4 (L)   03/08/2021 3.6   10/02/2020 4.0     Chloride (mmol/L)   Date Value   02/09/2022 99   01/26/2022 101   01/21/2022 98     CO2 (mmol/L)   Date Value   02/09/2022 34 (H)   01/26/2022 30 (H)   01/21/2022 32 (H) BUN (mg/dL)   Date Value   02/09/2022 22   01/26/2022 29 (H)   01/21/2022 37 (H)     Glucose (mg/dL)   Date Value   02/09/2022 104 (H)   01/26/2022 153 (H)   01/21/2022 148 (H)   06/03/2012 125 (H)     Calcium (mg/dL)   Date Value   02/09/2022 7.8 (L)   01/26/2022 8.4 (L)   01/21/2022 9.0       Last 3 BNP       Pro-BNP (pg/mL)   Date Value   02/09/2022 1,240 (H)   01/26/2022 924 (H)   01/21/2022 701 (H)      CBC: No results for input(s): WBC, HGB, PLT in the last 72 hours. BMP:    Recent Labs     02/09/22  1107      K 3.0*   CL 99   CO2 34*   BUN 22   CREATININE 1.1   GLUCOSE 104*     Hepatic: No results for input(s): AST, ALT, ALB, BILITOT, ALKPHOS in the last 72 hours. Troponin: No results for input(s): TROPONINI in the last 72 hours. BNP: No results for input(s): BNP in the last 72 hours. Lipids: No results for input(s): CHOL, HDL in the last 72 hours. Invalid input(s): LDLCALCU  INR: No results for input(s): INR in the last 72 hours. WEIGHTS:    Wt Readings from Last 3 Encounters:   02/09/22 142 lb 9.6 oz (64.7 kg)   01/26/22 142 lb (64.4 kg)   01/21/22 140 lb (63.5 kg)       TELEMETRY:  Cardiac Regularity: Regular  Cardiac Rhythm/Interpretation: Sinus heidy    ASSESSMENT:  Ramila Bermudez is evolemic with stable weights. Does have some lightheadedness at times with position changes. BP on right arm 92/53 and BP on left arm  94/55. Orthos also obtained and as follows:       02/09/22 1100   Vitals   Orthostatic B/P and Pulse?  Yes   Blood Pressure Lying 101/54   Pulse Lying 44 PER MINUTE   Blood Pressure Sitting 94/50   Pulse Sitting 47 PER MINUTE   Blood Pressure Standing 93/52   Pulse Standing 50 PER MINUTE   Level of Consciousness Alert (0)         Interventions completed this visit:  IV diuretics given no  Lab work obtained yes, BNP, BMP   Reviewed currently prescribed medications with patient, educated on importance of compliance and answered any questions regarding their medication  Educated on signs and symptoms of HF  Educated on low sodium diet    PLAN:  Scheduled to follow up in CHF clinic on  : Future Appointments   Date Time Provider Gretchen Lorenzo   3/2/2022 12:00 PM [facility] ROOM 1 [facility] Marija HOD   8/3/2023  9:00 AM Juantia Gonzalez MD [facility]     Given clinic phone number [phone] and aware of signs and symptoms to call with any HF change in symptoms.

## 2022-02-26 ENCOUNTER — HOSPITAL ENCOUNTER (EMERGENCY)
Age: 82
Discharge: HOME | End: 2022-02-26
Attending: EMERGENCY MEDICINE
Payer: MEDICARE

## 2022-02-26 ENCOUNTER — APPOINTMENT (OUTPATIENT)
Dept: CT IMAGING | Age: 82
End: 2022-02-26
Payer: MEDICARE

## 2022-02-26 VITALS
DIASTOLIC BLOOD PRESSURE: 60 MMHG | HEART RATE: 97 BPM | OXYGEN SATURATION: 96 % | WEIGHT: 138 LBS | RESPIRATION RATE: 18 BRPM | TEMPERATURE: 98.3 F | HEIGHT: 73 IN | BODY MASS INDEX: 18.29 KG/M2 | SYSTOLIC BLOOD PRESSURE: 119 MMHG

## 2022-02-26 DIAGNOSIS — R11.2 NAUSEA VOMITING AND DIARRHEA: Primary | ICD-10-CM

## 2022-02-26 DIAGNOSIS — I71.9 AORTIC ANEURYSM WITHOUT RUPTURE, UNSPECIFIED PORTION OF AORTA (HCC): ICD-10-CM

## 2022-02-26 DIAGNOSIS — E87.6 HYPOKALEMIA: ICD-10-CM

## 2022-02-26 DIAGNOSIS — R19.7 NAUSEA VOMITING AND DIARRHEA: Primary | ICD-10-CM

## 2022-02-26 DIAGNOSIS — Z85.858: ICD-10-CM

## 2022-02-26 LAB
ALBUMIN SERPL-MCNC: 3.7 G/DL (ref 3.5–5.2)
ALP BLD-CCNC: 83 U/L (ref 40–129)
ALT SERPL-CCNC: 27 U/L (ref 0–40)
ANION GAP SERPL CALCULATED.3IONS-SCNC: 10 MMOL/L (ref 7–16)
ANION GAP SERPL CALCULATED.3IONS-SCNC: 6 MMOL/L (ref 7–16)
AST SERPL-CCNC: 34 U/L (ref 0–39)
BACTERIA: ABNORMAL /HPF
BASOPHILS ABSOLUTE: 0.01 E9/L (ref 0–0.2)
BASOPHILS RELATIVE PERCENT: 0.2 % (ref 0–2)
BILIRUB SERPL-MCNC: 1 MG/DL (ref 0–1.2)
BILIRUBIN URINE: NEGATIVE
BLOOD, URINE: NEGATIVE
BUN BLDV-MCNC: 26 MG/DL (ref 6–23)
BUN BLDV-MCNC: 29 MG/DL (ref 6–23)
CALCIUM SERPL-MCNC: 7.6 MG/DL (ref 8.6–10.2)
CALCIUM SERPL-MCNC: 8.7 MG/DL (ref 8.6–10.2)
CHLORIDE BLD-SCNC: 100 MMOL/L (ref 98–107)
CHLORIDE BLD-SCNC: 100 MMOL/L (ref 98–107)
CLARITY: CLEAR
CO2: 32 MMOL/L (ref 22–29)
CO2: 34 MMOL/L (ref 22–29)
COLOR: YELLOW
CREAT SERPL-MCNC: 0.8 MG/DL (ref 0.7–1.2)
CREAT SERPL-MCNC: 0.8 MG/DL (ref 0.7–1.2)
CRYSTALS, UA: ABNORMAL /HPF
EKG ATRIAL RATE: 89 BPM
EKG P AXIS: 70 DEGREES
EKG P-R INTERVAL: 166 MS
EKG Q-T INTERVAL: 384 MS
EKG QRS DURATION: 94 MS
EKG QTC CALCULATION (BAZETT): 467 MS
EKG R AXIS: 43 DEGREES
EKG T AXIS: 94 DEGREES
EKG VENTRICULAR RATE: 89 BPM
EOSINOPHILS ABSOLUTE: 0.01 E9/L (ref 0.05–0.5)
EOSINOPHILS RELATIVE PERCENT: 0.2 % (ref 0–6)
EPITHELIAL CELLS, UA: ABNORMAL /HPF
GFR AFRICAN AMERICAN: >60
GFR AFRICAN AMERICAN: >60
GFR NON-AFRICAN AMERICAN: >60 ML/MIN/1.73
GFR NON-AFRICAN AMERICAN: >60 ML/MIN/1.73
GLUCOSE BLD-MCNC: 119 MG/DL (ref 74–99)
GLUCOSE BLD-MCNC: 144 MG/DL (ref 74–99)
GLUCOSE URINE: NEGATIVE MG/DL
HCT VFR BLD CALC: 35.8 % (ref 37–54)
HEMOGLOBIN: 12 G/DL (ref 12.5–16.5)
IMMATURE GRANULOCYTES #: 0.01 E9/L
IMMATURE GRANULOCYTES %: 0.2 % (ref 0–5)
KETONES, URINE: NEGATIVE MG/DL
LEUKOCYTE ESTERASE, URINE: NEGATIVE
LIPASE: 6 U/L (ref 13–60)
LYMPHOCYTES ABSOLUTE: 0.12 E9/L (ref 1.5–4)
LYMPHOCYTES RELATIVE PERCENT: 2.1 % (ref 20–42)
MAGNESIUM: 1.6 MG/DL (ref 1.6–2.6)
MCH RBC QN AUTO: 31.7 PG (ref 26–35)
MCHC RBC AUTO-ENTMCNC: 33.5 % (ref 32–34.5)
MCV RBC AUTO: 94.5 FL (ref 80–99.9)
MONOCYTES ABSOLUTE: 0.38 E9/L (ref 0.1–0.95)
MONOCYTES RELATIVE PERCENT: 6.6 % (ref 2–12)
NEUTROPHILS ABSOLUTE: 5.22 E9/L (ref 1.8–7.3)
NEUTROPHILS RELATIVE PERCENT: 90.7 % (ref 43–80)
NITRITE, URINE: NEGATIVE
PDW BLD-RTO: 15.8 FL (ref 11.5–15)
PH UA: 5.5 (ref 5–9)
PLATELET # BLD: 142 E9/L (ref 130–450)
PMV BLD AUTO: 11.7 FL (ref 7–12)
POTASSIUM REFLEX MAGNESIUM: 2.7 MMOL/L (ref 3.5–5)
POTASSIUM SERPL-SCNC: 4.6 MMOL/L (ref 3.5–5)
PROTEIN UA: ABNORMAL MG/DL
RBC # BLD: 3.79 E12/L (ref 3.8–5.8)
RBC UA: ABNORMAL /HPF (ref 0–2)
SODIUM BLD-SCNC: 138 MMOL/L (ref 132–146)
SODIUM BLD-SCNC: 144 MMOL/L (ref 132–146)
SPECIFIC GRAVITY UA: >=1.03 (ref 1–1.03)
TOTAL PROTEIN: 6.2 G/DL (ref 6.4–8.3)
UROBILINOGEN, URINE: 0.2 E.U./DL
WBC # BLD: 5.8 E9/L (ref 4.5–11.5)
WBC UA: ABNORMAL /HPF (ref 0–5)

## 2022-02-26 PROCEDURE — 2580000003 HC RX 258: Performed by: EMERGENCY MEDICINE

## 2022-02-26 PROCEDURE — 6370000000 HC RX 637 (ALT 250 FOR IP): Performed by: EMERGENCY MEDICINE

## 2022-02-26 PROCEDURE — 83735 ASSAY OF MAGNESIUM: CPT

## 2022-02-26 PROCEDURE — 6360000004 HC RX CONTRAST MEDICATION: Performed by: RADIOLOGY

## 2022-02-26 PROCEDURE — 96365 THER/PROPH/DIAG IV INF INIT: CPT

## 2022-02-26 PROCEDURE — 87088 URINE BACTERIA CULTURE: CPT

## 2022-02-26 PROCEDURE — 83690 ASSAY OF LIPASE: CPT

## 2022-02-26 PROCEDURE — 74177 CT ABD & PELVIS W/CONTRAST: CPT

## 2022-02-26 PROCEDURE — 81001 URINALYSIS AUTO W/SCOPE: CPT

## 2022-02-26 PROCEDURE — 85025 COMPLETE CBC W/AUTO DIFF WBC: CPT

## 2022-02-26 PROCEDURE — 96375 TX/PRO/DX INJ NEW DRUG ADDON: CPT

## 2022-02-26 PROCEDURE — 6360000002 HC RX W HCPCS: Performed by: EMERGENCY MEDICINE

## 2022-02-26 PROCEDURE — 80048 BASIC METABOLIC PNL TOTAL CA: CPT

## 2022-02-26 PROCEDURE — 96376 TX/PRO/DX INJ SAME DRUG ADON: CPT

## 2022-02-26 PROCEDURE — 80053 COMPREHEN METABOLIC PANEL: CPT

## 2022-02-26 PROCEDURE — 96366 THER/PROPH/DIAG IV INF ADDON: CPT

## 2022-02-26 PROCEDURE — 99282 EMERGENCY DEPT VISIT SF MDM: CPT

## 2022-02-26 PROCEDURE — 93005 ELECTROCARDIOGRAM TRACING: CPT | Performed by: EMERGENCY MEDICINE

## 2022-02-26 RX ORDER — 0.9 % SODIUM CHLORIDE 0.9 %
1000 INTRAVENOUS SOLUTION INTRAVENOUS ONCE
Status: COMPLETED | OUTPATIENT
Start: 2022-02-26 | End: 2022-02-26

## 2022-02-26 RX ORDER — POTASSIUM CHLORIDE 20 MEQ/1
40 TABLET, EXTENDED RELEASE ORAL ONCE
Status: COMPLETED | OUTPATIENT
Start: 2022-02-26 | End: 2022-02-26

## 2022-02-26 RX ORDER — POTASSIUM CHLORIDE 7.45 MG/ML
10 INJECTION INTRAVENOUS ONCE
Status: COMPLETED | OUTPATIENT
Start: 2022-02-26 | End: 2022-02-26

## 2022-02-26 RX ORDER — ONDANSETRON 2 MG/ML
4 INJECTION INTRAMUSCULAR; INTRAVENOUS ONCE
Status: COMPLETED | OUTPATIENT
Start: 2022-02-26 | End: 2022-02-26

## 2022-02-26 RX ORDER — ONDANSETRON 4 MG/1
4 TABLET, ORALLY DISINTEGRATING ORAL EVERY 8 HOURS PRN
Qty: 10 TABLET | Refills: 0 | Status: ON HOLD | OUTPATIENT
Start: 2022-02-26 | End: 2022-04-05 | Stop reason: ALTCHOICE

## 2022-02-26 RX ADMIN — ONDANSETRON 4 MG: 2 INJECTION INTRAMUSCULAR; INTRAVENOUS at 05:18

## 2022-02-26 RX ADMIN — IOPAMIDOL 75 ML: 755 INJECTION, SOLUTION INTRAVENOUS at 06:37

## 2022-02-26 RX ADMIN — SODIUM CHLORIDE 1000 ML: 9 INJECTION, SOLUTION INTRAVENOUS at 05:17

## 2022-02-26 RX ADMIN — POTASSIUM CHLORIDE 10 MEQ: 7.46 INJECTION, SOLUTION INTRAVENOUS at 05:17

## 2022-02-26 RX ADMIN — POTASSIUM CHLORIDE 40 MEQ: 1500 TABLET, EXTENDED RELEASE ORAL at 05:17

## 2022-02-26 RX ADMIN — POTASSIUM CHLORIDE 10 MEQ: 7.46 INJECTION, SOLUTION INTRAVENOUS at 06:24

## 2022-02-26 NOTE — ED PROVIDER NOTES
HPI:  2/26/22,   Time: 3:48 AM STEFANIE Giron is a [de-identified] y.o. male presenting to the ED for nausea vomiting diarrhea, beginning 1 day ago. The complaint has been persistent, moderate in severity, and worsened by nothing. The patient states over the last day since 8 PM last night he has been having nausea vomiting and diarrhea. The patient states that 8:00 last night he began feeling nauseous and had several episodes of vomiting. He describes as nonbilious nonbloody. He states that after that he began having diarrhea that was watery and nonbloody as well. He states that since then he has had some chills and just generalized fatigue and weakness. Due to his symptoms he came to the ED to be evaluated. Denies any abdominal pain. No chest pain shortness of breath. No fevers. No urinary symptoms. Review of Systems:   Pertinent positives and negatives are stated within HPI, all other systems reviewed and are negative.          --------------------------------------------- PAST HISTORY ---------------------------------------------  Past Medical History:  has a past medical history of Aneurysm of abdominal aorta ([facility] [address] CAD (coronary artery disease), Cancer ([facility] [address] Decreased dorsalis pedis pulse, GERD (gastroesophageal reflux disease), Glaucoma, Hyperlipidemia, Hypertension, Neuro-endocrine cancer ([facility] [address] Numbness and tingling of both feet, and Unspecified cerebral artery occlusion with cerebral infarction. Past Surgical History:  has a past surgical history that includes hernia repair; Diagnostic Cardiac Cath Lab Procedure; Tonsillectomy; eye surgery (Left, Feb 2013, June 2013); eye surgery (Left); vascular surgery (Right, 10/16/2013); Carotid endarterectomy (Right, ); Colonoscopy; Sigmoidoscopy; Facial cosmetic surgery (N/A, 12/10/2019); laparoscopy (N/A, 8/28/2020); and Gastrostomy tube placement. Social History:  reports that he quit smoking about 29 years ago.  His smoking use included cigarettes. He smoked 2.00 packs per day. He has never used smokeless tobacco. He reports current alcohol use. He reports that he does not use drugs. Family History: family history includes Heart Disease in his brother and father. The patients home medications have been reviewed. Allergies: Patient has no known allergies. ---------------------------------------------------PHYSICAL EXAM--------------------------------------    Constitutional/General: Alert and oriented x3, thin, cachectic, no acute distress  Head: Normocephalic and atraumatic  Eyes: PERRL, EOMI, conjunctive normal, sclera non icteric  Mouth: Oropharynx clear, handling secretions, no trismus, no asymmetry of the posterior oropharynx or uvular edema  Neck: Supple, full ROM, non tender to palpation in the midline, no stridor, no crepitus, no meningeal signs  Respiratory: Lungs clear to auscultation bilaterally, no wheezes, rales, or rhonchi. Not in respiratory distress  Cardiovascular:  Regular rate. Regular rhythm. No murmurs, gallops, or rubs. 2+ distal pulses  GI:  Abdomen Soft, Non tender, Non distended. +BS. No organomegaly, no palpable masses,  No rebound, guarding, or rigidity. PEG tube in place  Musculoskeletal: Moves all extremities x 4. Warm and well perfused, no clubbing, cyanosis, or edema. Capillary refill <3 seconds  Integument: skin warm and dry. No rashes. Neurologic: GCS 15, no focal deficits  Psychiatric: Normal Affect    -------------------------------------------------- RESULTS -------------------------------------------------  I have personally reviewed all laboratory and imaging results for this patient. Results are listed below.      LABS:  Results for orders placed or performed during the hospital encounter of 02/26/22   CBC with Auto Differential   Result Value Ref Range    WBC 5.8 4.5 - 11.5 E9/L    RBC 3.79 (L) 3.80 - 5.80 E12/L    Hemoglobin 12.0 (L) 12.5 - 16.5 g/dL    Hematocrit 35.8 (L) 37.0 - 54.0 % MCV 94.5 80.0 - 99.9 fL    MCH 31.7 26.0 - 35.0 pg    MCHC 33.5 32.0 - 34.5 %    RDW 15.8 (H) 11.5 - 15.0 fL    Platelets 352 619 - 430 E9/L    MPV 11.7 7.0 - 12.0 fL    Neutrophils % 90.7 (H) 43.0 - 80.0 %    Immature Granulocytes % 0.2 0.0 - 5.0 %    Lymphocytes % 2.1 (L) 20.0 - 42.0 %    Monocytes % 6.6 2.0 - 12.0 %    Eosinophils % 0.2 0.0 - 6.0 %    Basophils % 0.2 0.0 - 2.0 %    Neutrophils Absolute 5.22 1.80 - 7.30 E9/L    Immature Granulocytes # 0.01 E9/L    Lymphocytes Absolute 0.12 (L) 1.50 - 4.00 E9/L    Monocytes Absolute 0.38 0.10 - 0.95 E9/L    Eosinophils Absolute 0.01 (L) 0.05 - 0.50 E9/L    Basophils Absolute 0.01 0.00 - 0.20 E9/L   Comprehensive Metabolic Panel w/ Reflex to MG   Result Value Ref Range    Sodium 144 132 - 146 mmol/L    Potassium reflex Magnesium 2.7 (LL) 3.5 - 5.0 mmol/L    Chloride 100 98 - 107 mmol/L    CO2 34 (H) 22 - 29 mmol/L    Anion Gap 10 7 - 16 mmol/L    Glucose 144 (H) 74 - 99 mg/dL    BUN 29 (H) 6 - 23 mg/dL    CREATININE 0.8 0.7 - 1.2 mg/dL    GFR Non-African American >60 >=60 mL/min/1.73    GFR African American >60     Calcium 8.7 8.6 - 10.2 mg/dL    Total Protein 6.2 (L) 6.4 - 8.3 g/dL    Albumin 3.7 3.5 - 5.2 g/dL    Total Bilirubin 1.0 0.0 - 1.2 mg/dL    Alkaline Phosphatase 83 40 - 129 U/L    ALT 27 0 - 40 U/L    AST 34 0 - 39 U/L   Lipase   Result Value Ref Range    Lipase 6 (L) 13 - 60 U/L   Urinalysis with Microscopic   Result Value Ref Range    Color, UA Yellow Straw/Yellow    Clarity, UA Clear Clear    Glucose, Ur Negative Negative mg/dL    Bilirubin Urine Negative Negative    Ketones, Urine Negative Negative mg/dL    Specific Gravity, UA >=1.030 1.005 - 1.030    Blood, Urine Negative Negative    pH, UA 5.5 5.0 - 9.0    Protein, UA TRACE Negative mg/dL    Urobilinogen, Urine 0.2 <2.0 E.U./dL    Nitrite, Urine Negative Negative    Leukocyte Esterase, Urine Negative Negative    WBC, UA 1-3 0 - 5 /HPF    RBC, UA 0-1 0 - 2 /HPF    Epithelial Cells, UA RARE /HPF Bacteria, UA RARE (A) None Seen /HPF    Crystals, UA Few (A) None Seen /HPF   Magnesium   Result Value Ref Range    Magnesium 1.6 1.6 - 2.6 mg/dL   Basic Metabolic Panel   Result Value Ref Range    Sodium 138 132 - 146 mmol/L    Potassium 4.6 3.5 - 5.0 mmol/L    Chloride 100 98 - 107 mmol/L    CO2 32 (H) 22 - 29 mmol/L    Anion Gap 6 (L) 7 - 16 mmol/L    Glucose 119 (H) 74 - 99 mg/dL    BUN 26 (H) 6 - 23 mg/dL    CREATININE 0.8 0.7 - 1.2 mg/dL    GFR Non-African American >60 >=60 mL/min/1.73    GFR African American >60     Calcium 7.6 (L) 8.6 - 10.2 mg/dL   EKG 12 Lead   Result Value Ref Range    Ventricular Rate 89 BPM    Atrial Rate 89 BPM    P-R Interval 166 ms    QRS Duration 94 ms    Q-T Interval 384 ms    QTc Calculation (Bazett) 467 ms    P Axis 70 degrees    R Axis 43 degrees    T Axis 94 degrees       RADIOLOGY:  Interpreted by Radiologist.  CT ABDOMEN PELVIS W IV CONTRAST Additional Contrast? None   Final Result   Minimal gas along the peripheral aspect of the gastric fundus and proximal   stomach, concerning for intramural gas. This is a nonspecific finding but   developing changes of emphysematous gastritis should be considered. Recommend correlation with the patient's clinical presentation. Short-term   follow-up may be useful. Prominent stool in the rectal vault concerning for developing fecal impaction. Persistent significant thickening of the right colon which is similar to   previous and could be related to residual malignancy and or colitis. Mild   thickening of the proximal to mid sigmoid colon possibly due to colitis or   less likely malignancy. Continued short-term follow-up recommended. A partially calcified mesenteric nodule in the right abdomen adjacent to the   thickened colon is similar to slightly increased and could be related to   carcinoid tumor given the patient's reported history.       No significant change in the 3.2 cm abdominal aortic aneurysm which should be   followed every 3 years. Increasing subcutaneous nodularity in the bilateral gluteal regions, possibly   due to injection granulomas but subcutaneous metastatic disease would be   difficult to exclude. Partial visualization of patchy ground-glass opacities in the lingula and   right lower lobe. Early infiltrates from pneumonia should be considered. Urinary bladder thickening which may be due to chronic bladder outlet   obstruction due to prostatomegaly. Cystitis less likely. RECOMMENDATIONS:   Unavailable             EKG:  This EKG is signed and interpreted by the EP. EKG shows normal sinus rhythm 89 bpm.  Normal axis. Nonspecific ST-T wave changes noted diffusely. No STEMI.    ------------------------- NURSING NOTES AND VITALS REVIEWED ---------------------------   The nursing notes within the ED encounter and vital signs as below have been reviewed by myself. /60   Pulse 97   Temp 98.3 °F (36.8 °C) (Tympanic)   Resp 18   Ht 6' 1\" (1.854 m)   Wt 138 lb (62.6 kg)   SpO2 96%   BMI 18.21 kg/m²   Oxygen Saturation Interpretation: Normal    The patients available past medical records and past encounters were reviewed. ------------------------------ ED COURSE/MEDICAL DECISION MAKING----------------------  Medications   0.9 % sodium chloride bolus (0 mLs IntraVENous Stopped 2/26/22 1949)   ondansetron (ZOFRAN) injection 4 mg (4 mg IntraVENous Given 2/26/22 0518)   potassium chloride 10 mEq/100 mL IVPB (Peripheral Line) (0 mEq IntraVENous Stopped 2/26/22 0625)   potassium chloride 10 mEq/100 mL IVPB (Peripheral Line) (0 mEq IntraVENous Stopped 2/26/22 1950)   potassium chloride (KLOR-CON M) extended release tablet 40 mEq (40 mEq Oral Given 2/26/22 0517)   iopamidol (ISOVUE-370) 76 % injection 75 mL (75 mLs IntraVENous Given 2/26/22 8000)         ED COURSE:       Medical Decision Making:     This is an 69-year-old male who presented to the ED for generalized weakness nausea vomiting and diarrhea. Underwent laboratory work-up which showed a normal CBC except for hemoglobin of 12.0 with hematocrit of 35.8. Chemistry unremarkable except for a BUN of 29 glucose of 144 CO2 34 and a potassium of 2.7. Lipase normal.  Urinalysis shows no signs of infection. EKG showed no ischemic findings. Patient was given 40 p.o. potassium and 20 IV. Patient signed out to Dr. Franklin Carlisle pending CT abdomen pelvis and reevaluation and disposition. Please see his note for further details. Patient was discharged home in stable condition. I, Dr. Eladio Baker, am the primary provider for this encounter    This patient's ED course included: a personal history and physicial examination, re-evaluation prior to disposition, multiple bedside re-evaluations and IV medications    This patient has remained hemodynamically stable during their ED course. Re-Evaluations:             Re-evaluation. Patients symptoms are improving      Counseling: The emergency provider has spoken with the patient and discussed todays results, in addition to providing specific details for the plan of care and counseling regarding the diagnosis and prognosis. Questions are answered at this time and they are agreeable with the plan.       --------------------------------- IMPRESSION AND DISPOSITION ---------------------------------    IMPRESSION  1. Nausea vomiting and diarrhea    2. Hypokalemia Improving   3. Aortic aneurysm without rupture, unspecified portion of aorta ([address]    4. History of malignant neoplasm of endocrine gland        DISPOSITION  Disposition: Discharge to home  Patient condition is stable    NOTE: This report was transcribed using voice recognition software.  Every effort was made to ensure accuracy; however, inadvertent computerized transcription errors may be present        Alejandro Acevedo DO  02/26/22 2013

## 2022-02-26 NOTE — ED NOTES
Received patient from Dr. Santos Hollingsworth. Patient known history of endocrine tumor who follows with [facility] for work-up. Patient had improvement of symptoms after nausea medicine IV fluids he had his potassium replaced after his initial potassium was found to be two-point 7 repeat lab work was scheduled to be ordered after patient had repletion. Review of CT scan showed multiple nonspecific findings patient already has known endocrine tumor. I discussed all imaging results with the patient including slight increase of nodularity of lymph nodes. Discussed stable aneurysm as well as concerning findings for emphysematous gastritis and other concerns. These all appear to be unfounded based off patient's symptoms other than the known findings. Patient who was having significant improvement was comfortable plan of discharge.   I repeated BMP showing potassium had significantly improved to more normal state is felt the patient could be discharged to follow-up with his primary care physician I discussed all results with patient and his wife and they are comfortable plan of discharge     Deepti Rodriguez DO  02/26/22 0823

## 2022-02-28 LAB — URINE CULTURE, ROUTINE: NORMAL

## 2022-03-02 ENCOUNTER — HOSPITAL ENCOUNTER (OUTPATIENT)
Dept: OTHER | Age: 82
Setting detail: THERAPIES SERIES
Discharge: HOME | End: 2022-03-02
Payer: MEDICARE

## 2022-03-02 ENCOUNTER — TELEPHONE (OUTPATIENT)
Dept: CARDIOLOGY CLINIC | Age: 82
End: 2022-03-02

## 2022-03-02 VITALS
WEIGHT: 140 LBS | DIASTOLIC BLOOD PRESSURE: 59 MMHG | BODY MASS INDEX: 18.47 KG/M2 | SYSTOLIC BLOOD PRESSURE: 114 MMHG | RESPIRATION RATE: 18 BRPM | HEART RATE: 53 BPM | OXYGEN SATURATION: 99 %

## 2022-03-02 LAB
ANION GAP SERPL CALCULATED.3IONS-SCNC: 11 MMOL/L (ref 7–16)
BUN BLDV-MCNC: 27 MG/DL (ref 6–23)
CALCIUM SERPL-MCNC: 8.2 MG/DL (ref 8.6–10.2)
CHLORIDE BLD-SCNC: 98 MMOL/L (ref 98–107)
CO2: 32 MMOL/L (ref 22–29)
CREAT SERPL-MCNC: 1.2 MG/DL (ref 0.7–1.2)
GFR AFRICAN AMERICAN: >60
GFR NON-AFRICAN AMERICAN: 58 ML/MIN/1.73
GLUCOSE BLD-MCNC: 96 MG/DL (ref 74–99)
POTASSIUM SERPL-SCNC: 3.3 MMOL/L (ref 3.5–5)
PRO-BNP: 1169 PG/ML (ref 0–450)
SODIUM BLD-SCNC: 141 MMOL/L (ref 132–146)

## 2022-03-02 PROCEDURE — 80048 BASIC METABOLIC PNL TOTAL CA: CPT

## 2022-03-02 PROCEDURE — 36415 COLL VENOUS BLD VENIPUNCTURE: CPT

## 2022-03-02 PROCEDURE — 99214 OFFICE O/P EST MOD 30 MIN: CPT

## 2022-03-02 PROCEDURE — 83880 ASSAY OF NATRIURETIC PEPTIDE: CPT

## 2022-03-02 NOTE — PLAN OF CARE
Problem: Cardiac:  Goal: Hemodynamic stability will improve  Outcome: Ongoing  Goal: Ability to maintain an adequate cardiac output will improve  Outcome: Ongoing     Problem: FLUID AND ELECTROLYTE IMBALANCE  Goal: Fluid and electrolyte balance are achieved/maintained  Outcome: Ongoing     Continue plan of care

## 2022-03-02 NOTE — PROGRESS NOTES
Congestive Heart Failure [address]   1940    Referring Provider: Dr. Marcy Vega  Primary Care Physician: Dr. Kenneth Dai  Cardiologist: Dr. Marcy Vega  Nephrologist:     History of Present Illness:     Jackie Wu is a [de-identified] y.o. male with a history of HFrEF, most recent EF 15% 12/18/2021. Patient Story:    He does not have dyspnea with exertion, shortness of breath, or decline in overall functional capacity. He does not have orthopnea, PND, nocturnal cough or hemoptysis. He does not have abdominal distention or bloating, early satiety, anorexia/change in appetite. He does have a good urinary response to oral diuretic. He does  have trace lower extremity edema. He does have lightheadedness, dizziness. He denies palpitations, syncope or near syncope. He does not complain of chest pain, pressure, discomfort. No Known Allergies      Outpatient Medications Marked as Taking for the 3/2/22 encounter [facility] Encounter) with [facility] 1   Medication Sig Dispense Refill    ondansetron (ZOFRAN ODT) 4 MG disintegrating tablet Take 1 tablet by mouth every 8 hours as needed for Nausea or Vomiting 10 tablet 0    potassium chloride (KLOR-CON M) 10 MEQ extended release tablet Take 1 tablet by mouth 2 times daily 180 tablet 3    carvedilol (COREG) 6.25 MG tablet Take 1 tablet by mouth 2 times daily (with meals) 60 tablet 3    atorvastatin (LIPITOR) 40 MG tablet Take 1 tablet by mouth nightly 30 tablet 3    bumetanide (BUMEX) 2 MG tablet Take 0.5 tablets by mouth daily 30 tablet 3    albuterol sulfate  (90 Base) MCG/ACT inhaler as needed       COLACE 100 MG capsule 100 mg as needed       Octreotide Acetate (SANDOSTATIN IJ) Inject as directed Once per month      aspirin 81 MG chewable tablet Take 81 mg by mouth daily        Guideline directed medical:  ARNI/ACE I/ARB: No  Beta blocker:   Yes  Aldosterone antagonist: No      Physical Examination:     BP (!) 114/59   Pulse 53   Resp 18   Wt 140 lb (63.5 kg)   SpO2 99%   BMI 18.47 kg/m²     Assessment  Charting Type: Shift assessment (chf clinic)    Neurological  Level of Consciousness: Alert (0)  Orientation Level: Oriented X4  Cognition: Follows commands  Language: Clear     HEENT  HEENT (WDL): Exceptions to WDL  Right Eye: Impaired vision  Left Eye: Impaired vision    Respiratory  Respiratory Pattern: Regular  Respiratory Depth: Normal  Respiratory Quality/Effort: Unlabored  L Breath Sounds: Clear  R Breath Sounds: Clear        Cardiac  Cardiac Regularity: Regular  Heart Sounds: S1, S2  Cardiac Rhythm: Sinus heidy    Rhythm Interpretation  Pulse: 53  Cardiac Monitor  Telemetry Monitor On: Yes  Gastrointestinal  Abdominal (WDL): Exceptions to WDL  Abdomen Inspection: Soft,Ostomy tube  Tenderness: Soft  RUQ Bowel Sounds: Active  LUQ Bowel Sounds: Active  RLQ Bowel Sounds: Active  LLQ Bowel Sounds: Active      Bowel Sounds  RUQ Bowel Sounds: Active  LUQ Bowel Sounds: Active  RLQ Bowel Sounds: Active  LLQ Bowel Sounds:  Active    Peripheral Vascular  Peripheral Vascular (WDL): Exceptions to WDL  Edema: Right lower extremity,Left lower extremity  RLE Edema: Trace,Non-pitting  LLE Edema: Trace,Non-pitting           Genitourinary  Genitourinary (WDL): Within Defined Limits  Psychosocial  Psychosocial (WDL): Within Defined Limits              Pulse: 53           LAB DATA:  Last 3 BMP      Sodium (mmol/L)   Date Value   03/02/2022 141   02/26/2022 138   02/26/2022 144     Potassium (mmol/L)   Date Value   03/02/2022 3.3 (L)   02/26/2022 4.6   02/09/2022 3.0 (L)     Potassium reflex Magnesium (mmol/L)   Date Value   02/26/2022 2.7 (LL)   12/17/2021 3.4 (L)   03/08/2021 3.6     Chloride (mmol/L)   Date Value   03/02/2022 98   02/26/2022 100   02/26/2022 100     CO2 (mmol/L)   Date Value   03/02/2022 32 (H)   02/26/2022 32 (H)   02/26/2022 34 (H)     BUN (mg/dL)   Date Value   03/02/2022 27 (H) 02/26/2022 26 (H)   02/26/2022 29 (H)     Glucose (mg/dL)   Date Value   03/02/2022 96   02/26/2022 119 (H)   02/26/2022 144 (H)   06/03/2012 125 (H)     Calcium (mg/dL)   Date Value   03/02/2022 8.2 (L)   02/26/2022 7.6 (L)   02/26/2022 8.7       Last 3 BNP       Pro-BNP (pg/mL)   Date Value   03/02/2022 1,169 (H)   02/09/2022 1,240 (H)   01/26/2022 924 (H)      CBC: No results for input(s): WBC, HGB, PLT in the last 72 hours. BMP:    Recent Labs     03/02/22  1227      K 3.3*   CL 98   CO2 32*   BUN 27*   CREATININE 1.2   GLUCOSE 96     Hepatic: No results for input(s): AST, ALT, ALB, BILITOT, ALKPHOS in the last 72 hours. Troponin: No results for input(s): TROPONINI in the last 72 hours. BNP: No results for input(s): BNP in the last 72 hours. Lipids: No results for input(s): CHOL, HDL in the last 72 hours. Invalid input(s): LDLCALCU  INR: No results for input(s): INR in the last 72 hours. WEIGHTS:    Wt Readings from Last 3 Encounters:   03/02/22 140 lb (63.5 kg)   02/26/22 138 lb (62.6 kg)   02/09/22 142 lb 9.6 oz (64.7 kg)       TELEMETRY:  Cardiac Regularity: Regular  Cardiac Rhythm/Interpretation: Sinus heidy    ASSESSMENT:  Daniel Ceballos is evolemic with stable weights. He has trace non-pitting edema in BLE which is improved from previous visit. He recently was in ED for nausea/vomiting and hypokalemia and supplemented and then discharged home. Repeat labs drawn today. Interventions completed this visit:  IV diuretics given no  Lab work obtained yes, proBNP, BMP   Reviewed currently prescribed medications with patient, educated on importance of compliance and answered any questions regarding their medication  Educated on signs and symptoms of HF  Educated on low sodium diet    PLAN:  Scheduled to follow up in CHF clinic on  :   Future Appointments   Date Time Provider Gretchen Lorenzo   4/6/2022  2:00 PM DO Mauri Redman [facility]   6/6/2022 12:00 PM [facility]

## 2022-03-04 RX ORDER — POTASSIUM CHLORIDE 750 MG/1
20 TABLET, EXTENDED RELEASE ORAL DAILY
Qty: 180 TABLET | Refills: 3
Start: 2022-03-04 | End: 2022-06-16

## 2022-03-04 NOTE — TELEPHONE ENCOUNTER
Spoke with patient today. He indicates that he only takes 20 mEq daily, not twice daily. Med list indicates patient take 10 mEq twice daily. On 2/9/22, Dr. Asaf Conner told patient to take 40 mEq that day and then increase to 20 mEq daily afterwards. Please clarify if patient is to take 40 mEq today and tomorrow and then go back to 20 mEq or other. Will then correct med list accordingly.

## 2022-03-04 NOTE — TELEPHONE ENCOUNTER
Contacted patient with recommendations per Dr. Maribell Guajardo.  Patient verbalized understanding. Medication change in EPIC to reflect 20 mEq daily.

## 2022-03-18 ENCOUNTER — HOSPITAL ENCOUNTER (EMERGENCY)
Age: 82
Discharge: HOME | End: 2022-03-19
Attending: EMERGENCY MEDICINE
Payer: MEDICARE

## 2022-03-18 VITALS
WEIGHT: 140 LBS | BODY MASS INDEX: 18.55 KG/M2 | TEMPERATURE: 98.4 F | SYSTOLIC BLOOD PRESSURE: 123 MMHG | OXYGEN SATURATION: 100 % | RESPIRATION RATE: 16 BRPM | HEART RATE: 60 BPM | DIASTOLIC BLOOD PRESSURE: 67 MMHG | HEIGHT: 73 IN

## 2022-03-18 DIAGNOSIS — E87.6 HYPOKALEMIA: Primary | ICD-10-CM

## 2022-03-18 DIAGNOSIS — R55 NEAR SYNCOPE: ICD-10-CM

## 2022-03-18 DIAGNOSIS — E83.42 HYPOMAGNESEMIA: ICD-10-CM

## 2022-03-18 LAB
ANION GAP SERPL CALCULATED.3IONS-SCNC: 13 MMOL/L (ref 7–16)
BUN BLDV-MCNC: 26 MG/DL (ref 6–23)
CALCIUM SERPL-MCNC: 7.9 MG/DL (ref 8.6–10.2)
CHLORIDE BLD-SCNC: 98 MMOL/L (ref 98–107)
CO2: 28 MMOL/L (ref 22–29)
CREAT SERPL-MCNC: 1 MG/DL (ref 0.7–1.2)
GFR AFRICAN AMERICAN: >60
GFR NON-AFRICAN AMERICAN: >60 ML/MIN/1.73
GLUCOSE BLD-MCNC: 151 MG/DL (ref 74–99)
HCT VFR BLD CALC: 28.4 % (ref 37–54)
HEMOGLOBIN: 9.7 G/DL (ref 12.5–16.5)
MAGNESIUM: 1.5 MG/DL (ref 1.6–2.6)
MCH RBC QN AUTO: 32.3 PG (ref 26–35)
MCHC RBC AUTO-ENTMCNC: 34.2 % (ref 32–34.5)
MCV RBC AUTO: 94.7 FL (ref 80–99.9)
PDW BLD-RTO: 15.7 FL (ref 11.5–15)
PLATELET # BLD: 128 E9/L (ref 130–450)
PMV BLD AUTO: 12.3 FL (ref 7–12)
POTASSIUM SERPL-SCNC: 2.9 MMOL/L (ref 3.5–5)
RBC # BLD: 3 E12/L (ref 3.8–5.8)
SODIUM BLD-SCNC: 139 MMOL/L (ref 132–146)
TROPONIN, HIGH SENSITIVITY: 18 NG/L (ref 0–11)
WBC # BLD: 4.4 E9/L (ref 4.5–11.5)

## 2022-03-18 PROCEDURE — 36415 COLL VENOUS BLD VENIPUNCTURE: CPT

## 2022-03-18 PROCEDURE — 2580000003 HC RX 258: Performed by: EMERGENCY MEDICINE

## 2022-03-18 PROCEDURE — 99284 EMERGENCY DEPT VISIT MOD MDM: CPT

## 2022-03-18 PROCEDURE — 96368 THER/DIAG CONCURRENT INF: CPT

## 2022-03-18 PROCEDURE — 6370000000 HC RX 637 (ALT 250 FOR IP): Performed by: EMERGENCY MEDICINE

## 2022-03-18 PROCEDURE — 80048 BASIC METABOLIC PNL TOTAL CA: CPT

## 2022-03-18 PROCEDURE — 96366 THER/PROPH/DIAG IV INF ADDON: CPT

## 2022-03-18 PROCEDURE — 96365 THER/PROPH/DIAG IV INF INIT: CPT

## 2022-03-18 PROCEDURE — 85027 COMPLETE CBC AUTOMATED: CPT

## 2022-03-18 PROCEDURE — 93005 ELECTROCARDIOGRAM TRACING: CPT | Performed by: EMERGENCY MEDICINE

## 2022-03-18 PROCEDURE — 83735 ASSAY OF MAGNESIUM: CPT

## 2022-03-18 PROCEDURE — 6360000002 HC RX W HCPCS: Performed by: EMERGENCY MEDICINE

## 2022-03-18 PROCEDURE — 84484 ASSAY OF TROPONIN QUANT: CPT

## 2022-03-18 RX ORDER — POTASSIUM CHLORIDE 7.45 MG/ML
10 INJECTION INTRAVENOUS ONCE
Status: COMPLETED | OUTPATIENT
Start: 2022-03-18 | End: 2022-03-19

## 2022-03-18 RX ORDER — MAGNESIUM SULFATE IN WATER 40 MG/ML
2000 INJECTION, SOLUTION INTRAVENOUS ONCE
Status: COMPLETED | OUTPATIENT
Start: 2022-03-18 | End: 2022-03-19

## 2022-03-18 RX ORDER — POTASSIUM CHLORIDE 20 MEQ/1
40 TABLET, EXTENDED RELEASE ORAL ONCE
Status: COMPLETED | OUTPATIENT
Start: 2022-03-18 | End: 2022-03-18

## 2022-03-18 RX ORDER — 0.9 % SODIUM CHLORIDE 0.9 %
1000 INTRAVENOUS SOLUTION INTRAVENOUS ONCE
Status: COMPLETED | OUTPATIENT
Start: 2022-03-18 | End: 2022-03-18

## 2022-03-18 RX ADMIN — SODIUM CHLORIDE 1000 ML: 9 INJECTION, SOLUTION INTRAVENOUS at 20:24

## 2022-03-18 RX ADMIN — POTASSIUM CHLORIDE 10 MEQ: 7.46 INJECTION, SOLUTION INTRAVENOUS at 22:12

## 2022-03-18 RX ADMIN — POTASSIUM CHLORIDE 10 MEQ: 7.45 INJECTION INTRAVENOUS at 23:26

## 2022-03-18 RX ADMIN — POTASSIUM CHLORIDE 40 MEQ: 20 TABLET, EXTENDED RELEASE ORAL at 21:34

## 2022-03-18 RX ADMIN — MAGNESIUM SULFATE HEPTAHYDRATE 2000 MG: 40 INJECTION, SOLUTION INTRAVENOUS at 22:13

## 2022-03-18 NOTE — ED TRIAGE NOTES
Pt presents via EMS from dinner with family. Patient stood up to check out and had syncopal episode. Family member was there to lower him to the ground. Patient did not hit head and denies pain anywhere. States he was nauseous during incident but that has since gone away.

## 2022-03-19 LAB
EKG ATRIAL RATE: 56 BPM
EKG P AXIS: 79 DEGREES
EKG P-R INTERVAL: 162 MS
EKG Q-T INTERVAL: 432 MS
EKG QRS DURATION: 98 MS
EKG QTC CALCULATION (BAZETT): 416 MS
EKG R AXIS: 40 DEGREES
EKG T AXIS: -166 DEGREES
EKG VENTRICULAR RATE: 56 BPM

## 2022-03-19 PROCEDURE — 99284 EMERGENCY DEPT VISIT MOD MDM: CPT

## 2022-03-19 PROCEDURE — 93010 ELECTROCARDIOGRAM REPORT: CPT | Performed by: INTERNAL MEDICINE

## 2022-03-19 NOTE — ED PROVIDER NOTES
Ronnie Giordano is a [de-identified] y.o. male presenting to the ED for Weakness near syncope after eating fish, beginning several hours ago. The complaint has been persistent, moderate in severity, and worsened by nothing. He was out at [facility] with family when he had a near syncopal event when he went to stand up to check out. Patient's had a history of abdominal aortic aneurysm, being followed by vascular, also history of hyperlipidemia high blood pressure he also has a neuroendocrine tumor. He has had intermittent diarrhea and nausea. No fevers or chills reported he has no abdominal pain or back pain. He has no neurological complaints. He denies dizziness. He has no urinary complaints. ROS:   Pertinent positives and negatives are stated within HPI, all other systems reviewed and are negative.  --------------------------------------------- PAST HISTORY ---------------------------------------------  Past Medical History:  has a past medical history of Aneurysm of abdominal aorta ([facility] [address] CAD (coronary artery disease), Cancer ([facility] [address] Decreased dorsalis pedis pulse, GERD (gastroesophageal reflux disease), Glaucoma, Hyperlipidemia, Hypertension, Neuro-endocrine cancer ([facility] [address] Numbness and tingling of both feet, and Unspecified cerebral artery occlusion with cerebral infarction. Past Surgical History:  has a past surgical history that includes hernia repair; Diagnostic Cardiac Cath Lab Procedure; Tonsillectomy; eye surgery (Left, Feb 2013, June 2013); eye surgery (Left); vascular surgery (Right, 10/16/2013); Carotid endarterectomy (Right, ); Colonoscopy; Sigmoidoscopy; Facial cosmetic surgery (N/A, 12/10/2019); laparoscopy (N/A, 8/28/2020); and Gastrostomy tube placement. Social History:  reports that he quit smoking about 29 years ago. His smoking use included cigarettes. He smoked 2.00 packs per day. He has never used smokeless tobacco. He reports current alcohol use.  He reports that he does not use drugs. Family History: family history includes Heart Disease in his brother and father. The patients home medications have been reviewed. Allergies: Patient has no known allergies. ---------------------------------------------------PHYSICAL EXAM--------------------------------------    Constitutional/General: Alert and oriented x3, well appearing, non toxic in NAD  Head: Normocephalic and atraumatic  Eyes: PERRL, EOMI  Mouth: Oropharynx clear, handling secretions, no trismus, MM dry   Neck: Supple, full ROM, non tender to palpation in the midline, no stridor, no crepitus, no meningeal signs  Pulmonary: Lungs clear to auscultation bilaterally, no wheezes, rales, or rhonchi. Not in respiratory distress  Cardiovascular:  Regular rate. Regular rhythm. No murmurs, gallops, or rubs. 2+ distal pulses  Chest: no chest wall tenderness  Abdomen: Soft. Non tender. Non distended. +BS. No rebound, guarding, or rigidity. No pulsatile masses appreciated. Musculoskeletal: Moves all extremities x 4. Warm and well perfused, no clubbing, cyanosis, or edema. Capillary refill <3 seconds  Skin: warm and dry. No rashes. Neurologic: GCS 15, CN 2-12 grossly intact, no focal deficits, symmetric strength 5/5 in the upper and lower extremities bilaterally  Psych: Normal Affect    -------------------------------------------------- RESULTS -------------------------------------------------  I have personally reviewed all laboratory and imaging results for this patient. Results are listed below.      LABS:  Results for orders placed or performed during the hospital encounter of 03/18/22   CBC   Result Value Ref Range    WBC 4.4 (L) 4.5 - 11.5 E9/L    RBC 3.00 (L) 3.80 - 5.80 E12/L    Hemoglobin 9.7 (L) 12.5 - 16.5 g/dL    Hematocrit 28.4 (L) 37.0 - 54.0 %    MCV 94.7 80.0 - 99.9 fL    MCH 32.3 26.0 - 35.0 pg    MCHC 34.2 32.0 - 34.5 %    RDW 15.7 (H) 11.5 - 15.0 fL    Platelets 251 (L) 676 - 450 E9/L    MPV 12.3 (H) 7.0 - 12.0 fL   Basic Metabolic Panel   Result Value Ref Range    Sodium 139 132 - 146 mmol/L    Potassium 2.9 (L) 3.5 - 5.0 mmol/L    Chloride 98 98 - 107 mmol/L    CO2 28 22 - 29 mmol/L    Anion Gap 13 7 - 16 mmol/L    Glucose 151 (H) 74 - 99 mg/dL    BUN 26 (H) 6 - 23 mg/dL    CREATININE 1.0 0.7 - 1.2 mg/dL    GFR Non-African American >60 >=60 mL/min/1.73    GFR African American >60     Calcium 7.9 (L) 8.6 - 10.2 mg/dL   Troponin   Result Value Ref Range    Troponin, High Sensitivity 18 (H) 0 - 11 ng/L   Magnesium   Result Value Ref Range    Magnesium 1.5 (L) 1.6 - 2.6 mg/dL   EKG 12 Lead   Result Value Ref Range    Ventricular Rate 56 BPM    Atrial Rate 56 BPM    P-R Interval 162 ms    QRS Duration 98 ms    Q-T Interval 432 ms    QTc Calculation (Bazett) 416 ms    P Axis 79 degrees    R Axis 40 degrees    T Axis -166 degrees       RADIOLOGY:  Interpreted by Radiologist.  No orders to display         EKG Interpretation  Interpreted by emergency department physician    Rhythm: normal sinus  and sinus bradycardia  Rate: bradycardia 56 BPM  Axis: normal  Conduction: normal  ST Segments: nonspecific changes  T Waves: non specific changes    Clinical Impression: myocardial ischemia  Comparison to prior EKG: stable as compared to patient's most recent EKG      ------------------------- NURSING NOTES AND VITALS REVIEWED ---------------------------   The nursing notes within the ED encounter and vital signs as below have been reviewed by myself. /67   Pulse 60   Temp 98.4 °F (36.9 °C) (Oral)   Resp 16   Ht 6' 1\" (1.854 m)   Wt 140 lb (63.5 kg)   SpO2 100%   BMI 18.47 kg/m²   Oxygen Saturation Interpretation: Normal    The patients available past medical records and past encounters were reviewed.         ------------------------------ ED COURSE/MEDICAL DECISION MAKING----------------------  Medications   0.9 % sodium chloride bolus (0 mLs IntraVENous Stopped 3/18/22 2039)   potassium chloride 10 mEq/100 mL IVPB (Peripheral Line) (0 mEq IntraVENous Stopped 3/19/22 0115)   potassium chloride 10 mEq/100 mL IVPB (Peripheral Line) (0 mEq IntraVENous Stopped 3/19/22 0115)   potassium chloride (KLOR-CON M) extended release tablet 40 mEq (40 mEq Oral Given 3/18/22 2134)   magnesium sulfate 2000 mg in 50 mL IVPB premix (0 mg IntraVENous Stopped 3/19/22 0013)             Medical Decision Making:    Patient presents with near syncopal event generalized weakness. He has no chest pain. He is found to be hypokalemic and hypomagnesemic. Patient is IV potassium was replaced. Is also given IV magnesium. He was given IV fluids. Patient was initially hypotensive 92/51 upon arrival.  Patient's blood pressure improved with treatment. 123/67 at the bedside. Re-Evaluations:             Re-evaluation. Patients symptoms are improving      Consultations:             NONE    Critical Care: NONE        This patient's ED course included:        a personal history and physicial examination, re-evaluation prior to disposition, multiple bedside re-evaluations, IV medications, cardiac monitoring, complex medical decision making and emergency management and a personal history and physicial eaxminationa personal history and physicial eaxmination     This patient has remained hemodynamically stable, improved and been closely monitored during their ED course. Counseling: The emergency provider has spoken with the patient and family member patient and daughter and discussed todays results, in addition to providing specific details for the plan of care and counseling regarding the diagnosis and prognosis. Questions are answered at this time and they are agreeable with the plan.       --------------------------------- IMPRESSION AND DISPOSITION ---------------------------------    IMPRESSION  1. Hypokalemia    2. Hypomagnesemia    3.  Near syncope        DISPOSITION  Disposition: Discharge to home  Patient condition is stable        NOTE: This report was transcribed using voice recognition software.  Every effort was made to ensure accuracy; however, inadvertent computerized transcription errors may be present       Jamil Da Silva DO  03/19/22 1139

## 2022-04-05 ENCOUNTER — APPOINTMENT (OUTPATIENT)
Dept: GENERAL RADIOLOGY | Age: 82
DRG: 394 | End: 2022-04-05
Payer: MEDICARE

## 2022-04-05 ENCOUNTER — APPOINTMENT (OUTPATIENT)
Dept: CT IMAGING | Age: 82
DRG: 394 | End: 2022-04-05
Payer: MEDICARE

## 2022-04-05 ENCOUNTER — ANESTHESIA EVENT (OUTPATIENT)
Dept: ENDOSCOPY | Age: 82
DRG: 394 | End: 2022-04-05
Payer: MEDICARE

## 2022-04-05 ENCOUNTER — HOSPITAL ENCOUNTER (INPATIENT)
Age: 82
LOS: 2 days | Discharge: HOME | DRG: 394 | End: 2022-04-07
Attending: EMERGENCY MEDICINE | Admitting: INTERNAL MEDICINE
Payer: MEDICARE

## 2022-04-05 DIAGNOSIS — K94.20 COMPLICATION OF GASTROSTOMY TUBE (HCC): Primary | ICD-10-CM

## 2022-04-05 DIAGNOSIS — E87.6 HYPOKALEMIA: ICD-10-CM

## 2022-04-05 LAB
ANION GAP SERPL CALCULATED.3IONS-SCNC: 12 MMOL/L (ref 7–16)
BUN BLDV-MCNC: 20 MG/DL (ref 6–23)
CALCIUM SERPL-MCNC: 8.4 MG/DL (ref 8.6–10.2)
CHLORIDE BLD-SCNC: 96 MMOL/L (ref 98–107)
CO2: 35 MMOL/L (ref 22–29)
CREAT SERPL-MCNC: 0.9 MG/DL (ref 0.7–1.2)
GFR AFRICAN AMERICAN: >60
GFR NON-AFRICAN AMERICAN: >60 ML/MIN/1.73
GLUCOSE BLD-MCNC: 97 MG/DL (ref 74–99)
MAGNESIUM: 1.6 MG/DL (ref 1.6–2.6)
POTASSIUM SERPL-SCNC: 2.7 MMOL/L (ref 3.5–5)
SODIUM BLD-SCNC: 143 MMOL/L (ref 132–146)

## 2022-04-05 PROCEDURE — 36415 COLL VENOUS BLD VENIPUNCTURE: CPT

## 2022-04-05 PROCEDURE — 80048 BASIC METABOLIC PNL TOTAL CA: CPT

## 2022-04-05 PROCEDURE — 6360000002 HC RX W HCPCS: Performed by: NURSE PRACTITIONER

## 2022-04-05 PROCEDURE — 43762 RPLC GTUBE NO REVJ TRC: CPT

## 2022-04-05 PROCEDURE — 2580000003 HC RX 258: Performed by: FAMILY MEDICINE

## 2022-04-05 PROCEDURE — 74018 RADEX ABDOMEN 1 VIEW: CPT

## 2022-04-05 PROCEDURE — 71045 X-RAY EXAM CHEST 1 VIEW: CPT

## 2022-04-05 PROCEDURE — 2060000000 HC ICU INTERMEDIATE R&B

## 2022-04-05 PROCEDURE — 74176 CT ABD & PELVIS W/O CONTRAST: CPT

## 2022-04-05 PROCEDURE — G0378 HOSPITAL OBSERVATION PER HR: HCPCS

## 2022-04-05 PROCEDURE — 83735 ASSAY OF MAGNESIUM: CPT

## 2022-04-05 PROCEDURE — 99283 EMERGENCY DEPT VISIT LOW MDM: CPT

## 2022-04-05 RX ORDER — BUMETANIDE 1 MG/1
1 TABLET ORAL DAILY
Status: DISCONTINUED | OUTPATIENT
Start: 2022-04-05 | End: 2022-04-07 | Stop reason: HOSPADM

## 2022-04-05 RX ORDER — LANREOTIDE ACETATE 120 MG/.5ML
120 INJECTION SUBCUTANEOUS
COMMUNITY

## 2022-04-05 RX ORDER — POTASSIUM CHLORIDE 20 MEQ/1
20 TABLET, EXTENDED RELEASE ORAL DAILY
Status: DISCONTINUED | OUTPATIENT
Start: 2022-04-05 | End: 2022-04-07 | Stop reason: HOSPADM

## 2022-04-05 RX ORDER — DOCUSATE SODIUM 100 MG/1
100 CAPSULE, LIQUID FILLED ORAL 2 TIMES DAILY PRN
Status: DISCONTINUED | OUTPATIENT
Start: 2022-04-05 | End: 2022-04-07 | Stop reason: HOSPADM

## 2022-04-05 RX ORDER — ALBUTEROL SULFATE 90 UG/1
1 AEROSOL, METERED RESPIRATORY (INHALATION) PRN
Status: DISCONTINUED | OUTPATIENT
Start: 2022-04-05 | End: 2022-04-05 | Stop reason: CLARIF

## 2022-04-05 RX ORDER — ATORVASTATIN CALCIUM 40 MG/1
40 TABLET, FILM COATED ORAL NIGHTLY
Status: DISCONTINUED | OUTPATIENT
Start: 2022-04-05 | End: 2022-04-07 | Stop reason: HOSPADM

## 2022-04-05 RX ORDER — LIDOCAINE HYDROCHLORIDE 20 MG/ML
JELLY TOPICAL ONCE
Status: DISCONTINUED | OUTPATIENT
Start: 2022-04-05 | End: 2022-04-07 | Stop reason: HOSPADM

## 2022-04-05 RX ORDER — BUMETANIDE 1 MG/1
2 TABLET ORAL EVERY MORNING
COMMUNITY
End: 2022-09-27

## 2022-04-05 RX ORDER — SODIUM CHLORIDE 9 MG/ML
INJECTION, SOLUTION INTRAVENOUS CONTINUOUS
Status: DISCONTINUED | OUTPATIENT
Start: 2022-04-05 | End: 2022-04-06

## 2022-04-05 RX ORDER — ASPIRIN 81 MG/1
81 TABLET, CHEWABLE ORAL DAILY
Status: DISCONTINUED | OUTPATIENT
Start: 2022-04-05 | End: 2022-04-07 | Stop reason: HOSPADM

## 2022-04-05 RX ORDER — CARVEDILOL 6.25 MG/1
6.25 TABLET ORAL 2 TIMES DAILY WITH MEALS
Status: DISCONTINUED | OUTPATIENT
Start: 2022-04-05 | End: 2022-04-07 | Stop reason: HOSPADM

## 2022-04-05 RX ORDER — OXYMETAZOLINE HYDROCHLORIDE 0.05 G/100ML
2 SPRAY NASAL ONCE
Status: DISCONTINUED | OUTPATIENT
Start: 2022-04-05 | End: 2022-04-07 | Stop reason: HOSPADM

## 2022-04-05 RX ORDER — POTASSIUM CHLORIDE 7.45 MG/ML
10 INJECTION INTRAVENOUS PRN
Status: DISCONTINUED | OUTPATIENT
Start: 2022-04-05 | End: 2022-04-07 | Stop reason: HOSPADM

## 2022-04-05 RX ORDER — ALBUTEROL SULFATE 2.5 MG/3ML
2.5 SOLUTION RESPIRATORY (INHALATION) EVERY 6 HOURS PRN
Status: DISCONTINUED | OUTPATIENT
Start: 2022-04-05 | End: 2022-04-07 | Stop reason: HOSPADM

## 2022-04-05 RX ADMIN — SODIUM CHLORIDE: 9 INJECTION, SOLUTION INTRAVENOUS at 18:44

## 2022-04-05 RX ADMIN — POTASSIUM CHLORIDE 10 MEQ: 7.46 INJECTION, SOLUTION INTRAVENOUS at 23:38

## 2022-04-05 RX ADMIN — POTASSIUM CHLORIDE 10 MEQ: 7.46 INJECTION, SOLUTION INTRAVENOUS at 22:07

## 2022-04-05 ASSESSMENT — PAIN SCALES - GENERAL
PAINLEVEL_OUTOF10: 0
PAINLEVEL_OUTOF10: 0

## 2022-04-05 NOTE — ED PROVIDER NOTES
[address]  Department of Emergency Medicine     Written by: Kristina Acharya DO  Patient Name: Laura Crisostomo  Attending Provider: Susan Castillo MD  Admit Date: 2022 11:41 AM  MRN: [id number]    : 1940        Chief Complaint   Patient presents with   Tran Lites Tube Complications     pulled peg at home    - Chief complaint    HPI   Laura Crisostomo is a [de-identified] y.o. male presenting to the ED for evaluation of G Tube Complications (pulled peg at home)    Patient has a past medical history of HTN, HLD, CAD, and known abdominal neuroendocrine tumor. He has had a gastrostomy tube for about 1.5 years, states it was placed not for tube feeding but for gastric decompression. States about 2 hours prior to arrival he was walking in his house when the tube got caught on something and ripped out with the balloon still intact. Denies any nausea or vomiting, or significant abdominal pain; states that there is mild tenderness at the ostomy site but no other concerns. Complaints are mild in severity, no specific aggravating or alleviating factors, and began prior to arrival.    Review of Systems   Constitutional: Negative for chills and fever. HENT: Negative for rhinorrhea and sore throat. Eyes: Negative for visual disturbance. Respiratory: Negative for cough and shortness of breath. Cardiovascular: Negative for chest pain and palpitations. Gastrointestinal: Negative for abdominal pain, diarrhea, nausea and vomiting. Mild tenderness at gastrostomy site which was excellently pulled out prior to arrival   Genitourinary: Negative for dysuria and frequency. Musculoskeletal: Negative for back pain and myalgias. Skin: Negative for rash and wound. Neurological: Negative for weakness and headaches. Psychiatric/Behavioral: Negative for confusion. Physical Exam  Vitals and nursing note reviewed. Constitutional:       General: He is not in acute distress.      Appearance: He is not toxic-appearing. Comments: Patient is frail appearing   HENT:      Head: Normocephalic and atraumatic. Right Ear: External ear normal.      Left Ear: External ear normal.      Nose: Nose normal. No rhinorrhea. Mouth/Throat:      Mouth: Mucous membranes are moist.      Pharynx: Oropharynx is clear. Eyes:      Extraocular Movements: Extraocular movements intact. Conjunctiva/sclera: Conjunctivae normal.      Pupils: Pupils are equal, round, and reactive to light. Cardiovascular:      Rate and Rhythm: Normal rate and regular rhythm. Pulses: Normal pulses. Heart sounds: Normal heart sounds. Pulmonary:      Effort: Pulmonary effort is normal. No respiratory distress. Breath sounds: Normal breath sounds. No wheezing or rales. Abdominal:      General: Bowel sounds are normal.      Palpations: Abdomen is soft. Tenderness: There is no abdominal tenderness. There is no guarding or rebound. Comments: Small about 3 mm in diameter ostomy site from previous gastrostomy tube which appears dried, slightly patent   Musculoskeletal:         General: No tenderness. Normal range of motion. Cervical back: Normal range of motion and neck supple. Right lower leg: No edema. Left lower leg: No edema. Skin:     General: Skin is warm and dry. Capillary Refill: Capillary refill takes less than 2 seconds. Coloration: Skin is not jaundiced or pale. Findings: No rash. Neurological:      General: No focal deficit present. Mental Status: He is alert and oriented to person, place, and time. Psychiatric:         Mood and Affect: Mood normal.         Behavior: Behavior normal.          Procedures     Gastrostomy Tube Replacement Procedure Note    Indication: removed by patient traumatically, accidentally with balloon still intact    Procedure:  The patient was placed in the supine position and the patient's gastric tube was replaced using an 18 Citizen of [place] gastrostomy tube and the bulb was inflated using 15 cc of normal saline. The placement was noted to be malpositioned on Gastrografin x-ray and CT; general surgery was consulted and patient will have new gastrostomy tube formation tomorrow. The patient tolerated the procedure well however procedure was unsuccessful as noted above. Complications: Malposition of gastrostomy tube        [facility]     ED Course as of 04/06/22 1348   Tue Apr 05, 2022   1419 ED attending spoke with Dr. Theodore Mauro for general surgery, discussed case, they will have a general surgery resident, and evaluate the patient. [VG]   2579 Patient inadvertently pulled out his G-tube today. Balloon was fully inflated. Brought the tube in with him with balloon fully intact. He was packing to leave for a trip and got the tubing caught on the side of the bed. Maggy Goss      ED Course User Index  [KK] Brissa Lopez MD  [VG] Sara Elizabeth DO       [facility]    This is a [de-identified] y.o. male presenting for evaluation of complication with his gastrostomy tube which he has had for the past 1.5 years; states he was packing for a trip when his G-tube got caught on something and ripped out with the balloon intact; patient arrives with his previous gastrostomy tube which is an 25 [place] 15 cc tube and the balloon is fully intact and still inflated. He is in no acute distress. Vital signs are stable. We obtained an 25 [place] gastrostomy tube, replaced the tube as noted above; there was a mild amount of force needed to get the tip of the tube through the stoma opening but once about 2 mm of tubing was through the rest of the gastrostomy tube glided easily. Patient did have moderate discomfort during the procedure. We did flush about 30 cc of water slowly through the tube with good return but patient did have mild discomfort in doing so. We obtained a Gastrografin x-ray which showed some contrast extravasation concerning for malposition of the tube.   We consulted general surgery and obtained a CT without contrast which shows malposition of the gastrostomy tube tracking along the anterior wall of the gastric antrum. Discussed with general surgery consult and patient, given the traumatic nature of patient's G-tube removal at home with balloon entirely still intact, this likely cause damage to the original tract prior to arrival. General surgery removed the tube and will plan for new PEG tube formation tomorrow. Patient will be admitted to medicine. Discussed results and plan with the patient and his family member, they voiced understanding and are amenable. I have discussed this patient with my attending, who has seen the patient and agrees with this disposition. Patient was seen and evaluated by myself and my attending Rachel Campo MD. Assessment and Plan discussed with attending provider, please see attestation for final plan of care.         --------------------------------------------- PAST HISTORY ---------------------------------------------  Past Medical History:  has a past medical history of Aneurysm of abdominal aorta ([facility] [address] CAD (coronary artery disease), Cancer ([facility] [address] Decreased dorsalis pedis pulse, GERD (gastroesophageal reflux disease), Glaucoma, Hyperlipidemia, Hypertension, Neuro-endocrine cancer ([facility] [address] Numbness and tingling of both feet, and Unspecified cerebral artery occlusion with cerebral infarction. Past Surgical History:  has a past surgical history that includes hernia repair; Diagnostic Cardiac Cath Lab Procedure; Tonsillectomy; eye surgery (Left, Feb 2013, June 2013); eye surgery (Left); vascular surgery (Right, 10/16/2013); Carotid endarterectomy (Right, ); Colonoscopy; Sigmoidoscopy; Facial cosmetic surgery (N/A, 12/10/2019); laparoscopy (N/A, 8/28/2020); and Gastrostomy tube placement. Social History:  reports that he quit smoking about 29 years ago. His smoking use included cigarettes. He smoked 2.00 packs per day.  He has never used smokeless tobacco. He reports current alcohol use. He reports that he does not use drugs. Family History: family history includes Heart Disease in his brother and father. The patients home medications have been reviewed. Allergies: Patient has no known allergies. -------------------------------------------------- RESULTS -------------------------------------------------    LABS:  Results for orders placed or performed during the hospital encounter of 21/35/11   Basic Metabolic Panel   Result Value Ref Range    Sodium 143 132 - 146 mmol/L    Potassium 2.7 (LL) 3.5 - 5.0 mmol/L    Chloride 96 (L) 98 - 107 mmol/L    CO2 35 (H) 22 - 29 mmol/L    Anion Gap 12 7 - 16 mmol/L    Glucose 97 74 - 99 mg/dL    BUN 20 6 - 23 mg/dL    CREATININE 0.9 0.7 - 1.2 mg/dL    GFR Non-African American >60 >=60 mL/min/1.73    GFR African American >60     Calcium 8.4 (L) 8.6 - 10.2 mg/dL   Potassium   Result Value Ref Range    Potassium 2.6 (LL) 3.5 - 5.0 mmol/L   Magnesium   Result Value Ref Range    Magnesium 1.7 1.6 - 2.6 mg/dL   Magnesium   Result Value Ref Range    Magnesium 1.6 1.6 - 2.6 mg/dL   Potassium   Result Value Ref Range    Potassium 3.2 (L) 3.5 - 5.0 mmol/L       RADIOLOGY:  XR CHEST ABDOMEN NG PLACEMENT   Final Result   NG tube seen, with tip in the stomach. The side-port is noted by the GE   junction. The tube can be placed slightly farther into the stomach. CT ABDOMEN PELVIS WO CONTRAST Additional Contrast? None   Final Result   1. MALPOSITIONED GASTROSTOMY TUBE, which terminates outside the stomach along   the anterior wall of the gastric antrum. Contrast injected through the   gastrostomy is seen extravasating into the peritoneal cavity. 2. Dilated small bowel loops which may be due to ILEUS OR OBSTRUCTION. 3. 3.9 x 3.7 cm MASS in the right lower quadrant, involving the medial wall   of the cecum, likely related to patient's history of CARCINOID TUMOR.    4. ANOTHER 5.6 x 4.2 cm MASS in the mid pelvis, likely involving the sigmoid   colon which is tapered. The mass is inferiorly contiguous with the seminal   vesicles. This mass may represent metastatic disease or a 2nd primary. Consider colonoscopy. 5. Tapering of the sigmoid colon, as described above, is likely partially   obstructive. RECOMMENDATIONS:   Unavailable         XR ABDOMEN FOR NG/OG/NE TUBE PLACEMENT   Final Result   Contrast extravasation suggesting malposition of the G-tube.               ------------------------- NURSING NOTES AND VITALS REVIEWED ---------------------------  Date / Time Roomed:  4/5/2022 11:41 AM  ED Bed Assignment:  [id number]    The nursing notes within the ED encounter and vital signs as below have been reviewed. Patient Vitals for the past 24 hrs:   BP Temp Temp src Pulse Resp SpO2 Weight   04/06/22 0812 137/65 97.9 °F (36.6 °C) Oral 52 17 99 % --   04/06/22 0455 -- -- -- -- -- -- 148 lb 9.6 oz (67.4 kg)   04/05/22 2123 106/61 99 °F (37.2 °C) Oral 53 17 96 % --   04/05/22 2045 (!) 104/58 98.6 °F (37 °C) Oral 60 16 95 % --   04/05/22 1630 136/66 98 °F (36.7 °C) Oral 50 16 100 % --   04/05/22 1602 (!) 148/72 97.6 °F (36.4 °C) -- 59 16 97 % --       Oxygen Saturation Interpretation: Normal    ------------------------------------------ PROGRESS NOTES ------------------------------------------  Re-evaluation(s):  Please see ED course    Counseling:  I have spoken with the patient and family member and discussed todays results, in addition to providing specific details for the plan of care and counseling regarding the diagnosis and prognosis.   Their questions are answered at this time and they are agreeable with the plan of admission.    --------------------------------- ADDITIONAL PROVIDER NOTES ---------------------------------  Consultations:  Please see ED course    This patient's ED course included: a personal history and physicial examination, re-evaluation prior to disposition, multiple bedside re-evaluations, IV medications, cardiac monitoring, continuous pulse oximetry and complex medical decision making and emergency management    This patient has remained hemodynamically stable during their ED course. Diagnosis:  1. Complication of gastrostomy tube ([address]        Disposition:  Patient's disposition: Admit to med/surg floor  Patient's condition is stable.        Sahil Mtz DO  Resident  04/06/22 194[address] [facility] Kareen Gordon DO  Resident  04/06/22 7280

## 2022-04-05 NOTE — ANESTHESIA PRE PROCEDURE
Department of Anesthesiology  Preprocedure Note       Name:  Ana M Josue   Age:  [de-identified] y.o.  :  1940                                          MRN:  [id number]         Date:  2022      Surgeon: Jesu Zambrano):  Manju Ortega MD    Procedure: EGD PEG TUBE PLACEMENT (N/A )    Medications prior to admission:   Prior to Admission medications    Medication Sig Start Date End Date Taking? Authorizing Provider   bumetanide (BUMEX) 2 MG tablet Take 1 mg by mouth every morning    Historical Provider, MD   lanreotide acetate (SOMATULINE DEPOT) 120 MG/0.5ML SOLN depot injection Inject 120 mg into the skin every 28 days NEXT INJECTION DUE: 2022  -[address]-    Historical Provider, MD   potassium chloride (KLOR-CON M) 10 MEQ extended release tablet Take 2 tablets by mouth daily 3/4/22   Joana Abraham DO   carvedilol (COREG) 6.25 MG tablet Take 1 tablet by mouth 2 times daily (with meals) 21   TIFFANIE Caputo CNP   atorvastatin (LIPITOR) 40 MG tablet Take 1 tablet by mouth nightly 21   TIFFANIE Caputo CNP   aspirin 81 MG chewable tablet Take 81 mg by mouth every morning     Historical Provider, MD       Current medications:    No current facility-administered medications for this visit. No current outpatient medications on file.      Facility-Administered Medications Ordered in Other Visits   Medication Dose Route Frequency Provider Last Rate Last Admin    oxymetazoline (AFRIN) 0.05 % nasal spray 2 spray  2 spray Each Nostril Once Comfort Felicianoate, DO        lidocaine (XYLOCAINE) 2 % jelly   Topical Once Gimarjorieino C Gipeterfrate, DO        atorvastatin (LIPITOR) tablet 40 mg  40 mg Oral Nightly TIFFANIE Caputo CNP        bumetanide (BUMEX) tablet 1 mg  1 mg Oral Daily TIFFANIE Caputo CNP        carvedilol (COREG) tablet 6.25 mg  6.25 mg Oral BID WC TIFFANIE Caputo CNP        docusate sodium (COLACE) capsule 100 mg  100 mg Oral BID PRN Daljit De La Torre APRN - CNP        potassium chloride (KLOR-CON M) extended release tablet 20 mEq  20 mEq Oral Daily TIFFANIE Anderson - CNP        [Held by provider] aspirin chewable tablet 81 mg  81 mg Oral Daily Daljit De La Torre APRN - CNP        albuterol (PROVENTIL) nebulizer solution 2.5 mg  2.5 mg Nebulization Q6H PRN Daljit De La Torre APRN - CNP           Allergies:  No Known Allergies    Problem List:    Patient Active Problem List   Diagnosis Code    CAD (coronary artery disease) I25.10    LV dysfunction I51.9    Hyperlipidemia E78.5    Hypertension I10    Abdominal aortic aneurysm (AAA) without rupture (HCC) I71.4    Glaucoma H40.9    S/P carotid endarterectomy Z98.890    Carotid bruit R09.89    H/O: CVA (cerebrovascular accident) Z86.73    Post-op pain G89.18    Abdominal pain R10.9    Decreased dorsalis pedis pulse R09.89    Numbness and tingling of both feet R20.0, R20.2    Volume overload E87.70    Hypokalemia Q85.6    Complication of gastrostomy tube ([facility] [address] K94.20       Past Medical History:        Diagnosis Date    Aneurysm of abdominal aorta ([facility] [address] 09/18/2013    follows with Dr. Samm Louise yearly-2020    CAD (coronary artery disease)     Follows with Dr Kirk Mariano [facility])     Colon    Decreased dorsalis pedis pulse 8/5/2021    GERD (gastroesophageal reflux disease)     Glaucoma     (L) eye    Hyperlipidemia     Hypertension     Neuro-endocrine cancer ([facility] [address]     Numbness and tingling of both feet 8/5/2021    Unspecified cerebral artery occlusion with cerebral infarction 2016    TIA       Past Surgical History:        Procedure Laterality Date    CAROTID ENDARTERECTOMY Right     Dr. Jean Claude Giraldo CATH LAB PROCEDURE      EYE SURGERY Left Feb 2013, June 2013    glaucoma    EYE SURGERY Left     cataract    FACIAL COSMETIC SURGERY N/A 12/10/2019    EXCISION OF BASAL CELL CARCINOMA RIGHT BROW,  EXCISION OF SQUAMOUS CELL CARCINOMA RIGHT FOREARM -- FROZEN performed by Audra Barron MD at [address] N/A 2020    DIAGNOSTIC LAPAROSCOPY WITH BIOPSY performed by Moshe Dutton MD at [facility] VASCULAR SURGERY Right 10/16/2013    RIGHT CAROTID ENDARTERECTOMY       Social History:    Social History     Tobacco Use    Smoking status: Former Smoker     Packs/day: 2.00     Types: Cigarettes     Quit date: 10/9/1992     Years since quittin.5    Smokeless tobacco: Never Used   Substance Use Topics    Alcohol use: Yes     Comment: rare                                Counseling given: Not Answered      Vital Signs (Current): There were no vitals filed for this visit.                                            BP Readings from Last 3 Encounters:   22 136/66   22 123/67   22 (!) 114/59       NPO Status:                                                                                 BMI:   Wt Readings from Last 3 Encounters:   22 140 lb (63.5 kg)   22 140 lb (63.5 kg)   22 140 lb (63.5 kg)     There is no height or weight on file to calculate BMI.    CBC:   Lab Results   Component Value Date    WBC 4.4 2022    RBC 3.00 2022    HGB 9.7 2022    HCT 28.4 2022    MCV 94.7 2022    RDW 15.7 2022     2022       CMP:   Lab Results   Component Value Date     2022    K 2.9 2022    K 2.7 2022    CL 98 2022    CO2 28 2022    BUN 26 2022    CREATININE 1.0 2022    GFRAA >60 2022    LABGLOM >60 2022    GLUCOSE 151 2022    GLUCOSE 125 2012    PROT 6.2 2022    CALCIUM 7.9 2022    BILITOT 1.0 2022    ALKPHOS 83 2022    AST 34 2022    ALT 27 2022       POC Tests: No results for input(s): POCGLU, POCNA, POCK, POCCL, POCBUN, POCHEMO, POCHCT in the last 72 hours. Coags:   Lab Results   Component Value Date    PROTIME 12.6 10/09/2013    PROTIME 11.7 06/03/2012    INR 1.2 10/09/2013    APTT 27.8 10/09/2013       HCG (If Applicable): No results found for: PREGTESTUR, PREGSERUM, HCG, HCGQUANT     ABGs: No results found for: PHART, PO2ART, PDY4HKV, TKV3CHY, BEART, J9XQYYGP     Type & Screen (If Applicable):  No results found for: LABABO, LABRH     EKG 1-    Sinus  Bradycardia   -Incomplete left bundle branch block.    -Poor R-wave progression -may be secondary to conduction defect   consider old anterior infarct.    -  Nonspecific T-abnormality. ABNORMAL    ECHO 8-  Findings      Left Ventricle   Mild left ventricular concentric hypertrophy noted. Left ventricle size is   normal. Ejection fraction is visually estimated at 60%. No regional wall   motion abnormalities seen. There is doppler evidence of stage I diastolic   dysfunction. Right Ventricle   Normal right ventricle structure and function. Left Atrium   Left atrial volume index of 26 ml per meters squared BSA. Right Atrium   Normal right atrium. Mitral Valve   Structurally normal mitral valve. No evidence of mitral valve stenosis. Physiologic and/or trace mitral regurgitation is present. Tricuspid Valve   The tricuspid valve appears structurally normal. Physiologic and/or trace   tricuspid regurgitation. Aortic Valve   The aortic valve is trileaflet. No hemodynamically significant aortic   stenosis is present. No evidence of aortic valve regurgitation. Pulmonic Valve   Pulmonic valve is structurally normal.   Physiologic and/or trace pulmonic regurgitation present. Pericardial Effusion   No evidence for hemodynamically significant pericardial effusion. Aorta   Normal aortic root and ascending aorta. Miscellaneous   The inferior vena cava diameter is normal with normal respiratory   variation.       Conclusions      Summary   Ejection Nury Patel MD.  (Final assessment and plan per day of surgery team.)    Chart reviewed, patient seen. Agree with above assessment. Severiano Bakari, APRN - CRNA  4/6/22  15:18 PM      DOS STAFF ADDENDUM:    Pt seen and examined, physical exam updated, chart reviewed including anesthesia, drug and allergy history. H&P reviewed. No interval changes to history or physical examination (unless noted above). NPO status confirmed. Anesthetic plan, risks, benefits, alternatives discussed with patient. Patient verbalized an understanding and agrees to proceed.      Radha Velez MD  Anesthesiologist

## 2022-04-05 NOTE — CONSULTS
GENERAL SURGERY  CONSULT NOTE  4/5/2022    Physician Consulted: Dr. Harika Lee   Reason for Consult: PEG       HPI  uJaquin Marroquin is a [de-identified] y.o. male who presents for evaluation of PEG tube dislodgement. 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 Patient was walking today when the tube got caught and ripped out while the balloon was inflated. He presented to the ED in which a replacement G-tube was placed. However, on G tube study and on CT scan the PEG was not in place. The tube was removed. The patient does not have any abdominal pain, but is mildly tender near the PEG site.  There is some drainage from the Peg tube site        Past Medical History:   Diagnosis Date    Aneurysm of abdominal aorta ([address] 09/18/2013    follows with Dr. Justino Mao [id number]    CAD (coronary artery disease)     Follows with Dr Tom Paige [facility])     Colon    Decreased dorsalis pedis pulse 8/5/2021    GERD (gastroesophageal reflux disease)     Glaucoma     (L) eye    Hyperlipidemia     Hypertension     Neuro-endocrine cancer (HCC)     Numbness and tingling of both feet 8/5/2021    Unspecified cerebral artery occlusion with cerebral infarction 2016    TIA       Past Surgical History:   Procedure Laterality Date    CAROTID ENDARTERECTOMY Right     Dr. Jay Stovall CATH LAB PROCEDURE      EYE SURGERY Left Feb 2013, June 2013    glaucoma    EYE SURGERY Left     cataract    FACIAL COSMETIC SURGERY N/A 12/10/2019    EXCISION OF BASAL CELL CARCINOMA RIGHT BROW,  EXCISION OF SQUAMOUS CELL CARCINOMA RIGHT FOREARM -- FROZEN performed by Baljeet Ross MD at [address] [place] [address] N/A 8/28/2020    DIAGNOSTIC LAPAROSCOPY WITH BIOPSY performed by Mayda Pickens MD at [address] [facility] Right 10/16/2013    RIGHT CAROTID ENDARTERECTOMY Medications Prior to Admission:    Prior to Admission medications    Medication Sig Start Date End Date Taking?  Authorizing Provider   potassium chloride (KLOR-CON M) 10 MEQ extended release tablet Take 2 tablets by mouth daily 3/4/22   Mehran Duggan DO   ondansetron (ZOFRAN ODT) 4 MG disintegrating tablet Take 1 tablet by mouth every 8 hours as needed for Nausea or Vomiting 22  Zachary Fox DO   carvedilol (COREG) 6.25 MG tablet Take 1 tablet by mouth 2 times daily (with meals) 21   TIFFANIE Giraldo CNP   atorvastatin (LIPITOR) 40 MG tablet Take 1 tablet by mouth nightly 21   TIFFANIE Giraldo CNP   bumetanide (BUMEX) 2 MG tablet Take 0.5 tablets by mouth daily 21   TIFFANIE Giraldo - LEN   albuterol sulfate  (90 Base) MCG/ACT inhaler as needed  21   Historical Provider, MD   COLACE 100 MG capsule 100 mg as needed  3/1/21   Historical Provider, MD   Octreotide Acetate (SANDOSTATIN IJ) Inject as directed Once per month    Historical Provider, MD   aspirin 81 MG chewable tablet Take 81 mg by mouth daily     Historical Provider, MD       No Known Allergies    Family History   Problem Relation Age of Onset    Heart Disease Father     Heart Disease Brother        Social History     Tobacco Use    Smoking status: Former Smoker     Packs/day: 2.00     Types: Cigarettes     Quit date: 10/9/1992     Years since quittin.5    Smokeless tobacco: Never Used   Vaping Use    Vaping Use: Never used   Substance Use Topics    Alcohol use: Yes     Comment: rare    Drug use: No         Review of Systems   General ROS: negative  Hematological and Lymphatic ROS: negative  Respiratory ROS: negative  Cardiovascular ROS: negative  Gastrointestinal ROS: positive for - PEG tube  Genito-Urinary ROS: negative  Musculoskeletal ROS: negative      PHYSICAL EXAM:    Vitals:    22 1331   BP: (!) 151/76   Pulse: 54   Resp:    Temp:    SpO2: 99%       General Appearance:  awake, alert, oriented, in no acute distress  Skin:  Skin color, texture, turgor normal. No rashes or lesions. Head/face:  NCAT  Eyes:  PERRL  Lungs:  No chest wall tenderness. Heart:  Heart regular rate and rhythm  Abdomen:  Soft, non-tender, non-distended, previous PEG tube hole   Extremities: pulses present in all extremities      LABS:    CBC  No results for input(s): WBC, HGB, HCT, PLT in the last 72 hours. BMP  No results for input(s): NA, K, CL, CO2, BUN, CREATININE, GLU, CALCIUM in the last 72 hours. Liver Function  No results for input(s): AMYLASE, LIPASE, BILITOT, BILIDIR, AST, ALT, ALKPHOS, PROT, LABALBU in the last 72 hours. No results for input(s): LACTATE in the last 72 hours. No results for input(s): INR, PTT in the last 72 hours. Invalid input(s): PT    RADIOLOGY    CT ABDOMEN PELVIS WO CONTRAST Additional Contrast? None    Result Date: 4/5/2022  EXAMINATION: CT OF THE ABDOMEN AND PELVIS WITHOUT CONTRAST 4/5/2022 1:50 pm TECHNIQUE: CT of the abdomen and pelvis was performed without the administration of intravenous contrast. Multiplanar reformatted images are provided for review. Dose modulation, iterative reconstruction, and/or weight based adjustment of the mA/kV was utilized to reduce the radiation dose to as low as reasonably achievable. COMPARISON: CT abdomen and pelvis, 02/26/2022. HISTORY: ORDERING SYSTEM PROVIDED HISTORY: g tube malposition TECHNOLOGIST PROVIDED HISTORY: Additional Contrast?->None Reason for exam:->g tube malposition Decision Support Exception - unselect if not a suspected or confirmed emergency medical condition->Emergency Medical Condition (MA) FINDINGS: Lower Chest: The lung bases are clear. The heart is normal in size. No pleural or pericardial effusion. Organs: Liver: Unremarkable. Gallbladder: Unremarkable. Pancreas: Moderately atrophied. Otherwise, grossly unremarkable. Spleen:  Unremarkable. Adrenals: Unremarkable. Kidneys: Unremarkable. GI/Bowel: The tip of the gastrostomy tube is malpositioned and terminates along the anterior aspect of the gastric antrum. Contrast injected into it is seen extravasating into the peritoneal cavity. There is stable masslike mural thickening in the ascending colon, associated with a calcified mass in the adjacent mesentery, likely related patient's history of carcinoid tumor (series 2, image 104). The mass appears to extend to the medial cecal wall and measures approximately 3.9 x 3.7 cm in the maximum coronal plane. There is moderate fecal retention throughout the colon, with another site of mural thickening in the sigmoid colon. Adjacent to the sigmoid colon and contiguous with the superior aspect of the seminal vesicles, there is a 5.6 x 4.2 cm spiculated mass which may represent metastatic disease or 2nd primary. There is mild distension of small bowel loops which may be due to ileus or obstruction. . Pelvis: Prostate gland is enlarged. The urinary bladder is grossly unremarkable. As mentioned above, the superior aspects of the seminal vesicles are contiguous with the mesenteric mass. Peritoneum/Retroperitoneum: Prominent calcified atherosclerosis in the abdominal aorta and pelvic arteries. The abdominal aorta is borderline aneurysmal to 3.0 cm. No lymphadenopathy. Bones/Soft Tissues: The visualized bones are intact without fracture or focal lesion. 1. MALPOSITIONED GASTROSTOMY TUBE, which terminates outside the stomach along the anterior wall of the gastric antrum. Contrast injected through the gastrostomy is seen extravasating into the peritoneal cavity. 2. Dilated small bowel loops which may be due to ILEUS OR OBSTRUCTION. 3. 3.9 x 3.7 cm MASS in the right lower quadrant, involving the medial wall of the cecum, likely related to patient's history of CARCINOID TUMOR. 4. ANOTHER 5.6 x 4.2 cm MASS in the mid pelvis, likely involving the sigmoid colon which is tapered.   The mass is inferiorly contiguous with the seminal vesicles. This mass may represent metastatic disease or a 2nd primary. Consider colonoscopy. 5. Tapering of the sigmoid colon, as described above, is likely partially obstructive. RECOMMENDATIONS: Unavailable     XR ABDOMEN FOR NG/OG/NE TUBE PLACEMENT    Result Date: 4/5/2022  EXAMINATION: ONE SUPINE XRAY VIEW(S) OF THE ABDOMEN 4/5/2022 12:59 pm COMPARISON: 08/11/2021 abdomen radiographs and 02/26/2022 abdomen/pelvis CT. HISTORY: ORDERING SYSTEM PROVIDED HISTORY: Confirm G-tube placement TECHNOLOGIST PROVIDED HISTORY: Gastrografin study Reason for exam:->Confirm G-tube placement FINDINGS: Initial images demonstrates percutaneous gastrostomy tube left upper abdomen with bowel gas throughout the abdomen. Lung bases are clear. 2nd image demonstrates contrast introduced via the G-tube with some of the contrast in stomach however there is contrast extravasation as well suggesting malposition. Contrast extravasation suggesting malposition of the G-tube. ASSESSMENT:  [de-identified] y.o. male with known metastatic neuroendocrine cancer with carcinomatosis    PLAN:  Admit to medicine  Plan for replacement PEG tomorrow 4/6  Given particle obstruction will place NG tube to LIS to decompress pt overnight  Npo, IVF  Pain control  Ostomy bag over previous ostomy site for drainage     Electronically signed by Gisela Medina DO on 4/5/22 at 3:11 PM EDT    The patient was seen and examined on 4/5/2022. I agree with the assessment and plan. PEG tomorrow.

## 2022-04-06 ENCOUNTER — ANESTHESIA (OUTPATIENT)
Dept: ENDOSCOPY | Age: 82
DRG: 394 | End: 2022-04-06
Payer: MEDICARE

## 2022-04-06 VITALS
RESPIRATION RATE: 42 BRPM | SYSTOLIC BLOOD PRESSURE: 144 MMHG | DIASTOLIC BLOOD PRESSURE: 65 MMHG | OXYGEN SATURATION: 99 %

## 2022-04-06 PROBLEM — K56.609 SMALL BOWEL OBSTRUCTION (HCC): Status: ACTIVE | Noted: 2022-04-06

## 2022-04-06 LAB
ANION GAP SERPL CALCULATED.3IONS-SCNC: 9 MMOL/L (ref 7–16)
BUN BLDV-MCNC: 19 MG/DL (ref 6–23)
CALCIUM SERPL-MCNC: 8 MG/DL (ref 8.6–10.2)
CHLORIDE BLD-SCNC: 101 MMOL/L (ref 98–107)
CO2: 34 MMOL/L (ref 22–29)
CREAT SERPL-MCNC: 0.8 MG/DL (ref 0.7–1.2)
GFR AFRICAN AMERICAN: >60
GFR NON-AFRICAN AMERICAN: >60 ML/MIN/1.73
GLUCOSE BLD-MCNC: 60 MG/DL (ref 74–99)
MAGNESIUM: 1.7 MG/DL (ref 1.6–2.6)
MAGNESIUM: 2.2 MG/DL (ref 1.6–2.6)
METER GLUCOSE: 133 MG/DL (ref 74–99)
METER GLUCOSE: 49 MG/DL (ref 74–99)
POTASSIUM REFLEX MAGNESIUM: 3.3 MMOL/L (ref 3.5–5)
POTASSIUM SERPL-SCNC: 2.6 MMOL/L (ref 3.5–5)
POTASSIUM SERPL-SCNC: 3.2 MMOL/L (ref 3.5–5)
SODIUM BLD-SCNC: 144 MMOL/L (ref 132–146)

## 2022-04-06 PROCEDURE — 36415 COLL VENOUS BLD VENIPUNCTURE: CPT

## 2022-04-06 PROCEDURE — 2060000000 HC ICU INTERMEDIATE R&B

## 2022-04-06 PROCEDURE — 3609013300 HC EGD TUBE PLACEMENT: Performed by: SURGERY

## 2022-04-06 PROCEDURE — 6370000000 HC RX 637 (ALT 250 FOR IP): Performed by: FAMILY MEDICINE

## 2022-04-06 PROCEDURE — 3700000000 HC ANESTHESIA ATTENDED CARE: Performed by: SURGERY

## 2022-04-06 PROCEDURE — 82962 GLUCOSE BLOOD TEST: CPT

## 2022-04-06 PROCEDURE — 7100000011 HC PHASE II RECOVERY - ADDTL 15 MIN: Performed by: SURGERY

## 2022-04-06 PROCEDURE — 2580000003 HC RX 258: Performed by: NURSE ANESTHETIST, CERTIFIED REGISTERED

## 2022-04-06 PROCEDURE — 0DP6XUZ REMOVAL OF FEEDING DEVICE FROM STOMACH, EXTERNAL APPROACH: ICD-10-PCS | Performed by: SURGERY

## 2022-04-06 PROCEDURE — 80048 BASIC METABOLIC PNL TOTAL CA: CPT

## 2022-04-06 PROCEDURE — 3700000001 HC ADD 15 MINUTES (ANESTHESIA): Performed by: SURGERY

## 2022-04-06 PROCEDURE — 3E0G76Z INTRODUCTION OF NUTRITIONAL SUBSTANCE INTO UPPER GI, VIA NATURAL OR ARTIFICIAL OPENING: ICD-10-PCS | Performed by: SURGERY

## 2022-04-06 PROCEDURE — 6360000002 HC RX W HCPCS: Performed by: NURSE PRACTITIONER

## 2022-04-06 PROCEDURE — 2709999900 HC NON-CHARGEABLE SUPPLY: Performed by: SURGERY

## 2022-04-06 PROCEDURE — 6360000002 HC RX W HCPCS: Performed by: SURGERY

## 2022-04-06 PROCEDURE — 7100000010 HC PHASE II RECOVERY - FIRST 15 MIN: Performed by: SURGERY

## 2022-04-06 PROCEDURE — 83735 ASSAY OF MAGNESIUM: CPT

## 2022-04-06 PROCEDURE — G0378 HOSPITAL OBSERVATION PER HR: HCPCS

## 2022-04-06 PROCEDURE — 6360000002 HC RX W HCPCS: Performed by: NURSE ANESTHETIST, CERTIFIED REGISTERED

## 2022-04-06 PROCEDURE — 84132 ASSAY OF SERUM POTASSIUM: CPT

## 2022-04-06 PROCEDURE — 0DH63UZ INSERTION OF FEEDING DEVICE INTO STOMACH, PERCUTANEOUS APPROACH: ICD-10-PCS | Performed by: SURGERY

## 2022-04-06 PROCEDURE — 2500000003 HC RX 250 WO HCPCS

## 2022-04-06 PROCEDURE — 6360000002 HC RX W HCPCS: Performed by: FAMILY MEDICINE

## 2022-04-06 RX ORDER — MAGNESIUM SULFATE IN WATER 40 MG/ML
2000 INJECTION, SOLUTION INTRAVENOUS ONCE
Status: COMPLETED | OUTPATIENT
Start: 2022-04-06 | End: 2022-04-06

## 2022-04-06 RX ORDER — DEXTROSE MONOHYDRATE 25 G/50ML
25 INJECTION, SOLUTION INTRAVENOUS ONCE
Status: DISCONTINUED | OUTPATIENT
Start: 2022-04-06 | End: 2022-04-06

## 2022-04-06 RX ORDER — MAGNESIUM SULFATE IN WATER 40 MG/ML
2000 INJECTION, SOLUTION INTRAVENOUS ONCE
Status: DISCONTINUED | OUTPATIENT
Start: 2022-04-06 | End: 2022-04-06

## 2022-04-06 RX ORDER — SODIUM CHLORIDE 9 MG/ML
INJECTION, SOLUTION INTRAVENOUS CONTINUOUS PRN
Status: DISCONTINUED | OUTPATIENT
Start: 2022-04-06 | End: 2022-04-06 | Stop reason: SDUPTHER

## 2022-04-06 RX ORDER — PROPOFOL 10 MG/ML
INJECTION, EMULSION INTRAVENOUS PRN
Status: DISCONTINUED | OUTPATIENT
Start: 2022-04-06 | End: 2022-04-06 | Stop reason: SDUPTHER

## 2022-04-06 RX ORDER — CEFAZOLIN SODIUM 1 G/3ML
2000 INJECTION, POWDER, FOR SOLUTION INTRAMUSCULAR; INTRAVENOUS ONCE
Status: COMPLETED | OUTPATIENT
Start: 2022-04-06 | End: 2022-04-06

## 2022-04-06 RX ORDER — DEXTROSE MONOHYDRATE 25 G/50ML
INJECTION, SOLUTION INTRAVENOUS
Status: COMPLETED
Start: 2022-04-06 | End: 2022-04-06

## 2022-04-06 RX ORDER — POTASSIUM CHLORIDE 7.45 MG/ML
10 INJECTION INTRAVENOUS
Status: DISPENSED | OUTPATIENT
Start: 2022-04-06 | End: 2022-04-06

## 2022-04-06 RX ADMIN — MAGNESIUM SULFATE HEPTAHYDRATE 2000 MG: 40 INJECTION, SOLUTION INTRAVENOUS at 09:17

## 2022-04-06 RX ADMIN — PROPOFOL 20 MG: 10 INJECTION, EMULSION INTRAVENOUS at 15:48

## 2022-04-06 RX ADMIN — PROPOFOL 30 MG: 10 INJECTION, EMULSION INTRAVENOUS at 15:46

## 2022-04-06 RX ADMIN — POTASSIUM CHLORIDE 10 MEQ: 7.46 INJECTION, SOLUTION INTRAVENOUS at 13:18

## 2022-04-06 RX ADMIN — PROPOFOL 30 MG: 10 INJECTION, EMULSION INTRAVENOUS at 15:47

## 2022-04-06 RX ADMIN — ATORVASTATIN CALCIUM 40 MG: 40 TABLET, FILM COATED ORAL at 20:11

## 2022-04-06 RX ADMIN — PROPOFOL 30 MG: 10 INJECTION, EMULSION INTRAVENOUS at 15:49

## 2022-04-06 RX ADMIN — PROPOFOL 20 MG: 10 INJECTION, EMULSION INTRAVENOUS at 15:55

## 2022-04-06 RX ADMIN — POTASSIUM CHLORIDE 10 MEQ: 7.46 INJECTION, SOLUTION INTRAVENOUS at 05:10

## 2022-04-06 RX ADMIN — POTASSIUM CHLORIDE 10 MEQ: 7.46 INJECTION, SOLUTION INTRAVENOUS at 06:26

## 2022-04-06 RX ADMIN — POTASSIUM CHLORIDE 10 MEQ: 7.46 INJECTION, SOLUTION INTRAVENOUS at 02:20

## 2022-04-06 RX ADMIN — DEXTROSE MONOHYDRATE 25 G: 25 INJECTION, SOLUTION INTRAVENOUS at 15:26

## 2022-04-06 RX ADMIN — POTASSIUM CHLORIDE 10 MEQ: 7.46 INJECTION, SOLUTION INTRAVENOUS at 00:58

## 2022-04-06 RX ADMIN — CEFAZOLIN 2000 MG: 1 INJECTION, POWDER, FOR SOLUTION INTRAMUSCULAR; INTRAVENOUS at 15:43

## 2022-04-06 RX ADMIN — SODIUM CHLORIDE: 9 INJECTION, SOLUTION INTRAVENOUS at 14:50

## 2022-04-06 RX ADMIN — PROPOFOL 50 MG: 10 INJECTION, EMULSION INTRAVENOUS at 15:45

## 2022-04-06 RX ADMIN — POTASSIUM CHLORIDE 10 MEQ: 7.46 INJECTION, SOLUTION INTRAVENOUS at 11:22

## 2022-04-06 RX ADMIN — PROPOFOL 20 MG: 10 INJECTION, EMULSION INTRAVENOUS at 15:53

## 2022-04-06 ASSESSMENT — ENCOUNTER SYMPTOMS
RHINORRHEA: 0
SORE THROAT: 0
VOMITING: 0
ABDOMINAL PAIN: 0
BACK PAIN: 0
NAUSEA: 0
COUGH: 0
SHORTNESS OF BREATH: 0
DIARRHEA: 0

## 2022-04-06 ASSESSMENT — PAIN SCALES - GENERAL
PAINLEVEL_OUTOF10: 0

## 2022-04-06 NOTE — ANESTHESIA POSTPROCEDURE EVALUATION
Department of Anesthesiology  Postprocedure Note    Patient: Po Castillo  MRN: [id number]  [place]: 1940  Date of evaluation: 4/6/2022  Time:  4:08 PM     Procedure Summary     Date: 04/06/22 Room / Location: [facility] 03 / Andie Toth    Anesthesia Start: 3332 Anesthesia Stop: 7248    Procedure: EGD PEG TUBE PLACEMENT (N/A ) Diagnosis: (/)    Surgeons: Charlotte Degroot MD Responsible Provider: Christina Benjamin MD    Anesthesia Type: MAC ASA Status: 3          Anesthesia Type: MAC    Will Phase I:      Will Phase II:      Last vitals: Reviewed and per EMR flowsheets.        Anesthesia Post Evaluation    Patient location during evaluation: bedside  Patient participation: complete - patient participated  Level of consciousness: awake and alert  Pain score: 0  Airway patency: patent  Nausea & Vomiting: no vomiting and no nausea  Complications: no  Cardiovascular status: hemodynamically stable  Respiratory status: acceptable and spontaneous ventilation  Hydration status: stable

## 2022-04-06 NOTE — H&P
[facility]  History and Physical      CHIEF COMPLAINT:    Chief Complaint   Patient presents with    G Tube Complications     pulled peg at home        Patient of Martha Adams MD presents with:  Complication of gastrostomy tube [facility])    History of Present Illness:   Patient is an 60-year-old male with a past medical history of AAA, CAD, colon cancer, HLD, HTN, neuroendocrine cancer who presents to the emergency room for a complication of his gastrostomy tube. Patient states that he was walking around his bed when the tubing from his PEG tube got caught around the pole of his bed frame and was accidentally pulled out. Upon arrival to emergency patient had a CT abdomen which revealed a malpositioned gastrostomy tube, a partial small bowel obstruction, as well as a mass involving medial wall of the cecum likely related to patient's history of carcinoid tumor, another 5.6 x 4.2 cm mass in the mid pelvis likely involving the sigmoid colon which is tapered. Patient was also found to be hypokalemic upon arrival at 2.7. Patient denies any recent diarrhea, nausea or vomiting. Patient also states he has had regular bowel movements at home. An NG tube was placed to low intermittent suction for his partial bowel obstruction and general surgery was consulted for replacement of PEG tube. Upon assessment patient denies any CP, SOB, abdominal pain, fever, chills, N/V/D. Patient is admitted to intermediate telemetry unit for further work-up and treatment. REVIEW OF SYSTEMS:  Pertinent negatives are above in HPI. 10 point ROS otherwise negative.       Past Medical History:   Diagnosis Date    Aneurysm of abdominal aorta ([facility] [address] 09/18/2013    follows with Dr. Damien Alejandro yearly-2020    CAD (coronary artery disease)     Follows with Dr Deneen Lyon [facility])     Colon    Decreased dorsalis pedis pulse 8/5/2021    GERD (gastroesophageal reflux disease)     Glaucoma     (L) eye    Hyperlipidemia     has never used smokeless tobacco. He reports current alcohol use. He reports that he does not use drugs. Family History:   family history includes Heart Disease in his brother and father. PHYSICAL EXAM:    Vitals:  BP (!) 156/71   Pulse 57   Temp 97.9 °F (36.6 °C) (Oral)   Resp 16   Ht 6' 1\" (1.854 m)   Wt 148 lb 9.6 oz (67.4 kg)   SpO2 98%   BMI 19.61 kg/m²       General appearance: NAD, conversant, frail   Eyes: Sclerae anicteric, PERRLA  HEENT: AT/NC, MMM  Neck: FROM, supple, no thyromegaly  Lymph: No cervical / supraclavicular lymphadenopathy  Lungs: Clear to auscultation, WOB normal  CV: Bradycardia, no MRGs, no lower extremity edema  Abdomen: Soft, non-tender;  +BS, old PEG site  Extremities: FROM without synovitis. No clubbing or cyanosis of the hands. Skin: no rash, induration, lesions, or ulcers  Psych: Calm and cooperative. Normal judgement and insight. Normal mood and affect. Neuro: Alert and interactive, face symmetric, speech fluent. LABS:  All labs reviewed. Of note:  CBC:   Lab Results   Component Value Date    WBC 4.4 03/18/2022    RBC 3.00 03/18/2022    HGB 9.7 03/18/2022    HCT 28.4 03/18/2022    MCV 94.7 03/18/2022    MCH 32.3 03/18/2022    MCHC 34.2 03/18/2022    RDW 15.7 03/18/2022     03/18/2022    MPV 12.3 03/18/2022     BMP:    Lab Results   Component Value Date     04/06/2022    K 3.3 04/06/2022     04/06/2022    CO2 34 04/06/2022    BUN 19 04/06/2022    LABALBU 3.7 02/26/2022    LABALBU 4.6 06/03/2012    CREATININE 0.8 04/06/2022    CALCIUM 8.0 04/06/2022    GFRAA >60 04/06/2022    LABGLOM >60 04/06/2022    GLUCOSE 60 04/06/2022    GLUCOSE 125 06/03/2012       Imaging:  CT Abdomen: Malpositioned gastrostomy tube, which terminates outside the stomach along the anterior wall of the gastric antrum. Dilated small bowel loops which may be due to ileus or obstruction.   3.9 x 3.7 cm mass in the right lower quadrant involving the medial wall of the cecum likely related to patient's history of carcinoid tumor. Another 5.6 x 4.2 cm mass in the mid pelvis likely involving the sigmoid colon which is tapered. This mass may represent metastatic disease or a second primary. EKG:  One not obtained this admission, previous EKG demonstrates SB    Telemetry:  Sinus bradycardia     ASSESSMENT/PLAN:  Principal Problem:    Complication of gastrostomy tube ([address]  Active Problems:    Hypokalemia    Small bowel obstruction (HCC)  Resolved Problems:    * No resolved hospital problems. *    Patient is a [de-identified]year old male with a PMH of metastatic neuroendocrine cancer is admitted to [facility] for  Complications of G-tube  -Monitor labs  -K+ 2.7 > 3.2, repeat BMP once supplemental K has been given  -General surgery consulted  -OR today for EGD with PEG placement  -NGT to LIS for partial SBO  -NPO  -IVF NS @ 51       DVT prophylaxis PCD's  PT OT  Discharge planning    Code status: Full  Requires Inpatient level of care  TIFFANIE House CNP    3:23 PM  4/6/2022     Above note edited to reflect my thoughts     I personally saw, examined and provided care for the patient. Radiographs, labs and medication list were reviewed by me independently. The case was discussed in detail and plans for care were established. Review of JADE House   , documentation was conducted and revisions were made as appropriate directly by me. I agree with the above documented exam, problem list, and plan of care.      Kathryn Jones MD  10:07 PM  4/6/2022

## 2022-04-06 NOTE — OP NOTE
Operative Note      Patient: Johnny Adams  YOB: 1940  MRN: [id number]    Date of Procedure: 4/6/2022    Pre-Op Diagnosis:     Metastatic neuroendocrine tumor  Chronic small bowel obstruction  Post-Op Diagnosis: Same       Procedure(s):  EGD PEG TUBE PLACEMENT    Surgeon(s):  Liang Atwood MD    Assistant:   * No surgical staff found *    Anesthesia: Monitor Anesthesia Care    Estimated Blood Loss (mL): Minimal    Complications: None    Specimens:   * No specimens in log *    Implants:  * No implants in log *      Drains:   Gastrostomy/Enterostomy/Jejunostomy Percutaneous Endoscopic Gastrostomy (PEG) (Active)       Gastrostomy/Enterostomy/Jejunostomy Percutaneous Endoscopic Gastrostomy (PEG) LUQ 20 fr (Active)   Site Description Healing 04/06/22 1559   Drain Status Clamped 04/06/22 1559   Dressing Status Clean;Dry; Intact 04/06/22 1644   Dressing Type Dry dressing 04/06/22 1644       [REMOVED] NG/OG/NJ/NE Tube Nasogastric 16 fr Right nostril (Removed)   Securement device Yes 04/05/22 1830   Status Suction-low intermittent 04/05/22 1830   Placement Verified by X-Ray (Initial) 04/05/22 1830   NG/OG/NJ/NE External Measurement (cm) 62 cm 04/05/22 1830   Drainage Appearance Clear 04/05/22 1830       Findings: As above    Detailed Description of Procedure: The patient was brought to the endoscopy suite and placed on the table in the left lateral decubitus position. Patient received anesthesia per the department of anesthesiology. The abdominal wall was prepared and draped in a sterile fashion. A 1% lidocaine solution was used to anesthetize the skin. A 11 blade scalpel was used to make the skin incision. The endoscope was passed through the lumen of the esophagus, stomach and duodenum. The endoscope was retrieved the stomach. An appropriate location on the abdominal wall was identified with transillumination and palpation. The needle was placed through the incision into the lumen of the stomach.   A Patient has a question about disability paperwork. Please call.    guidewire was placed grasped pulled through the mouth. The PEG tube was applied and pulled through the abdominal wall. The endoscope was reinserted and placement was verified. The patient had a small nylon suture placed for some bleeding at the incision. A dry dressing was applied after the appropriate bolster was applied.     Electronically signed by Karen Dobbins MD on 4/6/2022 at 4:47 PM

## 2022-04-06 NOTE — PROGRESS NOTES
[facility] Quality Flow/Interdisciplinary Rounds Progress Note        Quality Flow Rounds held on April 6, 2022    Disciplines Attending:  Bedside Nurse, ,  and Nursing Unit Leadership    Radha Barrett was admitted on 4/5/2022 11:41 AM    Anticipated Discharge Date:  Expected Discharge Date: 04/08/22    Disposition:    Reid Score:  Reid Scale Score: 16    Readmission Risk              Risk of Unplanned Readmission:  14           Discussed patient goal for the day, patient clinical progression, and barriers to discharge. The following Goal(s) of the Day/Commitment(s) have been identified:  Peg placement planned today, PT/OT to see.        Tasneem Lawrence RN  April 6, 2022

## 2022-04-07 VITALS
OXYGEN SATURATION: 99 % | WEIGHT: 147 LBS | SYSTOLIC BLOOD PRESSURE: 115 MMHG | DIASTOLIC BLOOD PRESSURE: 59 MMHG | TEMPERATURE: 97.7 F | HEIGHT: 73 IN | HEART RATE: 54 BPM | RESPIRATION RATE: 17 BRPM | BODY MASS INDEX: 19.48 KG/M2

## 2022-04-07 LAB
ANION GAP SERPL CALCULATED.3IONS-SCNC: 7 MMOL/L (ref 7–16)
BUN BLDV-MCNC: 21 MG/DL (ref 6–23)
CALCIUM SERPL-MCNC: 8.1 MG/DL (ref 8.6–10.2)
CHLORIDE BLD-SCNC: 102 MMOL/L (ref 98–107)
CO2: 34 MMOL/L (ref 22–29)
CREAT SERPL-MCNC: 0.8 MG/DL (ref 0.7–1.2)
GFR AFRICAN AMERICAN: >60
GFR NON-AFRICAN AMERICAN: >60 ML/MIN/1.73
GLUCOSE BLD-MCNC: 99 MG/DL (ref 74–99)
HCT VFR BLD CALC: 31 % (ref 37–54)
HEMOGLOBIN: 10.6 G/DL (ref 12.5–16.5)
MCH RBC QN AUTO: 32.8 PG (ref 26–35)
MCHC RBC AUTO-ENTMCNC: 34.2 % (ref 32–34.5)
MCV RBC AUTO: 96 FL (ref 80–99.9)
PDW BLD-RTO: 15.4 FL (ref 11.5–15)
PLATELET # BLD: 152 E9/L (ref 130–450)
PMV BLD AUTO: 11.3 FL (ref 7–12)
POTASSIUM SERPL-SCNC: 3.1 MMOL/L (ref 3.5–5)
POTASSIUM SERPL-SCNC: 3.9 MMOL/L (ref 3.5–5)
RBC # BLD: 3.23 E12/L (ref 3.8–5.8)
SODIUM BLD-SCNC: 143 MMOL/L (ref 132–146)
WBC # BLD: 5 E9/L (ref 4.5–11.5)

## 2022-04-07 PROCEDURE — 80048 BASIC METABOLIC PNL TOTAL CA: CPT

## 2022-04-07 PROCEDURE — 6360000002 HC RX W HCPCS: Performed by: NURSE PRACTITIONER

## 2022-04-07 PROCEDURE — 84132 ASSAY OF SERUM POTASSIUM: CPT

## 2022-04-07 PROCEDURE — 6370000000 HC RX 637 (ALT 250 FOR IP): Performed by: FAMILY MEDICINE

## 2022-04-07 PROCEDURE — 36415 COLL VENOUS BLD VENIPUNCTURE: CPT

## 2022-04-07 PROCEDURE — 85027 COMPLETE CBC AUTOMATED: CPT

## 2022-04-07 PROCEDURE — G0378 HOSPITAL OBSERVATION PER HR: HCPCS

## 2022-04-07 RX ADMIN — POTASSIUM CHLORIDE 10 MEQ: 7.46 INJECTION, SOLUTION INTRAVENOUS at 12:49

## 2022-04-07 RX ADMIN — POTASSIUM CHLORIDE 10 MEQ: 7.46 INJECTION, SOLUTION INTRAVENOUS at 11:28

## 2022-04-07 RX ADMIN — POTASSIUM CHLORIDE 20 MEQ: 20 TABLET, EXTENDED RELEASE ORAL at 08:33

## 2022-04-07 RX ADMIN — POTASSIUM CHLORIDE 10 MEQ: 7.46 INJECTION, SOLUTION INTRAVENOUS at 08:50

## 2022-04-07 RX ADMIN — CARVEDILOL 6.25 MG: 6.25 TABLET, FILM COATED ORAL at 08:33

## 2022-04-07 RX ADMIN — POTASSIUM CHLORIDE 10 MEQ: 7.46 INJECTION, SOLUTION INTRAVENOUS at 10:08

## 2022-04-07 ASSESSMENT — PAIN SCALES - GENERAL: PAINLEVEL_OUTOF10: 0

## 2022-04-07 NOTE — PROGRESS NOTES
GENERAL SURGERY PROGRESS NOTE:    Pt seen and examined. No acute issues overnight. PEG in place at 2.5 cm with bumper easily rotated. Abdomen is soft, nontender, nondistended. Okay to eat and medications through PEG. Management per primary. Surgery will sign off.     Electronically signed by Velva Prader, DO on 4/7/2022 at 5:57 AM

## 2022-04-07 NOTE — DISCHARGE SUMMARY
Nicole [address]   Discharge summary   Patient ID:  Linda Barber  [id number]  [de-identified] y.o.  1940    Admit date: 4/5/2022    Discharge date and time: 4/7/2022    Admission Diagnoses:   Patient Active Problem List   Diagnosis    CAD (coronary artery disease)    LV dysfunction    Hyperlipidemia    Hypertension    Abdominal aortic aneurysm (AAA) without rupture (HCC)    Glaucoma    S/P carotid endarterectomy    Carotid bruit    H/O: CVA (cerebrovascular accident)    Post-op pain    Abdominal pain    Decreased dorsalis pedis pulse    Numbness and tingling of both feet    Volume overload    Hypokalemia    Complication of gastrostomy tube (HCC)    Small bowel obstruction ([address]       Discharge Diagnoses: PEG tube malfunction     Consults: general surgery    Procedures: EGD with PEG tube placement    Hospital Course:   Patient is a [de-identified]year old male with a PMH of metastatic neuroendocrine cancer is admitted to [facility] for  Complications of G-tube  -Monitor labs  -K+ 2.7 > 3.2, repeat BMP once supplemental K has been given> 3.1 today, 40 meq IV given, repeat potassium    -General surgery consulted  -POD #1 EGD with PEG placement  -NGT to LIS for partial SBO > discontinued   -IVF NS @ 51 > Discontinued     Recent Labs     04/07/22  0658   WBC 5.0   HGB 10.6*   HCT 31.0*          Recent Labs     04/05/22  1845 04/06/22  0400 04/06/22  0925 04/06/22  1407 04/07/22  0658     --   --  144 143   K 2.7*   < > 3.2* 3.3* 3.1*   CL 96*  --   --  101 102   CO2 35*  --   --  34* 34*   BUN 20  --   --  19 21   CREATININE 0.9  --   --  0.8 0.8   CALCIUM 8.4*  --   --  8.0* 8.1*    < > = values in this interval not displayed.        CT ABDOMEN PELVIS WO CONTRAST Additional Contrast? None    Result Date: 4/5/2022  EXAMINATION: CT OF THE ABDOMEN AND PELVIS WITHOUT CONTRAST 4/5/2022 1:50 pm TECHNIQUE: CT of the abdomen and pelvis was performed without the administration of intravenous contrast. Multiplanar reformatted images are provided for review. Dose modulation, iterative reconstruction, and/or weight based adjustment of the mA/kV was utilized to reduce the radiation dose to as low as reasonably achievable. COMPARISON: CT abdomen and pelvis, 02/26/2022. HISTORY: ORDERING SYSTEM PROVIDED HISTORY: g tube malposition TECHNOLOGIST PROVIDED HISTORY: Additional Contrast?->None Reason for exam:->g tube malposition Decision Support Exception - unselect if not a suspected or confirmed emergency medical condition->Emergency Medical Condition (MA) FINDINGS: Lower Chest: The lung bases are clear. The heart is normal in size. No pleural or pericardial effusion. Organs: Liver: Unremarkable. Gallbladder: Unremarkable. Pancreas: Moderately atrophied. Otherwise, grossly unremarkable. Spleen:  Unremarkable. Adrenals: Unremarkable. Kidneys: Unremarkable. GI/Bowel: The tip of the gastrostomy tube is malpositioned and terminates along the anterior aspect of the gastric antrum. Contrast injected into it is seen extravasating into the peritoneal cavity. There is stable masslike mural thickening in the ascending colon, associated with a calcified mass in the adjacent mesentery, likely related patient's history of carcinoid tumor (series 2, image 104). The mass appears to extend to the medial cecal wall and measures approximately 3.9 x 3.7 cm in the maximum coronal plane. There is moderate fecal retention throughout the colon, with another site of mural thickening in the sigmoid colon. Adjacent to the sigmoid colon and contiguous with the superior aspect of the seminal vesicles, there is a 5.6 x 4.2 cm spiculated mass which may represent metastatic disease or 2nd primary. There is mild distension of small bowel loops which may be due to ileus or obstruction. . Pelvis: Prostate gland is enlarged. The urinary bladder is grossly unremarkable. As mentioned above, the superior aspects of the seminal vesicles are contiguous with the mesenteric mass. Peritoneum/Retroperitoneum: Prominent calcified atherosclerosis in the abdominal aorta and pelvic arteries. The abdominal aorta is borderline aneurysmal to 3.0 cm. No lymphadenopathy. Bones/Soft Tissues: The visualized bones are intact without fracture or focal lesion. 1. MALPOSITIONED GASTROSTOMY TUBE, which terminates outside the stomach along the anterior wall of the gastric antrum. Contrast injected through the gastrostomy is seen extravasating into the peritoneal cavity. 2. Dilated small bowel loops which may be due to ILEUS OR OBSTRUCTION. 3. 3.9 x 3.7 cm MASS in the right lower quadrant, involving the medial wall of the cecum, likely related to patient's history of CARCINOID TUMOR. 4. ANOTHER 5.6 x 4.2 cm MASS in the mid pelvis, likely involving the sigmoid colon which is tapered. The mass is inferiorly contiguous with the seminal vesicles. This mass may represent metastatic disease or a 2nd primary. Consider colonoscopy. 5. Tapering of the sigmoid colon, as described above, is likely partially obstructive. RECOMMENDATIONS: Unavailable     XR ABDOMEN FOR NG/OG/NE TUBE PLACEMENT    Result Date: 4/5/2022  EXAMINATION: ONE SUPINE XRAY VIEW(S) OF THE ABDOMEN 4/5/2022 12:59 pm COMPARISON: 08/11/2021 abdomen radiographs and 02/26/2022 abdomen/pelvis CT. HISTORY: ORDERING SYSTEM PROVIDED HISTORY: Confirm G-tube placement TECHNOLOGIST PROVIDED HISTORY: Gastrografin study Reason for exam:->Confirm G-tube placement FINDINGS: Initial images demonstrates percutaneous gastrostomy tube left upper abdomen with bowel gas throughout the abdomen. Lung bases are clear. 2nd image demonstrates contrast introduced via the G-tube with some of the contrast in stomach however there is contrast extravasation as well suggesting malposition. Contrast extravasation suggesting malposition of the G-tube.      XR CHEST ABDOMEN NG PLACEMENT    Result Date: 4/5/2022  EXAMINATION: ONE SUPINE XRAY VIEW(S) OF THE ABDOMEN 4/5/2022 5:39 pm COMPARISON: The previous study performed earlier in the day. HISTORY: ORDERING SYSTEM PROVIDED HISTORY: NG placement TECHNOLOGIST PROVIDED HISTORY: Reason for exam:->NG placement FINDINGS: An NG tube is identified, with the tip in the stomach. The side port is noted by the GE junction. The tube can be placed slightly farther into the stomach. A previously identified G-tube is not seen on this study. The visualized bowel gas pattern is unremarkable and there is no evidence of obstruction or gross free air. The visualized lungs are clear. Nipple shadows are noted. NG tube seen, with tip in the stomach. The side-port is noted by the GE junction. The tube can be placed slightly farther into the stomach. Discharge Exam:    HEENT: NCAT,  PERRLA, No JVD  Heart:  RRR, no murmurs, gallops, or rubs. Lungs:  CTA bilaterally, no wheeze, rales or rhonchi  Abd: bowel sounds present, nontender, nondistended, no masses  Extrem:  No clubbing, cyanosis, or edema    Disposition: home     Patient Condition at Discharge: Stable     Patient Instructions:      Medication List        CHANGE how you take these medications      Bumex 2 MG tablet  Generic drug: bumetanide  What changed: Another medication with the same name was removed. Continue taking this medication, and follow the directions you see here. CONTINUE taking these medications      aspirin 81 MG chewable tablet     atorvastatin 40 MG tablet  Commonly known as: LIPITOR  Take 1 tablet by mouth nightly     carvedilol 6.25 MG tablet  Commonly known as: COREG  Take 1 tablet by mouth 2 times daily (with meals)     potassium chloride 10 MEQ extended release tablet  Commonly known as: KLOR-CON M  Take 2 tablets by mouth daily     Somatuline Depot 120 MG/0.5ML Soln depot injection  Generic drug: lanreotide acetate            Activity: activity as tolerated  Diet: regular diet    Pt has been advised to:     Follow-up with Amie Mendoza MD in 1 week.  Follow-up with consultants as recommended by them    Note that over 30 minutes was spent in preparing discharge papers, discussing discharge with patient, medication review, etc.    Signed:  Ena Councilman, APRN - CNP  4/7/2022  4:22 PM    Above note edited to reflect my thoughts     I personally saw, examined and provided care for the patient. Radiographs, labs and medication list were reviewed by me independently. The case was discussed in detail and plans for care were established. Review of Ena Councilman, APRN-CNP   , documentation was conducted and revisions were made as appropriate directly by me. I agree with the above documented exam, problem list, and plan of care.      Suzette Garza MD  4/7/2022

## 2022-04-07 NOTE — CARE COORDINATION
Social work / Discharge planning:        Social work met with patient for initial assessment. He has been living with his sister in [place], [place] and was supposed to leave for [place] the day he was admitted to the hospital.    Patient states that he ambulates independently using no DME. He used home care in the past but cannot recall which one. No history of REGINA. Patient denies having any discharge needs at this time.    Electronically signed by REGGIE Garrison on 4/7/2022 at 1:47 PM

## 2022-04-07 NOTE — PROGRESS NOTES
[facility] Quality Flow/Interdisciplinary Rounds Progress Note        Quality Flow Rounds held on April 7, 2022    Disciplines Attending:  Bedside Nurse, ,  and Nursing Unit Leadership    Linda Barber was admitted on 4/5/2022 11:41 AM    Anticipated Discharge Date:  Expected Discharge Date: 04/08/22    Disposition:    Reid Score:  Reid Scale Score: 16    Readmission Risk              Risk of Unplanned Readmission:  15           Discussed patient goal for the day, patient clinical progression, and barriers to discharge. The following Goal(s) of the Day/Commitment(s) have been identified:  Gen Surg signed off-check discharge home today.       Benjamín Kenyon RN  April 7, 2022

## 2022-04-28 ENCOUNTER — HOSPITAL ENCOUNTER (OUTPATIENT)
Dept: OTHER | Age: 82
Setting detail: THERAPIES SERIES
Discharge: HOME | End: 2022-04-28
Payer: MEDICARE

## 2022-04-28 VITALS
WEIGHT: 133.8 LBS | RESPIRATION RATE: 16 BRPM | DIASTOLIC BLOOD PRESSURE: 57 MMHG | BODY MASS INDEX: 17.65 KG/M2 | SYSTOLIC BLOOD PRESSURE: 95 MMHG | HEART RATE: 66 BPM | OXYGEN SATURATION: 97 %

## 2022-04-28 LAB
ANION GAP SERPL CALCULATED.3IONS-SCNC: 9 MMOL/L (ref 7–16)
BUN BLDV-MCNC: 27 MG/DL (ref 6–23)
CALCIUM SERPL-MCNC: 8.5 MG/DL (ref 8.6–10.2)
CHLORIDE BLD-SCNC: 98 MMOL/L (ref 98–107)
CO2: 33 MMOL/L (ref 22–29)
CREAT SERPL-MCNC: 0.9 MG/DL (ref 0.7–1.2)
GFR AFRICAN AMERICAN: >60
GFR NON-AFRICAN AMERICAN: >60 ML/MIN/1.73
GLUCOSE BLD-MCNC: 120 MG/DL (ref 74–99)
POTASSIUM SERPL-SCNC: 3.6 MMOL/L (ref 3.5–5)
PRO-BNP: 604 PG/ML (ref 0–450)
SODIUM BLD-SCNC: 140 MMOL/L (ref 132–146)

## 2022-04-28 PROCEDURE — 36415 COLL VENOUS BLD VENIPUNCTURE: CPT

## 2022-04-28 PROCEDURE — 83880 ASSAY OF NATRIURETIC PEPTIDE: CPT

## 2022-04-28 PROCEDURE — 99214 OFFICE O/P EST MOD 30 MIN: CPT

## 2022-04-28 PROCEDURE — 80048 BASIC METABOLIC PNL TOTAL CA: CPT

## 2022-04-28 NOTE — PROGRESS NOTES
Congestive Heart Failure [address]   1940    Referring Provider: Dr. Mala Lewis  Primary Care Physician: Dr. Sabrina Gavin  Cardiologist: Dr. Federico Larose  Nephrologist:     History of Present Illness:     Claudette Sidle is a [de-identified] y.o. male with a history of HFrEF, most recent EF 15% 12/18/2021. Patient Story:    He does not have dyspnea with exertion, shortness of breath, or decline in overall functional capacity. He does not have orthopnea, PND, nocturnal cough or hemoptysis. He does not have abdominal distention or bloating, early satiety, anorexia/change in appetite. He does have a good urinary response to oral diuretic. He does present with trace lower extremity edema. He does have lightheadedness, dizziness. He denies palpitations, syncope or near syncope. He does not complain of chest pain, pressure, discomfort. No Known Allergies    Prior to Visit Medications    Medication Sig Taking? Authorizing Provider   bumetanide (BUMEX) 2 MG tablet Take 1 mg by mouth every morning  Historical Provider, MD   lanreotide acetate (SOMATULINE DEPOT) 120 MG/0.5ML SOLN depot injection Inject 120 mg into the skin every 28 days NEXT INJECTION DUE: 04/27/2022  -[address]-  Historical Provider, MD   potassium chloride (KLOR-CON M) 10 MEQ extended release tablet Take 2 tablets by mouth daily  Walter Watkins DO   carvedilol (COREG) 6.25 MG tablet Take 1 tablet by mouth 2 times daily (with meals)  TIFFANIE Brown - CNP   atorvastatin (LIPITOR) 40 MG tablet Take 1 tablet by mouth nightly  Mary Lou De La Torre APRN - CNP   aspirin 81 MG chewable tablet Take 81 mg by mouth every morning   Historical Provider, MD     Guideline directed medical:  ARNI/ACE I/ARB: No  Beta blocker:   Yes  Aldosterone antagonist: No      Physical Examination:     BP (!) 95/57   Pulse 66   Resp 16   Wt 133 lb 12.8 oz (60.7 kg)   SpO2 97%   BMI 17.65 kg/m² Assessment  Charting Type: Shift assessment (chf clinic)    Neurological  Level of Consciousness: Alert (0)  Orientation Level: Oriented X4          Respiratory  Respiratory Pattern: Regular  Respiratory Depth: Normal  Respiratory Quality/Effort: Unlabored  Chest Assessment: Chest expansion symmetrical,Trachea midline  L Breath Sounds: Clear,Diminished  R Breath Sounds: Clear,Diminished        Cardiac  Cardiac Regularity: Regular  Heart Sounds: S1, S2  Cardiac Rhythm: Sinus rhythm    Rhythm Interpretation  Pulse: 66     Gastrointestinal  Abdominal (WDL): Exceptions to WDL  Abdomen Inspection: Soft (PEG tube)           Peripheral Vascular  Peripheral Vascular (WDL): Exceptions to WDL  RLE Edema: Trace  LLE Edema: Trace        Musculoskeletal  RL Extremity: Weakness  LL Extremity: Weakness                    Pulse: 66           LAB DATA:  Last 3 BMP      Sodium (mmol/L)   Date Value   04/28/2022 140   04/07/2022 143   04/06/2022 144     Potassium (mmol/L)   Date Value   04/28/2022 3.6   04/07/2022 3.9   04/07/2022 3.1 (L)     Potassium reflex Magnesium (mmol/L)   Date Value   04/06/2022 3.3 (L)   02/26/2022 2.7 (LL)   12/17/2021 3.4 (L)     Chloride (mmol/L)   Date Value   04/28/2022 98   04/07/2022 102   04/06/2022 101     CO2 (mmol/L)   Date Value   04/28/2022 33 (H)   04/07/2022 34 (H)   04/06/2022 34 (H)     BUN (mg/dL)   Date Value   04/28/2022 27 (H)   04/07/2022 21   04/06/2022 19     Glucose (mg/dL)   Date Value   04/28/2022 120 (H)   04/07/2022 99   04/06/2022 60 (L)   06/03/2012 125 (H)     Calcium (mg/dL)   Date Value   04/28/2022 8.5 (L)   04/07/2022 8.1 (L)   04/06/2022 8.0 (L)       Last 3 BNP       Pro-BNP (pg/mL)   Date Value   04/28/2022 604 (H)   03/02/2022 1,169 (H)   02/09/2022 1,240 (H)      CBC: No results for input(s): WBC, HGB, PLT in the last 72 hours.   BMP:    Recent Labs     04/28/22  1346      K 3.6   CL 98   CO2 33*   BUN 27*   CREATININE 0.9   GLUCOSE 120*     Hepatic: No results for input(s): AST, ALT, ALB, BILITOT, ALKPHOS in the last 72 hours. Troponin: No results for input(s): TROPONINI in the last 72 hours. BNP: No results for input(s): BNP in the last 72 hours. Lipids: No results for input(s): CHOL, HDL in the last 72 hours. Invalid input(s): LDLCALCU  INR: No results for input(s): INR in the last 72 hours. WEIGHTS:    Wt Readings from Last 3 Encounters:   04/28/22 133 lb 12.8 oz (60.7 kg)   04/07/22 147 lb (66.7 kg)   03/18/22 140 lb (63.5 kg)       TELEMETRY:  Cardiac Regularity: Regular  Cardiac Rhythm/Interpretation: Sinus heidy    ASSESSMENT:  Viral Cardenas presented to CHF clinic for follow-up. Patient had a 7 lb weight loss since last appointment in March. On assessment patient has trace edema in BLE. Lungs are clear. Agreeable to labs. Will see Dr. Gibran Rosario in June 6/16- made appt for CHF clinic for week before. .    Interventions completed this visit:  IV diuretics given no  Lab work obtained yes, proBNP, BMP   Reviewed currently prescribed medications with patient, educated on importance of compliance and answered any questions regarding their medication  Educated on signs and symptoms of HF  Educated on low sodium diet    PLAN:  Scheduled to follow up in CHF clinic on  : Future Appointments   Date Time Provider Gretchen Lorenzo   6/10/2022  1:30 PM [facility] ROOM 1 [facility]   6/16/2022  1:15 PM DO Mauri Lovelace [facility]   8/3/2023  9:00 AM Chris Quiroz MD [facility]     Given clinic phone number [phone] and aware of signs and symptoms to call with any HF change in symptoms.

## 2022-06-06 ENCOUNTER — TELEPHONE (OUTPATIENT)
Dept: CARDIOLOGY CLINIC | Age: 82
End: 2022-06-06

## 2022-06-06 ENCOUNTER — HOSPITAL ENCOUNTER (OUTPATIENT)
Dept: OTHER | Age: 82
Setting detail: THERAPIES SERIES
Discharge: HOME | End: 2022-06-06
Payer: MEDICARE

## 2022-06-06 VITALS
SYSTOLIC BLOOD PRESSURE: 111 MMHG | OXYGEN SATURATION: 97 % | RESPIRATION RATE: 16 BRPM | DIASTOLIC BLOOD PRESSURE: 60 MMHG | HEART RATE: 61 BPM

## 2022-06-06 LAB
ANION GAP SERPL CALCULATED.3IONS-SCNC: 10 MMOL/L (ref 7–16)
BUN BLDV-MCNC: 26 MG/DL (ref 6–23)
CALCIUM SERPL-MCNC: 8.1 MG/DL (ref 8.6–10.2)
CHLORIDE BLD-SCNC: 100 MMOL/L (ref 98–107)
CO2: 32 MMOL/L (ref 22–29)
CREAT SERPL-MCNC: 1 MG/DL (ref 0.7–1.2)
GFR AFRICAN AMERICAN: >60
GFR NON-AFRICAN AMERICAN: >60 ML/MIN/1.73
GLUCOSE BLD-MCNC: 122 MG/DL (ref 74–99)
POTASSIUM SERPL-SCNC: 3.2 MMOL/L (ref 3.5–5)
PRO-BNP: 591 PG/ML (ref 0–450)
SODIUM BLD-SCNC: 142 MMOL/L (ref 132–146)

## 2022-06-06 PROCEDURE — 80048 BASIC METABOLIC PNL TOTAL CA: CPT

## 2022-06-06 PROCEDURE — 36415 COLL VENOUS BLD VENIPUNCTURE: CPT

## 2022-06-06 PROCEDURE — 99214 OFFICE O/P EST MOD 30 MIN: CPT

## 2022-06-06 PROCEDURE — 83880 ASSAY OF NATRIURETIC PEPTIDE: CPT

## 2022-06-06 NOTE — PROGRESS NOTES
Congestive Heart Failure 2 [address]   1940    Referring Provider: Dr. Jeimy Vogel  Primary Care Physician: Dr. Len Brownlee  Cardiologist: Dr. Flaquito Benitez  Nephrologist:     History of Present Illness:     Jag Molina is a [de-identified] y.o. male with a history of HFrEF, most recent EF 15% 12/18/2021. Patient Story:    He does not have dyspnea with exertion, shortness of breath, or decline in overall functional capacity. He does not have orthopnea, PND, nocturnal cough or hemoptysis. He does not have abdominal distention or bloating, early satiety, anorexia/change in appetite. He does have a good urinary response to oral diuretic. He does not have lower extremity edema. He does have lightheadedness, dizziness. He denies palpitations, syncope or near syncope. He does not complain of chest pain, pressure, discomfort. No Known Allergies    Prior to Visit Medications    Medication Sig Taking? Authorizing Provider   bumetanide (BUMEX) 2 MG tablet Take 1 mg by mouth every morning  Historical Provider, MD   lanreotide acetate (SOMATULINE DEPOT) 120 MG/0.5ML SOLN depot injection Inject 120 mg into the skin every 28 days NEXT INJECTION DUE: 04/27/2022  -[address]-  Historical Provider, MD   potassium chloride (KLOR-CON M) 10 MEQ extended release tablet Take 2 tablets by mouth daily  Santiago Milton DO   carvedilol (COREG) 6.25 MG tablet Take 1 tablet by mouth 2 times daily (with meals)  TIFFANIE Ware - CNP   atorvastatin (LIPITOR) 40 MG tablet Take 1 tablet by mouth nightly  TIFFANIE Ware - CNP   aspirin 81 MG chewable tablet Take 81 mg by mouth every morning   Historical Provider, MD     Guideline directed medical:  ARNI/ACE I/ARB: No  Beta blocker:   Yes  Aldosterone antagonist: No      Physical Examination:     /60   Pulse 61   Resp 16   SpO2 97%     Assessment  Charting Type: Shift assessment (chf clinic)    Neurological  Level of Consciousness: Alert (0)  Orientation Level: Oriented X4  Cognition: Follows commands  Speech: Clear     HEENT (Head, Ears, Eyes, Nose, & Throat)  HEENT (WDL): Exceptions to WDL  Right Eye: Impaired vision,Glasses  Left Eye: Impaired vision,Glasses    Respiratory  Respiratory Pattern: Regular  Respiratory Depth: Normal  Respiratory Quality/Effort: Unlabored  L Breath Sounds: Clear  R Breath Sounds: Clear        Cardiac  Cardiac Regularity: Regular  Heart Sounds: S1, S2  Cardiac Rhythm: Sinus rhythm    Rhythm Interpretation  Heart Rate: 61     Gastrointestinal  Abdominal (WDL): Exceptions to WDL  Abdomen Inspection: Soft (PEG tube)  RUQ Bowel Sounds: Active  LUQ Bowel Sounds: Active  RLQ Bowel Sounds: Active  LLQ Bowel Sounds: Active  Tenderness: Soft      Bowel Sounds  RUQ Bowel Sounds: Active  LUQ Bowel Sounds: Active  RLQ Bowel Sounds: Active  LLQ Bowel Sounds:  Active    Peripheral Vascular  Peripheral Vascular (WDL): Within Defined Limits  Edema: None        Musculoskeletal  RL Extremity: Weakness  LL Extremity: Weakness  Genitourinary  Genitourinary (WDL): Within Defined Limits  Psychosocial  Psychosocial (WDL): Within Defined Limits  Pain Assessment  Pain Assessment: None - Denies Pain           Heart Rate: 61           LAB DATA:  Last 3 BMP      Sodium (mmol/L)   Date Value   06/06/2022 142   04/28/2022 140   04/07/2022 143     Potassium (mmol/L)   Date Value   06/06/2022 3.2 (L)   04/28/2022 3.6   04/07/2022 3.9     Potassium reflex Magnesium (mmol/L)   Date Value   04/06/2022 3.3 (L)   02/26/2022 2.7 (LL)   12/17/2021 3.4 (L)     Chloride (mmol/L)   Date Value   06/06/2022 100   04/28/2022 98   04/07/2022 102     CO2 (mmol/L)   Date Value   06/06/2022 32 (H)   04/28/2022 33 (H)   04/07/2022 34 (H)     BUN (mg/dL)   Date Value   06/06/2022 26 (H)   04/28/2022 27 (H)   04/07/2022 21     Glucose (mg/dL)   Date Value   06/06/2022 122 (H)   04/28/2022 120 (H)   04/07/2022 99 06/03/2012 125 (H)     Calcium (mg/dL)   Date Value   06/06/2022 8.1 (L)   04/28/2022 8.5 (L)   04/07/2022 8.1 (L)       Last 3 BNP       Pro-BNP (pg/mL)   Date Value   06/06/2022 591 (H)   04/28/2022 604 (H)   03/02/2022 1,169 (H)      CBC: No results for input(s): WBC, HGB, PLT in the last 72 hours. BMP:    Recent Labs     06/06/22  1246      K 3.2*      CO2 32*   BUN 26*   CREATININE 1.0   GLUCOSE 122*     Hepatic: No results for input(s): AST, ALT, ALB, BILITOT, ALKPHOS in the last 72 hours. Troponin: No results for input(s): TROPONINI in the last 72 hours. BNP: No results for input(s): BNP in the last 72 hours. Lipids: No results for input(s): CHOL, HDL in the last 72 hours. Invalid input(s): LDLCALCU  INR: No results for input(s): INR in the last 72 hours. WEIGHTS:    Wt Readings from Last 3 Encounters:   04/28/22 133 lb 12.8 oz (60.7 kg)   04/07/22 147 lb (66.7 kg)   03/18/22 140 lb (63.5 kg)       TELEMETRY:  Cardiac Regularity: Regular  Cardiac Rhythm/Interpretation: Sinus heidy    ASSESSMENT:  Shahida Napoles is euvolemic with weight loss and no more edema in BLE. .   . Interventions completed this visit:  IV diuretics given-no  Lab work obtained yes, proBNP, BMP   Reviewed currently prescribed medications with patient, educated on importance of compliance and answered any questions regarding their medication  Educated on signs and symptoms of HF  Educated on low sodium diet    PLAN:  Scheduled to follow up in CHF clinic on  : Future Appointments   Date Time Provider Gretchen Lorenzo   6/16/2022  1:15 PM DO Curtis Cerrato [facility]   9/12/2022 12:30 PM [facility]   8/3/2023  9:00 AM Laz Argueta MD [facility]     Given clinic phone number [phone] and aware of signs and symptoms to call with any HF change in symptoms.

## 2022-06-06 NOTE — TELEPHONE ENCOUNTER
Patient seen today in [facility]. His labs showed his potasium at 3.2. Please advise.

## 2022-06-07 ENCOUNTER — TELEPHONE (OUTPATIENT)
Dept: CARDIOLOGY CLINIC | Age: 82
End: 2022-06-07

## 2022-06-07 NOTE — TELEPHONE ENCOUNTER
----- Message from Jennifer Morrison DO sent at 6/7/2022 11:50 AM EDT -----  Have patient take an extra kdur 40 meq of kdur today only. Otherwise same.

## 2022-06-10 ENCOUNTER — HOSPITAL ENCOUNTER (OUTPATIENT)
Dept: OTHER | Age: 82
Discharge: HOME | End: 2022-06-10

## 2022-06-16 ENCOUNTER — OFFICE VISIT (OUTPATIENT)
Dept: CARDIOLOGY CLINIC | Age: 82
End: 2022-06-16
Payer: MEDICARE

## 2022-06-16 VITALS
HEART RATE: 59 BPM | RESPIRATION RATE: 16 BRPM | DIASTOLIC BLOOD PRESSURE: 60 MMHG | BODY MASS INDEX: 17.26 KG/M2 | WEIGHT: 130.2 LBS | HEIGHT: 73 IN | SYSTOLIC BLOOD PRESSURE: 100 MMHG

## 2022-06-16 DIAGNOSIS — I25.10 CORONARY ARTERY DISEASE INVOLVING NATIVE CORONARY ARTERY WITHOUT ANGINA PECTORIS, UNSPECIFIED WHETHER NATIVE OR TRANSPLANTED HEART: Primary | ICD-10-CM

## 2022-06-16 PROCEDURE — 1123F ACP DISCUSS/DSCN MKR DOCD: CPT | Performed by: INTERNAL MEDICINE

## 2022-06-16 PROCEDURE — 93000 ELECTROCARDIOGRAM COMPLETE: CPT | Performed by: INTERNAL MEDICINE

## 2022-06-16 PROCEDURE — 99214 OFFICE O/P EST MOD 30 MIN: CPT | Performed by: INTERNAL MEDICINE

## 2022-06-16 RX ORDER — POTASSIUM CHLORIDE 1.5 G/1.77G
40 POWDER, FOR SOLUTION ORAL DAILY
COMMUNITY
End: 2022-10-10 | Stop reason: SDUPTHER

## 2022-06-16 NOTE — PROGRESS NOTES
[facility] Cardiology Progress Note    Patient is a [de-identified] y.o. male of Edna Hawthorne MD seen in follow up. Chief complaint: CHF/CAD    HPI: No CP or SOB. Patient Active Problem List   Diagnosis    CAD (coronary artery disease)    LV dysfunction    Hyperlipidemia    Hypertension    Abdominal aortic aneurysm (AAA) without rupture (HCC)    Glaucoma    S/P carotid endarterectomy    Carotid bruit    H/O: CVA (cerebrovascular accident)    Post-op pain    Abdominal pain    Decreased dorsalis pedis pulse    Numbness and tingling of both feet    Volume overload    Hypokalemia    Complication of gastrostomy tube (HCC)    Small bowel obstruction (HCC)       No Known Allergies    Current Outpatient Medications   Medication Sig Dispense Refill    potassium chloride (KLOR-CON) 20 MEQ packet Take 20 mEq by mouth daily      bumetanide (BUMEX) 1 MG tablet Take 1 mg by mouth every morning       lanreotide acetate (SOMATULINE DEPOT) 120 MG/0.5ML SOLN depot injection Inject 120 mg into the skin every 28 days NEXT INJECTION DUE: 04/27/2022  [facility]      carvedilol (COREG) 6.25 MG tablet Take 1 tablet by mouth 2 times daily (with meals) 60 tablet 3    atorvastatin (LIPITOR) 40 MG tablet Take 1 tablet by mouth nightly 30 tablet 3    aspirin 81 MG chewable tablet Take 81 mg by mouth every morning       potassium chloride (KLOR-CON M) 10 MEQ extended release tablet Take 2 tablets by mouth daily (Patient not taking: Reported on 6/16/2022) 180 tablet 3     No current facility-administered medications for this visit. Review of systems:   Heart: as above   Lungs: as above   Eyes: denies changes in vision or discharge. Ears: denies changes in hearing or pain. Nose: denies epistaxis or masses   Throat: denies sore throat or trouble swallowing. Neuro: denies numbness, tingling, tremors. Skin: denies rashes or itching.    : denies hematuria, dysuria   GI: denies vomiting, diarrhea aneurysm (AAA) without rupture (HCC)    Glaucoma    S/P carotid endarterectomy    Carotid bruit    H/O: CVA (cerebrovascular accident)    Post-op pain    Abdominal pain    Decreased dorsalis pedis pulse    Numbness and tingling of both feet    Volume overload    Hypokalemia    Complication of gastrostomy tube ([address]    Small bowel obstruction ([address]     1. Acute Systolic CHF:     BB/bumex. ACEI stopped due to symptomatic hypotension. 2. Hx of CAD:     Pharm stress 12/17/2021 with fixed apical scar. Continue ASA/BB/statin. 3. Carotid Disease: ASA/statin. 4. HTN: Observe. 5. Lipids: Statin. 6. Hx of CVA    7. Neuroendocrine malignancy: Advanced. Per oncology. Saul Carolina D.O.   Cardiologist  Cardiology, [facility]

## 2022-07-19 ENCOUNTER — HOSPITAL ENCOUNTER (OUTPATIENT)
Dept: NUCLEAR MEDICINE | Age: 82
Discharge: HOME | End: 2022-07-21
Payer: MEDICARE

## 2022-07-19 DIAGNOSIS — C7A.025: ICD-10-CM

## 2022-07-19 PROCEDURE — A9587 GALLIUM GA-68: HCPCS | Performed by: RADIOLOGY

## 2022-07-19 PROCEDURE — 78815 PET IMAGE W/CT SKULL-THIGH: CPT

## 2022-07-19 PROCEDURE — 3430000000 HC RX DIAGNOSTIC RADIOPHARMACEUTICAL: Performed by: RADIOLOGY

## 2022-07-19 PROCEDURE — 78815 PET IMAGE W/CT SKULL-THIGH: CPT | Performed by: RADIOLOGY

## 2022-07-19 RX ORDER — 68GA-DOTATATE 40 MCG
2.5 KIT INTRAVENOUS ONCE
Status: COMPLETED | OUTPATIENT
Start: 2022-07-19 | End: 2022-07-19

## 2022-07-19 RX ADMIN — 68GA-DOTATATE 3 MILLICURIE: KIT INTRAVENOUS at 13:00

## 2022-09-09 ENCOUNTER — HOSPITAL ENCOUNTER (OUTPATIENT)
Dept: OTHER | Age: 82
Setting detail: THERAPIES SERIES
Discharge: HOME | End: 2022-09-09
Payer: MEDICARE

## 2022-09-09 VITALS
HEART RATE: 54 BPM | RESPIRATION RATE: 16 BRPM | DIASTOLIC BLOOD PRESSURE: 57 MMHG | WEIGHT: 143 LBS | OXYGEN SATURATION: 98 % | BODY MASS INDEX: 18.87 KG/M2 | SYSTOLIC BLOOD PRESSURE: 103 MMHG

## 2022-09-09 LAB
ANION GAP SERPL CALCULATED.3IONS-SCNC: 9 MMOL/L (ref 7–16)
BUN BLDV-MCNC: 23 MG/DL (ref 6–23)
CALCIUM SERPL-MCNC: 7.7 MG/DL (ref 8.6–10.2)
CHLORIDE BLD-SCNC: 105 MMOL/L (ref 98–107)
CO2: 27 MMOL/L (ref 22–29)
CREAT SERPL-MCNC: 1 MG/DL (ref 0.7–1.2)
GFR AFRICAN AMERICAN: >60
GFR NON-AFRICAN AMERICAN: >60 ML/MIN/1.73
GLUCOSE BLD-MCNC: 107 MG/DL (ref 74–99)
POTASSIUM SERPL-SCNC: 3.7 MMOL/L (ref 3.5–5)
PRO-BNP: 771 PG/ML (ref 0–450)
SODIUM BLD-SCNC: 141 MMOL/L (ref 132–146)

## 2022-09-09 PROCEDURE — 80048 BASIC METABOLIC PNL TOTAL CA: CPT

## 2022-09-09 PROCEDURE — 83880 ASSAY OF NATRIURETIC PEPTIDE: CPT

## 2022-09-09 PROCEDURE — 36415 COLL VENOUS BLD VENIPUNCTURE: CPT

## 2022-09-09 PROCEDURE — 99214 OFFICE O/P EST MOD 30 MIN: CPT

## 2022-09-09 RX ORDER — EVEROLIMUS 10 MG/1
10 TABLET ORAL DAILY
COMMUNITY

## 2022-09-09 NOTE — PROGRESS NOTES
Wt 143 lb (64.9 kg)   SpO2 98%   BMI 18.87 kg/m²     Assessment  Charting Type: Shift assessment (chf clinic)    Neurological  Level of Consciousness: Alert (0)  Orientation Level: Oriented X4  Cognition: Follows commands  Speech: Clear     HEENT (Head, Ears, Eyes, Nose, & Throat)  HEENT (WDL): Exceptions to WDL  Right Eye: Impaired vision, Glasses  Left Eye: Impaired vision, Glasses    Respiratory  Respiratory Pattern: Regular  Respiratory Depth: Normal  Respiratory Quality/Effort: Unlabored  L Breath Sounds: Clear  R Breath Sounds: Clear        Cardiac  Cardiac Regularity: Regular  Heart Sounds: S1, S2  Cardiac Rhythm: Sinus rhythm, Sinus heidy    Rhythm Interpretation  Heart Rate: 54     Gastrointestinal  Abdominal (WDL): Exceptions to WDL  Abdomen Inspection: Soft (PEG tube)  RUQ Bowel Sounds: Active  LUQ Bowel Sounds: Active  RLQ Bowel Sounds: Active  LLQ Bowel Sounds: Active  Tenderness: Soft      Bowel Sounds  RUQ Bowel Sounds: Active  LUQ Bowel Sounds: Active  RLQ Bowel Sounds: Active  LLQ Bowel Sounds:  Active    Peripheral Vascular  Peripheral Vascular (WDL): Exceptions to WDL  Edema: Right lower extremity, Left lower extremity  RLE Edema: +2, Pitting  LLE Edema: +2, Pitting        Musculoskeletal  RL Extremity: Weakness  LL Extremity: Weakness  Genitourinary  Genitourinary (WDL): Within Defined Limits  Psychosocial  Psychosocial (WDL): Within Defined Limits              Heart Rate: 54           LAB DATA:  Last 3 BMP      Sodium (mmol/L)   Date Value   09/09/2022 141   06/06/2022 142   04/28/2022 140     Potassium (mmol/L)   Date Value   09/09/2022 3.7   06/06/2022 3.2 (L)   04/28/2022 3.6     Potassium reflex Magnesium (mmol/L)   Date Value   04/06/2022 3.3 (L)   02/26/2022 2.7 (LL)   12/17/2021 3.4 (L)     Chloride (mmol/L)   Date Value   09/09/2022 105   06/06/2022 100   04/28/2022 98     CO2 (mmol/L)   Date Value   09/09/2022 27   06/06/2022 32 (H)   04/28/2022 33 (H)     BUN (mg/dL)   Date Value clinic on  : Future Appointments   Date Time Provider Gretchen Lorenzo   9/27/2022  1:30 PM DO Mauri Arboleda [facility]   10/7/2022  1:30 PM [facility] ROOM 2 [facility] Marija HOD   8/3/2023  9:00 AM Erica Wagner MD [facility]     Given clinic phone number [phone] and aware of signs and symptoms to call with any HF change in symptoms.

## 2022-09-27 ENCOUNTER — OFFICE VISIT (OUTPATIENT)
Dept: CARDIOLOGY CLINIC | Age: 82
End: 2022-09-27
Payer: MEDICARE

## 2022-09-27 VITALS
BODY MASS INDEX: 18.82 KG/M2 | HEIGHT: 73 IN | HEART RATE: 56 BPM | DIASTOLIC BLOOD PRESSURE: 66 MMHG | WEIGHT: 142 LBS | RESPIRATION RATE: 16 BRPM | SYSTOLIC BLOOD PRESSURE: 114 MMHG

## 2022-09-27 DIAGNOSIS — I25.10 CORONARY ARTERY DISEASE INVOLVING NATIVE CORONARY ARTERY WITHOUT ANGINA PECTORIS, UNSPECIFIED WHETHER NATIVE OR TRANSPLANTED HEART: Primary | ICD-10-CM

## 2022-09-27 PROCEDURE — 93000 ELECTROCARDIOGRAM COMPLETE: CPT | Performed by: INTERNAL MEDICINE

## 2022-09-27 PROCEDURE — 99214 OFFICE O/P EST MOD 30 MIN: CPT | Performed by: INTERNAL MEDICINE

## 2022-09-27 PROCEDURE — 1123F ACP DISCUSS/DSCN MKR DOCD: CPT | Performed by: INTERNAL MEDICINE

## 2022-09-27 RX ORDER — BUMETANIDE 2 MG/1
2 TABLET ORAL DAILY
COMMUNITY
Start: 2022-07-18

## 2022-09-27 RX ORDER — DEXAMETHASONE 0.5 MG/5ML
SOLUTION ORAL
COMMUNITY
Start: 2022-09-18

## 2022-09-27 RX ORDER — TOBRAMYCIN AND DEXAMETHASONE 3; 1 MG/ML; MG/ML
SUSPENSION/ DROPS OPHTHALMIC
COMMUNITY
Start: 2022-06-28 | End: 2022-10-07

## 2022-09-27 NOTE — PROGRESS NOTES
[facility] Cardiology Progress Note    Patient is a 81 y.o. male of Ethel Raygoza MD seen in follow up. Chief complaint: CHF/CAD    HPI: No CP or SOB. Patient Active Problem List   Diagnosis    CAD (coronary artery disease)    LV dysfunction    Hyperlipidemia    Hypertension    Abdominal aortic aneurysm (AAA) without rupture (HCC)    Glaucoma    S/P carotid endarterectomy    Carotid bruit    H/O: CVA (cerebrovascular accident)    Post-op pain    Abdominal pain    Decreased dorsalis pedis pulse    Numbness and tingling of both feet    Volume overload    Hypokalemia    Complication of gastrostomy tube (HCC)    Small bowel obstruction (HCC)       No Known Allergies    Current Outpatient Medications   Medication Sig Dispense Refill    bumetanide (BUMEX) 2 MG tablet Take 2 mg by mouth daily      dexamethasone 0.5 MG/5ML solution SWISH AND SPIT 5 TO 10ML BY MOUTH FOUR TIMES A DAY      tobramycin-dexamethasone (TOBRADEX) 0.3-0.1 % ophthalmic suspension       everolimus (AFINITOR) 10 MG TABS chemo tablet Take 10 mg by mouth daily      potassium chloride (KLOR-CON) 20 MEQ packet Take 20 mEq by mouth daily      lanreotide acetate (SOMATULINE) 120 MG/0.5ML SOLN depot injection Inject 120 mg into the skin every 28 days NEXT INJECTION DUE: 04/27/2022  -[facility]      carvedilol (COREG) 6.25 MG tablet Take 1 tablet by mouth 2 times daily (with meals) 60 tablet 3    atorvastatin (LIPITOR) 40 MG tablet Take 1 tablet by mouth nightly 30 tablet 3    aspirin 81 MG chewable tablet Take 81 mg by mouth every morning        No current facility-administered medications for this visit. Review of systems:   Heart: as above   Lungs: as above   Eyes: denies changes in vision or discharge. Ears: denies changes in hearing or pain. Nose: denies epistaxis or masses   Throat: denies sore throat or trouble swallowing. Neuro: denies numbness, tingling, tremors. Skin: denies rashes or itching.    : denies hematuria, dysuria   GI: denies vomiting, diarrhea   Psych: denies mood changed, anxiety, depression. Physical Exam   /66   Pulse 56   Resp 16   Ht 6' 1\" (1.854 m)   Wt 142 lb (64.4 kg)   BMI 18.73 kg/m²   Constitutional: Oriented to person, place, and time. No distress. Well developed. Head: Normocephalic and atraumatic. Neck: Neck supple. No hepatojugular reflux. No JVD present. Carotid bruit is not present. No tracheal deviation present. No thyromegaly present. Cardiovascular: Normal rate, regular rhythm, normal heart sounds. intact distal pulses. No gallop and no friction rub. No murmur heard. Pulmonary: Breath sounds normal. No respiratory distress. No wheezes. No rales. Chest: Effort normal. No tenderness. Abdominal: Soft. Bowel sounds are normal. No distension or mass. No tenderness, rebound or guarding. Musculoskeletal: . No tenderness. No clubbing or cyanosis. Extremitites: Intact distal pulses. No edema  Neurological: Alert and oriented to person, place, and time. Skin: Skin is warm and dry. No rash noted. Not diaphoretic. No erythema. Psychiatric: Normal mood and affect. Behavior is normal.   Lymphadenopathy: No cervical adenopathy. No groin adenopathy. EKG: sinus bradycardia. Echo Summary 12/18/2021:   LVEF 15%   Left ventricular internal dimensions were normal in diastole and dilated  in systole. Regional wall motion abnormality with akinesis of apical segment and severe generalized hypokinesis of remaining segments. Severely reduced left ventricular ejection fraction. The left atrium is mildly dilated. Right ventricle global systolic function is reduced . Mild mitral annular calcification. Mild mitral regurgitation is present. There is a small anterior pericardial effusion noted. Moderate left pleural effusion.     ASSESSMENT & PLAN:    Patient Active Problem List   Diagnosis    CAD (coronary artery disease)    LV dysfunction    Hyperlipidemia Hypertension    Abdominal aortic aneurysm (AAA) without rupture (HCC)    Glaucoma    S/P carotid endarterectomy    Carotid bruit    H/O: CVA (cerebrovascular accident)    Post-op pain    Abdominal pain    Decreased dorsalis pedis pulse    Numbness and tingling of both feet    Volume overload    Hypokalemia    Complication of gastrostomy tube (HCC)    Small bowel obstruction ([facility] [address]     1. Acute Systolic CHF:     BB/bumex. ACEI stopped due to symptomatic hypotension. 2. Hx of CAD:     Pharm stress 12/17/2021 with fixed apical scar. Continue ASA/BB/statin. 3. Carotid Disease: ASA/statin. 4. HTN: Observe. 5. Lipids: Statin. 6. Hx of CVA    7. Neuroendocrine malignancy: Advanced. Per oncology. Debbie Lopez D.O.   Cardiologist  Cardiology, [facility]

## 2022-10-07 ENCOUNTER — HOSPITAL ENCOUNTER (OUTPATIENT)
Dept: OTHER | Age: 82
Setting detail: THERAPIES SERIES
Discharge: HOME | End: 2022-10-07
Payer: MEDICARE

## 2022-10-07 ENCOUNTER — TELEPHONE (OUTPATIENT)
Dept: CARDIOLOGY CLINIC | Age: 82
End: 2022-10-07

## 2022-10-07 VITALS
SYSTOLIC BLOOD PRESSURE: 135 MMHG | OXYGEN SATURATION: 98 % | WEIGHT: 148 LBS | HEART RATE: 60 BPM | BODY MASS INDEX: 19.53 KG/M2 | RESPIRATION RATE: 16 BRPM | DIASTOLIC BLOOD PRESSURE: 60 MMHG

## 2022-10-07 DIAGNOSIS — I25.10 CORONARY ARTERY DISEASE INVOLVING NATIVE CORONARY ARTERY WITHOUT ANGINA PECTORIS, UNSPECIFIED WHETHER NATIVE OR TRANSPLANTED HEART: Primary | ICD-10-CM

## 2022-10-07 LAB
ANION GAP SERPL CALCULATED.3IONS-SCNC: 10 MMOL/L (ref 7–16)
BUN BLDV-MCNC: 28 MG/DL (ref 6–23)
CALCIUM SERPL-MCNC: 7.8 MG/DL (ref 8.6–10.2)
CHLORIDE BLD-SCNC: 103 MMOL/L (ref 98–107)
CO2: 30 MMOL/L (ref 22–29)
CREAT SERPL-MCNC: 1 MG/DL (ref 0.7–1.2)
GFR AFRICAN AMERICAN: >60
GFR NON-AFRICAN AMERICAN: >60 ML/MIN/1.73
GLUCOSE BLD-MCNC: 82 MG/DL (ref 74–99)
POTASSIUM SERPL-SCNC: 3 MMOL/L (ref 3.5–5)
PRO-BNP: 967 PG/ML (ref 0–450)
SODIUM BLD-SCNC: 143 MMOL/L (ref 132–146)

## 2022-10-07 PROCEDURE — 96374 THER/PROPH/DIAG INJ IV PUSH: CPT

## 2022-10-07 PROCEDURE — 99214 OFFICE O/P EST MOD 30 MIN: CPT

## 2022-10-07 PROCEDURE — 80048 BASIC METABOLIC PNL TOTAL CA: CPT

## 2022-10-07 PROCEDURE — 36415 COLL VENOUS BLD VENIPUNCTURE: CPT

## 2022-10-07 PROCEDURE — 2500000003 HC RX 250 WO HCPCS

## 2022-10-07 PROCEDURE — 83880 ASSAY OF NATRIURETIC PEPTIDE: CPT

## 2022-10-07 PROCEDURE — 2580000003 HC RX 258

## 2022-10-07 RX ORDER — SODIUM CHLORIDE 0.9 % (FLUSH) 0.9 %
10 SYRINGE (ML) INJECTION ONCE
Status: DISCONTINUED | OUTPATIENT
Start: 2022-10-07 | End: 2022-10-08 | Stop reason: HOSPADM

## 2022-10-07 RX ORDER — BUMETANIDE 0.25 MG/ML
INJECTION, SOLUTION INTRAMUSCULAR; INTRAVENOUS
Status: COMPLETED
Start: 2022-10-07 | End: 2022-10-07

## 2022-10-07 RX ORDER — SODIUM CHLORIDE 0.9 % (FLUSH) 0.9 %
SYRINGE (ML) INJECTION
Status: COMPLETED
Start: 2022-10-07 | End: 2022-10-07

## 2022-10-07 RX ORDER — BUMETANIDE 0.25 MG/ML
2 INJECTION, SOLUTION INTRAMUSCULAR; INTRAVENOUS ONCE
Status: DISCONTINUED | OUTPATIENT
Start: 2022-10-07 | End: 2022-10-08 | Stop reason: HOSPADM

## 2022-10-07 RX ADMIN — Medication 10 ML: at 15:13

## 2022-10-07 RX ADMIN — BUMETANIDE 2 MG: 0.25 INJECTION, SOLUTION INTRAMUSCULAR; INTRAVENOUS at 15:12

## 2022-10-07 NOTE — PROGRESS NOTES
Consulted for IV access for Bumex push. 22g x 1\" angiocath inserted to right anterior forearm without difficulty. Pt tolerated procedure without difficulty. Franky Felix Prey

## 2022-10-07 NOTE — TELEPHONE ENCOUNTER
Per verbal from  patient was instructed to take an extra 40meq potassium today and starting tomorrow increase to 40meq daily, bmp in 1 week, patient understood instructions.

## 2022-10-07 NOTE — TELEPHONE ENCOUNTER
Patient seen at the CHF clinic today, potassium is 3.0, he was given IV bumex 20mg, he is taking potassium 20meq daily, please advise

## 2022-10-07 NOTE — PROGRESS NOTES
Congestive Heart Failure 2 [address] Lim[address] Mon   1940    Referring Provider: Dr. Aide Sher  Primary Care Physician: Dr. Lee [facility]  Cardiologist: Dr. Zenon Smith  Nephrologist:     History of Present Illness:     Carrie Carnes is a 81 y.o. male with a history of HFrEF, most recent EF 15% 12/18/2021. Patient Story:    He does not have dyspnea with exertion, shortness of breath, or decline in overall functional capacity. He does not have orthopnea, PND, nocturnal cough or hemoptysis. He does not have abdominal distention or bloating, early satiety, anorexia/change in appetite. He does have a good urinary response to oral diuretic. He does have lower extremity edema. He does not have lightheadedness, dizziness. He denies palpitations, syncope or near syncope. He does not complain of chest pain, pressure, discomfort. No Known Allergies    Prior to Visit Medications    Medication Sig Taking? Authorizing Provider   bumetanide (BUMEX) 2 MG tablet Take 2 mg by mouth daily  Historical Provider, MD   dexamethasone 0.5 MG/5ML solution SWISH AND SPIT 5 TO 10ML BY MOUTH FOUR TIMES A DAY  Historical Provider, MD   everolimus (AFINITOR) 10 MG TABS chemo tablet Take 10 mg by mouth daily  Historical Provider, MD   potassium chloride (KLOR-CON) 20 MEQ packet Take 20 mEq by mouth daily  Historical Provider, MD   lanreotide acetate (SOMATULINE) 120 MG/0.5ML SOLN depot injection Inject 120 mg into the skin every 28 days [facility]  Historical Provider, MD   carvedilol (COREG) 6.25 MG tablet Take 1 tablet by mouth 2 times daily (with meals)  TIFFANIE Garcia - CNP   atorvastatin (LIPITOR) 40 MG tablet Take 1 tablet by mouth nightly  TIFFANIE Garcia - CNP   aspirin 81 MG chewable tablet Take 81 mg by mouth every morning   Historical Provider, MD     Guideline directed medical:  ARNI/ACE I/ARB: No  Beta blocker:   Yes  Aldosterone antagonist: No    Physical Examination:     /60   Pulse 60   Resp 16   Wt 148 lb (67.1 kg)   SpO2 98%   BMI 19.53 kg/m²     Assessment  Charting Type: Shift assessment (chf clinic)    Neurological  Level of Consciousness: Alert (0)          Respiratory  Respiratory Quality/Effort: Unlabored  Chest Assessment: Chest expansion symmetrical        Cardiac  Cardiac Rhythm: Sinus rhythm, Sinus heidy    Rhythm Interpretation  Heart Rate: 60     Gastrointestinal  Abdominal (WDL): Within Defined Limits  Abdomen Inspection: Soft           Peripheral Vascular  Peripheral Vascular (WDL): Exceptions to WDL  Edema: Right lower extremity, Left lower extremity  RLE Edema: +2, Pitting  LLE Edema: +2, Pitting           Genitourinary  Genitourinary (WDL): Within Defined Limits  Psychosocial  Psychosocial (WDL): Within Defined Limits  Pain Assessment  Pain Assessment: None - Denies Pain           Heart Rate: 60           LAB DATA:  Last 3 BMP      Sodium (mmol/L)   Date Value   10/07/2022 143   09/09/2022 141   06/06/2022 142     Potassium (mmol/L)   Date Value   10/07/2022 3.0 (L)   09/09/2022 3.7   06/06/2022 3.2 (L)     Potassium reflex Magnesium (mmol/L)   Date Value   04/06/2022 3.3 (L)   02/26/2022 2.7 (LL)   12/17/2021 3.4 (L)     Chloride (mmol/L)   Date Value   10/07/2022 103   09/09/2022 105   06/06/2022 100     CO2 (mmol/L)   Date Value   10/07/2022 30 (H)   09/09/2022 27   06/06/2022 32 (H)     BUN (mg/dL)   Date Value   10/07/2022 28 (H)   09/09/2022 23   06/06/2022 26 (H)     Glucose (mg/dL)   Date Value   10/07/2022 82   09/09/2022 107 (H)   06/06/2022 122 (H)   06/03/2012 125 (H)     Calcium (mg/dL)   Date Value   10/07/2022 7.8 (L)   09/09/2022 7.7 (L)   06/06/2022 8.1 (L)       Last 3 BNP       Pro-BNP (pg/mL)   Date Value   10/07/2022 967 (H)   09/09/2022 771 (H)   06/06/2022 591 (H)      CBC: No results for input(s): WBC, HGB, PLT in the last 72 hours.   BMP:    Recent Labs     10/07/22  1441      K 3.0*   CL 103   CO2 30*   BUN 28*   CREATININE 1.0   GLUCOSE 82         Hepatic: No results for input(s): AST, ALT, ALB, BILITOT, ALKPHOS in the last 72 hours. Troponin: No results for input(s): TROPONINI in the last 72 hours. BNP: No results for input(s): BNP in the last 72 hours. Lipids: No results for input(s): CHOL, HDL in the last 72 hours. Invalid input(s): LDLCALCU  INR: No results for input(s): INR in the last 72 hours. WEIGHTS:    Wt Readings from Last 3 Encounters:   10/07/22 148 lb (67.1 kg)   09/27/22 142 lb (64.4 kg)   09/09/22 143 lb (64.9 kg)       TELEMETRY:  Cardiac Regularity: Regular  Cardiac Rhythm/Interpretation: Sinus heidy    ASSESSMENT:  Jameson Mcarthur weight is up 18lbs since last CHF Clinic visit . lower extremity edema 2+ pitting edema. Patient at [facility] and patient states he eats [place] lot\"of soup patient reducated on 2000mg low sodium diet and verbalizes understanding    Interventions completed this visit:  IV diuretics given-yes Bumex IVP 2mg For weight gain of 18lbs and Pitting edema rivas lower legs 2+ pitting   Lab work obtained yes, proBNP, BMP   Reviewed currently prescribed medications with patient, educated on importance of compliance and answered any questions regarding their medication  Educated on signs and symptoms of HF  Educated on low sodium diet    PLAN:  Scheduled to follow up in CHF clinic on  : Future Appointments   Date Time Provider Gretchen Lorenzo   10/17/2022  1:15 PM [facility] ROOM 1 [facility]   8/3/2023  9:00 AM Agustin Prieto MD [facility]     Given clinic phone number [phone] and aware of signs and symptoms to call with any HF change in symptoms.

## 2022-10-07 NOTE — PROGRESS NOTES
I spoke to Aiden Davis at Dr Anita Underwood office and notified of BMP/BNP Results Potassium 3.0 and that Bumex IVP  2mg was Given and to please advise

## 2022-10-10 RX ORDER — POTASSIUM CHLORIDE 1.5 G/1.77G
POWDER, FOR SOLUTION ORAL
Qty: 180 EACH | Refills: 3 | Status: SHIPPED
Start: 2022-10-10 | End: 2022-10-12

## 2022-10-12 RX ORDER — POTASSIUM CHLORIDE 1500 MG/1
40 TABLET, FILM COATED, EXTENDED RELEASE ORAL
COMMUNITY
End: 2022-10-12 | Stop reason: SDUPTHER

## 2022-10-12 RX ORDER — POTASSIUM CHLORIDE 1500 MG/1
40 TABLET, FILM COATED, EXTENDED RELEASE ORAL
Qty: 180 TABLET | Refills: 3 | Status: SHIPPED
Start: 2022-10-12 | End: 2022-11-04

## 2022-10-17 ENCOUNTER — HOSPITAL ENCOUNTER (OUTPATIENT)
Dept: OTHER | Age: 82
Setting detail: THERAPIES SERIES
Discharge: HOME | End: 2022-10-17
Payer: MEDICARE

## 2022-10-17 VITALS
SYSTOLIC BLOOD PRESSURE: 124 MMHG | RESPIRATION RATE: 17 BRPM | BODY MASS INDEX: 19.79 KG/M2 | DIASTOLIC BLOOD PRESSURE: 60 MMHG | HEART RATE: 52 BPM | WEIGHT: 150 LBS | OXYGEN SATURATION: 96 %

## 2022-10-17 LAB
ANION GAP SERPL CALCULATED.3IONS-SCNC: 6 MMOL/L (ref 7–16)
BUN BLDV-MCNC: 23 MG/DL (ref 6–23)
CALCIUM SERPL-MCNC: 7.9 MG/DL (ref 8.6–10.2)
CHLORIDE BLD-SCNC: 100 MMOL/L (ref 98–107)
CO2: 32 MMOL/L (ref 22–29)
CREAT SERPL-MCNC: 1.1 MG/DL (ref 0.7–1.2)
GFR AFRICAN AMERICAN: >60
GFR NON-AFRICAN AMERICAN: >60 ML/MIN/1.73
GLUCOSE BLD-MCNC: 94 MG/DL (ref 74–99)
POTASSIUM SERPL-SCNC: 3.6 MMOL/L (ref 3.5–5)
PRO-BNP: 907 PG/ML (ref 0–450)
SODIUM BLD-SCNC: 138 MMOL/L (ref 132–146)

## 2022-10-17 PROCEDURE — 99214 OFFICE O/P EST MOD 30 MIN: CPT

## 2022-10-17 PROCEDURE — 96374 THER/PROPH/DIAG INJ IV PUSH: CPT

## 2022-10-17 PROCEDURE — 2500000003 HC RX 250 WO HCPCS

## 2022-10-17 PROCEDURE — 36415 COLL VENOUS BLD VENIPUNCTURE: CPT

## 2022-10-17 PROCEDURE — 80048 BASIC METABOLIC PNL TOTAL CA: CPT

## 2022-10-17 PROCEDURE — 2580000003 HC RX 258

## 2022-10-17 PROCEDURE — 83880 ASSAY OF NATRIURETIC PEPTIDE: CPT

## 2022-10-17 RX ORDER — SODIUM CHLORIDE 0.9 % (FLUSH) 0.9 %
10 SYRINGE (ML) INJECTION ONCE
Status: DISCONTINUED | OUTPATIENT
Start: 2022-10-17 | End: 2022-10-18 | Stop reason: HOSPADM

## 2022-10-17 RX ORDER — SODIUM CHLORIDE 0.9 % (FLUSH) 0.9 %
SYRINGE (ML) INJECTION
Status: COMPLETED
Start: 2022-10-17 | End: 2022-10-17

## 2022-10-17 RX ORDER — BUMETANIDE 0.25 MG/ML
2 INJECTION, SOLUTION INTRAMUSCULAR; INTRAVENOUS ONCE
Status: DISCONTINUED | OUTPATIENT
Start: 2022-10-17 | End: 2022-10-18 | Stop reason: HOSPADM

## 2022-10-17 RX ORDER — BUMETANIDE 0.25 MG/ML
INJECTION, SOLUTION INTRAMUSCULAR; INTRAVENOUS
Status: COMPLETED
Start: 2022-10-17 | End: 2022-10-17

## 2022-10-17 RX ADMIN — BUMETANIDE 2 MG: 0.25 INJECTION, SOLUTION INTRAMUSCULAR; INTRAVENOUS at 14:07

## 2022-10-17 RX ADMIN — SODIUM CHLORIDE, PRESERVATIVE FREE 10 ML: 5 INJECTION INTRAVENOUS at 14:08

## 2022-10-17 NOTE — PROGRESS NOTES
Congestive Heart Failure 2 Carmel[address] Murphy   1940    Referring Provider: Dr. Stephanie Ruiz  Primary Care Physician: Dr. Alvina Veliz  Cardiologist: Dr. Paz Miller  Nephrologist:     History of Present Illness:     Suzan Josue is a 81 y.o. male with a history of HFrEF, most recent EF 15% 12/18/2021. Patient Story:    He does have dyspnea with exertion, shortness of breath, at times and states recovers with rest or decline in overall functional capacity. He does not have orthopnea, PND, nocturnal cough or hemoptysis. He does not have abdominal distention or bloating, early satiety, anorexia/change in appetite. He does have a good urinary response to oral diuretic. He does have lower extremity edema. He does not have lightheadedness, dizziness. He denies palpitations, syncope or near syncope. He does not complain of chest pain, pressure, discomfort. No Known Allergies    Prior to Visit Medications    Medication Sig Taking?  Authorizing Provider   potassium chloride (KLOR-CON M) 20 MEQ TBCR extended release tablet Take 2 tablets by mouth daily (with breakfast) Take 2 tablets daily  Patient taking differently: Take 20 mEq by mouth 2 times daily  Soo Webster MD   bumetanide (BUMEX) 2 MG tablet Take 2 mg by mouth daily  Historical Provider, MD   dexamethasone 0.5 MG/5ML solution SWISH AND SPIT 5 TO 10ML BY MOUTH FOUR TIMES A DAY  Historical Provider, MD   everolimus (AFINITOR) 10 MG TABS chemo tablet Take 10 mg by mouth daily  Historical Provider, MD   lanreotide acetate (SOMATULINE) 120 MG/0.5ML SOLN depot injection Inject 120 mg into the skin every 28 days -[facility]  Historical Provider, MD   carvedilol (COREG) 6.25 MG tablet Take 1 tablet by mouth 2 times daily (with meals)  TIFFANIE Gibson CNP   atorvastatin (LIPITOR) 40 MG tablet Take 1 tablet by mouth nightly  TIFFANIE Gibson - CNP   aspirin 81 MG chewable tablet Take 81 mg by mouth every morning   Historical Provider, MD     Guideline directed medical:  ARNI/ACE I/ARB: No  Beta blocker:   Yes  Aldosterone antagonist: No    Physical Examination:     /60   Pulse 52   Resp 17   Wt 150 lb (68 kg)   SpO2 96%   BMI 19.79 kg/m²     Assessment  Charting Type: Shift assessment (chf clinic)    Neurological  Level of Consciousness: Alert (0)          Respiratory  Respiratory Quality/Effort: Dyspnea with exertion  Chest Assessment: Chest expansion symmetrical        Cardiac  Cardiac Rhythm: Sinus heidy    Rhythm Interpretation  Heart Rate: 52     Gastrointestinal  Abdominal (WDL): Within Defined Limits  Abdomen Inspection: Soft           Peripheral Vascular  Peripheral Vascular (WDL): Exceptions to WDL  Edema: Right lower extremity, Left lower extremity  RLE Edema: +2, Pitting  LLE Edema: +2, Pitting           Genitourinary  Genitourinary (WDL): Within Defined Limits  Psychosocial  Psychosocial (WDL): Within Defined Limits  Pain Assessment  Pain Assessment: None - Denies Pain           Heart Rate: 52           LAB DATA:  Last 3 BMP      Sodium (mmol/L)   Date Value   10/17/2022 138   10/07/2022 143   09/09/2022 141     Potassium (mmol/L)   Date Value   10/17/2022 3.6   10/07/2022 3.0 (L)   09/09/2022 3.7     Potassium reflex Magnesium (mmol/L)   Date Value   04/06/2022 3.3 (L)   02/26/2022 2.7 (LL)   12/17/2021 3.4 (L)     Chloride (mmol/L)   Date Value   10/17/2022 100   10/07/2022 103   09/09/2022 105     CO2 (mmol/L)   Date Value   10/17/2022 32 (H)   10/07/2022 30 (H)   09/09/2022 27     BUN (mg/dL)   Date Value   10/17/2022 23   10/07/2022 28 (H)   09/09/2022 23     Glucose (mg/dL)   Date Value   10/17/2022 94   10/07/2022 82   09/09/2022 107 (H)   06/03/2012 125 (H)     Calcium (mg/dL)   Date Value   10/17/2022 7.9 (L)   10/07/2022 7.8 (L)   09/09/2022 7.7 (L)       Last 3 BNP       Pro-BNP (pg/mL)   Date Value   10/17/2022 907 (H)   10/07/2022 967 (H)   09/09/2022 771 (H)      CBC: No results for input(s): WBC, HGB, PLT in the last 72 hours. BMP:    Recent Labs     10/17/22  1357      K 3.6      CO2 32*   BUN 23   CREATININE 1.1   GLUCOSE 94           Hepatic: No results for input(s): AST, ALT, ALB, BILITOT, ALKPHOS in the last 72 hours. Troponin: No results for input(s): TROPONINI in the last 72 hours. BNP: No results for input(s): BNP in the last 72 hours. Lipids: No results for input(s): CHOL, HDL in the last 72 hours. Invalid input(s): LDLCALCU  INR: No results for input(s): INR in the last 72 hours. WEIGHTS:    Wt Readings from Last 3 Encounters:   10/17/22 150 lb (68 kg)   10/07/22 148 lb (67.1 kg)   09/27/22 142 lb (64.4 kg)       TELEMETRY:  Cardiac Regularity: Regular  Cardiac Rhythm/Interpretation: Sinus heidy    ASSESSMENT:  Jayce Perry weight is up 2lbs since last CHF Clinic visit 10-7-22. lower extremity edema 2+ pitting edema, dalia hose given to patient. Patient states after last CHF Visit he did have a good response to IVP Bumex 2mg given. Patient at assisted living [facility]. Patient asked to please start weighing himself daily and states he will. Interventions completed this visit:  IV diuretics given-yes Bumex IVP 2mg For weight gain of 2lbs and Pitting edema rivas lower legs 2+ , and SOB with activity  Lab work obtained yes, proBNP, BMP   Reviewed currently prescribed medications with patient, educated on importance of compliance and answered any questions regarding their medication  Educated on signs and symptoms of HF  Educated on low sodium diet    PLAN:  Scheduled to follow up in CHF clinic on  : Future Appointments   Date Time Provider Gretchen Lorenzo   10/31/2022  9:45 AM [facility] ROOM 2 [facility]   8/3/2023  9:00 AM Kye Pompa MD [facility]     Given clinic phone number [phone] and aware of signs and symptoms to call with any HF change in symptoms.

## 2022-10-31 ENCOUNTER — HOSPITAL ENCOUNTER (OUTPATIENT)
Dept: OTHER | Age: 82
Setting detail: THERAPIES SERIES
Discharge: HOME | End: 2022-10-31
Payer: MEDICARE

## 2022-10-31 VITALS
BODY MASS INDEX: 18.6 KG/M2 | OXYGEN SATURATION: 98 % | SYSTOLIC BLOOD PRESSURE: 109 MMHG | DIASTOLIC BLOOD PRESSURE: 56 MMHG | RESPIRATION RATE: 18 BRPM | WEIGHT: 141 LBS | HEART RATE: 50 BPM

## 2022-10-31 LAB
ANION GAP SERPL CALCULATED.3IONS-SCNC: 9 MMOL/L (ref 7–16)
BUN BLDV-MCNC: 29 MG/DL (ref 6–23)
CALCIUM SERPL-MCNC: 8.5 MG/DL (ref 8.6–10.2)
CHLORIDE BLD-SCNC: 100 MMOL/L (ref 98–107)
CO2: 32 MMOL/L (ref 22–29)
CREAT SERPL-MCNC: 1 MG/DL (ref 0.7–1.2)
GFR SERPL CREATININE-BSD FRML MDRD: >60 ML/MIN/1.73
GLUCOSE BLD-MCNC: 177 MG/DL (ref 74–99)
POTASSIUM SERPL-SCNC: 3.4 MMOL/L (ref 3.5–5)
PRO-BNP: 595 PG/ML (ref 0–450)
SODIUM BLD-SCNC: 141 MMOL/L (ref 132–146)

## 2022-10-31 PROCEDURE — 36415 COLL VENOUS BLD VENIPUNCTURE: CPT

## 2022-10-31 PROCEDURE — 83880 ASSAY OF NATRIURETIC PEPTIDE: CPT

## 2022-10-31 PROCEDURE — 99214 OFFICE O/P EST MOD 30 MIN: CPT

## 2022-10-31 PROCEDURE — 80048 BASIC METABOLIC PNL TOTAL CA: CPT

## 2022-10-31 RX ORDER — SPIRONOLACTONE 25 MG/1
12.5 TABLET ORAL DAILY
Qty: 90 TABLET | Refills: 1 | Status: ON HOLD | OUTPATIENT
Start: 2022-10-31

## 2022-10-31 NOTE — PROGRESS NOTES
Congestive Heart Failure 2 Carmelita Akhtar   1940    Referring Provider: Dr. Gem Flannery  Primary Care Physician: Dr. Danish Martin  Cardiologist: Dr. Shazia Barber  Nephrologist:     History of Present Illness:     Jameson Mcarthur is a 81 y.o. male with a history of HFrEF, most recent EF 15% 12/18/2021. Patient Story:    He does have dyspnea with exertion, shortness of breath, at times and states recovers with rest or decline in overall functional capacity. He does not have orthopnea, PND, nocturnal cough or hemoptysis. He does not have abdominal distention or bloating, early satiety, anorexia/change in appetite. He does have a good urinary response to oral diuretic. He does have lower extremity edema. He does not have lightheadedness, dizziness. He denies palpitations, syncope or near syncope. He does not complain of chest pain, pressure, discomfort. No Known Allergies    Prior to Visit Medications    Medication Sig Taking?  Authorizing Provider   potassium chloride (KLOR-CON M) 20 MEQ TBCR extended release tablet Take 2 tablets by mouth daily (with breakfast) Take 2 tablets daily  Patient taking differently: Take 20 mEq by mouth 2 times daily  Isrrael Garza MD   bumetanide (BUMEX) 2 MG tablet Take 2 mg by mouth daily  Historical Provider, MD   dexamethasone 0.5 MG/5ML solution SWISH AND SPIT 5 TO 10ML BY MOUTH FOUR TIMES A DAY  Historical Provider, MD   everolimus (AFINITOR) 10 MG TABS chemo tablet Take 10 mg by mouth daily  Historical Provider, MD   lanreotide acetate (SOMATULINE) 120 MG/0.5ML SOLN depot injection Inject 120 mg into the skin every 28 days [facility]-  Historical Provider, MD   carvedilol (COREG) 6.25 MG tablet Take 1 tablet by mouth 2 times daily (with meals)  TIFFANIE Roblero CNP   atorvastatin (LIPITOR) 40 MG tablet Take 1 tablet by mouth nightly  TIFFANIE Roblero - CNP   aspirin 81 MG chewable tablet Take 81 mg by mouth every morning   Historical Provider, MD     Guideline directed medical:  ARNI/ACE I/ARB: No  Beta blocker: Yes  Aldosterone antagonist: No  SGLT2: No    Physical Examination:     BP (!) 109/56   Pulse 50   Resp 18   Wt 141 lb (64 kg)   SpO2 98%   BMI 18.60 kg/m²     Assessment  Charting Type: Shift assessment (chf clinic)    Neurological  Level of Consciousness: Alert (0)          Respiratory  Chest Assessment: Chest expansion symmetrical  L Breath Sounds: Clear  R Breath Sounds: Clear        Cardiac  Cardiac Rhythm: Sinus heidy    Rhythm Interpretation  Heart Rate: 50     Gastrointestinal  Abdominal (WDL): Within Defined Limits  Abdomen Inspection: Soft  RUQ Bowel Sounds: Active  LUQ Bowel Sounds: Active  RLQ Bowel Sounds: Active  LLQ Bowel Sounds: Active      Bowel Sounds  RUQ Bowel Sounds: Active  LUQ Bowel Sounds: Active  RLQ Bowel Sounds: Active  LLQ Bowel Sounds:  Active    Peripheral Vascular  Peripheral Vascular (WDL): Exceptions to WDL  Edema: Right lower extremity, Left lower extremity  RLE Edema: Pitting, +1  LLE Edema: Pitting, +1        Musculoskeletal  RL Extremity: Unsteady  LL Extremity: Unsteady  Genitourinary  Genitourinary (WDL): Within Defined Limits  Psychosocial  Psychosocial (WDL): Within Defined Limits              Heart Rate: 50           LAB DATA:    Last 3 BMP      Sodium (mmol/L)   Date Value   10/31/2022 141   10/17/2022 138   10/07/2022 143     Potassium (mmol/L)   Date Value   10/31/2022 3.4 (L)   10/17/2022 3.6   10/07/2022 3.0 (L)     Potassium reflex Magnesium (mmol/L)   Date Value   04/06/2022 3.3 (L)   02/26/2022 2.7 (LL)   12/17/2021 3.4 (L)     Chloride (mmol/L)   Date Value   10/31/2022 100   10/17/2022 100   10/07/2022 103     CO2 (mmol/L)   Date Value   10/31/2022 32 (H)   10/17/2022 32 (H)   10/07/2022 30 (H)     BUN (mg/dL)   Date Value   10/31/2022 29 (H)   10/17/2022 23   10/07/2022 28 (H)     Glucose (mg/dL)   Date Value   10/31/2022 177 (H) 10/17/2022 94   10/07/2022 82   06/03/2012 125 (H)     Calcium (mg/dL)   Date Value   10/31/2022 8.5 (L)   10/17/2022 7.9 (L)   10/07/2022 7.8 (L)       Last 3 BNP       Pro-BNP (pg/mL)   Date Value   10/31/2022 595 (H)   10/17/2022 907 (H)   10/07/2022 967 (H)      CBC: No results for input(s): WBC, HGB, PLT in the last 72 hours. BMP:    Recent Labs     10/31/22  1028      K 3.4*      CO2 32*   BUN 29*   CREATININE 1.0   GLUCOSE 177*             Hepatic: No results for input(s): AST, ALT, ALB, BILITOT, ALKPHOS in the last 72 hours. Troponin: No results for input(s): TROPONINI in the last 72 hours. BNP: No results for input(s): BNP in the last 72 hours. Lipids: No results for input(s): CHOL, HDL in the last 72 hours. Invalid input(s): LDLCALCU  INR: No results for input(s): INR in the last 72 hours. WEIGHTS:    Wt Readings from Last 3 Encounters:   10/31/22 141 lb (64 kg)   10/17/22 150 lb (68 kg)   10/07/22 148 lb (67.1 kg)       TELEMETRY:  Cardiac Regularity: Regular  Cardiac Rhythm/Interpretation: Sinus heidy    ASSESSMENT:  Adina Niño is euvolemic with decrease in swelling and decreased shortness of breath    Interventions completed this visit:  IV diuretics given-no  Lab work obtained yes, proBNP, BMP   Reviewed currently prescribed medications with patient, educated on importance of compliance and answered any questions regarding their medication  Educated on signs and symptoms of HF  Educated on low sodium diet    PLAN:  Scheduled to follow up in CHF clinic on  : Future Appointments   Date Time Provider Gretchen Lorenzo   11/30/2022 10:15 AM [facility] ROOM 2 [facility]   8/3/2023  9:00 AM Elizabeth Mendes MD [facility]     Given clinic phone number [phone] and aware of signs and symptoms to call with any HF change in symptoms.

## 2022-10-31 NOTE — PROGRESS NOTES
Call placed to [facility] and provider's instructions given and patient verbalized understanding : Take an extra 20 mEq klor-con tonight. Will have him start spironolactone 12.5 mg daily. Stop potassium supplement after starting spironolactone . Return on Friday, 11/4/22 for repeat labs.     Future Appointments   Date Time Provider Gretchen Lorenoz   11/4/2022  8:00 AM [facility] ROOM 1 [facility]   11/30/2022 10:15 AM [facility] ROOM 2 [facility]   8/3/2023  9:00 AM Dash Stock MD [facility]

## 2022-10-31 NOTE — PROGRESS NOTES
Labs and CHF clinic note reviewed  Spoke with Galo Glez RN  Patient was incorrectly taking bumex at last visit and now has been taking ordered bumex 2 mg daily  He has decreased weight and improved symptoms however hypokalemic in clinic today.      Will have him start spironolactone 12.5 mg daily   Stop potassium supplement after starting spironolactone   Follow up labs Friday at [facility], please fax to cardiology/CHF clinic

## 2022-11-01 NOTE — TELEPHONE ENCOUNTER
Home health nurse states that patient has been having low bp for about a week:    92/55  90/56  98/60  112/88  116/68  96/62  bp taken in am prior to any meds, please advise [employer] Pregnancy And Lactation Text: This medication is Pregnancy Category D and is not considered safe during pregnancy.  The risk during breast feeding is also uncertain.

## 2022-11-04 ENCOUNTER — HOSPITAL ENCOUNTER (OUTPATIENT)
Dept: OTHER | Age: 82
Setting detail: THERAPIES SERIES
Discharge: HOME | End: 2022-11-04
Payer: MEDICARE

## 2022-11-04 VITALS
HEART RATE: 52 BPM | RESPIRATION RATE: 16 BRPM | BODY MASS INDEX: 17.92 KG/M2 | OXYGEN SATURATION: 97 % | SYSTOLIC BLOOD PRESSURE: 101 MMHG | WEIGHT: 135.8 LBS | DIASTOLIC BLOOD PRESSURE: 58 MMHG

## 2022-11-04 LAB
ANION GAP SERPL CALCULATED.3IONS-SCNC: 9 MMOL/L (ref 7–16)
BUN BLDV-MCNC: 30 MG/DL (ref 6–23)
CALCIUM SERPL-MCNC: 8.5 MG/DL (ref 8.6–10.2)
CHLORIDE BLD-SCNC: 98 MMOL/L (ref 98–107)
CO2: 33 MMOL/L (ref 22–29)
CREAT SERPL-MCNC: 1.1 MG/DL (ref 0.7–1.2)
GFR SERPL CREATININE-BSD FRML MDRD: >60 ML/MIN/1.73
GLUCOSE BLD-MCNC: 108 MG/DL (ref 74–99)
POTASSIUM SERPL-SCNC: 3.5 MMOL/L (ref 3.5–5)
PRO-BNP: 566 PG/ML (ref 0–450)
SODIUM BLD-SCNC: 140 MMOL/L (ref 132–146)

## 2022-11-04 PROCEDURE — 36415 COLL VENOUS BLD VENIPUNCTURE: CPT

## 2022-11-04 PROCEDURE — 80048 BASIC METABOLIC PNL TOTAL CA: CPT

## 2022-11-04 PROCEDURE — 83880 ASSAY OF NATRIURETIC PEPTIDE: CPT

## 2022-11-04 PROCEDURE — 99214 OFFICE O/P EST MOD 30 MIN: CPT

## 2022-11-04 NOTE — RESULT ENCOUNTER NOTE
Labs and CHF clinic note reviewed  Vitals at [facility]: BP (!) 101/58   Pulse 52     Stop coreg given BP  Start Toprol 25 mg nightly to allow increased BP room for GDMT titration  Follow up in CHF clinic in 2 weeks.      Thank you

## 2022-11-04 NOTE — PROGRESS NOTES
Congestive Heart Failure 2 Carmelita Cook   1940    Referring Provider: Dr. Diane Jaeger  Primary Care Physician: Dr. Juan Pablo Simpson  Cardiologist: Dr. Babs Manriquez  Nephrologist:     History of Present Illness:     Alisa Jarrell is a 81 y.o. male with a history of HFrEF, most recent EF 15% 12/18/2021. Patient Story:    He does not have dyspnea with exertion, shortness of breath, at times and states recovers with rest or decline in overall functional capacity. He does not have orthopnea, PND, nocturnal cough or hemoptysis. He does not have abdominal distention or bloating, early satiety, anorexia/change in appetite. He does have a good urinary response to oral diuretic. He does have lower extremity edema. He does not have lightheadedness, dizziness. He denies palpitations, syncope or near syncope. He does not complain of chest pain, pressure, discomfort. No Known Allergies    Prior to Visit Medications    Medication Sig Taking?  Authorizing Provider   spironolactone (ALDACTONE) 25 MG tablet Take 0.5 tablets by mouth daily  TIFFANIE Marie CNP   bumetanide (BUMEX) 2 MG tablet Take 2 mg by mouth daily  Historical Provider, MD   dexamethasone 0.5 MG/5ML solution SWISH AND SPIT 5 TO 10ML BY MOUTH FOUR TIMES A DAY  Historical Provider, MD   everolimus (AFINITOR) 10 MG TABS chemo tablet Take 10 mg by mouth daily  Historical Provider, MD   lanreotide acetate (SOMATULINE) 120 MG/0.5ML SOLN depot injection Inject 120 mg into the skin every 28 days -[facility]-  Historical Provider, MD   carvedilol (COREG) 6.25 MG tablet Take 1 tablet by mouth 2 times daily (with meals)  TIFFANIE Joya CNP   atorvastatin (LIPITOR) 40 MG tablet Take 1 tablet by mouth nightly  TIFFANIE Joya CNP   aspirin 81 MG chewable tablet Take 81 mg by mouth every morning   Historical Provider, MD     Guideline directed medical:  ARNI/ACE I/ARB: No  Beta blocker: Yes  Aldosterone antagonist: Yes  SGLT2: No    Physical Examination:     BP (!) 101/58   Pulse 52   Resp 16   Wt 135 lb 12.8 oz (61.6 kg)   SpO2 97%   BMI 17.92 kg/m²     Assessment  Charting Type: Shift assessment (chf clinic)    Neurological  Level of Consciousness: Alert (0)          Respiratory  Chest Assessment: Chest expansion symmetrical  L Breath Sounds: Clear  R Breath Sounds: Clear        Cardiac  Cardiac Regularity: Regular  Cardiac Rhythm: Sinus heidy    Rhythm Interpretation  Heart Rate: 52     Gastrointestinal  Abdominal (WDL): Exceptions to WDL  Abdomen Inspection: Soft, Gastrostomy  RUQ Bowel Sounds: Active  LUQ Bowel Sounds: Active  RLQ Bowel Sounds: Active  LLQ Bowel Sounds: Active  Tenderness: Soft      Bowel Sounds  RUQ Bowel Sounds: Active  LUQ Bowel Sounds: Active  RLQ Bowel Sounds: Active  LLQ Bowel Sounds:  Active    Peripheral Vascular  Peripheral Vascular (WDL): Exceptions to WDL  Edema: Right lower extremity, Left lower extremity  RLE Edema: Pitting, +1  LLE Edema: Pitting, +1        Musculoskeletal  RL Extremity: Unsteady  LL Extremity: Unsteady     Psychosocial  Psychosocial (WDL): Within Defined Limits              Heart Rate: 52           LAB DATA:    Last 3 BMP      Sodium (mmol/L)   Date Value   11/04/2022 140   10/31/2022 141   10/17/2022 138     Potassium (mmol/L)   Date Value   11/04/2022 3.5   10/31/2022 3.4 (L)   10/17/2022 3.6     Potassium reflex Magnesium (mmol/L)   Date Value   04/06/2022 3.3 (L)   02/26/2022 2.7 (LL)   12/17/2021 3.4 (L)     Chloride (mmol/L)   Date Value   11/04/2022 98   10/31/2022 100   10/17/2022 100     CO2 (mmol/L)   Date Value   11/04/2022 33 (H)   10/31/2022 32 (H)   10/17/2022 32 (H)     BUN (mg/dL)   Date Value   11/04/2022 30 (H)   10/31/2022 29 (H)   10/17/2022 23     Glucose (mg/dL)   Date Value   11/04/2022 108 (H)   10/31/2022 177 (H)   10/17/2022 94   06/03/2012 125 (H)     Calcium (mg/dL)   Date Value   11/04/2022 8.5 (L)   10/31/2022 8.5 (L)   10/17/2022 7.9 (L)       Last 3 BNP       Pro-BNP (pg/mL)   Date Value   11/04/2022 566 (H)   10/31/2022 595 (H)   10/17/2022 907 (H)      CBC: No results for input(s): WBC, HGB, PLT in the last 72 hours. BMP:    Recent Labs     11/04/22  0815      K 3.5   CL 98   CO2 33*   BUN 30*   CREATININE 1.1   GLUCOSE 108*               Hepatic: No results for input(s): AST, ALT, ALB, BILITOT, ALKPHOS in the last 72 hours. Troponin: No results for input(s): TROPONINI in the last 72 hours. BNP: No results for input(s): BNP in the last 72 hours. Lipids: No results for input(s): CHOL, HDL in the last 72 hours. Invalid input(s): LDLCALCU  INR: No results for input(s): INR in the last 72 hours. WEIGHTS:    Wt Readings from Last 3 Encounters:   11/04/22 135 lb 12.8 oz (61.6 kg)   10/31/22 141 lb (64 kg)   10/17/22 150 lb (68 kg)       TELEMETRY:  Cardiac Regularity: Regular  Cardiac Rhythm/Interpretation: Sinus heidy    ASSESSMENT:  Sade Aviles is euvolemic with stable weight. He has started 12.5 mg of spironolactone daily as instructed by cardiology that he picked that up 3 days ago. He was instructed to stop potassium supplements when he started spironolactone and repeat labs drawn today to evaluate. Interventions completed this visit:  IV diuretics given-no  Lab work obtained yes, proBNP, BMP   Reviewed currently prescribed medications with patient, educated on importance of compliance and answered any questions regarding their medication  Educated on signs and symptoms of HF  Educated on low sodium diet    PLAN:  Scheduled to follow up in CHF clinic on  : Future Appointments   Date Time Provider Gretchen Lorenzo   11/30/2022 10:15 AM [facility]F ROOM 2 [facility]   8/3/2023  9:00 AM Merline Birchwood, MD [facility]     Given clinic phone number [phone] and aware of signs and symptoms to call with any HF change in symptoms.

## 2022-11-08 ENCOUNTER — TELEPHONE (OUTPATIENT)
Dept: CARDIOLOGY CLINIC | Age: 82
End: 2022-11-08

## 2022-11-08 DIAGNOSIS — Z79.899 NEW MEDICATION ADDED: ICD-10-CM

## 2022-11-08 DIAGNOSIS — I50.9 CONGESTIVE HEART FAILURE, UNSPECIFIED HF CHRONICITY, UNSPECIFIED HEART FAILURE TYPE (HCC): Primary | ICD-10-CM

## 2022-11-08 RX ORDER — METOPROLOL SUCCINATE 25 MG/1
25 TABLET, EXTENDED RELEASE ORAL DAILY
Qty: 30 TABLET | Refills: 5 | Status: ON HOLD | OUTPATIENT
Start: 2022-11-08

## 2022-11-08 NOTE — TELEPHONE ENCOUNTER
----- Message from TIFFANIE Kirby CNP sent at 11/4/2022  3:32 PM EDT -----  Labs and CHF clinic note reviewed  Vitals at [facility]: BP (!) 101/58   Pulse 52     Stop coreg given BP  Start Toprol 25 mg nightly to allow increased BP room for GDMT titration  Follow up in CHF clinic in 2 weeks.      Thank you

## 2022-11-08 NOTE — TELEPHONE ENCOUNTER
Left provider instructions in VM, requested call back to confirm understanding  Next mailed letter with instructions and copy of med script.

## 2022-11-14 NOTE — TELEPHONE ENCOUNTER
Per [facility],  toprol xl was picked up.   Stopped coreg  Last  11-9 was K+  And metoprolol 11-9-22

## 2022-11-16 ENCOUNTER — HOSPITAL ENCOUNTER (OUTPATIENT)
Dept: OTHER | Age: 82
Setting detail: THERAPIES SERIES
Discharge: HOME | End: 2022-11-16
Payer: MEDICARE

## 2022-11-16 ENCOUNTER — TELEPHONE (OUTPATIENT)
Dept: CARDIOLOGY CLINIC | Age: 82
End: 2022-11-16

## 2022-11-16 VITALS
DIASTOLIC BLOOD PRESSURE: 73 MMHG | WEIGHT: 131.13 LBS | SYSTOLIC BLOOD PRESSURE: 138 MMHG | OXYGEN SATURATION: 97 % | RESPIRATION RATE: 16 BRPM | HEART RATE: 60 BPM | BODY MASS INDEX: 17.3 KG/M2

## 2022-11-16 DIAGNOSIS — I50.9 CONGESTIVE HEART FAILURE, UNSPECIFIED HF CHRONICITY, UNSPECIFIED HEART FAILURE TYPE (HCC): Primary | ICD-10-CM

## 2022-11-16 LAB
ANION GAP SERPL CALCULATED.3IONS-SCNC: 12 MMOL/L (ref 7–16)
BUN BLDV-MCNC: 35 MG/DL (ref 6–23)
CALCIUM SERPL-MCNC: 8.3 MG/DL (ref 8.6–10.2)
CHLORIDE BLD-SCNC: 95 MMOL/L (ref 98–107)
CO2: 33 MMOL/L (ref 22–29)
CREAT SERPL-MCNC: 1.1 MG/DL (ref 0.7–1.2)
GFR SERPL CREATININE-BSD FRML MDRD: >60 ML/MIN/1.73
GLUCOSE BLD-MCNC: 199 MG/DL (ref 74–99)
POTASSIUM SERPL-SCNC: 3.6 MMOL/L (ref 3.5–5)
PRO-BNP: 641 PG/ML (ref 0–450)
SODIUM BLD-SCNC: 140 MMOL/L (ref 132–146)

## 2022-11-16 PROCEDURE — 36415 COLL VENOUS BLD VENIPUNCTURE: CPT

## 2022-11-16 PROCEDURE — 99214 OFFICE O/P EST MOD 30 MIN: CPT

## 2022-11-16 PROCEDURE — 80048 BASIC METABOLIC PNL TOTAL CA: CPT

## 2022-11-16 PROCEDURE — 83880 ASSAY OF NATRIURETIC PEPTIDE: CPT

## 2022-11-16 RX ORDER — BUMETANIDE 1 MG/1
1 TABLET ORAL DAILY
Qty: 30 TABLET | Refills: 3 | Status: SHIPPED | OUTPATIENT
Start: 2022-11-16

## 2022-11-16 NOTE — RESULT ENCOUNTER NOTE
Labs and CHF clinic note reviewed  Vitals at CHF clinic: /73   Pulse 60    Current GDMT:  Toprol 25 mg daily   Spironolactone 12.5 mg daily   Bumex 2 mg daily     Start Entresto 24/26 mg BID  Decrease bumex to 1 mg daily   Follow up labs in 1 week     Thank you

## 2022-11-16 NOTE — TELEPHONE ENCOUNTER
Left instructions in VM and requested call back to office to confirm understanding.      Used [employer]  , left in [place], [address]

## 2022-11-16 NOTE — PROGRESS NOTES
Congestive Heart Failure 2 Carmelita Galicia   1940    Referring Provider: Dr. Luz Regalado  Primary Care Physician: Dr. Shana Collins  Cardiologist: Dr. Marah Morrow  Nephrologist:     History of Present Illness:     Bernadette Corona is a 81 y.o. male with a history of HFrEF, most recent EF 15% 12/18/2021. Patient Story:    He does not have dyspnea with exertion, shortness of breath, at times and states recovers with rest or decline in overall functional capacity. He does not have orthopnea, PND, nocturnal cough or hemoptysis. He does not have abdominal distention or bloating, early satiety, anorexia/change in appetite. He does have a good urinary response to oral diuretic. He does have lower extremity edema. He does not have lightheadedness, dizziness. He denies palpitations, syncope or near syncope. He does not complain of chest pain, pressure, discomfort. No Known Allergies    Prior to Visit Medications    Medication Sig Taking?  Authorizing Provider   metoprolol succinate (TOPROL XL) 25 MG extended release tablet Take 1 tablet by mouth daily  TIFFANIE Rankin - CNP   spironolactone (ALDACTONE) 25 MG tablet Take 0.5 tablets by mouth daily  TIFFANIE Rankin - CNP   bumetanide (BUMEX) 2 MG tablet Take 2 mg by mouth daily  Historical Provider, MD   dexamethasone 0.5 MG/5ML solution SWISH AND SPIT 5 TO 10ML BY MOUTH FOUR TIMES A DAY  Historical Provider, MD   everolimus (AFINITOR) 10 MG TABS chemo tablet Take 10 mg by mouth daily  Historical Provider, MD   lanreotide acetate (SOMATULINE) 120 MG/0.5ML SOLN depot injection Inject 120 mg into the skin every 28 days -[facility]-  Historical Provider, MD   atorvastatin (LIPITOR) 40 MG tablet Take 1 tablet by mouth nightly  [place] TIFFANIE Petty - CNP   aspirin 81 MG chewable tablet Take 81 mg by mouth every morning   Historical Provider, MD     Guideline directed medical:  ARNI/ACE I/ARB: No  Beta blocker:   Yes  Aldosterone antagonist: Yes  SGLT2: No    Physical Examination:     /73   Pulse 60   Resp 16   Wt 131 lb 2 oz (59.5 kg)   SpO2 97%   BMI 17.30 kg/m²     Assessment  Charting Type: Shift assessment    Neurological  Level of Consciousness: Alert (0)          Respiratory  Respiratory Pattern: Regular  Respiratory Depth: Normal  Respiratory Quality/Effort: Dyspnea with exertion  Chest Assessment: Chest expansion symmetrical  L Breath Sounds: Clear  R Breath Sounds: Clear        Cardiac  Cardiac Regularity: Regular  Cardiac Rhythm: Sinus rhythm    Rhythm Interpretation  Heart Rate: 60                 Peripheral Vascular  Peripheral Vascular (WDL): Within Defined Limits  Edema: Right lower extremity, Left lower extremity  RLE Edema: None  LLE Edema: None        Musculoskeletal  RUE:  (uses a cane or walking stick for ambulation)  RL Extremity: Unsteady  LL Extremity: Unsteady  Genitourinary  Genitourinary (WDL): Within Defined Limits  Psychosocial  Psychosocial (WDL): Within Defined Limits  Pain Assessment  Pain Assessment: None - Denies Pain           Heart Rate: 60           LAB DATA:    Last 3 BMP      Sodium (mmol/L)   Date Value   11/16/2022 140   11/04/2022 140   10/31/2022 141     Potassium (mmol/L)   Date Value   11/16/2022 3.6   11/04/2022 3.5   10/31/2022 3.4 (L)     Potassium reflex Magnesium (mmol/L)   Date Value   04/06/2022 3.3 (L)   02/26/2022 2.7 (LL)   12/17/2021 3.4 (L)     Chloride (mmol/L)   Date Value   11/16/2022 95 (L)   11/04/2022 98   10/31/2022 100     CO2 (mmol/L)   Date Value   11/16/2022 33 (H)   11/04/2022 33 (H)   10/31/2022 32 (H)     BUN (mg/dL)   Date Value   11/16/2022 35 (H)   11/04/2022 30 (H)   10/31/2022 29 (H)     Glucose (mg/dL)   Date Value   11/16/2022 199 (H)   11/04/2022 108 (H)   10/31/2022 177 (H)   06/03/2012 125 (H)     Calcium (mg/dL)   Date Value   11/16/2022 8.3 (L)   11/04/2022 8.5 (L)   10/31/2022 8.5 (L)       Last 3 BNP Pro-BNP (pg/mL)   Date Value   11/16/2022 641 (H)   11/04/2022 566 (H)   10/31/2022 595 (H)      CBC: No results for input(s): WBC, HGB, PLT in the last 72 hours. BMP:    Recent Labs     11/16/22  1057      K 3.6   CL 95*   CO2 33*   BUN 35*   CREATININE 1.1   GLUCOSE 199*                 Hepatic: No results for input(s): AST, ALT, ALB, BILITOT, ALKPHOS in the last 72 hours. Troponin: No results for input(s): TROPONINI in the last 72 hours. BNP: No results for input(s): BNP in the last 72 hours. Lipids: No results for input(s): CHOL, HDL in the last 72 hours. Invalid input(s): LDLCALCU  INR: No results for input(s): INR in the last 72 hours. WEIGHTS:    Wt Readings from Last 3 Encounters:   11/16/22 131 lb 2 oz (59.5 kg)   11/04/22 135 lb 12.8 oz (61.6 kg)   10/31/22 141 lb (64 kg)       TELEMETRY:  Cardiac Regularity: Regular  Cardiac Rhythm/Interpretation: Sinus heidy    ASSESSMENT:  Pete Quan is euvolemic with stable weight. Patient states he is very tired and cold today. He states he does have a good response from his oral diuretic. He states he knows he isn't hydrating well enough. He states he only takes in about 3-4 cups a day. He verbalized the understanding of the importance of taking in enough fluids. He states he will try more. No edema noted. Interventions completed this visit:  IV diuretics given-no  Lab work obtained yes, proBNP, BMP   Reviewed currently prescribed medications with patient, educated on importance of compliance and answered any questions regarding their medication  Educated on signs and symptoms of HF  Educated on low sodium diet    PLAN:  Scheduled to follow up in CHF clinic on  :   Future Appointments   Date Time Provider Gretchen Lorenzo   11/30/2022 10:15 AM [facility] ROOM 2 [facility]   8/3/2023  9:00 AM Gretchen Villalpando MD [facility]     Given clinic phone number [phone] and aware of signs and symptoms to call with any HF change in symptoms.

## 2022-11-16 NOTE — TELEPHONE ENCOUNTER
Will have coordinator f/u call to patient to confirm got message and update letter sent.  (I'm off till Monday)

## 2022-11-16 NOTE — TELEPHONE ENCOUNTER
----- Message from TIFFANIE Bryan CNP sent at 11/16/2022  3:30 PM EST -----  Labs and CHF clinic note reviewed  Vitals at CHF clinic: /73   Pulse 60    Current GDMT:  Toprol 25 mg daily   Spironolactone 12.5 mg daily   Bumex 2 mg daily     Start Entresto 24/26 mg BID  Decrease bumex to 1 mg daily   Follow up labs in 1 week     Thank you

## 2022-11-16 NOTE — DISCHARGE INSTRUCTIONS
Learning About Self-Care for Heart Failure  What is self-care for heart failure? Heart failure usually gets worse over time. But there are many things you can do to feel better, avoid the hospital, and live longer. Self-care means managing your health by doing certain things every day, like weighing yourself. It's about knowing which symptoms to watch for so you can avoid getting worse. When you practice good self-care, you know when it's time to call your doctor and when your heart failure has turned into an emergency. The list below can help. Top 5 self-care tips for every day   Take your medicines as prescribed. This gives them the best chance of helping you. Weigh yourself every day. This helps you watch for signs that you're getting worse. Weight gain may be a sign that your body is holding on to too much fluid. Weigh yourself at the same time each day, using the same scale, on a hard, flat surface. The best time is in the morning after you go to the bathroom and before you eat or drink anything. Keep a daily record of your symptoms. Checking your symptoms helps you see what symptoms are normal for you and if they change or get worse. Limit sodium. This helps keep fluid from building up and may help you feel better. Your doctor can tell you how much sodium is right for you. An example is less than 3,000 milligrams (mg) a day. Try limiting the salt you eat at home, and by watching for \"hidden\" sodium when you eat out or shop for food. Try to exercise throughout the week. Exercise makes your heart stronger and can help you avoid symptoms. Walking is a great way to get exercise. If your doctor says it's safe, start out with some short walks. Then slowly build up to longer ones. Some people with heart failure also may need to limit how much fluid they drink each day. Ask your doctor if this is true for you and, if it is, how much fluid is safe for you to drink each day. When should you act?   Try to become familiar with signs that mean your heart failure is getting worse. If you need help, talk with your doctor about making a personal plan. Here are some things to watch for as you practice your daily self-care. Call your doctor if:  You have sudden weight gain, such as more than 2 to 3 pounds in a day or 5 pounds in a week. (Your doctor may suggest a different range of weight gain.)  You have new or worse swelling in your feet, ankles, or legs. Your breathing gets worse. Activities that did not make you short of breath before are hard for you now. Your breathing when you lie down is worse than usual, or you wake up at night needing to catch your breath. Be sure to make and go to all of your follow-up appointments. And it's always a good idea to call your doctor anytime you have a sudden change in symptoms. When is it an emergency? Sometimes the symptoms get worse very quickly. This is called sudden heart failure. It causes fluid to build up in your lungs. Sudden heart failure is an emergency. If you have any of these symptoms, you need care right away. Call 911 if:   You have severe shortness of breath. You have an irregular or fast heartbeat. You cough up foamy, pink mucus. What else can you do to stay healthy? There are other things you can do to take care of your body and your heart. These things will help you feel better. And they will also reduce your risk of heart attack and stroke. Try to stay at a healthy weight. Eat a healthy diet with lots of fresh fruit, vegetables, and whole grains. If you smoke, quit. Limit the amount of alcohol you drink. Keep high blood pressure and diabetes under control. If you need help, talk with your doctor. If your doctor has not set you up with a cardiac rehabilitation (rehab) program, talk to him or her about whether that is right for you. Cardiac rehab includes exercise, help with diet and lifestyle changes, and emotional support.   Also let your doctor know if:  You're having trouble sleeping. Sleep is important to your well-being. It also helps your heart work the way it's supposed to. Your doctor can help you decide if you need treatment for sleep problems. You're feeling sad or hopeless much of the time, or you are worried and anxious. Heart failure can be hard on your emotions. Treatment with counseling and medicine can help. And when you feel better, you're more likely to take care of yourself. You think you may have a problem with alcohol or drug use. You and your doctor can decide if you have a problem and what type of treatment might help you. Follow-up care is a key part of your treatment and safety. Be sure to make and go to all appointments, and call your doctor if you are having problems. It's also a good idea to know your test results and keep a list of the medicines you take. Where can you learn more? Go to https://[employer]. org and sign in to your [employer] account. Enter B103 in the [employer] box to learn more about \"Learning About Self-Care for Heart Failure. \"     If you do not have an account, please click on the \"Sign Up Now\" link. Current as of: January 10, 2022               Content Version: 13.4  © 2006-2022 Healthwise, Inc[employer]. Care instructions adapted under license by [facility] ([facility]). If you have questions about a medical condition or this instruction, always ask your healthcare professional. [place] [address] any warranty or liability for your use of this information.

## 2022-11-17 NOTE — TELEPHONE ENCOUNTER
Left message with Louis Connors's instructions. To please call the office to confirm he got the message and if he had any questions.

## 2022-11-30 ENCOUNTER — APPOINTMENT (OUTPATIENT)
Dept: GENERAL RADIOLOGY | Age: 82
DRG: 684 | End: 2022-11-30
Payer: MEDICARE

## 2022-11-30 ENCOUNTER — HOSPITAL ENCOUNTER (INPATIENT)
Age: 82
LOS: 2 days | Discharge: HOME | DRG: 684 | End: 2022-12-02
Attending: EMERGENCY MEDICINE | Admitting: INTERNAL MEDICINE
Payer: MEDICARE

## 2022-11-30 ENCOUNTER — TELEPHONE (OUTPATIENT)
Dept: CARDIOLOGY CLINIC | Age: 82
End: 2022-11-30

## 2022-11-30 ENCOUNTER — HOSPITAL ENCOUNTER (OUTPATIENT)
Dept: OTHER | Age: 82
Setting detail: THERAPIES SERIES
Discharge: HOME | End: 2022-11-30
Payer: MEDICARE

## 2022-11-30 DIAGNOSIS — E87.6 HYPOKALEMIA: ICD-10-CM

## 2022-11-30 DIAGNOSIS — N17.9 ACUTE KIDNEY INJURY (HCC): Primary | ICD-10-CM

## 2022-11-30 DIAGNOSIS — I95.9 HYPOTENSION, UNSPECIFIED HYPOTENSION TYPE: ICD-10-CM

## 2022-11-30 LAB
ALBUMIN SERPL-MCNC: 3 G/DL (ref 3.5–5.2)
ALP BLD-CCNC: 100 U/L (ref 40–129)
ALT SERPL-CCNC: 36 U/L (ref 0–40)
ANION GAP SERPL CALCULATED.3IONS-SCNC: 12 MMOL/L (ref 7–16)
AST SERPL-CCNC: 43 U/L (ref 0–39)
BASOPHILS ABSOLUTE: 0.01 E9/L (ref 0–0.2)
BASOPHILS RELATIVE PERCENT: 0.2 % (ref 0–2)
BILIRUB SERPL-MCNC: 0.5 MG/DL (ref 0–1.2)
BUN BLDV-MCNC: 41 MG/DL (ref 6–23)
CALCIUM SERPL-MCNC: 7.4 MG/DL (ref 8.6–10.2)
CHLORIDE BLD-SCNC: 103 MMOL/L (ref 98–107)
CO2: 26 MMOL/L (ref 22–29)
CREAT SERPL-MCNC: 1.5 MG/DL (ref 0.7–1.2)
EKG ATRIAL RATE: 75 BPM
EKG Q-T INTERVAL: 436 MS
EKG QRS DURATION: 100 MS
EKG QTC CALCULATION (BAZETT): 393 MS
EKG R AXIS: 40 DEGREES
EKG T AXIS: 118 DEGREES
EKG VENTRICULAR RATE: 49 BPM
EOSINOPHILS ABSOLUTE: 0.01 E9/L (ref 0.05–0.5)
EOSINOPHILS RELATIVE PERCENT: 0.2 % (ref 0–6)
GFR SERPL CREATININE-BSD FRML MDRD: 46 ML/MIN/1.73
GLUCOSE BLD-MCNC: 129 MG/DL (ref 74–99)
HCT VFR BLD CALC: 32.5 % (ref 37–54)
HEMOGLOBIN: 10.5 G/DL (ref 12.5–16.5)
IMMATURE GRANULOCYTES #: 0.01 E9/L
IMMATURE GRANULOCYTES %: 0.2 % (ref 0–5)
LACTIC ACID, SEPSIS: 3.2 MMOL/L (ref 0.5–1.9)
LYMPHOCYTES ABSOLUTE: 0.61 E9/L (ref 1.5–4)
LYMPHOCYTES RELATIVE PERCENT: 15.1 % (ref 20–42)
MAGNESIUM: 1.4 MG/DL (ref 1.6–2.6)
MCH RBC QN AUTO: 30.2 PG (ref 26–35)
MCHC RBC AUTO-ENTMCNC: 32.3 % (ref 32–34.5)
MCV RBC AUTO: 93.4 FL (ref 80–99.9)
MONOCYTES ABSOLUTE: 0.45 E9/L (ref 0.1–0.95)
MONOCYTES RELATIVE PERCENT: 11.1 % (ref 2–12)
NEUTROPHILS ABSOLUTE: 2.95 E9/L (ref 1.8–7.3)
NEUTROPHILS RELATIVE PERCENT: 73.2 % (ref 43–80)
PDW BLD-RTO: 14.5 FL (ref 11.5–15)
PLATELET # BLD: 163 E9/L (ref 130–450)
PMV BLD AUTO: 11.4 FL (ref 7–12)
POTASSIUM REFLEX MAGNESIUM: 2.7 MMOL/L (ref 3.5–5)
POTASSIUM SERPL-SCNC: 3 MMOL/L (ref 3.5–5)
PRO-BNP: 373 PG/ML (ref 0–450)
RBC # BLD: 3.48 E12/L (ref 3.8–5.8)
SODIUM BLD-SCNC: 141 MMOL/L (ref 132–146)
TOTAL PROTEIN: 5.4 G/DL (ref 6.4–8.3)
TROPONIN, HIGH SENSITIVITY: 30 NG/L (ref 0–11)
TROPONIN, HIGH SENSITIVITY: 33 NG/L (ref 0–11)
WBC # BLD: 4 E9/L (ref 4.5–11.5)

## 2022-11-30 PROCEDURE — 83605 ASSAY OF LACTIC ACID: CPT

## 2022-11-30 PROCEDURE — 99215 OFFICE O/P EST HI 40 MIN: CPT

## 2022-11-30 PROCEDURE — 96376 TX/PRO/DX INJ SAME DRUG ADON: CPT

## 2022-11-30 PROCEDURE — 96374 THER/PROPH/DIAG INJ IV PUSH: CPT

## 2022-11-30 PROCEDURE — 99214 OFFICE O/P EST MOD 30 MIN: CPT

## 2022-11-30 PROCEDURE — 83735 ASSAY OF MAGNESIUM: CPT

## 2022-11-30 PROCEDURE — 99285 EMERGENCY DEPT VISIT HI MDM: CPT

## 2022-11-30 PROCEDURE — 84484 ASSAY OF TROPONIN QUANT: CPT

## 2022-11-30 PROCEDURE — 2580000003 HC RX 258

## 2022-11-30 PROCEDURE — 6370000000 HC RX 637 (ALT 250 FOR IP)

## 2022-11-30 PROCEDURE — 6360000002 HC RX W HCPCS: Performed by: EMERGENCY MEDICINE

## 2022-11-30 PROCEDURE — 71046 X-RAY EXAM CHEST 2 VIEWS: CPT

## 2022-11-30 PROCEDURE — 2580000003 HC RX 258: Performed by: FAMILY MEDICINE

## 2022-11-30 PROCEDURE — 36415 COLL VENOUS BLD VENIPUNCTURE: CPT

## 2022-11-30 PROCEDURE — 93010 ELECTROCARDIOGRAM REPORT: CPT | Performed by: INTERNAL MEDICINE

## 2022-11-30 PROCEDURE — 80053 COMPREHEN METABOLIC PANEL: CPT

## 2022-11-30 PROCEDURE — 83880 ASSAY OF NATRIURETIC PEPTIDE: CPT

## 2022-11-30 PROCEDURE — 1200000000 HC SEMI PRIVATE

## 2022-11-30 PROCEDURE — 6360000002 HC RX W HCPCS

## 2022-11-30 PROCEDURE — 93005 ELECTROCARDIOGRAM TRACING: CPT | Performed by: NURSE PRACTITIONER

## 2022-11-30 PROCEDURE — 6370000000 HC RX 637 (ALT 250 FOR IP): Performed by: FAMILY MEDICINE

## 2022-11-30 PROCEDURE — 85025 COMPLETE CBC W/AUTO DIFF WBC: CPT

## 2022-11-30 PROCEDURE — 84132 ASSAY OF SERUM POTASSIUM: CPT

## 2022-11-30 RX ORDER — SODIUM CHLORIDE 0.9 % (FLUSH) 0.9 %
5-40 SYRINGE (ML) INJECTION PRN
Status: DISCONTINUED | OUTPATIENT
Start: 2022-11-30 | End: 2022-12-02 | Stop reason: HOSPADM

## 2022-11-30 RX ORDER — POTASSIUM CHLORIDE 1.5 G/1.77G
40 POWDER, FOR SOLUTION ORAL ONCE
Status: DISCONTINUED | OUTPATIENT
Start: 2022-11-30 | End: 2022-11-30 | Stop reason: CLARIF

## 2022-11-30 RX ORDER — EVEROLIMUS 10 MG/1
10 TABLET ORAL DAILY
Status: DISCONTINUED | OUTPATIENT
Start: 2022-11-30 | End: 2022-12-02 | Stop reason: HOSPADM

## 2022-11-30 RX ORDER — MAGNESIUM SULFATE IN WATER 40 MG/ML
2000 INJECTION, SOLUTION INTRAVENOUS ONCE
Status: COMPLETED | OUTPATIENT
Start: 2022-11-30 | End: 2022-11-30

## 2022-11-30 RX ORDER — ACETAMINOPHEN 325 MG/1
650 TABLET ORAL EVERY 6 HOURS PRN
Status: DISCONTINUED | OUTPATIENT
Start: 2022-11-30 | End: 2022-12-02 | Stop reason: HOSPADM

## 2022-11-30 RX ORDER — METOPROLOL SUCCINATE 25 MG/1
25 TABLET, EXTENDED RELEASE ORAL DAILY
Status: DISCONTINUED | OUTPATIENT
Start: 2022-11-30 | End: 2022-12-02 | Stop reason: HOSPADM

## 2022-11-30 RX ORDER — SODIUM CHLORIDE 0.9 % (FLUSH) 0.9 %
5-40 SYRINGE (ML) INJECTION EVERY 12 HOURS SCHEDULED
Status: DISCONTINUED | OUTPATIENT
Start: 2022-11-30 | End: 2022-12-02 | Stop reason: HOSPADM

## 2022-11-30 RX ORDER — SODIUM CHLORIDE 9 MG/ML
INJECTION, SOLUTION INTRAVENOUS PRN
Status: DISCONTINUED | OUTPATIENT
Start: 2022-11-30 | End: 2022-12-02 | Stop reason: HOSPADM

## 2022-11-30 RX ORDER — ONDANSETRON 2 MG/ML
4 INJECTION INTRAMUSCULAR; INTRAVENOUS EVERY 6 HOURS PRN
Status: DISCONTINUED | OUTPATIENT
Start: 2022-11-30 | End: 2022-12-02 | Stop reason: HOSPADM

## 2022-11-30 RX ORDER — ENOXAPARIN SODIUM 100 MG/ML
40 INJECTION SUBCUTANEOUS DAILY
Status: DISCONTINUED | OUTPATIENT
Start: 2022-11-30 | End: 2022-12-02 | Stop reason: HOSPADM

## 2022-11-30 RX ORDER — SODIUM CHLORIDE 9 MG/ML
INJECTION, SOLUTION INTRAVENOUS CONTINUOUS
Status: DISCONTINUED | OUTPATIENT
Start: 2022-11-30 | End: 2022-12-02 | Stop reason: HOSPADM

## 2022-11-30 RX ORDER — ASPIRIN 81 MG/1
81 TABLET, CHEWABLE ORAL EVERY MORNING
Status: DISCONTINUED | OUTPATIENT
Start: 2022-12-01 | End: 2022-12-02 | Stop reason: HOSPADM

## 2022-11-30 RX ORDER — 0.9 % SODIUM CHLORIDE 0.9 %
500 INTRAVENOUS SOLUTION INTRAVENOUS ONCE
Status: COMPLETED | OUTPATIENT
Start: 2022-11-30 | End: 2022-11-30

## 2022-11-30 RX ORDER — ACETAMINOPHEN 650 MG/1
650 SUPPOSITORY RECTAL EVERY 6 HOURS PRN
Status: DISCONTINUED | OUTPATIENT
Start: 2022-11-30 | End: 2022-12-02 | Stop reason: HOSPADM

## 2022-11-30 RX ORDER — ATORVASTATIN CALCIUM 40 MG/1
40 TABLET, FILM COATED ORAL NIGHTLY
Status: DISCONTINUED | OUTPATIENT
Start: 2022-11-30 | End: 2022-12-02 | Stop reason: HOSPADM

## 2022-11-30 RX ORDER — ONDANSETRON 4 MG/1
4 TABLET, ORALLY DISINTEGRATING ORAL EVERY 8 HOURS PRN
Status: DISCONTINUED | OUTPATIENT
Start: 2022-11-30 | End: 2022-12-02 | Stop reason: HOSPADM

## 2022-11-30 RX ORDER — POTASSIUM CHLORIDE 7.45 MG/ML
10 INJECTION INTRAVENOUS
Status: COMPLETED | OUTPATIENT
Start: 2022-11-30 | End: 2022-11-30

## 2022-11-30 RX ADMIN — POTASSIUM CHLORIDE 10 MEQ: 10 INJECTION, SOLUTION INTRAVENOUS at 16:00

## 2022-11-30 RX ADMIN — POTASSIUM CHLORIDE 10 MEQ: 10 INJECTION, SOLUTION INTRAVENOUS at 13:27

## 2022-11-30 RX ADMIN — ATORVASTATIN CALCIUM 40 MG: 40 TABLET, FILM COATED ORAL at 21:01

## 2022-11-30 RX ADMIN — POTASSIUM BICARBONATE 40 MEQ: 782 TABLET, EFFERVESCENT ORAL at 17:05

## 2022-11-30 RX ADMIN — SODIUM CHLORIDE 500 ML: 9 INJECTION, SOLUTION INTRAVENOUS at 11:52

## 2022-11-30 RX ADMIN — SODIUM CHLORIDE 500 ML: 9 INJECTION, SOLUTION INTRAVENOUS at 14:39

## 2022-11-30 RX ADMIN — POTASSIUM CHLORIDE 10 MEQ: 10 INJECTION, SOLUTION INTRAVENOUS at 14:39

## 2022-11-30 RX ADMIN — Medication 10 ML: at 20:54

## 2022-11-30 RX ADMIN — SODIUM CHLORIDE: 9 INJECTION, SOLUTION INTRAVENOUS at 20:52

## 2022-11-30 RX ADMIN — MAGNESIUM SULFATE HEPTAHYDRATE 2000 MG: 40 INJECTION, SOLUTION INTRAVENOUS at 17:04

## 2022-11-30 ASSESSMENT — ENCOUNTER SYMPTOMS
SHORTNESS OF BREATH: 0
PHOTOPHOBIA: 0
RHINORRHEA: 0
CONSTIPATION: 0
DIARRHEA: 1
ABDOMINAL DISTENTION: 0
COUGH: 0
EYE DISCHARGE: 0
NAUSEA: 0
SINUS PRESSURE: 0
SORE THROAT: 0
EYE REDNESS: 0
BLOOD IN STOOL: 0
WHEEZING: 0
ABDOMINAL PAIN: 0
VOMITING: 0
BACK PAIN: 0

## 2022-11-30 ASSESSMENT — PAIN - FUNCTIONAL ASSESSMENT: PAIN_FUNCTIONAL_ASSESSMENT: NONE - DENIES PAIN

## 2022-11-30 NOTE — PROGRESS NOTES
Notifed Dr. Tala López (cardiologist) & Dr. Barber Sky (PCP) patient is taken to ER today during visit after finding BP is 52/40.

## 2022-11-30 NOTE — ED PROVIDER NOTES
[facility]  Department of Emergency Medicine     Written by: Aissatou Mejia MD  Patient Name: Yasmani Soto  Attending Provider: Kelley Mullen DO  Admit Date: 2022 11:33 AM  MRN: [id number]                   : 1940        Chief Complaint   Patient presents with    Hypotension     50 systolic at [facility] clinic     - Chief complaint    HPI   Patient is an 81-year-old male with past medical history of CHF, CAD, hypertension, hyperlipidemia, AAA, CVA status post carotid endarterectomy, G-tube, history of colon cancer presenting with hypotension. Reportedly, patient was hypotensive with a systolic pressure of 50 at the [facility] clinic. Patient was at a scheduled appointment this morning when his blood pressure is noted to be  very low. Patient reports no acute symptoms but he reports chronic fatigue that is no worse than normal and loose stools that have been ongoing for several months. Patient reports good compliance with his medications. Patient is a former smoker, endorses occasional alcohol use, and denies drug use. Patient denies headache, lightheadedness, dizziness, numbness, weakness, tingling, loss of sensation, loss of consciousness, fever, cough, sore throat, chest pain, shortness of breath, nausea, vomiting, abdominal pain, hematuria, dysuria, increasing or decreasing urinary frequency, blood in stool, constipation, diarrhea, leg pain, leg swelling, recent travel, recent illness, recent surgery, or sick contacts. Review of Systems   Constitutional:  Positive for fatigue. Negative for activity change, chills and fever. HENT:  Negative for congestion, postnasal drip, rhinorrhea, sinus pressure and sore throat. Eyes:  Negative for photophobia, discharge, redness and visual disturbance. Respiratory:  Negative for cough, shortness of breath and wheezing. Cardiovascular:  Negative for chest pain, palpitations and leg swelling. Gastrointestinal:  Positive for diarrhea. Negative for abdominal distention, abdominal pain, blood in stool, constipation, nausea and vomiting. Endocrine: Negative for cold intolerance and heat intolerance. Genitourinary:  Negative for decreased urine volume, difficulty urinating, dysuria, flank pain, frequency, hematuria and urgency. Musculoskeletal:  Negative for arthralgias, back pain, joint swelling and myalgias. Skin:  Negative for rash and wound. Allergic/Immunologic: Negative for immunocompromised state. Neurological:  Negative for dizziness, syncope, facial asymmetry, weakness, light-headedness, numbness and headaches. Hematological:  Does not bruise/bleed easily. Psychiatric/Behavioral:  Negative for behavioral problems and hallucinations. Physical Exam  Constitutional:       General: He is not in acute distress. Appearance: Normal appearance. He is not ill-appearing, toxic-appearing or diaphoretic. HENT:      Head: Normocephalic and atraumatic. Right Ear: Hearing normal.      Left Ear: Hearing normal.      Nose: Nose normal.      Mouth/Throat:      Lips: Pink. Mouth: Mucous membranes are moist.      Pharynx: Oropharynx is clear. Eyes:      Extraocular Movements: Extraocular movements intact. Conjunctiva/sclera: Conjunctivae normal.      Pupils: Pupils are equal, round, and reactive to light. Cardiovascular:      Rate and Rhythm: Normal rate and regular rhythm. Pulses: Normal pulses. Radial pulses are 2+ on the right side and 2+ on the left side. Posterior tibial pulses are 2+ on the right side and 2+ on the left side. Heart sounds: S1 normal and S2 normal.   Pulmonary:      Effort: Pulmonary effort is normal. No tachypnea, accessory muscle usage or respiratory distress. Breath sounds: Normal breath sounds and air entry. No decreased breath sounds, wheezing, rhonchi or rales. Abdominal:      General: Abdomen is flat. Bowel sounds are normal. There is no distension. Palpations: Abdomen is soft. There is no fluid wave or mass. Tenderness: There is no abdominal tenderness. There is no right CVA tenderness, left CVA tenderness or guarding. Musculoskeletal:         General: No swelling or tenderness. Normal range of motion. Cervical back: Normal range of motion and neck supple. No rigidity. Right lower leg: No edema. Left lower leg: No edema. Lymphadenopathy:      Cervical: No cervical adenopathy. Skin:     General: Skin is warm and dry. Capillary Refill: Capillary refill takes less than 2 seconds. Findings: No bruising, lesion or rash. Neurological:      General: No focal deficit present. Mental Status: He is alert and oriented to person, place, and time. Mental status is at baseline. Motor: No weakness. Psychiatric:         Attention and Perception: Attention normal.         Mood and Affect: Mood and affect normal.         Behavior: Behavior is cooperative. EKG Interpretation    Interpreted by emergency department physician    Rhythm: Sinus bradycardia  Rate: bradycardia  Axis: normal  Ectopy: none  Conduction: normal  ST Segments: no acute change  T Waves: non specific changes and biphasic T waves seen in aVL  Q Waves: none    Clinical Impression: non-specific EKG with new biphasic T waves seen in aVL. Prior EKG on 9/27/2022    Procedures       MDM  Number of Diagnoses or Management Options  Acute kidney injury ([facility] [address]  Hypokalemia  Hypotension, unspecified hypotension type  Diagnosis management comments: Patient is an 81-year-old male with past medical history of CHF, CAD, hypertension, hyperlipidemia, AAA, CVA status post carotid endarterectomy, G-tube, history of colon cancer presenting with hypotension. At time of initial examination the patient is hypotensive and bradycardic. EKG interpreted as above. Given patient's history of CHF we will bolus him with 500 cc of fluids and reevaluate.   Labs and imaging reviewed as below. Patient was found to be hypokalemic which is subsequently repleted. Patient also appears to have an acute kidney injury with a creatinine of 1.5 and a BUN of 41 with a baseline creatinine of 1.1. Patient was also still hypotensive despite 500 cc of fluids. We will order another 500 cc. Decision was made to admit the patient. Patient was reevaluated and is still resting comfortably. Patient's blood pressure appears to be improving with fluid administration. Patient was explained the results of his blood work and his imaging as well as the decision to admit. Patient is agreeable. Case was discussed with Adilene MORENO for Dr. Blayne Sahu, hospitalist, who agrees to admit the patient. At the time admission, patient demonstrated good understanding of his condition, no questions, and was hemodynamically stable. Amount and/or Complexity of Data Reviewed  Decide to obtain previous medical records or to obtain history from someone other than the patient: yes           ED Course as of 11/30/22 1517   Wed Nov 30, 2022   1443 Patient was reevaluated and explained the results of his blood work and his imaging as well as the decision to admit. Patient was agreeable. [VG]      ED Course User Index  [VG] Michelle Celaya MD       --------------------------------------------- PAST HISTORY ---------------------------------------------  Past Medical History:  has a past medical history of Aneurysm of abdominal aorta ([address] CAD (coronary artery disease), Cancer ([address] Decreased dorsalis pedis pulse, GERD (gastroesophageal reflux disease), Glaucoma, Hyperlipidemia, Hypertension, Neuro-endocrine cancer ([address] Numbness and tingling of both feet, and Unspecified cerebral artery occlusion with cerebral infarction. Past Surgical History:  has a past surgical history that includes hernia repair; Diagnostic Cardiac Cath Lab Procedure;  Tonsillectomy; eye surgery (Left, Feb 2013, June 2013); eye surgery (Left); vascular surgery (Right, 10/16/2013); Carotid endarterectomy (Right, ); Colonoscopy; Sigmoidoscopy; Facial cosmetic surgery (N/A, 12/10/2019); laparoscopy (N/A, 8/28/2020); Gastrostomy tube placement; and Gastrostomy tube placement (N/A, 4/6/2022). Social History:  reports that he quit smoking about 30 years ago. His smoking use included cigarettes. He smoked an average of 2 packs per day. He has never used smokeless tobacco. He reports current alcohol use. He reports that he does not use drugs. Family History: family history includes Heart Disease in his brother and father. The patients home medications have been reviewed. Allergies: Patient has no known allergies.     -------------------------------------------------- RESULTS -------------------------------------------------    LABS:  Results for orders placed or performed during the hospital encounter of 11/30/22   CBC with Auto Differential   Result Value Ref Range    WBC 4.0 (L) 4.5 - 11.5 E9/L    RBC 3.48 (L) 3.80 - 5.80 E12/L    Hemoglobin 10.5 (L) 12.5 - 16.5 g/dL    Hematocrit 32.5 (L) 37.0 - 54.0 %    MCV 93.4 80.0 - 99.9 fL    MCH 30.2 26.0 - 35.0 pg    MCHC 32.3 32.0 - 34.5 %    RDW 14.5 11.5 - 15.0 fL    Platelets 125 984 - 832 E9/L    MPV 11.4 7.0 - 12.0 fL    Neutrophils % 73.2 43.0 - 80.0 %    Immature Granulocytes % 0.2 0.0 - 5.0 %    Lymphocytes % 15.1 (L) 20.0 - 42.0 %    Monocytes % 11.1 2.0 - 12.0 %    Eosinophils % 0.2 0.0 - 6.0 %    Basophils % 0.2 0.0 - 2.0 %    Neutrophils Absolute 2.95 1.80 - 7.30 E9/L    Immature Granulocytes # 0.01 E9/L    Lymphocytes Absolute 0.61 (L) 1.50 - 4.00 E9/L    Monocytes Absolute 0.45 0.10 - 0.95 E9/L    Eosinophils Absolute 0.01 (L) 0.05 - 0.50 E9/L    Basophils Absolute 0.01 0.00 - 0.20 E9/L   Comprehensive Metabolic Panel w/ Reflex to MG   Result Value Ref Range    Sodium 141 132 - 146 mmol/L    Potassium reflex Magnesium 2.7 (LL) 3.5 - 5.0 mmol/L    Chloride 103 98 - 107 mmol/L    CO2 26 22 - 29 mmol/L    Anion Gap 12 7 - 16 mmol/L    Glucose 129 (H) 74 - 99 mg/dL    BUN 41 (H) 6 - 23 mg/dL    Creatinine 1.5 (H) 0.7 - 1.2 mg/dL    Est, Glom Filt Rate 46 >=60 mL/min/1.73    Calcium 7.4 (L) 8.6 - 10.2 mg/dL    Total Protein 5.4 (L) 6.4 - 8.3 g/dL    Albumin 3.0 (L) 3.5 - 5.2 g/dL    Total Bilirubin 0.5 0.0 - 1.2 mg/dL    Alkaline Phosphatase 100 40 - 129 U/L    ALT 36 0 - 40 U/L    AST 43 (H) 0 - 39 U/L   Troponin   Result Value Ref Range    Troponin, High Sensitivity 33 (H) 0 - 11 ng/L   Lactate, Sepsis   Result Value Ref Range    Lactic Acid, Sepsis 3.2 (H) 0.5 - 1.9 mmol/L   Brain Natriuretic Peptide   Result Value Ref Range    Pro- 0 - 450 pg/mL   Magnesium   Result Value Ref Range    Magnesium 1.4 (L) 1.6 - 2.6 mg/dL   EKG 12 Lead   Result Value Ref Range    Ventricular Rate 49 BPM    Atrial Rate 75 BPM    QRS Duration 100 ms    Q-T Interval 436 ms    QTc Calculation (Bazett) 393 ms    R Axis 40 degrees    T Axis 118 degrees       RADIOLOGY:  XR CHEST (2 VW)   Final Result   No acute process. Question nipple shadows. Repeat study with nipple markers recommended. ------------------------- NURSING NOTES AND VITALS REVIEWED ---------------------------  Date / Time Roomed:  11/30/2022 11:33 AM  ED Bed Assignment:  [id number]    The nursing notes within the ED encounter and vital signs as below have been reviewed.      Patient Vitals for the past 24 hrs:   BP Temp Pulse Resp SpO2 Weight   11/30/22 1440 (!) 97/54 -- 54 -- -- --   11/30/22 1424 -- -- 53 -- -- --   11/30/22 1334 (!) 97/56 -- (!) 48 -- -- --   11/30/22 1300 -- -- 50 -- -- --   11/30/22 1258 (!) 86/52 -- -- -- -- --   11/30/22 1144 (!) 80/44 -- 55 -- -- --   11/30/22 1118 (!) 75/45 98 °F (36.7 °C) 54 18 100 % 131 lb (59.4 kg)       Oxygen Saturation Interpretation: Normal    ------------------------------------------ PROGRESS NOTES ------------------------------------------  Re-evaluation(s):  Time: multiple Patients symptoms show no change  Repeat physical examination is not changed    Counseling:  I have spoken with the patient and discussed todays results, in addition to providing specific details for the plan of care and counseling regarding the diagnosis and prognosis. Their questions are answered at this time and they are agreeable with the plan of admission.    --------------------------------- ADDITIONAL PROVIDER NOTES ---------------------------------  Consultations:  Spoke with [address] [place] [address] for Dr. Williams Gil. Discussed case. They will admit the patient. This patient's ED course included: a personal history and physicial examination, re-evaluation prior to disposition, multiple bedside re-evaluations, IV medications, cardiac monitoring, and continuous pulse oximetry    This patient has remained hemodynamically stable during their ED course. Diagnosis:  1. Acute kidney injury ([address]    2. Hypokalemia    3. Hypotension, unspecified hypotension type        Disposition:  Patient's disposition: Admit to telemetry  Patient's condition is stable. Patient was seen and evaluated by myself and my attending Martine Pringle DO. Assessment and Plan discussed with attending provider, please see attestation for final plan of care.      MD Wolfgang Land MD  Resident  11/30/22 [id number]

## 2022-11-30 NOTE — ED NOTES
Department of Emergency Medicine  FIRST PROVIDER TRIAGE NOTE             Independent MLP           11/30/22  11:20 AM EST    Date of Encounter: 11/30/22   MRN: [id number]      HPI: Tanisha Richards is a 81 y.o. male who presents to the ED for Hypotension (50 systolic at CHF clinic )   Sent to ER from heart clinic due to hypotension. Pt. Hypotensive here without any complaints. ROS: Negative for cp, sob, abd pain, back pain, cough, vomiting, or diarrhea. PE: Gen Appearance/Constitutional: alert  Musculoskeletal: moves all extremities x 4     Initial Plan of Care: All treatment areas with department are currently occupied. Plan to order/Initiate the following while awaiting opening in ED: labs, EKG, and imaging studies.   Initiate Treatment-Testing, Proceed toTreatment Area When Bed Available for ED Attending/MLP to Continue Care    Electronically signed by TIFFANIE Blanca CNP   DD: 11/30/22      TIFFANIE Blanca CNP  11/30/22 4803

## 2022-11-30 NOTE — DISCHARGE INSTRUCTIONS
Low Sodium Diet (2,000 Milligram): Care Instructions  Overview     Limiting sodium can be an important part of managing some health problems. The most common source of sodium is salt. People get most of the salt in their diet from canned, prepared, and packaged foods. Fast food and restaurant meals also are very high in sodium. Your doctor will probably limit your sodium to less than 2,000 milligrams (mg) a day. This limit counts all the sodium in prepared and packaged foods and any salt you add to your food. Follow-up care is a key part of your treatment and safety. Be sure to make and go to all appointments, and call your doctor if you are having problems. It's also a good idea to know your test results and keep a list of the medicines you take. How can you care for yourself at home? Read food labels  Read labels on cans and food packages. The labels tell you how much sodium is in each serving. Make sure that you look at the serving size. If you eat more than the serving size, you have eaten more sodium. Food labels also tell you the Percent Daily Value for sodium. Choose products with low Percent Daily Values for sodium. Be aware that sodium can come in forms other than salt, including monosodium glutamate (MSG), sodium citrate, and sodium bicarbonate (baking soda). MSG is often added to Asian food. When you eat out, you can sometimes ask for food without MSG or added salt. Buy low-sodium foods  Buy foods that are labeled \"unsalted\" (no salt added), \"sodium-free\" (less than 5 mg of sodium per serving), or \"low-sodium\" (140 mg or less of sodium per serving). Foods labeled \"reduced-sodium\" and \"light sodium\" may still have too much sodium. Be sure to read the label to see how much sodium you are getting. Buy fresh vegetables, or frozen vegetables without added sauces. Buy low-sodium versions of canned vegetables, soups, and other canned goods.   Prepare low-sodium meals  Cut back on the amount of salt you use in cooking. This will help you adjust to the taste. Do not add salt after cooking. One teaspoon of salt has about 2,300 mg of sodium. Take the salt shaker off the table. Flavor your food with garlic, lemon juice, onion, vinegar, herbs, and spices. Do not use soy sauce, lite soy sauce, steak sauce, onion salt, garlic salt, celery salt, or ketchup on your food. Use low-sodium salad dressings, sauces, and ketchup. Or make your own salad dressings and sauces without adding salt. Use less salt (or none) when recipes call for it. You can often use half the salt a recipe calls for without losing flavor. Other foods such as rice, pasta, and grains do not need added salt. Rinse canned vegetables, and cook them in fresh water. This removes some--but not all--of the salt. Avoid water that is naturally high in sodium or that has been treated with water softeners, which add sodium. If you buy bottled water, read the label and choose a sodium-free brand. Avoid high-sodium foods  Avoid eating:  Smoked, cured, salted, and canned meat, fish, and poultry. Ham, devries, hot dogs, and luncheon meats. Regular, hard, and processed cheese and regular peanut butter. Crackers with salted tops, and other salted snack foods such as pretzels, chips, and salted popcorn. Frozen prepared meals, unless labeled low-sodium. Canned and dried soups, broths, and bouillon, unless labeled sodium-free or low-sodium. Canned vegetables, unless labeled sodium-free or low-sodium. [place] fries, pizza, tacos, and other fast foods. Pickles, olives, ketchup, and other condiments, especially soy sauce, unless labeled sodium-free or low-sodium. Where can you learn more? Go to https://[email or web address].com. org and sign in to your [employer] account. Enter W273 in the [employer] box to learn more about \"Low Sodium Diet (2,000 Milligram): Care Instructions. \"     If you do not have an account, please click on the \"Sign Up Now\" link.  Current as of: May 9, 2022               Content Version: 13.4  © 6933-6132 Healthwise, Incorporated. Care instructions adapted under license by [facility] ([facility]). If you have questions about a medical condition or this instruction, always ask your healthcare professional. [address] any warranty or liability for your use of this information.

## 2022-11-30 NOTE — PROGRESS NOTES
Congestive Heart Failure [address]   1940    Referring Provider: Dr. Carol Ann Johnson  Primary Care Physician: Dr. Mel Wolfe  Cardiologist: Dr. Ishmael Dc  Nephrologist:     History of Present Illness:     Yoon Walters is a 81 y.o. male with a history of HFrEF, most recent EF 15% 12/18/2021. Patient Story:    He does not have dyspnea with exertion, shortness of breath, at times and states recovers with rest or decline in overall functional capacity. He does not have orthopnea, PND, nocturnal cough or hemoptysis. He does not have abdominal distention or bloating, early satiety, anorexia/change in appetite. He does have a good urinary response to oral diuretic. He does have lower extremity edema. He does not have lightheadedness, dizziness. He denies palpitations, syncope or near syncope. He does not complain of chest pain, pressure, discomfort. No Known Allergies    Prior to Visit Medications    Medication Sig Taking? Authorizing Provider   sacubitril-valsartan (ENTRESTO) 24-26 MG per tablet Take 1 tablet by mouth 2 times daily  Richmond Casey APRN - CNP   bumetanide (BUMEX) 1 MG tablet Take 1 tablet by mouth daily  Richmond Casey APRN - CNP   metoprolol succinate (TOPROL XL) 25 MG extended release tablet Take 1 tablet by mouth daily  Thurlow Kemps, APRN - CNP   spironolactone (ALDACTONE) 25 MG tablet Take 0.5 tablets by mouth daily  Richmond Casey APRN - CNP   dexamethasone 0.5 MG/5ML solution Taking once per day.   Historical Provider, MD   everolimus (AFINITOR) 10 MG TABS chemo tablet Take 10 mg by mouth daily  Historical Provider, MD   lanreotide acetate (SOMATULINE) 120 MG/0.5ML SOLN depot injection Inject 120 mg into the skin every 28 days [facility]-  Historical Provider, MD   atorvastatin (LIPITOR) 40 MG tablet Take 1 tablet by mouth nightly  Jeane De La Torre APRN - CNP   aspirin 81 MG chewable tablet Take 81 mg by mouth every morning   Historical Provider, MD     Guideline directed medical:  ARNI/ACE I/ARB: No  Beta blocker: Yes  Aldosterone antagonist: Yes  SGLT2: No    Physical Examination:     BP 52/40 HR 60  RR16                                                                                               LAB DATA:    Last 3 BMP      Sodium (mmol/L)   Date Value   11/16/2022 140   11/04/2022 140   10/31/2022 141     Potassium (mmol/L)   Date Value   11/16/2022 3.6   11/04/2022 3.5   10/31/2022 3.4 (L)     Potassium reflex Magnesium (mmol/L)   Date Value   04/06/2022 3.3 (L)   02/26/2022 2.7 (LL)   12/17/2021 3.4 (L)     Chloride (mmol/L)   Date Value   11/16/2022 95 (L)   11/04/2022 98   10/31/2022 100     CO2 (mmol/L)   Date Value   11/16/2022 33 (H)   11/04/2022 33 (H)   10/31/2022 32 (H)     BUN (mg/dL)   Date Value   11/16/2022 35 (H)   11/04/2022 30 (H)   10/31/2022 29 (H)     Glucose (mg/dL)   Date Value   11/16/2022 199 (H)   11/04/2022 108 (H)   10/31/2022 177 (H)   06/03/2012 125 (H)     Calcium (mg/dL)   Date Value   11/16/2022 8.3 (L)   11/04/2022 8.5 (L)   10/31/2022 8.5 (L)       Last 3 BNP       Pro-BNP (pg/mL)   Date Value   11/16/2022 641 (H)   11/04/2022 566 (H)   10/31/2022 595 (H)      CBC: No results for input(s): WBC, HGB, PLT in the last 72 hours. BMP:    No results for input(s): NA, K, CL, CO2, BUN, CREATININE, GLUCOSE in the last 72 hours. Hepatic: No results for input(s): AST, ALT, ALB, BILITOT, ALKPHOS in the last 72 hours. Troponin: No results for input(s): TROPONINI in the last 72 hours. BNP: No results for input(s): BNP in the last 72 hours. Lipids: No results for input(s): CHOL, HDL in the last 72 hours. Invalid input(s): LDLCALCU  INR: No results for input(s): INR in the last 72 hours.     WEIGHTS:    Wt Readings from Last 3 Encounters:   11/16/22 131 lb 2 oz (59.5 kg)   11/04/22 135 lb 12.8 oz (61.6 kg)   10/31/22 141 lb (64 kg)       TELEMETRY:  Cardiac Regularity: Regular  Cardiac Rhythm/Interpretation: Sinus rhythm    ASSESSMENT:  Rodolfo Luna presented with a daughter today. Patients weight is up 6lbs today from last visit of 11/16/22. Patient states he is very tired today. He states he does have a good response from his oral diuretic. He states he knows he isn't hydrating well enough. He states he only takes in about 3-4 cups a day. He is trying for more. He verbalized the understanding of the importance of taking in enough fluids. He states he will try more. Trace edema noted. BP 52/40 sent to ER, notified Dr. Rhiannon Sherman and Dr. Christo Garay. Unable to obtain a SpO2 level. Interventions completed this visit:  IV diuretics given-no  Lab work obtained yes, proBNP, BMP   Reviewed currently prescribed medications with patient, educated on importance of compliance and answered any questions regarding their medication  Educated on signs and symptoms of HF  Educated on low sodium diet    PLAN:  Scheduled to follow up in CHF clinic on  : Future Appointments   Date Time Provider Gretchen Lorenzo   8/3/2023  9:00 AM Kristin Blanton MD [facility]     Given clinic phone number [phone] and aware of signs and symptoms to call with any HF change in symptoms.

## 2022-12-01 PROBLEM — I95.9 HYPOTENSION: Status: ACTIVE | Noted: 2022-12-01

## 2022-12-01 LAB
ALBUMIN SERPL-MCNC: 2.7 G/DL (ref 3.5–5.2)
ANION GAP SERPL CALCULATED.3IONS-SCNC: 10 MMOL/L (ref 7–16)
BUN BLDV-MCNC: 36 MG/DL (ref 6–23)
CALCIUM SERPL-MCNC: 7.2 MG/DL (ref 8.6–10.2)
CHLORIDE BLD-SCNC: 107 MMOL/L (ref 98–107)
CO2: 27 MMOL/L (ref 22–29)
CREAT SERPL-MCNC: 1.3 MG/DL (ref 0.7–1.2)
CREATININE URINE: 49 MG/DL (ref 40–278)
GFR SERPL CREATININE-BSD FRML MDRD: 55 ML/MIN/1.73
GLUCOSE BLD-MCNC: 113 MG/DL (ref 74–99)
PHOSPHORUS: 3.2 MG/DL (ref 2.5–4.5)
POTASSIUM SERPL-SCNC: 2.9 MMOL/L (ref 3.5–5)
SODIUM BLD-SCNC: 144 MMOL/L (ref 132–146)
SODIUM URINE: 47 MMOL/L
UREA NITROGEN, UR: 548 MG/DL (ref 800–1666)

## 2022-12-01 PROCEDURE — 84540 ASSAY OF URINE/UREA-N: CPT

## 2022-12-01 PROCEDURE — 2580000003 HC RX 258: Performed by: FAMILY MEDICINE

## 2022-12-01 PROCEDURE — 82570 ASSAY OF URINE CREATININE: CPT

## 2022-12-01 PROCEDURE — 1200000000 HC SEMI PRIVATE

## 2022-12-01 PROCEDURE — 6360000002 HC RX W HCPCS: Performed by: FAMILY MEDICINE

## 2022-12-01 PROCEDURE — 84300 ASSAY OF URINE SODIUM: CPT

## 2022-12-01 PROCEDURE — 6370000000 HC RX 637 (ALT 250 FOR IP): Performed by: FAMILY MEDICINE

## 2022-12-01 PROCEDURE — 80069 RENAL FUNCTION PANEL: CPT

## 2022-12-01 PROCEDURE — 36415 COLL VENOUS BLD VENIPUNCTURE: CPT

## 2022-12-01 RX ORDER — POTASSIUM CHLORIDE 20 MEQ/1
40 TABLET, EXTENDED RELEASE ORAL 2 TIMES DAILY WITH MEALS
Status: COMPLETED | OUTPATIENT
Start: 2022-12-01 | End: 2022-12-01

## 2022-12-01 RX ORDER — MAGNESIUM SULFATE IN WATER 40 MG/ML
2000 INJECTION, SOLUTION INTRAVENOUS ONCE
Status: COMPLETED | OUTPATIENT
Start: 2022-12-01 | End: 2022-12-01

## 2022-12-01 RX ADMIN — SODIUM CHLORIDE: 9 INJECTION, SOLUTION INTRAVENOUS at 18:17

## 2022-12-01 RX ADMIN — ASPIRIN 81 MG CHEWABLE TABLET 81 MG: 81 TABLET CHEWABLE at 08:18

## 2022-12-01 RX ADMIN — EVEROLIMUS 10 MG: 10 TABLET ORAL at 13:48

## 2022-12-01 RX ADMIN — MAGNESIUM SULFATE HEPTAHYDRATE 2000 MG: 40 INJECTION, SOLUTION INTRAVENOUS at 08:14

## 2022-12-01 RX ADMIN — ATORVASTATIN CALCIUM 40 MG: 40 TABLET, FILM COATED ORAL at 21:08

## 2022-12-01 RX ADMIN — SODIUM CHLORIDE: 9 INJECTION, SOLUTION INTRAVENOUS at 08:11

## 2022-12-01 RX ADMIN — POTASSIUM CHLORIDE 40 MEQ: 1500 TABLET, EXTENDED RELEASE ORAL at 17:02

## 2022-12-01 RX ADMIN — POTASSIUM CHLORIDE 40 MEQ: 1500 TABLET, EXTENDED RELEASE ORAL at 08:19

## 2022-12-01 NOTE — PROGRESS NOTES
Shana Flaherty was order Afinitor (everolimus) chemo tablet which is a nonformulary medication. This medication is not stocked by the pharmacy and will need to be brought in from home for inpatient use. If the medication has not been administered by 1400 on the following day from the time the order was placed, a pharmacist will follow-up with the nurse of the patient to assess the capability of the patient to bring in the medication.     Thank you

## 2022-12-01 NOTE — FLOWSHEET NOTE
12/01/22 0815   Vital Signs   Heart Rate 56   Heart Rate Source Telemetry   BP (!) 80/40   MAP (Calculated) 53   BP Location Left upper arm   BP Method Manual   Patient wishes to be a Limited Code- no intubation, no CPR, no life-saving medications, and no defibrillation. Raffy De La Torre, CNP updated.

## 2022-12-01 NOTE — H&P
[facility]  History and Physical      CHIEF COMPLAINT:    Chief Complaint   Patient presents with    Hypotension     50 systolic at CHF clinic         Patient of Juan M Rendon MD presents with:  LESLIE (acute kidney injury) [facility])    History of Present Illness:   Patient is an 81-year-old male with a past medical history of aneurysm and abdominal aorta, CAD, colon cancer, GERD, hyperlipidemia, hypertension, neuroendocrine cancer, CVA, numbness and tingling of both feet. Patient presented to the ED from the CHF clinic with a systolic pressure of 50. Patient was at a scheduled appointment yesterday morning when his blood pressure was noted to be very low. Patient reports chronic fatigue and loose stools that have been ongoing for several months. Patient has lost 90 pounds over the past year. Patient has been compliant with all medications. Patient is a former smoker and endorses occasional alcohol use. Patient was given a 500 cc bolus and was also found to be hypokalemic which was supplemented in the ED. Patient's BUN/creatinine is 41/1.5 as his baseline creatinine is 1.1. Patient was admitted to telemetry unit for further testing and treatment. REVIEW OF SYSTEMS:  Pertinent negatives are above in HPI. 10 point ROS otherwise negative.       Past Medical History:   Diagnosis Date    Aneurysm of abdominal aorta ([address] 09/18/2013    follows with Dr. Oscar Rodriguez yearly-2020    CAD (coronary artery disease)     Follows with Dr Jeri Blandon [facility])     Colon    Decreased dorsalis pedis pulse 8/5/2021    GERD (gastroesophageal reflux disease)     Glaucoma     (L) eye    Hyperlipidemia     Hypertension     Neuro-endocrine cancer (HCC)     Numbness and tingling of both feet 8/5/2021    Unspecified cerebral artery occlusion with cerebral infarction 2016    TIA         Past Surgical History:   Procedure Laterality Date    CAROTID ENDARTERECTOMY Right     Dr. Baker Stands DIAGNOSTIC CARDIAC CATH LAB PROCEDURE      EYE SURGERY Left Feb 2013, June 2013    glaucoma    EYE SURGERY Left     cataract    FACIAL COSMETIC SURGERY N/A 12/10/2019    EXCISION OF BASAL CELL CARCINOMA RIGHT BROW,  EXCISION OF SQUAMOUS CELL CARCINOMA RIGHT FOREARM -- FROZEN performed by Eber Bermudez MD at [facility] N/A 4/6/2022    EGD PEG TUBE PLACEMENT performed by Enoch Hare MD at [facility] N/A 8/28/2020    DIAGNOSTIC LAPAROSCOPY WITH BIOPSY performed by Enoch Hare MD at [facility] Right 10/16/2013    RIGHT CAROTID ENDARTERECTOMY       Medications Prior to Admission:    Medications Prior to Admission: sacubitril-valsartan (ENTRESTO) 24-26 MG per tablet, Take 1 tablet by mouth 2 times daily  bumetanide (BUMEX) 1 MG tablet, Take 1 tablet by mouth daily  metoprolol succinate (TOPROL XL) 25 MG extended release tablet, Take 1 tablet by mouth daily  spironolactone (ALDACTONE) 25 MG tablet, Take 0.5 tablets by mouth daily  dexamethasone 0.5 MG/5ML solution, Taking once per day. [DISCONTINUED] everolimus (AFINITOR) 10 MG TABS chemo tablet, Take 10 mg by mouth daily  lanreotide acetate (SOMATULINE) 120 MG/0.5ML SOLN depot injection, Inject 120 mg into the skin every 28 days -[facility]-  atorvastatin (LIPITOR) 40 MG tablet, Take 1 tablet by mouth nightly  aspirin 81 MG chewable tablet, Take 81 mg by mouth every morning     Note that the patient's home medications were reviewed and the above list is accurate to the best of my knowledge at the time of the exam.    Allergies:    Patient has no known allergies. Social History:    reports that he quit smoking about 30 years ago. His smoking use included cigarettes. He smoked an average of 2 packs per day. He has never used smokeless tobacco. He reports current alcohol use.  He reports that he does not use drugs. Family History:   family history includes Heart Disease in his brother and father. PHYSICAL EXAM:    Vitals:  BP (!) 90/52   Pulse 57   Temp 97.9 °F (36.6 °C) (Oral)   Resp 16   Wt 142 lb 8 oz (64.6 kg)   SpO2 96%   BMI 18.80 kg/m²       General appearance: NAD, conversant, ill appearing  Eyes: Sclerae anicteric, PERRLA  HEENT: AT/NC, MMM  Neck: FROM, supple, no thyromegaly  Lymph: No cervical / supraclavicular lymphadenopathy  Lungs: Clear to auscultation, WOB normal  CV: RRR, no MRGs, no lower extremity edema  Abdomen: Soft, non-tender; no masses or HSM, +BS, peg tube  Extremities: FROM without synovitis. No clubbing or cyanosis of the hands. Skin: no rash, induration, lesions, or ulcers  Psych: Calm and cooperative. Normal judgement and insight. Normal mood and affect. Neuro: Alert and interactive, face symmetric, speech fluent. LABS:  All labs reviewed.   Of note:  CBC with Differential:    Lab Results   Component Value Date/Time    WBC 4.0 11/30/2022 11:47 AM    RBC 3.48 11/30/2022 11:47 AM    HGB 10.5 11/30/2022 11:47 AM    HCT 32.5 11/30/2022 11:47 AM     11/30/2022 11:47 AM    MCV 93.4 11/30/2022 11:47 AM    MCH 30.2 11/30/2022 11:47 AM    MCHC 32.3 11/30/2022 11:47 AM    RDW 14.5 11/30/2022 11:47 AM    LYMPHOPCT 15.1 11/30/2022 11:47 AM    MONOPCT 11.1 11/30/2022 11:47 AM    BASOPCT 0.2 11/30/2022 11:47 AM    MONOSABS 0.45 11/30/2022 11:47 AM    LYMPHSABS 0.61 11/30/2022 11:47 AM    EOSABS 0.01 11/30/2022 11:47 AM    BASOSABS 0.01 11/30/2022 11:47 AM     CMP:    Lab Results   Component Value Date/Time     12/01/2022 03:40 AM    K 2.9 12/01/2022 03:40 AM    K 2.7 11/30/2022 11:47 AM     12/01/2022 03:40 AM    CO2 27 12/01/2022 03:40 AM    BUN 36 12/01/2022 03:40 AM    CREATININE 1.3 12/01/2022 03:40 AM    GFRAA >60 10/17/2022 01:57 PM    LABGLOM 55 12/01/2022 03:40 AM    GLUCOSE 113 12/01/2022 03:40 AM    GLUCOSE 125 06/03/2012 06:25 PM    PROT 5.4 11/30/2022 11:47 AM    LABALBU 2.7 12/01/2022 03:40 AM    LABALBU 4.6 06/03/2012 06:25 PM    CALCIUM 7.2 12/01/2022 03:40 AM    BILITOT 0.5 11/30/2022 11:47 AM    ALKPHOS 100 11/30/2022 11:47 AM    AST 43 11/30/2022 11:47 AM    ALT 36 11/30/2022 11:47 AM       Imaging:  CXR: No acute process. Question nipple shadows. EKG:  I've personally reviewed the patient's EKG:  Sinus bradycardia    Telemetry:  I've personally reviewed the patient's telemetry:      ASSESSMENT/PLAN:  Principal Problem:    LESLIE (acute kidney injury) ([address]  Active Problems:    Hypotension  Resolved Problems:    * No resolved hospital problems. *    69-year-old male with a history of colon cancer, hypertension, and CHF presents to the ED from CHF clinic with low blood pressures and is admitted to telemetry unit with    LESLIE  Monitor labs-BUN/creatinine 36/1.3-baseline 29/1.0  Supplement electrolytes as needed potassium 2.9  Mag-1.4-2 g mag bolus  IV hydration  Hold nephrotoxins    Hypotension  Monitor BPs every 4 hours  Hold offending agents  IV hydration    Medication for other comorbidities continue as appropriate dose adjustment as necessary. DVT prophylaxis  PT OT  Discharge planning  Case discussed with attending and agreed upon plan of care. Code status: Full  Requires inpatient level of care  TIFFANIE Lopez CNP    7:30 AM  12/1/2022     Above note edited to reflect my thoughts   69-year-old  man admitted with complaints of hypotension resulting in mild renal injury  Continue IV fluid hydration for prerenal LESLIE  Patient admits to poor hydration  Recheck labs in a.m. If all parameters improved okay for discharge tomorrow    I personally saw, examined and provided care for the patient. Radiographs, labs and medication list were reviewed by me independently. The case was discussed in detail and plans for care were established.  Review of Asya CANADA CNP, documentation was conducted and revisions were made as appropriate directly by me. I agree with the above documented exam, problem list, and plan of care.      Lakeisha Keene MD  5:57 PM  12/1/2022

## 2022-12-01 NOTE — CARE COORDINATION
Social Work discharge planning    Pt is a resident of [facility] [phone]. SW spoke to nurse Stacy Hedrick at [facility], who advised pt is independent with ambulation, no dme, no 02,  self administers medications at the assisted living. Pt has Neuroendocrine Cancer and receives lanreotide each month at his Oncologists office. He has support from daughters Erica Robbins and Syed Alfredo, as well as other family members. AT this time, as long as pt has all po daily meds, plan is  to return to [address] [place] [address]. PT OT ordered and pending. Social Work to follow for support and discharge planning with CM.   Electronically signed by Franklin Louis on 12/1/2022 at 1:56 PM

## 2022-12-02 VITALS
OXYGEN SATURATION: 98 % | HEART RATE: 54 BPM | WEIGHT: 149.1 LBS | HEIGHT: 73 IN | DIASTOLIC BLOOD PRESSURE: 58 MMHG | TEMPERATURE: 97.8 F | BODY MASS INDEX: 19.76 KG/M2 | SYSTOLIC BLOOD PRESSURE: 102 MMHG | RESPIRATION RATE: 18 BRPM

## 2022-12-02 LAB
ALBUMIN SERPL-MCNC: 2.9 G/DL (ref 3.5–5.2)
ALP BLD-CCNC: 107 U/L (ref 40–129)
ALT SERPL-CCNC: 34 U/L (ref 0–40)
ANION GAP SERPL CALCULATED.3IONS-SCNC: 4 MMOL/L (ref 7–16)
AST SERPL-CCNC: 35 U/L (ref 0–39)
ATYPICAL LYMPHOCYTE RELATIVE PERCENT: 2.6 % (ref 0–4)
BASOPHILS ABSOLUTE: 0.03 E9/L (ref 0–0.2)
BASOPHILS RELATIVE PERCENT: 0.9 % (ref 0–2)
BILIRUB SERPL-MCNC: 0.5 MG/DL (ref 0–1.2)
BUN BLDV-MCNC: 30 MG/DL (ref 6–23)
CALCIUM SERPL-MCNC: 7.7 MG/DL (ref 8.6–10.2)
CHLORIDE BLD-SCNC: 107 MMOL/L (ref 98–107)
CO2: 29 MMOL/L (ref 22–29)
CREAT SERPL-MCNC: 1 MG/DL (ref 0.7–1.2)
EOSINOPHILS ABSOLUTE: 0 E9/L (ref 0.05–0.5)
EOSINOPHILS RELATIVE PERCENT: 0 % (ref 0–6)
GFR SERPL CREATININE-BSD FRML MDRD: >60 ML/MIN/1.73
GLUCOSE BLD-MCNC: 124 MG/DL (ref 74–99)
HCT VFR BLD CALC: 34.2 % (ref 37–54)
HEMOGLOBIN: 10.8 G/DL (ref 12.5–16.5)
LYMPHOCYTES ABSOLUTE: 0.27 E9/L (ref 1.5–4)
LYMPHOCYTES RELATIVE PERCENT: 6.1 % (ref 20–42)
MAGNESIUM: 1.9 MG/DL (ref 1.6–2.6)
MCH RBC QN AUTO: 30 PG (ref 26–35)
MCHC RBC AUTO-ENTMCNC: 31.6 % (ref 32–34.5)
MCV RBC AUTO: 95 FL (ref 80–99.9)
MONOCYTES ABSOLUTE: 0.12 E9/L (ref 0.1–0.95)
MONOCYTES RELATIVE PERCENT: 4.3 % (ref 2–12)
NEUTROPHILS ABSOLUTE: 2.58 E9/L (ref 1.8–7.3)
NEUTROPHILS RELATIVE PERCENT: 86.1 % (ref 43–80)
NUCLEATED RED BLOOD CELLS: 0 /100 WBC
PDW BLD-RTO: 14.2 FL (ref 11.5–15)
PLATELET # BLD: 149 E9/L (ref 130–450)
PMV BLD AUTO: 11.4 FL (ref 7–12)
POTASSIUM REFLEX MAGNESIUM: 3.5 MMOL/L (ref 3.5–5)
RBC # BLD: 3.6 E12/L (ref 3.8–5.8)
RBC # BLD: NORMAL 10*6/UL
SODIUM BLD-SCNC: 140 MMOL/L (ref 132–146)
TOTAL PROTEIN: 5.3 G/DL (ref 6.4–8.3)
WBC # BLD: 3 E9/L (ref 4.5–11.5)

## 2022-12-02 PROCEDURE — 2580000003 HC RX 258: Performed by: FAMILY MEDICINE

## 2022-12-02 PROCEDURE — 85025 COMPLETE CBC W/AUTO DIFF WBC: CPT

## 2022-12-02 PROCEDURE — 6370000000 HC RX 637 (ALT 250 FOR IP): Performed by: FAMILY MEDICINE

## 2022-12-02 PROCEDURE — 80053 COMPREHEN METABOLIC PANEL: CPT

## 2022-12-02 PROCEDURE — 36415 COLL VENOUS BLD VENIPUNCTURE: CPT

## 2022-12-02 PROCEDURE — 83735 ASSAY OF MAGNESIUM: CPT

## 2022-12-02 RX ADMIN — EVEROLIMUS 10 MG: 10 TABLET ORAL at 10:00

## 2022-12-02 RX ADMIN — SODIUM CHLORIDE: 9 INJECTION, SOLUTION INTRAVENOUS at 04:48

## 2022-12-02 RX ADMIN — ASPIRIN 81 MG CHEWABLE TABLET 81 MG: 81 TABLET CHEWABLE at 10:00

## 2022-12-02 NOTE — PLAN OF CARE
Problem: Safety - Adult  Goal: Free from fall injury  12/2/2022 1307 by Angel Walter RN  Outcome: Completed  12/2/2022 0325 by Rachel Hoffman RN  Outcome: Progressing     Problem: Skin/Tissue Integrity  Goal: Absence of new skin breakdown  Description: 1. Monitor for areas of redness and/or skin breakdown  2. Assess vascular access sites hourly  3. Every 4-6 hours minimum:  Change oxygen saturation probe site  4. Every 4-6 hours:  If on nasal continuous positive airway pressure, respiratory therapy assess nares and determine need for appliance change or resting period.   12/2/2022 1307 by Angel Walter RN  Outcome: Completed  12/2/2022 0325 by Rachel Hoffman RN  Outcome: Progressing     Problem: Cardiovascular - Adult  Goal: Maintains optimal cardiac output and hemodynamic stability  12/2/2022 1307 by Angel Walter RN  Outcome: Completed  12/2/2022 0325 by Rachel Hoffman RN  Outcome: Progressing     Problem: Skin/Tissue Integrity - Adult  Goal: Skin integrity remains intact  12/2/2022 1307 by Angel Walter RN  Outcome: Completed  12/2/2022 0325 by Rachel Hoffman RN  Outcome: Progressing     Problem: Musculoskeletal - Adult  Goal: Return mobility to safest level of function  12/2/2022 1307 by Angel Walter RN  Outcome: Completed  12/2/2022 0325 by Rachel Hoffman RN  Outcome: Progressing     Problem: Gastrointestinal - Adult  Goal: Maintains or returns to baseline bowel function  12/2/2022 1307 by Angel Walter RN  Outcome: Completed  12/2/2022 0325 by Rachel Hoffman RN  Outcome: Progressing  Goal: Maintains adequate nutritional intake  12/2/2022 1307 by Angel Walter RN  Outcome: Completed  12/2/2022 0325 by Rachel Hoffman RN  Outcome: Progressing     Problem: Infection - Adult  Goal: Absence of infection at discharge  12/2/2022 1307 by Angel Walter RN  Outcome: Completed  12/2/2022 0325 by Rachel Hoffman RN  Outcome: Progressing     Problem: Metabolic/Fluid and Electrolytes - Adult  Goal: Electrolytes maintained within normal limits  12/2/2022 1307 by Angel Walter RN  Outcome: Completed  12/2/2022 0325 by Rachel Hoffman RN  Outcome: Progressing

## 2022-12-02 NOTE — DISCHARGE SUMMARY
[facility]   Discharge summary   Patient ID:  Tanisha Richards  [id number]  81 y.o.  1940    Admit date: 11/30/2022    Discharge date and time: 12/2/2022    Admission Diagnoses:   Patient Active Problem List   Diagnosis    CAD (coronary artery disease)    LV dysfunction    Hyperlipidemia    Hypertension    Abdominal aortic aneurysm (AAA) without rupture    Glaucoma    S/P carotid endarterectomy    Carotid bruit    H/O: CVA (cerebrovascular accident)    Post-op pain    Abdominal pain    Decreased dorsalis pedis pulse    Numbness and tingling of both feet    Volume overload    Hypokalemia    Complication of gastrostomy tube (HCC)    Small bowel obstruction (HCC)    LESLIE (acute kidney injury) ([facility] [address]    Hypotension       Discharge Diagnoses: LESLIE/Hypotension    Consults: none    Procedures: None    Hospital Course: The patient is a 81 y.o. male of Ritu Talavera MD     81-year-old male with a history of colon cancer, hypertension, and CHF presents to the ED from CHF clinic with low blood pressures and is admitted to telemetry unit with     LESLIE  Monitor labs-BUN/creatinine 36/1.3-baseline 29/1.0  Supplement electrolytes as needed potassium 2.9  Mag-1.4-2 g mag bolus  IV hydration  Hold nephrotoxins     Hypotension  Monitor BPs every 4 hours  Hold offending agents  IV hydration    12/2/2022  Vitas have remained stable. LESLIE resolved. Patient is cleared for discharge back to Assisted living in stable condition and with medications listed below.       Recent Labs     11/30/22  1147 12/02/22  0806   WBC 4.0* 3.0*   HGB 10.5* 10.8*   HCT 32.5* 34.2*    149       Recent Labs     11/30/22  1147 11/30/22  1552 12/01/22  0340 12/02/22  0806     --  144 140   K 2.7* 3.0* 2.9* 3.5     --  107 107   CO2 26  --  27 29   BUN 41*  --  36* 30*   CREATININE 1.5*  --  1.3* 1.0   CALCIUM 7.4*  --  7.2* 7.7*       XR CHEST (2 VW)    Result Date: 11/30/2022  EXAMINATION: TWO XRAY VIEWS OF THE CHEST 11/30/2022 11:47 am COMPARISON: None. HISTORY: ORDERING SYSTEM PROVIDED HISTORY: hypotension TECHNOLOGIST PROVIDED HISTORY: Reason for exam:->hypotension FINDINGS: The lungs are without acute focal process. There is no effusion or pneumothorax. The cardiomediastinal silhouette is without acute process. The osseous structures are without acute process. There are nodular densities projected over the lower thorax on the frontal projection, may represent nipple shadows. No acute process. Question nipple shadows. Repeat study with nipple markers recommended. Discharge Exam:    HEENT: NCAT,  PERRLA, No JVD  Heart:  RRR, no murmurs, gallops, or rubs. Lungs:  CTA bilaterally, no wheeze, rales or rhonchi  Abd: bowel sounds present, nontender, nondistended, no masses  Extrem:  No clubbing, cyanosis, or edema    Disposition: Assisted Living     Patient Condition at Discharge: Stable    Patient Instructions:      Medication List        CONTINUE taking these medications      aspirin 81 MG chewable tablet     atorvastatin 40 MG tablet  Commonly known as: LIPITOR  Take 1 tablet by mouth nightly     bumetanide 1 MG tablet  Commonly known as: BUMEX  Take 1 tablet by mouth daily     dexamethasone 0.5 MG/5ML solution     lanreotide acetate 120 MG/0.5ML Soln depot injection  Commonly known as: SOMATULINE     metoprolol succinate 25 MG extended release tablet  Commonly known as: TOPROL XL  Take 1 tablet by mouth daily     sacubitril-valsartan 24-26 MG per tablet  Commonly known as: ENTRESTO  Take 1 tablet by mouth 2 times daily     spironolactone 25 MG tablet  Commonly known as: ALDACTONE  Take 0.5 tablets by mouth daily            Activity: activity as tolerated  Diet: cardiac diet and diabetic diet    Pt has been advised to: Follow-up with Vandana Anaya MD in 1 week.   Follow-up with consultants as recommended by them    Note that over 30 minutes was spent in preparing discharge papers, discussing discharge with patient, medication review, etc.    Signed:  Pascual Goodpasture Learn, APRN - CNP  12/2/2022  1:59 PM     Above note edited to reflect my thoughts     I personally saw, examined and provided care for the patient. Radiographs, labs and medication list were reviewed by me independently. The case was discussed in detail and plans for care were established. Review of Asya MORENO- CNP, documentation was conducted and revisions were made as appropriate directly by me. I agree with the above documented exam, problem list, and plan of care.      Jimmy Garcia MD  7:57 PM  12/2/2022

## 2022-12-02 NOTE — CARE COORDINATION
Social Work / Discharge Planning : Patient has a discharge order. Patient to return to [facility]. . Family to transport. SW called Arlin [facility] [phone] and spoke to [address] [place] [address]. They requested a N to N which was given to RN. No COVID needed. SW to follow.  Electronically signed by REGGIE Stroud on 12/2/22 at 1:05 PM EST

## 2022-12-02 NOTE — PLAN OF CARE
Problem: Safety - Adult  Goal: Free from fall injury  12/2/2022 0325 by Thomas Michelle RN  Outcome: Progressing  12/1/2022 1355 by Oneida Castro RN  Outcome: Progressing     Problem: Skin/Tissue Integrity  Goal: Absence of new skin breakdown  Description: 1. Monitor for areas of redness and/or skin breakdown  2. Assess vascular access sites hourly  3. Every 4-6 hours minimum:  Change oxygen saturation probe site  4. Every 4-6 hours:  If on nasal continuous positive airway pressure, respiratory therapy assess nares and determine need for appliance change or resting period.   12/2/2022 0325 by Thomas Michelle RN  Outcome: Progressing  12/1/2022 1355 by Oneida Castro RN  Outcome: Progressing     Problem: Cardiovascular - Adult  Goal: Maintains optimal cardiac output and hemodynamic stability  Outcome: Progressing     Problem: Skin/Tissue Integrity - Adult  Goal: Skin integrity remains intact  Outcome: Progressing     Problem: Musculoskeletal - Adult  Goal: Return mobility to safest level of function  Outcome: Progressing     Problem: Gastrointestinal - Adult  Goal: Maintains or returns to baseline bowel function  Outcome: Progressing  Goal: Maintains adequate nutritional intake  Outcome: Progressing     Problem: Infection - Adult  Goal: Absence of infection at discharge  Outcome: Progressing     Problem: Metabolic/Fluid and Electrolytes - Adult  Goal: Electrolytes maintained within normal limits  Outcome: Progressing

## 2022-12-14 ENCOUNTER — HOSPITAL ENCOUNTER (OUTPATIENT)
Dept: OTHER | Age: 82
Setting detail: THERAPIES SERIES
Discharge: HOME | End: 2022-12-14
Payer: MEDICARE

## 2022-12-14 VITALS
DIASTOLIC BLOOD PRESSURE: 40 MMHG | BODY MASS INDEX: 20.98 KG/M2 | OXYGEN SATURATION: 96 % | HEART RATE: 60 BPM | RESPIRATION RATE: 16 BRPM | SYSTOLIC BLOOD PRESSURE: 72 MMHG | WEIGHT: 159 LBS

## 2022-12-14 LAB
ALBUMIN SERPL-MCNC: 2.8 G/DL (ref 3.5–5.2)
ALP BLD-CCNC: 110 U/L (ref 40–129)
ALT SERPL-CCNC: 51 U/L (ref 0–40)
ANION GAP SERPL CALCULATED.3IONS-SCNC: 7 MMOL/L (ref 7–16)
AST SERPL-CCNC: 57 U/L (ref 0–39)
BILIRUB SERPL-MCNC: 0.4 MG/DL (ref 0–1.2)
BUN BLDV-MCNC: 26 MG/DL (ref 6–23)
CALCIUM SERPL-MCNC: 7.7 MG/DL (ref 8.6–10.2)
CHLORIDE BLD-SCNC: 112 MMOL/L (ref 98–107)
CO2: 25 MMOL/L (ref 22–29)
CREAT SERPL-MCNC: 1.1 MG/DL (ref 0.7–1.2)
GFR SERPL CREATININE-BSD FRML MDRD: >60 ML/MIN/1.73
GLUCOSE BLD-MCNC: 166 MG/DL (ref 74–99)
POTASSIUM SERPL-SCNC: 3.4 MMOL/L (ref 3.5–5)
PRO-BNP: 584 PG/ML (ref 0–450)
SODIUM BLD-SCNC: 144 MMOL/L (ref 132–146)
TOTAL PROTEIN: 5.1 G/DL (ref 6.4–8.3)

## 2022-12-14 PROCEDURE — 99214 OFFICE O/P EST MOD 30 MIN: CPT

## 2022-12-14 PROCEDURE — 36415 COLL VENOUS BLD VENIPUNCTURE: CPT

## 2022-12-14 PROCEDURE — 83880 ASSAY OF NATRIURETIC PEPTIDE: CPT

## 2022-12-14 PROCEDURE — 80053 COMPREHEN METABOLIC PANEL: CPT

## 2022-12-14 RX ORDER — POTASSIUM CHLORIDE 750 MG/1
20 CAPSULE, EXTENDED RELEASE ORAL DAILY
COMMUNITY
End: 2022-12-14

## 2022-12-14 RX ORDER — EVEROLIMUS 10 MG/1
10 TABLET ORAL DAILY
COMMUNITY

## 2022-12-14 RX ORDER — POTASSIUM CHLORIDE 750 MG/1
10 CAPSULE, EXTENDED RELEASE ORAL DAILY
Qty: 90 CAPSULE | Refills: 3 | Status: SHIPPED | OUTPATIENT
Start: 2022-12-14

## 2022-12-14 RX ORDER — POTASSIUM CITRATE 10 MEQ/1
TABLET, EXTENDED RELEASE ORAL DAILY
COMMUNITY
End: 2022-12-14

## 2022-12-14 NOTE — PROGRESS NOTES
I spoke to Lyric Cruz at Dr Christina Roy office and notified her of patients weight gain of 22lbs since off Bumex since 12-7-22 , and of 3+ pitting edema rivas lower legs, also notified her that I spoke to Dr Deepa Ying and he is aware of above and I received orders from Dr Afua Kaufmanted she states she will notify Dr Ignacio Mccoy.

## 2022-12-14 NOTE — PROGRESS NOTES
I spoke to Dr Emerald Jiménez reported Potassium of 3.4 orders received for Potassium 10meq daily ( Dr Russo Person sent order to patients Pharmacy)and to decrease Bumex 1mg QOD. I left message for patient and I spoke to [facility] and she verbalizes understanding of above orders.

## 2022-12-14 NOTE — PROGRESS NOTES
Congestive Heart Failure [address]   1940    Referring Provider: Dr. Devyn Louise  Primary Care Physician: Dr. Juaquin Howe  Cardiologist: Dr. Salazar Fernandez  Nephrologist:     History of Present Illness:     Nathan Martinez is a 81 y.o. male with a history of HFrEF, most recent EF 15% 12/18/2021. Patient Story:    He does not have dyspnea with exertion, shortness of breath, or decline in overall functional capacity. He does not have orthopnea, PND, nocturnal cough or hemoptysis. He does not have abdominal distention or bloating, early satiety, anorexia/change in appetite. He does have a good urinary response to oral diuretic. He does have lower extremity edema. He does not have lightheadedness, dizziness. He denies palpitations, syncope or near syncope. He does not complain of chest pain, pressure, discomfort. No Known Allergies    Prior to Visit Medications    Medication Sig Taking? Authorizing Provider   everolimus (AFINITOR) 10 MG TABS chemo tablet Take 10 mg by mouth daily Yes Historical Provider, MD   potassium chloride (MICRO-K) 10 MEQ extended release capsule Take 1 capsule by mouth daily  Caesar Credit, DO   sacubitril-valsartan (ENTRESTO) 24-26 MG per tablet Take 1 tablet by mouth 2 times daily  Susan Drop, APRN - CNP   bumetanide (BUMEX) 1 MG tablet Take 1 tablet by mouth daily  Patient taking differently: Take 1 mg by mouth daily Dr Juaquin Howe stopped Bumex 12-8-22 because of B/P  Susan Drop, APRN - CNP   metoprolol succinate (TOPROL XL) 25 MG extended release tablet Take 1 tablet by mouth daily  Susan Drop, APRN - CNP   spironolactone (ALDACTONE) 25 MG tablet Take 0.5 tablets by mouth daily  Susan Drop, APRN - CNP   dexamethasone 0.5 MG/5ML solution Taking once per day.   Historical Provider, MD   lanreotide acetate (SOMATULINE) 120 MG/0.5ML SOLN depot injection Inject 120 mg into the skin every 28 days -[facility] [address] Provider, MD   atorvastatin (LIPITOR) 40 MG tablet Take 1 tablet by mouth nightly  Katelyn De La Torre APRN - CNP   aspirin 81 MG chewable tablet Take 81 mg by mouth every morning   Historical Provider, MD     Guideline directed medical:  ARNI/ACE I/ARB:YES  Beta blocker:   Yes  Aldosterone antagonist: Yes  SGLT2: No    Physical Examination:     BP (!) 72/40 Comment: manual  Pulse 60   Resp 16   Wt 159 lb (72.1 kg)   SpO2 96%   BMI 20.98 kg/m²     Assessment  Charting Type: Shift assessment (chf clinic)    Neurological  Level of Consciousness: Alert (0)          Respiratory  Respiratory Quality/Effort: Unlabored  Chest Assessment: Chest expansion symmetrical  Breath Sounds  Right Upper Lobe: Clear  Right Middle Lobe: Clear  Right Lower Lobe: Clear  Left Upper Lobe: Clear  Left Lower Lobe: Clear     Cardiac  Cardiac Rhythm: Sinus rhythm    Rhythm Interpretation  Heart Rate: 60     Gastrointestinal  Abdominal (WDL): Exceptions to WDL  Abdomen Inspection: Soft           Peripheral Vascular  Peripheral Vascular (WDL): Exceptions to WDL (support hose are on)  Edema: Right lower extremity, Left lower extremity  RLE Edema: Pitting, +3  LLE Edema: Pitting, +3           Genitourinary  Genitourinary (WDL): Within Defined Limits  Psychosocial  Psychosocial (WDL): Within Defined Limits  Pain Assessment  Pain Assessment: None - Denies Pain           Heart Rate: 60           LAB DATA:    Last 3 BMP      Sodium (mmol/L)   Date Value   12/14/2022 144   12/02/2022 140   12/01/2022 144     Potassium (mmol/L)   Date Value   12/14/2022 3.4 (L)   12/01/2022 2.9 (L)   11/30/2022 3.0 (L)     Potassium reflex Magnesium (mmol/L)   Date Value   12/02/2022 3.5   11/30/2022 2.7 (LL)   04/06/2022 3.3 (L)     Chloride (mmol/L)   Date Value   12/14/2022 112 (H)   12/02/2022 107   12/01/2022 107     CO2 (mmol/L)   Date Value   12/14/2022 25   12/02/2022 29   12/01/2022 27     BUN (mg/dL)   Date Value   12/14/2022 26 (H) 12/02/2022 30 (H)   12/01/2022 36 (H)     Glucose (mg/dL)   Date Value   12/14/2022 166 (H)   12/02/2022 124 (H)   12/01/2022 113 (H)   06/03/2012 125 (H)     Calcium (mg/dL)   Date Value   12/14/2022 7.7 (L)   12/02/2022 7.7 (L)   12/01/2022 7.2 (L)       Last 3 BNP       Pro-BNP (pg/mL)   Date Value   12/14/2022 584 (H)   11/30/2022 373   11/16/2022 641 (H)      CBC: No results for input(s): WBC, HGB, PLT in the last 72 hours. BMP:    Recent Labs     12/14/22  1130      K 3.4*   *   CO2 25   BUN 26*   CREATININE 1.1   GLUCOSE 166*                 Hepatic:   Recent Labs     12/14/22  1130   AST 57*   ALT 51*   BILITOT 0.4   ALKPHOS 110     Troponin: No results for input(s): TROPONINI in the last 72 hours. BNP: No results for input(s): BNP in the last 72 hours. Lipids: No results for input(s): CHOL, HDL in the last 72 hours. Invalid input(s): LDLCALCU  INR: No results for input(s): INR in the last 72 hours. WEIGHTS:    Wt Readings from Last 3 Encounters:   12/14/22 159 lb (72.1 kg)   12/02/22 149 lb 1.6 oz (67.6 kg)   11/16/22 131 lb 2 oz (59.5 kg)       TELEMETRY:  Cardiac Regularity: Regular  Cardiac Rhythm/Interpretation: NSR    ASSESSMENT: Pretty Riggs weight is up 22lbs since last CHF clinic visit 11-30-22. Patient states \"Dr Lord Ornelas took me off of Bumex d/t to low B/P since 12-8-22\" patients B/p 72/40 manually patient denies any lightheadedness/dizziness, chest pain or SOB. Patient has increased rivas lower leg edema 3+ pitting Dr Christine Cope called and notified of above and orders received see progress note. Patient educated on continuing to wear support hose on in am off pm and he does.  Reeducated on 2000mg low sodium diet as patient has been eating foods high in sodium devries, sausage gravy, salting foods patient does understand importance of low sodium diet and states he will start following diet                    Interventions completed this visit:  IV diuretics given-no  Lab work obtained yes, proBNP, CMP(per DR OLIVA Davis Person)  Reviewed currently prescribed medications with patient, educated on importance of compliance and answered any questions regarding their medication  Educated on signs and symptoms of HF  Educated on low sodium diet    PLAN:  Scheduled to follow up in CHF clinic on  : Future Appointments   Date Time Provider Gretchen Lorenzo   [facility]23[facility]20[facility]  [facility]   8/3/2023  9:00 AM Kye Pompa MD [facility]     Given clinic phone number [phone] and aware of signs and symptoms to call with any HF change in symptoms.

## 2022-12-14 NOTE — PROGRESS NOTES
I spoke to Dr Gladys Monsalve in regards to patients not taking his Bumex per Dr Halie King order because of low B/P per patient, patients weight up 22lbs and of B/P 72/40 manual patient denies lightheadness/dizziness no SOB no Chest pain, orders received to obtain CMP, have patient wear dalia hose in AM and off pm, educate patient on low sodium diet, and schedule appointment next week

## 2022-12-23 ENCOUNTER — HOSPITAL ENCOUNTER (OUTPATIENT)
Dept: OTHER | Age: 82
Discharge: HOME | End: 2022-12-23

## 2023-01-04 ENCOUNTER — TELEPHONE (OUTPATIENT)
Dept: CARDIOLOGY CLINIC | Age: 83
End: 2023-01-04

## 2023-01-04 ENCOUNTER — HOSPITAL ENCOUNTER (OUTPATIENT)
Dept: OTHER | Age: 83
Setting detail: THERAPIES SERIES
Discharge: HOME | End: 2023-01-04
Payer: MEDICARE

## 2023-01-04 VITALS — HEART RATE: 55 BPM

## 2023-01-04 LAB
ANION GAP SERPL CALCULATED.3IONS-SCNC: 6 MMOL/L (ref 7–16)
BUN BLDV-MCNC: 24 MG/DL (ref 6–23)
CALCIUM SERPL-MCNC: 6.9 MG/DL (ref 8.6–10.2)
CHLORIDE BLD-SCNC: 105 MMOL/L (ref 98–107)
CO2: 30 MMOL/L (ref 22–29)
CREAT SERPL-MCNC: 1.1 MG/DL (ref 0.7–1.2)
GFR SERPL CREATININE-BSD FRML MDRD: >60 ML/MIN/1.73
GLUCOSE BLD-MCNC: 113 MG/DL (ref 74–99)
POTASSIUM SERPL-SCNC: 2.9 MMOL/L (ref 3.5–5)
PRO-BNP: 1111 PG/ML (ref 0–450)
SODIUM BLD-SCNC: 141 MMOL/L (ref 132–146)

## 2023-01-04 PROCEDURE — 83880 ASSAY OF NATRIURETIC PEPTIDE: CPT

## 2023-01-04 PROCEDURE — 2580000003 HC RX 258

## 2023-01-04 PROCEDURE — 36415 COLL VENOUS BLD VENIPUNCTURE: CPT

## 2023-01-04 PROCEDURE — 96374 THER/PROPH/DIAG INJ IV PUSH: CPT

## 2023-01-04 PROCEDURE — 80048 BASIC METABOLIC PNL TOTAL CA: CPT

## 2023-01-04 PROCEDURE — 2500000003 HC RX 250 WO HCPCS

## 2023-01-04 PROCEDURE — 99214 OFFICE O/P EST MOD 30 MIN: CPT

## 2023-01-04 RX ORDER — SODIUM CHLORIDE 0.9 % (FLUSH) 0.9 %
5-40 SYRINGE (ML) INJECTION 2 TIMES DAILY
Status: DISCONTINUED | OUTPATIENT
Start: 2023-01-04 | End: 2023-01-05 | Stop reason: HOSPADM

## 2023-01-04 RX ORDER — BUMETANIDE 0.25 MG/ML
INJECTION, SOLUTION INTRAMUSCULAR; INTRAVENOUS
Status: COMPLETED
Start: 2023-01-04 | End: 2023-01-04

## 2023-01-04 RX ORDER — BUMETANIDE 0.25 MG/ML
2 INJECTION, SOLUTION INTRAMUSCULAR; INTRAVENOUS ONCE
Status: COMPLETED | OUTPATIENT
Start: 2023-01-04 | End: 2023-01-04

## 2023-01-04 RX ORDER — SODIUM CHLORIDE 0.9 % (FLUSH) 0.9 %
SYRINGE (ML) INJECTION
Status: COMPLETED
Start: 2023-01-04 | End: 2023-01-04

## 2023-01-04 RX ORDER — POTASSIUM CHLORIDE 750 MG/1
20 CAPSULE, EXTENDED RELEASE ORAL 2 TIMES DAILY
Qty: 90 CAPSULE | Refills: 3 | Status: SHIPPED | OUTPATIENT
Start: 2023-01-04 | End: 2023-01-09 | Stop reason: SDUPTHER

## 2023-01-04 RX ORDER — MIDODRINE HYDROCHLORIDE 5 MG/1
5 TABLET ORAL 3 TIMES DAILY
Qty: 90 TABLET | Refills: 3 | Status: SHIPPED | OUTPATIENT
Start: 2023-01-04 | End: 2023-01-04 | Stop reason: SDUPTHER

## 2023-01-04 RX ORDER — POTASSIUM CHLORIDE 750 MG/1
20 CAPSULE, EXTENDED RELEASE ORAL 2 TIMES DAILY
Qty: 90 CAPSULE | Refills: 3 | Status: SHIPPED | OUTPATIENT
Start: 2023-01-04 | End: 2023-01-04 | Stop reason: SDUPTHER

## 2023-01-04 RX ORDER — MIDODRINE HYDROCHLORIDE 5 MG/1
5 TABLET ORAL 3 TIMES DAILY
Qty: 90 TABLET | Refills: 3 | Status: SHIPPED | OUTPATIENT
Start: 2023-01-04 | End: 2023-01-09 | Stop reason: SDUPTHER

## 2023-01-04 RX ADMIN — SODIUM CHLORIDE, PRESERVATIVE FREE 10 ML: 5 INJECTION INTRAVENOUS at 11:50

## 2023-01-04 RX ADMIN — BUMETANIDE 2 MG: 0.25 INJECTION, SOLUTION INTRAMUSCULAR; INTRAVENOUS at 11:50

## 2023-01-04 RX ADMIN — Medication 10 ML: at 11:50

## 2023-01-04 NOTE — PROGRESS NOTES
Spoke with Rhonda Arizmendi RN in CHF clinic  Patient hypervolemic and hypotensive today in clinic  I called and spoke with Dr. Crouch Asp   We are going to start Midodrine 5 mg TID  We gave him IV bumex in clinic today and will continue every other day dosing for now  Labs today with K 2.9. Will increase KDUR from 10 meq daily to 20 meq BID    He is returning to CHF clinic in 6 days for close follow up. Patient very tearful and depressed in CHF clinic today - he denies any suicidal or homicidal thoughts. We discussed palliative care consult - he will consider it. I also encouraged him to speak with Dr. Doe Jarrell (PCP) about treatment of chronic illness depression.      He previously was participating in PT however is not currently and feels like he has no energy to continue

## 2023-01-04 NOTE — PROGRESS NOTES
Congestive Heart Failure [address]   1940    Referring Provider: Dr. Ofelia Link  Primary Care Physician: Dr. Melyssa Hartman  Cardiologist: Dr. Leo Martinez  Nephrologist:     History of Present Illness:     Markel De Oliveira is a 81 y.o. male with a history of HFrEF, most recent EF 15% 12/18/2021. Patient Story:    He does not have dyspnea with exertion, shortness of breath, or decline in overall functional capacity. He does not have orthopnea, PND, nocturnal cough or hemoptysis. He does not have abdominal distention or bloating, early satiety, anorexia/change in appetite. He does have a good urinary response to oral diuretic. He does have lower extremity edema. He does not have lightheadedness, dizziness. He denies palpitations, syncope or near syncope. He does not complain of chest pain, pressure, discomfort. No Known Allergies    Prior to Visit Medications    Medication Sig Taking? Authorizing Provider   midodrine (PROAMATINE) 5 MG tablet Take 1 tablet by mouth 3 times daily  TIFFANIE Wilder CNP   potassium chloride (MICRO-K) 10 MEQ extended release capsule Take 2 capsules by mouth 2 times daily  TIFFANIE Wilder CNP   everolimus (AFINITOR) 10 MG TABS chemo tablet Take 10 mg by mouth daily  Historical Provider, MD   sacubitril-valsartan (ENTRESTO) 24-26 MG per tablet Take 1 tablet by mouth 2 times daily  TIFFANIE Wilder CNP   bumetanide (BUMEX) 1 MG tablet Take 1 tablet by mouth daily  Patient taking differently: Take 1 mg by mouth daily Every other day  TIFFANIE Wilder CNP   metoprolol succinate (TOPROL XL) 25 MG extended release tablet Take 1 tablet by mouth daily  TIFFANIE Wilder CNP   spironolactone (ALDACTONE) 25 MG tablet Take 0.5 tablets by mouth daily  TIFFANIE Wilder CNP   dexamethasone 0.5 MG/5ML solution Taking once per day.   Historical Provider, MD   lanreotide acetate (SOMATULINE) 120 MG/0.5ML SOLN depot injection Inject 120 mg into the skin every 28 days [facility]-  Historical Provider, MD   atorvastatin (LIPITOR) 40 MG tablet Take 1 tablet by mouth nightly  TIFFANIE Escoto - CNP   aspirin 81 MG chewable tablet Take 81 mg by mouth every morning   Historical Provider, MD     Guideline directed medical:  ARNI/ACE I/ARB:YES  Beta blocker: Yes  Aldosterone antagonist: Yes  SGLT2: No    Physical Examination:     Pulse 55     Assessment  Charting Type: Shift assessment    Neurological  Level of Consciousness: Alert (0)          Respiratory  Respiratory Pattern: Regular  Respiratory Depth: Normal  Respiratory Quality/Effort: Unlabored  Chest Assessment: Chest expansion symmetrical  L Breath Sounds: Clear  R Breath Sounds: Clear  Breath Sounds  Right Upper Lobe: Clear  Right Middle Lobe: Clear  Right Lower Lobe: Clear  Left Upper Lobe: Clear  Left Lower Lobe: Clear     Cardiac  Cardiac Regularity: Regular  Cardiac Rhythm: Sinus heidy    Rhythm Interpretation  Heart Rate: 55     Gastrointestinal  Abdominal (WDL): Exceptions to WDL  Abdomen Inspection: Soft  RUQ Bowel Sounds: Active  LUQ Bowel Sounds: Active  RLQ Bowel Sounds: Active  LLQ Bowel Sounds: Active      Bowel Sounds  RUQ Bowel Sounds: Active  LUQ Bowel Sounds: Active  RLQ Bowel Sounds: Active  LLQ Bowel Sounds:  Active    Peripheral Vascular  Peripheral Vascular (WDL): Exceptions to WDL  Edema: Right lower extremity, Left lower extremity  RLE Edema: Pitting, +3  LLE Edema: Pitting, +3        Musculoskeletal  RL Extremity: Unsteady  LL Extremity: Unsteady  Genitourinary  Genitourinary (WDL): Within Defined Limits  Psychosocial  Psychosocial (WDL): Within Defined Limits              Heart Rate: 55           LAB DATA:    Last 3 BMP      Sodium (mmol/L)   Date Value   01/04/2023 141   12/14/2022 144   12/02/2022 140     Potassium (mmol/L)   Date Value   01/04/2023 2.9 (L)   12/14/2022 3.4 (L)   12/01/2022 2.9 (L)     Potassium reflex Magnesium (mmol/L)   Date Value   12/02/2022 3.5   11/30/2022 2.7 (LL)   04/06/2022 3.3 (L)     Chloride (mmol/L)   Date Value   01/04/2023 105   12/14/2022 112 (H)   12/02/2022 107     CO2 (mmol/L)   Date Value   01/04/2023 30 (H)   12/14/2022 25   12/02/2022 29     BUN (mg/dL)   Date Value   01/04/2023 24 (H)   12/14/2022 26 (H)   12/02/2022 30 (H)     Glucose (mg/dL)   Date Value   01/04/2023 113 (H)   12/14/2022 166 (H)   12/02/2022 124 (H)   06/03/2012 125 (H)     Calcium (mg/dL)   Date Value   01/04/2023 6.9 (L)   12/14/2022 7.7 (L)   12/02/2022 7.7 (L)       Last 3 BNP       Pro-BNP (pg/mL)   Date Value   01/04/2023 1,111 (H)   12/14/2022 584 (H)   11/30/2022 373      CBC: No results for input(s): WBC, HGB, PLT in the last 72 hours. BMP:    Recent Labs     01/04/23  1006      K 2.9*      CO2 30*   BUN 24*   CREATININE 1.1   GLUCOSE 113*                   Hepatic:   No results for input(s): AST, ALT, ALB, BILITOT, ALKPHOS in the last 72 hours. Troponin: No results for input(s): TROPONINI in the last 72 hours. BNP: No results for input(s): BNP in the last 72 hours. Lipids: No results for input(s): CHOL, HDL in the last 72 hours. Invalid input(s): LDLCALCU  INR: No results for input(s): INR in the last 72 hours. WEIGHTS:    Wt Readings from Last 3 Encounters:   12/14/22 159 lb (72.1 kg)   12/02/22 149 lb 1.6 oz (67.6 kg)   11/16/22 131 lb 2 oz (59.5 kg)       TELEMETRY:  Cardiac Regularity: Regular  Cardiac Rhythm/Interpretation: NSR    ASSESSMENT: Levy Lynn weight is up 23lbs since last CHF clinic visit 1/4/23. Patient states \"Dr Sivakumar Zambrano has me taking Bumex everu other day d/t to low B/P since 12-14-22\" patients B/p 74/42 manually patient denies any lightheadedness/dizziness, chest pain or SOB. Patient has increased rivas lower leg edema 3+ pitting Dr Gallegos Friend called and notified of above waiting for response from Darlyn/Dr. Ender Chavez.  Patient  does not have compression stockings on but said he has been wearing them. Reeducated on 2000mg low sodium diet as patient has been eating foods high in sodium devries, sausage gravy, salting foods patient does understand importance of low sodium diet and states he will start following diet. Does admit to adding a little salt to his food. Dr. Zenon Cheng ok'd IV diuretic today, Jossy HANSEN ordered mididrine 5mg three times daily to increase BP. Will see in one week. Low potassium addresed by Jossy HANSEN. Dosing was changed. Interventions completed this visit:  IV diuretics given-no  Lab work obtained yes, proBNP/BMP   Reviewed currently prescribed medications with patient, educated on importance of compliance and answered any questions regarding their medication  Educated on signs and symptoms of HF  Educated on low sodium diet    PLAN:  Scheduled to follow up in CHF clinic on  : Future Appointments   Date Time Provider Gretchen Lorenzo   1/10/2023  8:00 AM [facility] ROOM 1 [facility]   1/18/2023  9:45 AM [facility] ROOM 2 [facility]   8/3/2023  9:00 AM Domingo Green MD [facility]     Given clinic phone number [phone] and aware of signs and symptoms to call with any HF change in symptoms.

## 2023-01-04 NOTE — TELEPHONE ENCOUNTER
Patient seen at the CHF clinic today, BP is 74/42, patient feels weak and fatigue, he gained 23lbs since 11/30 and has 3+pitting edema in lower extremities, he is taking bumex 1mg QOD, please advise

## 2023-01-04 NOTE — DISCHARGE INSTRUCTIONS
Low Sodium Diet (2,000 Milligram): Care Instructions  Overview     Limiting sodium can be an important part of managing some health problems. The most common source of sodium is salt. People get most of the salt in their diet from canned, prepared, and packaged foods. Fast food and restaurant meals also are very high in sodium. Your doctor will probably limit your sodium to less than 2,000 milligrams (mg) a day. This limit counts all the sodium in prepared and packaged foods and any salt you add to your food. Follow-up care is a key part of your treatment and safety. Be sure to make and go to all appointments, and call your doctor if you are having problems. It's also a good idea to know your test results and keep a list of the medicines you take. How can you care for yourself at home? Read food labels  Read labels on cans and food packages. The labels tell you how much sodium is in each serving. Make sure that you look at the serving size. If you eat more than the serving size, you have eaten more sodium. Food labels also tell you the Percent Daily Value for sodium. Choose products with low Percent Daily Values for sodium. Be aware that sodium can come in forms other than salt, including monosodium glutamate (MSG), sodium citrate, and sodium bicarbonate (baking soda). MSG is often added to Asian food. When you eat out, you can sometimes ask for food without MSG or added salt. Buy low-sodium foods  Buy foods that are labeled \"unsalted\" (no salt added), \"sodium-free\" (less than 5 mg of sodium per serving), or \"low-sodium\" (140 mg or less of sodium per serving). Foods labeled \"reduced-sodium\" and \"light sodium\" may still have too much sodium. Be sure to read the label to see how much sodium you are getting. Buy fresh vegetables, or frozen vegetables without added sauces. Buy low-sodium versions of canned vegetables, soups, and other canned goods.   Prepare low-sodium meals  Cut back on the amount of salt you use in cooking. This will help you adjust to the taste. Do not add salt after cooking. One teaspoon of salt has about 2,300 mg of sodium. Take the salt shaker off the table. Flavor your food with garlic, lemon juice, onion, vinegar, herbs, and spices. Do not use soy sauce, lite soy sauce, steak sauce, onion salt, garlic salt, celery salt, or ketchup on your food. Use low-sodium salad dressings, sauces, and ketchup. Or make your own salad dressings and sauces without adding salt. Use less salt (or none) when recipes call for it. You can often use half the salt a recipe calls for without losing flavor. Other foods such as rice, pasta, and grains do not need added salt. Rinse canned vegetables, and cook them in fresh water. This removes some--but not all--of the salt. Avoid water that is naturally high in sodium or that has been treated with water softeners, which add sodium. If you buy bottled water, read the label and choose a sodium-free brand. Avoid high-sodium foods  Avoid eating:  Smoked, cured, salted, and canned meat, fish, and poultry. Ham, devries, hot dogs, and luncheon meats. Regular, hard, and processed cheese and regular peanut butter. Crackers with salted tops, and other salted snack foods such as pretzels, chips, and salted popcorn. Frozen prepared meals, unless labeled low-sodium. Canned and dried soups, broths, and bouillon, unless labeled sodium-free or low-sodium. Canned vegetables, unless labeled sodium-free or low-sodium. [place] fries, pizza, tacos, and other fast foods. Pickles, olives, ketchup, and other condiments, especially soy sauce, unless labeled sodium-free or low-sodium. Where can you learn more? Go to http://www.woods.com/ and enter V843 to learn more about \"Low Sodium Diet (2,000 Milligram): Care Instructions. \"  Current as of: May 9, 2022               Content Version: 13.5  © 0663-5540 [employer].    Care instructions adapted under license by [employer]. If you have questions about a medical condition or this instruction, always ask your healthcare professional. [employer] Inc[employer] disclaims any warranty or liability for your use of this information.

## 2023-01-04 NOTE — TELEPHONE ENCOUNTER
Discussed with Maria Luisa Herzog. She is adjusting medications. Gosia Szymanski D.O.   Cardiologist  Cardiology, [address] [facility]

## 2023-01-05 DIAGNOSIS — Z79.899 NEW MEDICATION ADDED: ICD-10-CM

## 2023-01-05 DIAGNOSIS — I50.9 CONGESTIVE HEART FAILURE, UNSPECIFIED HF CHRONICITY, UNSPECIFIED HEART FAILURE TYPE (HCC): ICD-10-CM

## 2023-01-05 RX ORDER — BUMETANIDE 1 MG/1
1 TABLET ORAL DAILY
Qty: 30 TABLET | Refills: 3 | Status: SHIPPED | OUTPATIENT
Start: 2023-01-05

## 2023-01-05 RX ORDER — METOPROLOL SUCCINATE 25 MG/1
25 TABLET, EXTENDED RELEASE ORAL DAILY
Qty: 30 TABLET | Refills: 5 | Status: SHIPPED | OUTPATIENT
Start: 2023-01-05

## 2023-01-09 RX ORDER — MIDODRINE HYDROCHLORIDE 5 MG/1
5 TABLET ORAL 3 TIMES DAILY
Qty: 90 TABLET | Refills: 3 | Status: SHIPPED | OUTPATIENT
Start: 2023-01-09

## 2023-01-09 RX ORDER — POTASSIUM CHLORIDE 750 MG/1
20 CAPSULE, EXTENDED RELEASE ORAL 2 TIMES DAILY
Qty: 90 CAPSULE | Refills: 3 | Status: SHIPPED | OUTPATIENT
Start: 2023-01-09

## 2023-01-10 ENCOUNTER — HOSPITAL ENCOUNTER (OUTPATIENT)
Dept: OTHER | Age: 83
Setting detail: THERAPIES SERIES
Discharge: HOME | End: 2023-01-10
Payer: MEDICARE

## 2023-01-10 VITALS
BODY MASS INDEX: 20.03 KG/M2 | HEART RATE: 52 BPM | OXYGEN SATURATION: 96 % | RESPIRATION RATE: 16 BRPM | DIASTOLIC BLOOD PRESSURE: 40 MMHG | SYSTOLIC BLOOD PRESSURE: 63 MMHG | WEIGHT: 151.8 LBS

## 2023-01-10 LAB
ANION GAP SERPL CALCULATED.3IONS-SCNC: 5 MMOL/L (ref 7–16)
BUN BLDV-MCNC: 22 MG/DL (ref 6–23)
CALCIUM SERPL-MCNC: 7.2 MG/DL (ref 8.6–10.2)
CHLORIDE BLD-SCNC: 106 MMOL/L (ref 98–107)
CO2: 33 MMOL/L (ref 22–29)
CREAT SERPL-MCNC: 1.1 MG/DL (ref 0.7–1.2)
GFR SERPL CREATININE-BSD FRML MDRD: >60 ML/MIN/1.73
GLUCOSE BLD-MCNC: 118 MG/DL (ref 74–99)
POTASSIUM SERPL-SCNC: 3.4 MMOL/L (ref 3.5–5)
PRO-BNP: 750 PG/ML (ref 0–450)
SODIUM BLD-SCNC: 144 MMOL/L (ref 132–146)

## 2023-01-10 PROCEDURE — 83880 ASSAY OF NATRIURETIC PEPTIDE: CPT

## 2023-01-10 PROCEDURE — 80048 BASIC METABOLIC PNL TOTAL CA: CPT

## 2023-01-10 PROCEDURE — 36415 COLL VENOUS BLD VENIPUNCTURE: CPT

## 2023-01-10 PROCEDURE — 99214 OFFICE O/P EST MOD 30 MIN: CPT

## 2023-01-10 RX ORDER — TAMSULOSIN HYDROCHLORIDE 0.4 MG/1
0.4 CAPSULE ORAL DAILY
COMMUNITY

## 2023-01-10 RX ORDER — CIPROFLOXACIN 250 MG/1
250 TABLET, FILM COATED ORAL 2 TIMES DAILY
COMMUNITY

## 2023-01-10 NOTE — PROGRESS NOTES
I spoke to Deepa MORENO-LEN at [address] [place] [address] reported patients assessment today at clinic and B/P 62/40 manually ( patient denies lightheadness or dizziness) and patient saw Dr Marlee Villa yesterday per patient and was started on Cipro 250mg BID and Midodrine 5mg was decreased to BID and Flomax was started 0.4mg daily D/T patient was having difficulty urinating. Orders received to decrease Entresto 24/26mg to 1/2 tablet of 24/26mg BID and to notify Dr. Marlee Villa of above. Patient and sister Luana Band her at Clinic and verbalizes understanding of Entresto order.   Dr Marlee Villa office called at 36 I spoke to [facility] and reported patients B/P 62/40 Manually and patient denies lightheadness or dizziness, [facility] is aware of changes that Vishnu Geronimo made at appointment and she will notify Dr Lorraine Vaughn to please advise

## 2023-01-10 NOTE — PROGRESS NOTES
Congestive Heart Failure 2 [address]   1940    Referring Provider: Dr. Miguelito Rabago  Primary Care Physician: Dr. Ken Rosenbaum  Cardiologist: Dr. Rd Ray  Nephrologist:     History of Present Illness:     Sade Aviles is a 81 y.o. male with a history of HFrEF, most recent EF 15% 12/18/2021. Patient Story:    He does not have dyspnea with exertion, shortness of breath, or decline in overall functional capacity. He does not have orthopnea, PND, nocturnal cough or hemoptysis. He does not have abdominal distention or bloating, early satiety, anorexia/change in appetite. He does have a good urinary response to oral diuretic. He does have lower extremity edema. He does not have lightheadedness, dizziness. He denies palpitations, syncope or near syncope. He does not complain of chest pain, pressure, discomfort. No Known Allergies    Prior to Visit Medications    Medication Sig Taking?  Authorizing Provider   ciprofloxacin (CIPRO) 250 MG tablet Take 250 mg by mouth 2 times daily Yes Historical Provider, MD   tamsulosin (FLOMAX) 0.4 MG capsule Take 0.4 mg by mouth daily Yes Historical Provider, MD   midodrine (PROAMATINE) 5 MG tablet Take 1 tablet by mouth 3 times daily  Patient taking differently: Take 5 mg by mouth 2 times daily Per PCP now taking BID  TIFFANIE Manley CNP   potassium chloride (MICRO-K) 10 MEQ extended release capsule Take 2 capsules by mouth 2 times daily  TIFFANIE Manley CNP   metoprolol succinate (TOPROL XL) 25 MG extended release tablet Take 1 tablet by mouth daily  TIFFANIE Manley CNP   sacubitril-valsartan (ENTRESTO) 24-26 MG per tablet Take 1 tablet by mouth 2 times daily  TIFFANIE Manley CNP   bumetanide (BUMEX) 1 MG tablet Take 1 tablet by mouth daily Every other day  TIFFANIE Manley CNP   everolimus (AFINITOR) 10 MG TABS chemo tablet Take 10 mg by mouth daily  Historical Provider, MD spironolactone (ALDACTONE) 25 MG tablet Take 0.5 tablets by mouth daily  Eugenia Dietz APRN - CNP   lanreotide acetate (SOMATULINE) 120 MG/0.5ML SOLN depot injection Inject 120 mg into the skin every 28 days [facility]-  Historical Provider, MD   atorvastatin (LIPITOR) 40 MG tablet Take 1 tablet by mouth nightly  Desiree De La Torre APRN - CNP   aspirin 81 MG chewable tablet Take 81 mg by mouth every morning   Historical Provider, MD     Guideline directed medical:  ARNI/ACE I/ARB:YES  Beta blocker:   Yes  Aldosterone antagonist: Yes  SGLT2: No    Physical Examination:     BP (!) 63/40 Comment: left arm (baylee Connors aprn-cnp aware of) patient asymtamatic, Manual  Pulse 52   Resp 16   Wt 151 lb 12.8 oz (68.9 kg)   SpO2 96%   BMI 20.03 kg/m²     Assessment  Charting Type: Shift assessment (chf clinic)    Neurological  Level of Consciousness: Alert (0)          Respiratory  Respiratory Quality/Effort: Unlabored  Chest Assessment: Chest expansion symmetrical  Breath Sounds  Right Upper Lobe: Clear  Right Middle Lobe: Clear  Right Lower Lobe: Clear  Left Upper Lobe: Clear  Left Lower Lobe: Clear     Cardiac  Cardiac Rhythm: Sinus heidy    Rhythm Interpretation  Heart Rate: 52     Gastrointestinal  Abdominal (WDL): Within Defined Limits (ileostomy)  Abdomen Inspection: Soft           Peripheral Vascular  Peripheral Vascular (WDL): Exceptions to WDL  Edema: Right lower extremity, Left lower extremity  RLE Edema: +2, Pitting  LLE Edema: +2, Pitting           Genitourinary  Genitourinary (WDL): Exceptions to WDL (diff voiding saw Dr Hale Aas started flomax)  Psychosocial  Psychosocial (WDL): Within Defined Limits  Pain Assessment  Pain Assessment: None - Denies Pain           Heart Rate: 52           LAB DATA:    Last 3 BMP      Sodium (mmol/L)   Date Value   01/10/2023 144   01/04/2023 141   12/14/2022 144     Potassium (mmol/L)   Date Value   01/10/2023 3.4 (L)   01/04/2023 2.9 (L)   12/14/2022 3.4 (L) Potassium reflex Magnesium (mmol/L)   Date Value   12/02/2022 3.5   11/30/2022 2.7 (LL)   04/06/2022 3.3 (L)     Chloride (mmol/L)   Date Value   01/10/2023 106   01/04/2023 105   12/14/2022 112 (H)     CO2 (mmol/L)   Date Value   01/10/2023 33 (H)   01/04/2023 30 (H)   12/14/2022 25     BUN (mg/dL)   Date Value   01/10/2023 22   01/04/2023 24 (H)   12/14/2022 26 (H)     Glucose (mg/dL)   Date Value   01/10/2023 118 (H)   01/04/2023 113 (H)   12/14/2022 166 (H)   06/03/2012 125 (H)     Calcium (mg/dL)   Date Value   01/10/2023 7.2 (L)   01/04/2023 6.9 (L)   12/14/2022 7.7 (L)       Last 3 BNP       Pro-BNP (pg/mL)   Date Value   01/10/2023 750 (H)   01/04/2023 1,111 (H)   12/14/2022 584 (H)      CBC: No results for input(s): WBC, HGB, PLT in the last 72 hours. BMP:    Recent Labs     01/10/23  0803      K 3.4*      CO2 33*   BUN 22   CREATININE 1.1   GLUCOSE 118*                   Hepatic:   No results for input(s): AST, ALT, ALB, BILITOT, ALKPHOS in the last 72 hours. Troponin: No results for input(s): TROPONINI in the last 72 hours. BNP: No results for input(s): BNP in the last 72 hours. Lipids: No results for input(s): CHOL, HDL in the last 72 hours. Invalid input(s): LDLCALCU  INR: No results for input(s): INR in the last 72 hours. WEIGHTS:    Wt Readings from Last 3 Encounters:   01/10/23 151 lb 12.8 oz (68.9 kg)   12/14/22 159 lb (72.1 kg)   12/02/22 149 lb 1.6 oz (67.6 kg)       TELEMETRY:  Cardiac Regularity: Regular  Cardiac Rhythm/Interpretation: NSR    ASSESSMENT: Kun Sabillon weight is down 9lbs since last CHF clinic visit 1/4-23. Patient states \"Dr Siva Aburto has me taking Bumex every other day d/t to low B/P since 12-14-22\" patients B/p 63/40 manually patient denies any lightheadedness/dizziness, chest pain or SOB.  Patient was started on Cipro 250mg BID Flomax 0.4mg daily and midodrine decreased to 5mg BID per Dr Samantha Dao d/t per patient he was having difficulty voiding Patient has rivas lower leg edema 2+pitting which is decreased from last CHF Clinic Visit. Janette Clancy TIFFANIE-CNPcalled and notified of above ,orders received (see note) Patient enc to hydrate with 64fld ounces of water and educated on importance patient verbalizes understanding. Patient educated on spacing medication for safety of B/P and B/P cuff given to patient            Interventions completed this visit:  IV diuretics given-no  Lab work obtained yes, proBNP/BMP   Reviewed currently prescribed medications with patient, educated on importance of compliance and answered any questions regarding their medication  Educated on signs and symptoms of HF  Educated on low sodium diet    PLAN:  Scheduled to follow up in CHF clinic on  : Future Appointments   Date Time Provider Gretchen Lorenzo   1/18/2023  9:45 AM [facility] ROOM 2 [facility]   8/3/2023  9:00 AM Nelida Oliver MD [facility]     Given clinic phone number [phone] and aware of signs and symptoms to call with any HF change in symptoms.

## 2023-01-10 NOTE — PROGRESS NOTES
Tima HANSEN notified of K 3.4 orders received to increase potassium to 40meq BID I spoke to Gale and he verbalizes understanding of above orders

## 2023-01-10 NOTE — DISCHARGE INSTRUCTIONS
Reduce Entresto to half of a 24/26 tablet twice a day. Stagger your medications according to schedule discussed today at [address] and do not take them all at the same time. Take your blood pressure in the AM and then one hour after taking your medications.

## 2023-01-18 ENCOUNTER — HOSPITAL ENCOUNTER (OUTPATIENT)
Dept: OTHER | Age: 83
Setting detail: THERAPIES SERIES
Discharge: HOME | End: 2023-01-18
Payer: MEDICARE

## 2023-01-18 VITALS
RESPIRATION RATE: 18 BRPM | OXYGEN SATURATION: 96 % | DIASTOLIC BLOOD PRESSURE: 50 MMHG | WEIGHT: 149 LBS | SYSTOLIC BLOOD PRESSURE: 70 MMHG | BODY MASS INDEX: 19.66 KG/M2 | HEART RATE: 58 BPM

## 2023-01-18 DIAGNOSIS — Z79.899 NEW MEDICATION ADDED: ICD-10-CM

## 2023-01-18 DIAGNOSIS — I50.9 CONGESTIVE HEART FAILURE, UNSPECIFIED HF CHRONICITY, UNSPECIFIED HEART FAILURE TYPE (HCC): ICD-10-CM

## 2023-01-18 LAB
ALBUMIN SERPL-MCNC: 2.9 G/DL (ref 3.5–5.2)
ALP BLD-CCNC: 105 U/L (ref 40–129)
ALT SERPL-CCNC: 59 U/L (ref 0–40)
ANION GAP SERPL CALCULATED.3IONS-SCNC: 8 MMOL/L (ref 7–16)
AST SERPL-CCNC: 96 U/L (ref 0–39)
BILIRUB SERPL-MCNC: 0.3 MG/DL (ref 0–1.2)
BUN BLDV-MCNC: 28 MG/DL (ref 6–23)
CALCIUM SERPL-MCNC: 8.1 MG/DL (ref 8.6–10.2)
CHLORIDE BLD-SCNC: 104 MMOL/L (ref 98–107)
CO2: 26 MMOL/L (ref 22–29)
CREAT SERPL-MCNC: 1.3 MG/DL (ref 0.7–1.2)
GFR SERPL CREATININE-BSD FRML MDRD: 55 ML/MIN/1.73
GLUCOSE BLD-MCNC: 125 MG/DL (ref 74–99)
POTASSIUM SERPL-SCNC: 4.2 MMOL/L (ref 3.5–5)
PRO-BNP: 635 PG/ML (ref 0–450)
SODIUM BLD-SCNC: 138 MMOL/L (ref 132–146)
TOTAL PROTEIN: 5.5 G/DL (ref 6.4–8.3)

## 2023-01-18 PROCEDURE — 99214 OFFICE O/P EST MOD 30 MIN: CPT

## 2023-01-18 PROCEDURE — 80053 COMPREHEN METABOLIC PANEL: CPT

## 2023-01-18 PROCEDURE — 83880 ASSAY OF NATRIURETIC PEPTIDE: CPT

## 2023-01-18 PROCEDURE — 36415 COLL VENOUS BLD VENIPUNCTURE: CPT

## 2023-01-18 RX ORDER — MIDODRINE HYDROCHLORIDE 5 MG/1
5 TABLET ORAL 3 TIMES DAILY
Qty: 90 TABLET | Refills: 3 | Status: ON HOLD | OUTPATIENT
Start: 2023-01-18

## 2023-01-18 RX ORDER — BUMETANIDE 1 MG/1
1 TABLET ORAL DAILY
Qty: 30 TABLET | Refills: 3 | Status: ON HOLD | OUTPATIENT
Start: 2023-01-18

## 2023-01-18 RX ORDER — METOPROLOL SUCCINATE 25 MG/1
25 TABLET, EXTENDED RELEASE ORAL NIGHTLY
Qty: 30 TABLET | Refills: 5 | Status: ON HOLD | OUTPATIENT
Start: 2023-01-18

## 2023-01-18 NOTE — PROGRESS NOTES
Congestive Heart Failure [address]   1940    Referring Provider: Dr. Suzi Norton  Primary Care Physician: Dr. Bret Mehta  Cardiologist: Dr. Gini Blankenship  Nephrologist:     History of Present Illness:     Taran Smith is a 81 y.o. male with a history of HFrEF, most recent EF 15% 12/18/2021. Patient Story:    He does not have dyspnea with exertion, shortness of breath, or decline in overall functional capacity. He does not have orthopnea, PND, nocturnal cough or hemoptysis. He does not have abdominal distention or bloating, early satiety, anorexia/change in appetite. He does have a good urinary response to oral diuretic. He does have lower extremity edema. He does not have lightheadedness, dizziness. He denies palpitations, syncope or near syncope. He does not complain of chest pain, pressure, discomfort. No Known Allergies    Prior to Visit Medications    Medication Sig Taking?  Authorizing Provider   potassium chloride (MICRO-K) 10 MEQ extended release capsule Take 2 capsules by mouth 2 times daily  TIFFANIE Garza CNP   midodrine (PROAMATINE) 5 MG tablet Take 1 tablet by mouth 3 times daily  TIFFANIE Garza CNP   bumetanide (BUMEX) 1 MG tablet Take 1 tablet by mouth daily  TIFFANIE Garza CNP   metoprolol succinate (TOPROL XL) 25 MG extended release tablet Take 1 tablet by mouth nightly  TIFFANIE Garza CNP   ciprofloxacin (CIPRO) 250 MG tablet Take 250 mg by mouth 2 times daily  Patient not taking: Reported on 1/18/2023  Historical Provider, MD   tamsulosin (FLOMAX) 0.4 MG capsule Take 0.4 mg by mouth daily  Historical Provider, MD   sacubitril-valsartan (ENTRESTO) 24-26 MG per tablet Take 1 tablet by mouth 2 times daily  Patient taking differently: Take 0.5 tablets by mouth 2 times daily  TIFFANIE Garza CNP   everolimus (AFINITOR) 10 MG TABS chemo tablet Take 10 mg by mouth daily  Historical Provider, MD   spironolactone (ALDACTONE) 25 MG tablet Take 0.5 tablets by mouth daily  TIFFANIE Mejia - CNP   lanreotide acetate (SOMATULINE) 120 MG/0.5ML SOLN depot injection Inject 120 mg into the skin every 28 days -[facility]-  Historical Provider, MD   atorvastatin (LIPITOR) 40 MG tablet Take 1 tablet by mouth nightly  TIFFANIE Lopez - CNP   aspirin 81 MG chewable tablet Take 81 mg by mouth every morning   Historical Provider, MD     Guideline directed medical:  ARNI/ACE I/ARB:YES  Beta blocker:   Yes  Aldosterone antagonist: Yes  SGLT2: No    Physical Examination:     BP (!) 70/50   Pulse 58   Resp 18   Wt 149 lb (67.6 kg)   SpO2 96%   BMI 19.66 kg/m²     Assessment  Charting Type: Shift assessment (chf clinic)    Neurological  Level of Consciousness: Alert (0)          Respiratory  Respiratory Quality/Effort: Unlabored  Chest Assessment: Chest expansion symmetrical  Breath Sounds  Right Upper Lobe: Clear  Right Middle Lobe: Clear  Right Lower Lobe: Clear  Left Upper Lobe: Clear  Left Lower Lobe: Clear     Cardiac  Cardiac Rhythm: Sinus heidy    Rhythm Interpretation  Heart Rate: 58     Gastrointestinal  Abdominal (WDL): Within Defined Limits (ileostomy)  Abdomen Inspection: Soft           Peripheral Vascular  Peripheral Vascular (WDL): Exceptions to WDL  Edema: Right lower extremity, Left lower extremity  RLE Edema: +2, Pitting  LLE Edema: +2, Pitting           Genitourinary  Genitourinary (WDL): Exceptions to WDL (diff voiding saw Dr Jagdish Perez started flomax)  Psychosocial  Psychosocial (WDL): Within Defined Limits              Heart Rate: 58           LAB DATA:    Last 3 BMP      Sodium (mmol/L)   Date Value   01/18/2023 138   01/10/2023 144   01/04/2023 141     Potassium (mmol/L)   Date Value   01/18/2023 4.2   01/10/2023 3.4 (L)   01/04/2023 2.9 (L)     Potassium reflex Magnesium (mmol/L)   Date Value   12/02/2022 3.5   11/30/2022 2.7 (LL)   04/06/2022 3.3 (L)     Chloride (mmol/L)   Date Value   01/18/2023 104   01/10/2023 106   01/04/2023 105     CO2 (mmol/L)   Date Value   01/18/2023 26   01/10/2023 33 (H)   01/04/2023 30 (H)     BUN (mg/dL)   Date Value   01/18/2023 28 (H)   01/10/2023 22   01/04/2023 24 (H)     Glucose (mg/dL)   Date Value   01/18/2023 125 (H)   01/10/2023 118 (H)   01/04/2023 113 (H)   06/03/2012 125 (H)     Calcium (mg/dL)   Date Value   01/18/2023 8.1 (L)   01/10/2023 7.2 (L)   01/04/2023 6.9 (L)       Last 3 BNP       Pro-BNP (pg/mL)   Date Value   01/18/2023 635 (H)   01/10/2023 750 (H)   01/04/2023 1,111 (H)      CBC: No results for input(s): WBC, HGB, PLT in the last 72 hours. BMP:    Recent Labs     01/18/23  1106      K 4.2      CO2 26   BUN 28*   CREATININE 1.3*   GLUCOSE 125*                     Hepatic:   Recent Labs     01/18/23  1106   AST 96*   ALT 59*   BILITOT 0.3   ALKPHOS 105       Troponin: No results for input(s): TROPONINI in the last 72 hours. BNP: No results for input(s): BNP in the last 72 hours. Lipids: No results for input(s): CHOL, HDL in the last 72 hours. Invalid input(s): LDLCALCU  INR: No results for input(s): INR in the last 72 hours. WEIGHTS:    Wt Readings from Last 3 Encounters:   01/18/23 149 lb (67.6 kg)   01/10/23 151 lb 12.8 oz (68.9 kg)   12/14/22 159 lb (72.1 kg)       TELEMETRY:  Cardiac Regularity: Regular  Cardiac Rhythm/Interpretation: NSR    ASSESSMENT: Richmond Medico weight is down 2 bs since last CHF clinic visit 1/10/23. Patient states he has been feeling tired and did not take any medications two days ago and stated \"I was just tired and didn't feel like taking them. \"Patient does admit to having a better appetite and drinking close to 64 ounces of fluids. He also states that he urinates adequate amounts throughout the day. Patient's blood pressure 70/50 manually to left arm, patient denies dizziness.  Upon assessment patient's apical is regular, lungs diminished, abdomen soft, +2 pitting edema to bilateral lower extremities. Zi Ahuja CNP notified of patient's blood pressure and following orders given; ProAmatine 5mg three times a day, Bumex daily, and add Ensure/Boost daily. Patient and daughter verbalized an understanding of orders and orders wrote on discharge summary. Interventions completed this visit:  IV diuretics given-no  Lab work obtained yes, proBNP/CMP  Reviewed currently prescribed medications with patient, educated on importance of compliance and answered any questions regarding their medication  Educated on signs and symptoms of HF  Educated on low sodium diet    PLAN:  Scheduled to follow up in CHF clinic on  : Future Appointments   Date Time Provider Gretchen Lorenzo   1/25/2023 12:00 PM [facility] ROOM 2 [facility]   8/3/2023  9:00 AM Jake Arshad MD [facility]     Given clinic phone number [phone] and aware of signs and symptoms to call with any HF change in symptoms.

## 2023-01-18 NOTE — PROGRESS NOTES
Received call from [facility] in [address]  clinic   Patient in CHF clinic ongoing hypotension, asymptomatic   Lower extremity edema   Ongoing depression - didn't take any of his medication yesterday. We discussed palliative care referral last week he was going to think about it.      He is peeing well with addition of Flomax from PCP    Instructed to increase midodrine to 5 mg TID  Increase bumex to daily   Start taking toprol nighty  Entresto dose reduced last week   Elevated legs as much as possible  Given hypoalbuminemia will have him do ensure/boost     Following up in one week at CHF clinic

## 2023-01-21 ENCOUNTER — APPOINTMENT (OUTPATIENT)
Dept: GENERAL RADIOLOGY | Age: 83
DRG: 312 | End: 2023-01-21
Payer: MEDICARE

## 2023-01-21 ENCOUNTER — HOSPITAL ENCOUNTER (INPATIENT)
Age: 83
LOS: 4 days | Discharge: HOME HEALTH | DRG: 312 | End: 2023-01-25
Attending: EMERGENCY MEDICINE | Admitting: INTERNAL MEDICINE
Payer: MEDICARE

## 2023-01-21 ENCOUNTER — APPOINTMENT (OUTPATIENT)
Dept: CT IMAGING | Age: 83
DRG: 312 | End: 2023-01-21
Payer: MEDICARE

## 2023-01-21 DIAGNOSIS — I95.1 ORTHOSTATIC HYPOTENSION: Primary | ICD-10-CM
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
## 2023-01-21 DIAGNOSIS — S09.90XA INJURY OF HEAD, INITIAL ENCOUNTER: ICD-10-CM

## 2023-01-21 LAB
ALBUMIN SERPL-MCNC: 2.9 G/DL (ref 3.5–5.2)
ALP BLD-CCNC: 116 U/L (ref 40–129)
ALT SERPL-CCNC: 78 U/L (ref 0–40)
ANION GAP SERPL CALCULATED.3IONS-SCNC: 8 MMOL/L (ref 7–16)
ANISOCYTOSIS: ABNORMAL
AST SERPL-CCNC: 93 U/L (ref 0–39)
BASOPHILS ABSOLUTE: 0.01 E9/L (ref 0–0.2)
BASOPHILS RELATIVE PERCENT: 0.4 % (ref 0–2)
BILIRUB SERPL-MCNC: 0.3 MG/DL (ref 0–1.2)
BUN BLDV-MCNC: 34 MG/DL (ref 6–23)
CALCIUM SERPL-MCNC: 8 MG/DL (ref 8.6–10.2)
CHLORIDE BLD-SCNC: 100 MMOL/L (ref 98–107)
CO2: 26 MMOL/L (ref 22–29)
CREAT SERPL-MCNC: 1.3 MG/DL (ref 0.7–1.2)
EKG ATRIAL RATE: 50 BPM
EKG P-R INTERVAL: 224 MS
EKG Q-T INTERVAL: 546 MS
EKG QRS DURATION: 76 MS
EKG QTC CALCULATION (BAZETT): 497 MS
EKG R AXIS: 34 DEGREES
EKG T AXIS: -29 DEGREES
EKG VENTRICULAR RATE: 50 BPM
EOSINOPHILS ABSOLUTE: 0.01 E9/L (ref 0.05–0.5)
EOSINOPHILS RELATIVE PERCENT: 0.4 % (ref 0–6)
GFR SERPL CREATININE-BSD FRML MDRD: 55 ML/MIN/1.73
GLUCOSE BLD-MCNC: 168 MG/DL (ref 74–99)
HCT VFR BLD CALC: 26.5 % (ref 37–54)
HEMOGLOBIN: 8.3 G/DL (ref 12.5–16.5)
HYPOCHROMIA: ABNORMAL
IMMATURE GRANULOCYTES #: 0.01 E9/L
IMMATURE GRANULOCYTES %: 0.4 % (ref 0–5)
LYMPHOCYTES ABSOLUTE: 0.37 E9/L (ref 1.5–4)
LYMPHOCYTES RELATIVE PERCENT: 14.4 % (ref 20–42)
MCH RBC QN AUTO: 30.3 PG (ref 26–35)
MCHC RBC AUTO-ENTMCNC: 31.3 % (ref 32–34.5)
MCV RBC AUTO: 96.7 FL (ref 80–99.9)
MONOCYTES ABSOLUTE: 0.29 E9/L (ref 0.1–0.95)
MONOCYTES RELATIVE PERCENT: 11.3 % (ref 2–12)
NEUTROPHILS ABSOLUTE: 1.88 E9/L (ref 1.8–7.3)
NEUTROPHILS RELATIVE PERCENT: 73.1 % (ref 43–80)
PDW BLD-RTO: 15.1 FL (ref 11.5–15)
PLATELET # BLD: 102 E9/L (ref 130–450)
PMV BLD AUTO: 12.4 FL (ref 7–12)
POTASSIUM SERPL-SCNC: 3.7 MMOL/L (ref 3.5–5)
RBC # BLD: 2.74 E12/L (ref 3.8–5.8)
SODIUM BLD-SCNC: 134 MMOL/L (ref 132–146)
TOTAL PROTEIN: 5.3 G/DL (ref 6.4–8.3)
TROPONIN, HIGH SENSITIVITY: 25 NG/L (ref 0–11)
TROPONIN, HIGH SENSITIVITY: 27 NG/L (ref 0–11)
TROPONIN, HIGH SENSITIVITY: 27 NG/L (ref 0–11)
WBC # BLD: 2.6 E9/L (ref 4.5–11.5)

## 2023-01-21 PROCEDURE — 93010 ELECTROCARDIOGRAM REPORT: CPT | Performed by: INTERNAL MEDICINE

## 2023-01-21 PROCEDURE — 72125 CT NECK SPINE W/O DYE: CPT

## 2023-01-21 PROCEDURE — 71045 X-RAY EXAM CHEST 1 VIEW: CPT

## 2023-01-21 PROCEDURE — 85025 COMPLETE CBC W/AUTO DIFF WBC: CPT

## 2023-01-21 PROCEDURE — 93005 ELECTROCARDIOGRAM TRACING: CPT

## 2023-01-21 PROCEDURE — 70450 CT HEAD/BRAIN W/O DYE: CPT

## 2023-01-21 PROCEDURE — 2580000003 HC RX 258

## 2023-01-21 PROCEDURE — 2580000003 HC RX 258: Performed by: NURSE PRACTITIONER

## 2023-01-21 PROCEDURE — 73070 X-RAY EXAM OF ELBOW: CPT

## 2023-01-21 PROCEDURE — 6370000000 HC RX 637 (ALT 250 FOR IP): Performed by: NURSE PRACTITIONER

## 2023-01-21 PROCEDURE — 84484 ASSAY OF TROPONIN QUANT: CPT

## 2023-01-21 PROCEDURE — 2060000000 HC ICU INTERMEDIATE R&B

## 2023-01-21 PROCEDURE — 90714 TD VACC NO PRESV 7 YRS+ IM: CPT

## 2023-01-21 PROCEDURE — 99285 EMERGENCY DEPT VISIT HI MDM: CPT

## 2023-01-21 PROCEDURE — 80053 COMPREHEN METABOLIC PANEL: CPT

## 2023-01-21 PROCEDURE — 90471 IMMUNIZATION ADMIN: CPT

## 2023-01-21 PROCEDURE — 6360000002 HC RX W HCPCS

## 2023-01-21 PROCEDURE — 96360 HYDRATION IV INFUSION INIT: CPT

## 2023-01-21 PROCEDURE — 36415 COLL VENOUS BLD VENIPUNCTURE: CPT

## 2023-01-21 RX ORDER — MIDODRINE HYDROCHLORIDE 5 MG/1
5 TABLET ORAL 3 TIMES DAILY
Status: DISCONTINUED | OUTPATIENT
Start: 2023-01-21 | End: 2023-01-25 | Stop reason: HOSPADM

## 2023-01-21 RX ORDER — SODIUM CHLORIDE 0.9 % (FLUSH) 0.9 %
5-40 SYRINGE (ML) INJECTION EVERY 12 HOURS SCHEDULED
Status: DISCONTINUED | OUTPATIENT
Start: 2023-01-21 | End: 2023-01-25 | Stop reason: HOSPADM

## 2023-01-21 RX ORDER — SODIUM CHLORIDE 9 MG/ML
INJECTION, SOLUTION INTRAVENOUS CONTINUOUS
Status: DISCONTINUED | OUTPATIENT
Start: 2023-01-21 | End: 2023-01-24

## 2023-01-21 RX ORDER — METOPROLOL SUCCINATE 25 MG/1
25 TABLET, EXTENDED RELEASE ORAL NIGHTLY
Status: DISCONTINUED | OUTPATIENT
Start: 2023-01-21 | End: 2023-01-25 | Stop reason: HOSPADM

## 2023-01-21 RX ORDER — ASPIRIN 81 MG/1
81 TABLET, CHEWABLE ORAL EVERY MORNING
Status: DISCONTINUED | OUTPATIENT
Start: 2023-01-22 | End: 2023-01-25 | Stop reason: HOSPADM

## 2023-01-21 RX ORDER — POTASSIUM CHLORIDE 20 MEQ/1
40 TABLET, EXTENDED RELEASE ORAL 2 TIMES DAILY
Status: DISCONTINUED | OUTPATIENT
Start: 2023-01-21 | End: 2023-01-25 | Stop reason: HOSPADM

## 2023-01-21 RX ORDER — BISACODYL 10 MG
10 SUPPOSITORY, RECTAL RECTAL DAILY PRN
Status: DISCONTINUED | OUTPATIENT
Start: 2023-01-21 | End: 2023-01-25 | Stop reason: HOSPADM

## 2023-01-21 RX ORDER — 0.9 % SODIUM CHLORIDE 0.9 %
500 INTRAVENOUS SOLUTION INTRAVENOUS ONCE
Status: COMPLETED | OUTPATIENT
Start: 2023-01-21 | End: 2023-01-21

## 2023-01-21 RX ORDER — SODIUM CHLORIDE 0.9 % (FLUSH) 0.9 %
5-40 SYRINGE (ML) INJECTION PRN
Status: DISCONTINUED | OUTPATIENT
Start: 2023-01-21 | End: 2023-01-25 | Stop reason: HOSPADM

## 2023-01-21 RX ORDER — HEPARIN SODIUM 10000 [USP'U]/ML
5000 INJECTION, SOLUTION INTRAVENOUS; SUBCUTANEOUS EVERY 8 HOURS
Status: DISCONTINUED | OUTPATIENT
Start: 2023-01-21 | End: 2023-01-25 | Stop reason: HOSPADM

## 2023-01-21 RX ORDER — SODIUM CHLORIDE 9 MG/ML
INJECTION, SOLUTION INTRAVENOUS PRN
Status: DISCONTINUED | OUTPATIENT
Start: 2023-01-21 | End: 2023-01-25 | Stop reason: HOSPADM

## 2023-01-21 RX ORDER — EVEROLIMUS 10 MG/1
10 TABLET ORAL DAILY
Status: DISCONTINUED | OUTPATIENT
Start: 2023-01-21 | End: 2023-01-22

## 2023-01-21 RX ORDER — DEXAMETHASONE 0.5 MG/5ML
0.5 SOLUTION ORAL DAILY
COMMUNITY

## 2023-01-21 RX ORDER — TAMSULOSIN HYDROCHLORIDE 0.4 MG/1
0.4 CAPSULE ORAL DAILY
Status: DISCONTINUED | OUTPATIENT
Start: 2023-01-21 | End: 2023-01-23

## 2023-01-21 RX ADMIN — EVEROLIMUS 10 MG: 10 TABLET ORAL at 20:39

## 2023-01-21 RX ADMIN — Medication 10 ML: at 21:09

## 2023-01-21 RX ADMIN — POTASSIUM CHLORIDE 40 MEQ: 20 TABLET, EXTENDED RELEASE ORAL at 21:07

## 2023-01-21 RX ADMIN — SODIUM CHLORIDE 500 ML: 9 INJECTION, SOLUTION INTRAVENOUS at 11:00

## 2023-01-21 RX ADMIN — SODIUM CHLORIDE: 9 INJECTION, SOLUTION INTRAVENOUS at 21:08

## 2023-01-21 RX ADMIN — TAMSULOSIN HYDROCHLORIDE 0.4 MG: 0.4 CAPSULE ORAL at 20:39

## 2023-01-21 RX ADMIN — SODIUM CHLORIDE 500 ML: 9 INJECTION, SOLUTION INTRAVENOUS at 21:08

## 2023-01-21 RX ADMIN — MIDODRINE HYDROCHLORIDE 5 MG: 5 TABLET ORAL at 21:07

## 2023-01-21 RX ADMIN — CLOSTRIDIUM TETANI TOXOID ANTIGEN (FORMALDEHYDE INACTIVATED) AND CORYNEBACTERIUM DIPHTHERIAE TOXOID ANTIGEN (FORMALDEHYDE INACTIVATED) 0.5 ML: 5; 2 INJECTION, SUSPENSION INTRAMUSCULAR at 11:17

## 2023-01-21 ASSESSMENT — ENCOUNTER SYMPTOMS
DIARRHEA: 0
VOMITING: 0
COUGH: 0
RHINORRHEA: 0
ABDOMINAL PAIN: 0
BACK PAIN: 0
SINUS PRESSURE: 0
SHORTNESS OF BREATH: 0
SORE THROAT: 0
NAUSEA: 0
CONSTIPATION: 0
EYE DISCHARGE: 0
ABDOMINAL DISTENTION: 0
PHOTOPHOBIA: 0
WHEEZING: 0
EYE REDNESS: 0
BLOOD IN STOOL: 0

## 2023-01-21 ASSESSMENT — LIFESTYLE VARIABLES
HOW OFTEN DO YOU HAVE A DRINK CONTAINING ALCOHOL: NEVER
HOW MANY STANDARD DRINKS CONTAINING ALCOHOL DO YOU HAVE ON A TYPICAL DAY: PATIENT DOES NOT DRINK

## 2023-01-21 ASSESSMENT — PAIN SCALES - GENERAL
PAINLEVEL_OUTOF10: 0
PAINLEVEL_OUTOF10: 6
PAINLEVEL_OUTOF10: 0

## 2023-01-21 NOTE — ED PROVIDER NOTES
[facility]  Department of Emergency Medicine     Written by: Pilar Roberts MD  Patient Name: Edwin Nuñez  Attending Provider: Shana Anguiano DO  Admit Date: 2023 10:18 AM  MRN: [id number]                   : 1940        Chief Complaint   Patient presents with    Head Injury    Fall     Became dizzy and fell. Skin tear to left arm. Coccyx pain. Denies thinners or LOC.l    Hypotension    - Chief complaint    HPI   The patient is an 28-year-old male with a past medical history of neuroendocrine cancer status post G-tube for drainage, CHF with EF 15%, CAD, AAA measured at 3.2 cm on 2022 presenting after a fall with a head injury from which are nursing home via EMS. Patient was walking back from eating breakfast when he started feeling dizzy and lightheaded and fell forward hitting his head. Patient denies losing conscious however the patient's fall was unwitnessed. Patient endorses pain at his right elbow, chest, and head. Patient has multiple abrasions over the same areas. Patient is not on any blood thinners. Patient is a former smoker, endorses occasional alcohol use, denies drug use. Patient denies headache, numbness, weakness, tingling, loss of sensation, loss of consciousness, fever, cough, sore throat, chest pain, shortness of breath, nausea, vomiting, abdominal pain, hematuria, dysuria, increasing or decreasing urinary frequency, blood in stool, constipation, diarrhea, leg pain, leg swelling, recent travel, recent illness, recent surgery, or sick contacts. Review of Systems   Constitutional:  Negative for activity change, chills, fatigue and fever. HENT:  Negative for congestion, postnasal drip, rhinorrhea, sinus pressure and sore throat. Eyes:  Negative for photophobia, discharge, redness and visual disturbance. Respiratory:  Negative for cough, shortness of breath and wheezing.     Cardiovascular:  Negative for chest pain, palpitations and leg swelling. Gastrointestinal:  Negative for abdominal distention, abdominal pain, blood in stool, constipation, diarrhea, nausea and vomiting. Endocrine: Negative for cold intolerance and heat intolerance. Genitourinary:  Negative for decreased urine volume, difficulty urinating, dysuria, flank pain, frequency, hematuria and urgency. Musculoskeletal:  Negative for arthralgias, back pain, joint swelling and myalgias. Fall; multiple abrasions   Skin:  Negative for rash and wound. Allergic/Immunologic: Negative for immunocompromised state. Neurological:  Positive for dizziness and light-headedness. Negative for syncope, facial asymmetry, weakness, numbness and headaches. Hematological:  Does not bruise/bleed easily. Psychiatric/Behavioral:  Negative for behavioral problems and hallucinations. Physical Exam  Constitutional:       General: He is not in acute distress. Appearance: Normal appearance. He is not ill-appearing, toxic-appearing or diaphoretic. HENT:      Head: Normocephalic. Abrasion present. No raccoon eyes, Chappell's sign, contusion, masses, right periorbital erythema, left periorbital erythema or laceration. Hair is normal.        Right Ear: Hearing, tympanic membrane and ear canal normal.      Left Ear: Hearing, tympanic membrane and ear canal normal.      Nose: Nose normal. No nasal deformity, septal deviation, signs of injury, laceration, nasal tenderness, mucosal edema, congestion or rhinorrhea. Right Nostril: No foreign body, epistaxis or septal hematoma. Left Nostril: No foreign body, epistaxis or septal hematoma. Mouth/Throat:      Lips: Pink. Mouth: Mucous membranes are moist.      Pharynx: Oropharynx is clear. Eyes:      Extraocular Movements: Extraocular movements intact. Conjunctiva/sclera: Conjunctivae normal.      Pupils: Pupils are equal, round, and reactive to light.    Cardiovascular:      Rate and Rhythm: Normal rate and regular rhythm. Pulses: Normal pulses. Radial pulses are 2+ on the right side and 2+ on the left side. Posterior tibial pulses are 2+ on the right side and 2+ on the left side. Heart sounds: S1 normal and S2 normal.   Pulmonary:      Effort: Pulmonary effort is normal. No tachypnea, accessory muscle usage or respiratory distress. Breath sounds: Normal breath sounds and air entry. No decreased breath sounds, wheezing, rhonchi or rales. Abdominal:      General: Abdomen is flat. The ostomy site is clean. Bowel sounds are normal. There is no distension. Palpations: Abdomen is soft. There is no fluid wave or mass. Tenderness: There is no abdominal tenderness. There is no right CVA tenderness, left CVA tenderness, guarding or rebound. Negative signs include Russ's sign, Rovsing's sign and McBurney's sign. Comments: G-tube site appears clean, dry, and intact. There is no purulent or bloody drainage noted from the G-tube   Musculoskeletal:         General: No swelling or tenderness. Normal range of motion. Cervical back: Normal range of motion and neck supple. No rigidity. Right lower leg: No edema. Left lower leg: No edema. Comments: Patient has abrasions to his right proximal forearm, middle chest, and left forehead. Lymphadenopathy:      Cervical: No cervical adenopathy. Skin:     General: Skin is warm and dry. Capillary Refill: Capillary refill takes less than 2 seconds. Findings: No bruising, lesion or rash. Neurological:      General: No focal deficit present. Mental Status: He is alert and oriented to person, place, and time. Mental status is at baseline. Motor: No weakness. Psychiatric:         Attention and Perception: Attention normal.         Mood and Affect: Mood and affect normal.         Behavior: Behavior is cooperative.         EKG Interpretation    Interpreted by emergency department physician    Rhythm: sinus bradycardia and 1 degree AV block  Rate: bradycardia  Axis: normal  Ectopy: none  Conduction: 1st degree AV block;   ST Segments: no acute change  T Waves: no acute change  Q Waves: none    Clinical Impression: no acute changes when compared to prior EKG on 11/30/2022    Procedures       MDM  Number of Diagnoses or Management Options  Fall, initial encounter  Injury of head, initial encounter  Orthostatic hypotension  Diagnosis management comments: The patient is an 71-year-old male with a past medical history of neuroendocrine cancer status post G-tube for drainage, CHF with EF 15%, CAD, AAA measured at 3.2 cm on 2/26/2022 presenting after a fall with a head injury from which are nursing home via EMS. At the time of initial examination, the patient is hypotensive. Differential diagnosis includes orthostatic hypotension, cardiac arrhythmia, acute coronary syndrome, intracerebral hemorrhage/injury, or pneumonia. EKG as interpreted by me shows no acute ischemic changes with first-degree AV block and prolonged QTC. Chart review reveals that the patient has an ejection fraction of 15% on echo from 12/17/2021. CBC shows pancytopenia. CMP shows Elevated creatinine of 1.3 with a BUN of 34, and elevated liver enzymes. Initial troponin is 27. Repeat troponin delta is 0. X-ray imaging of the chest and right elbow show no acute abnormality otherwise agree with the radiologist interpretation. CT of the head and C-spine shows no acute intracranial or intracervical pathology and I otherwise agree with the radiologist interpretation. Patient's orthostatic vital signs are positive. Patient was given a 500 cc bolus. Patient was ambulated in the emergency room and was able to walk without assistance using his cane. However upon standing while being changed, patient had another episode of dizziness and lightheadedness. Patient's daughter is now at bedside and has questions regarding possible palliative care. Ultimately decision was made to admit the patient for orthostatic hypotension, possible change from assisted living to higher level of care nursing facility, and palliative care consult. Case was discussed with April APRN for Dr. Gypsy Dove, hospitalist, who agrees to admit the patient. At the time of admission, the patient and his daughter demonstrated good understanding of his condition, had no questions, and the patient was hemodynamically stable. Amount and/or Complexity of Data Reviewed  Decide to obtain previous medical records or to obtain history from someone other than the patient: yes             --------------------------------------------- PAST HISTORY ---------------------------------------------  Past Medical History:  has a past medical history of Aneurysm of abdominal aorta, CAD (coronary artery disease), Cancer ([facility] [address] Decreased dorsalis pedis pulse, GERD (gastroesophageal reflux disease), Glaucoma, Hyperlipidemia, Hypertension, Neuro-endocrine cancer ([facility] [address] Numbness and tingling of both feet, and Unspecified cerebral artery occlusion with cerebral infarction. Past Surgical History:  has a past surgical history that includes hernia repair; Diagnostic Cardiac Cath Lab Procedure; Tonsillectomy; eye surgery (Left, Feb 2013, June 2013); eye surgery (Left); vascular surgery (Right, 10/16/2013); Carotid endarterectomy (Right, ); Colonoscopy; Sigmoidoscopy; Facial cosmetic surgery (N/A, 12/10/2019); laparoscopy (N/A, 8/28/2020); Gastrostomy tube placement; and Gastrostomy tube placement (N/A, 4/6/2022). Social History:  reports that he quit smoking about 30 years ago. His smoking use included cigarettes. He smoked an average of 2 packs per day. He has never used smokeless tobacco. He reports current alcohol use. He reports that he does not use drugs. Family History: family history includes Heart Disease in his brother and father.      The patients home medications have been reviewed. Allergies: Patient has no known allergies.     -------------------------------------------------- RESULTS -------------------------------------------------    LABS:  Results for orders placed or performed during the hospital encounter of 01/21/23   CBC with Auto Differential   Result Value Ref Range    WBC 2.6 (L) 4.5 - 11.5 E9/L    RBC 2.74 (L) 3.80 - 5.80 E12/L    Hemoglobin 8.3 (L) 12.5 - 16.5 g/dL    Hematocrit 26.5 (L) 37.0 - 54.0 %    MCV 96.7 80.0 - 99.9 fL    MCH 30.3 26.0 - 35.0 pg    MCHC 31.3 (L) 32.0 - 34.5 %    RDW 15.1 (H) 11.5 - 15.0 fL    Platelets 475 (L) 011 - 450 E9/L    MPV 12.4 (H) 7.0 - 12.0 fL    Neutrophils % 73.1 43.0 - 80.0 %    Immature Granulocytes % 0.4 0.0 - 5.0 %    Lymphocytes % 14.4 (L) 20.0 - 42.0 %    Monocytes % 11.3 2.0 - 12.0 %    Eosinophils % 0.4 0.0 - 6.0 %    Basophils % 0.4 0.0 - 2.0 %    Neutrophils Absolute 1.88 1.80 - 7.30 E9/L    Immature Granulocytes # 0.01 E9/L    Lymphocytes Absolute 0.37 (L) 1.50 - 4.00 E9/L    Monocytes Absolute 0.29 0.10 - 0.95 E9/L    Eosinophils Absolute 0.01 (L) 0.05 - 0.50 E9/L    Basophils Absolute 0.01 0.00 - 0.20 E9/L    Anisocytosis 1+     Hypochromia 1+    CMP   Result Value Ref Range    Sodium 134 132 - 146 mmol/L    Potassium 3.7 3.5 - 5.0 mmol/L    Chloride 100 98 - 107 mmol/L    CO2 26 22 - 29 mmol/L    Anion Gap 8 7 - 16 mmol/L    Glucose 168 (H) 74 - 99 mg/dL    BUN 34 (H) 6 - 23 mg/dL    Creatinine 1.3 (H) 0.7 - 1.2 mg/dL    Est, Glom Filt Rate 55 >=60 mL/min/1.73    Calcium 8.0 (L) 8.6 - 10.2 mg/dL    Total Protein 5.3 (L) 6.4 - 8.3 g/dL    Albumin 2.9 (L) 3.5 - 5.2 g/dL    Total Bilirubin 0.3 0.0 - 1.2 mg/dL    Alkaline Phosphatase 116 40 - 129 U/L    ALT 78 (H) 0 - 40 U/L    AST 93 (H) 0 - 39 U/L   Troponin   Result Value Ref Range    Troponin, High Sensitivity 27 (H) 0 - 11 ng/L   Troponin   Result Value Ref Range    Troponin, High Sensitivity 27 (H) 0 - 11 ng/L   Troponin   Result Value Ref Range    Troponin, High Sensitivity 25 (H) 0 - 11 ng/L   EKG 12 Lead   Result Value Ref Range    Ventricular Rate 50 BPM    Atrial Rate 50 BPM    P-R Interval 224 ms    QRS Duration 76 ms    Q-T Interval 546 ms    QTc Calculation (Bazett) 497 ms    R Axis 34 degrees    T Axis -29 degrees       RADIOLOGY:  CT Head W/O Contrast   Final Result   No acute intracranial or cervical abnormality. CT CERVICAL SPINE WO CONTRAST   Final Result   No acute intracranial or cervical abnormality. XR CHEST PORTABLE   Final Result   No acute process. XR ELBOW RIGHT (2 VIEWS)   Final Result   No acute abnormality.               ------------------------- NURSING NOTES AND VITALS REVIEWED ---------------------------  Date / Time Roomed:  1/21/2023 10:18 AM  ED Bed Assignment:  15/15    The nursing notes within the ED encounter and vital signs as below have been reviewed. Patient Vitals for the past 24 hrs:   BP Temp Temp src Pulse Resp SpO2 Height Weight   01/21/23 2300 (!) 93/56 98.8 °F (37.1 °C) Oral 68 17 100 % -- --   01/21/23 2045 (!) 69/38 -- -- -- -- -- -- --   01/21/23 1601 (!) 87/49 98.1 °F (36.7 °C) Oral 67 16 100 % -- --   01/21/23 1507 (!) 100/58 98 °F (36.7 °C) -- 70 13 96 % -- --   01/21/23 1221 (!) 107/57 -- -- -- -- -- -- --   01/21/23 1119 (!) 87/48 -- -- -- -- 100 % -- --   01/21/23 1026 (!) 74/42 98 °F (36.7 °C) -- 50 16 97 % 6' 1\" (1.854 m) 151 lb (68.5 kg)       Oxygen Saturation Interpretation: Normal    ------------------------------------------ PROGRESS NOTES ------------------------------------------  Re-evaluation(s):  Time: multiple  Patients symptoms show no change  Repeat physical examination is not changed    Counseling:  I have spoken with the patient and daughter  and discussed todays results, in addition to providing specific details for the plan of care and counseling regarding the diagnosis and prognosis.   Their questions are answered at this time and they are agreeable with the plan of admission.    --------------------------------- ADDITIONAL PROVIDER NOTES ---------------------------------  Consultations:  Spoke with Chitra MORENO for Dr. Joleen Ahuja. Discussed case. They will admit the patient. This patient's ED course included: a personal history and physicial examination, re-evaluation prior to disposition, multiple bedside re-evaluations, and IV medications    This patient has remained hemodynamically stable during their ED course. Diagnosis:  1. Orthostatic hypotension    2. Fall, initial encounter    3. Injury of head, initial encounter        Disposition:  Patient's disposition: Admit to intermediate  Patient's condition is stable. Patient was seen and evaluated by myself and my attending Shana Anguiano DO. Assessment and Plan discussed with attending provider, please see attestation for final plan of care.      MD Pilar Fermin MD  Resident  01/21/23 [id number]

## 2023-01-21 NOTE — ED NOTES
Dr. Aurelia Melendrez made aware of patient positive orthostatic blood pressure. Patient able to ambulate with his cane & 1 standby, patient had no complaints and stated \" this is my baseline. \" However, when this RN helped patient changed his undergarment  while patient was standing up within 5 minutes, patient started having dizziness and turing pale. Helped patient back to bed and patient stated symptoms improved.         Angleine Howard RN  01/21/23 [id number]

## 2023-01-22 LAB
ANION GAP SERPL CALCULATED.3IONS-SCNC: 8 MMOL/L (ref 7–16)
BASOPHILS ABSOLUTE: 0.01 E9/L (ref 0–0.2)
BASOPHILS RELATIVE PERCENT: 0.3 % (ref 0–2)
BUN BLDV-MCNC: 34 MG/DL (ref 6–23)
CALCIUM SERPL-MCNC: 7.6 MG/DL (ref 8.6–10.2)
CHLORIDE BLD-SCNC: 109 MMOL/L (ref 98–107)
CO2: 24 MMOL/L (ref 22–29)
CREAT SERPL-MCNC: 1.1 MG/DL (ref 0.7–1.2)
EOSINOPHILS ABSOLUTE: 0.03 E9/L (ref 0.05–0.5)
EOSINOPHILS RELATIVE PERCENT: 1 % (ref 0–6)
GFR SERPL CREATININE-BSD FRML MDRD: >60 ML/MIN/1.73
GLUCOSE BLD-MCNC: 98 MG/DL (ref 74–99)
HCT VFR BLD CALC: 23.6 % (ref 37–54)
HEMOGLOBIN: 7.3 G/DL (ref 12.5–16.5)
IMMATURE GRANULOCYTES #: 0.01 E9/L
IMMATURE GRANULOCYTES %: 0.3 % (ref 0–5)
LYMPHOCYTES ABSOLUTE: 0.26 E9/L (ref 1.5–4)
LYMPHOCYTES RELATIVE PERCENT: 9 % (ref 20–42)
MCH RBC QN AUTO: 29.7 PG (ref 26–35)
MCHC RBC AUTO-ENTMCNC: 30.9 % (ref 32–34.5)
MCV RBC AUTO: 95.9 FL (ref 80–99.9)
MONOCYTES ABSOLUTE: 0.32 E9/L (ref 0.1–0.95)
MONOCYTES RELATIVE PERCENT: 11.1 % (ref 2–12)
NEUTROPHILS ABSOLUTE: 2.25 E9/L (ref 1.8–7.3)
NEUTROPHILS RELATIVE PERCENT: 78.3 % (ref 43–80)
PDW BLD-RTO: 15.3 FL (ref 11.5–15)
PLATELET # BLD: 74 E9/L (ref 130–450)
PLATELET CONFIRMATION: NORMAL
PMV BLD AUTO: 12.3 FL (ref 7–12)
POTASSIUM REFLEX MAGNESIUM: 3.8 MMOL/L (ref 3.5–5)
RBC # BLD: 2.46 E12/L (ref 3.8–5.8)
RBC # BLD: NORMAL 10*6/UL
SODIUM BLD-SCNC: 141 MMOL/L (ref 132–146)
WBC # BLD: 2.9 E9/L (ref 4.5–11.5)

## 2023-01-22 PROCEDURE — 6370000000 HC RX 637 (ALT 250 FOR IP): Performed by: NURSE PRACTITIONER

## 2023-01-22 PROCEDURE — 85025 COMPLETE CBC W/AUTO DIFF WBC: CPT

## 2023-01-22 PROCEDURE — 2060000000 HC ICU INTERMEDIATE R&B

## 2023-01-22 PROCEDURE — 99222 1ST HOSP IP/OBS MODERATE 55: CPT | Performed by: NURSE PRACTITIONER

## 2023-01-22 PROCEDURE — 80048 BASIC METABOLIC PNL TOTAL CA: CPT

## 2023-01-22 PROCEDURE — 36415 COLL VENOUS BLD VENIPUNCTURE: CPT

## 2023-01-22 PROCEDURE — 2580000003 HC RX 258: Performed by: NURSE PRACTITIONER

## 2023-01-22 RX ORDER — DEXAMETHASONE 0.5 MG/5ML
0.5 SOLUTION ORAL DAILY
Status: DISCONTINUED | OUTPATIENT
Start: 2023-01-22 | End: 2023-01-22

## 2023-01-22 RX ORDER — DEXAMETHASONE 0.5 MG/5ML
0.5 SOLUTION ORAL
Status: DISCONTINUED | OUTPATIENT
Start: 2023-01-22 | End: 2023-01-25 | Stop reason: HOSPADM

## 2023-01-22 RX ORDER — EVEROLIMUS 10 MG/1
10 TABLET ORAL
Status: DISCONTINUED | OUTPATIENT
Start: 2023-01-22 | End: 2023-01-25 | Stop reason: HOSPADM

## 2023-01-22 RX ADMIN — DEXAMETHASONE 0.5 MG: 0.5 SOLUTION ORAL at 11:53

## 2023-01-22 RX ADMIN — ASPIRIN 81 MG CHEWABLE TABLET 81 MG: 81 TABLET CHEWABLE at 09:00

## 2023-01-22 RX ADMIN — MIDODRINE HYDROCHLORIDE 5 MG: 5 TABLET ORAL at 14:00

## 2023-01-22 RX ADMIN — MIDODRINE HYDROCHLORIDE 5 MG: 5 TABLET ORAL at 09:00

## 2023-01-22 RX ADMIN — Medication 10 ML: at 09:01

## 2023-01-22 RX ADMIN — TAMSULOSIN HYDROCHLORIDE 0.4 MG: 0.4 CAPSULE ORAL at 09:00

## 2023-01-22 RX ADMIN — Medication 10 ML: at 21:05

## 2023-01-22 RX ADMIN — EVEROLIMUS 10 MG: 10 TABLET ORAL at 13:29

## 2023-01-22 RX ADMIN — POTASSIUM CHLORIDE 40 MEQ: 20 TABLET, EXTENDED RELEASE ORAL at 21:04

## 2023-01-22 RX ADMIN — MIDODRINE HYDROCHLORIDE 5 MG: 5 TABLET ORAL at 21:05

## 2023-01-22 RX ADMIN — POTASSIUM CHLORIDE 40 MEQ: 20 TABLET, EXTENDED RELEASE ORAL at 09:01

## 2023-01-22 ASSESSMENT — PAIN SCALES - GENERAL
PAINLEVEL_OUTOF10: 0

## 2023-01-22 NOTE — CONSULTS
Inpatient Cardiology Consultation      Reason for Consult: cardiomyopathy    Consulting Physician: Dr. Terrell Cowden    Requesting Physician:  Dr. David Ramirez    Date of Consultation: 1/23/2022    HISTORY OF PRESENT ILLNESS:     This 81-year-old male is known to [facility] cardiology and is followed by Dr. Daljit Barnhart. He has a history of HFrEF and symptomatic hypotension, coronary artery disease and a CVA. He was last evaluated in our office on September 27, 2022. PMHx: ? Ischemic cardiomyopathy HFrEF (TTE 12/2021: EF 15%. Akinesis of apical segment and severe generalized hypokinesis of remaining segments. LV dilated in systole. Indeterminate diastolic function. LA mildly dilated. Mild MR. Small anterior pericardial effusion. Moderate left pleural effusion), hypertension, hyperlipidemia, CVA 2016, neuroendocrine tumor/CA and colon CA follows with [facility] on Afinitor chemo tablet, AAA 3.2 cm 2/2022, PVD s/p right CEA, PEG tube, history of symptomatic hypotension, history of small bowel obstruction    NM stress 12/2021: Large apical and small anteroapical, anterior and inferior wall fixed defects. EF 25%. Severe global hypokinesis. Moderate risk myocardial perfusion study. HPI:   Patient presented to ER 1/21/2023 with a fall and a head injury. Blood pressure on admission was 74/42 and heart rate 50 bpm and he was afebrile with no hypoxia on room air. Orthostatic blood pressures were lying 102/52 with a heart rate of 58 and sitting 86/49 with a heart rate of 63 and standing 70/42 with a pulse of 62    Chest x-ray showed no acute process, CT cervical spine without contrast showed moderate bilateral carotid arterial and vascular plaque and CT of the head without contrast unremarkable. EKG on admission was sinus bradycardia with a first-degree AV block and nonspecific T wave changes. It was basically unchanged from his last EKG from November 30, 2022.     Labs: Potassium was 3.7 with a BUN and creatinine of 34 and 1.3 (baseline creatinine 1.1 from January 4, 2003), high-sensitivity troponin 27-27-25, (troponins usually run 18-37) ,albumin 2.9, ALT 78 and AST 93 with a white count of 2.6 and H&H 8.3 and 26.5 and platelets 643 (baseline H&H from earlier this month is 10.8 and 34.2 and at that time his white count was 3 with platelets of 198), BNP is 635 and his highest was on January 4 when it was 1111. Toprol and Entresto have been placed on hold and he was given a 500 cc fluid bolus but his blood pressure remains low. He is on ProAmatine. Patient currently receiving normal saline at 50 cc an hour. Upon assessment today 1/23/2023 patient is lying semi-Craig in hospital bed on room air. Patient is alert and oriented, speaking full sentences, and is in no apparent distress this time. Daughter is at bedside which helps with history. Patient reports Saturday after having breakfast the patient stood up to walk back to his room and started to feel very weak having to take a break senior care back to his room. When coming into his room he reports the weakness became very strong causing his legs to give out and him to fall forward striking his anterior head on the door then falling back onto his coccyx and hitting the back of his head on the floor. The patient denies LOC during this event. The patient's only prodrome to fall was generalized weakness especially in the legs. He denies chest pain, SOB/OLIVEIRA, palpitations, orthopnea, PND, dizziness, vertigo, blurred vision, headache, diaphoresis, or emesis. He reports since being in the hospital he is feeling much better and back to his baseline. He reports he is chronically incontinent of bowel and urine and has not reported any dark or tarry stools or blood in his urine.   Physical examination reveals pallorous patient with +1 bilateral lower extremity edema which patient reports is chronic and actually better than normal.  Telemetry reveals sinus bradycardia in the high 50s with 1 episode of 13 beat NSVT last evening and PSVT last evening as well. The patient is currently a limited code and is receiving chemotherapy for colon CA. Patient is known to have severe LV dysfunction with last recorded EF of 15% 12/2021 on TTE. Subject was broached about permanent ICD placement and patient and family reports Dr. Kevin Laurent has spoken to them about, but they do not seem receptive to possibility of ICD placement. Past Medical and Surgical History:    Underweight  Remote tobacco abuse  Exercise Nuclear Stress Test 10/02/2013: Turner protocol,    minutes, 10.1 METs with 82% MPHR achieved. Small fixed defect of the distal anterior wall and apex suggestive of prior infarction. EF 65% with hypokinesis of the apex. Low risk exercise treadmill test. Exercise capacity is average. TTE 08/28/2015 (Dr. Marta Knowles): Ejection fraction is visually estimated at 60%. No regional wall motion abnormalities seen. Mild left ventricular concentric hypertrophy noted. There is doppler evidence of stage I diastolic dysfunction. Physiologic and/or trace mitral regurgitation is present. HTN with blood pressures now running low requiring midodrine  HLD  GERD  TIA 2016  Neuroendocrine tumor/cancer [facility] at [facility]  AAA (follows with Dr. Senait Gibson) 3.2 cm abdominal aortic aneurysm in February 2022  PVD s/p Right CEA   Colon CA (follows with Dr. Jennifer Monge, reportedly on weekly chemotherapy)  Glaucoma  S/p Bilateral eye cataract removal, hernia repair, tonsillectomy, and G tube placement (currently to gravity)  ? ischemic cardiomyopathy -HFrEF and right ventricular global systolic function is reduced also   2D echo December 18, 2021   Left ventricular internal dimensions were normal in diastole and dilated   in systole. Regional wall motion abnormality with akinesis of apical segment and   severe generalized hypokinesis of remaining segments. Severely reduced left ventricular ejection fraction.    The left atrium is mildly dilated. Right ventricle global systolic function is reduced . Mild mitral annular calcification. Mild mitral regurgitation is present. There is a small anterior pericardial effusion noted. Moderate left pleural effusion. EF 15%  December 21, 2021 Lexiscan stress test, imaging abnormal with a large apical and small anterior apical anterior and inferior wall defect with EF 25% and apical anteroapical and inferior apical akinesis with severe global hypokinesis and moderate risk perfusion study  Hospital admission April 5-April 7, 2022 for PEG tube malfunction  Hospital admission November 30 to December 2, 2022, LESLIE and hypotension         Medications Prior to admit:  Prior to Admission medications    Medication Sig Start Date End Date Taking?  Authorizing Provider   dexamethasone 0.5 MG/5ML solution Take 0.5 mg by mouth daily With chemo pill, to prevent mouth sores   Yes Historical Provider, MD   midodrine (PROAMATINE) 5 MG tablet Take 1 tablet by mouth 3 times daily 1/18/23   TIFFANIE Chi CNP   bumetanide (BUMEX) 1 MG tablet Take 1 tablet by mouth daily 1/18/23   TIFFANIE Chi CNP   metoprolol succinate (TOPROL XL) 25 MG extended release tablet Take 1 tablet by mouth nightly 1/18/23   TIFFANIE Chi CNP   ciprofloxacin (CIPRO) 250 MG tablet Take 250 mg by mouth 2 times daily  Patient not taking: No sig reported    Historical Provider, MD   tamsulosin (FLOMAX) 0.4 MG capsule Take 0.4 mg by mouth daily    Historical Provider, MD   potassium chloride (MICRO-K) 10 MEQ extended release capsule Take 2 capsules by mouth 2 times daily  Patient taking differently: Take 40 mEq by mouth 2 times daily 1/9/23   TIFFANIE Chi CNP   sacubitril-valsartan (ENTRESTO) 24-26 MG per tablet Take 1 tablet by mouth 2 times daily  Patient taking differently: Take 0.5 tablets by mouth 2 times daily 1/5/23   TIFFANIE Chi CNP   everolimus (AFINITOR) 10 MG TABS chemo tablet Take 10 mg by mouth daily    Historical Provider, MD   spironolactone (ALDACTONE) 25 MG tablet Take 0.5 tablets by mouth daily 10/31/22   TIFFANIE Mazariegos - CNP   lanreotide acetate (SOMATULINE) 120 MG/0.5ML SOLN depot injection Inject 120 mg into the skin every 28 days -[facility]-    Historical Provider, MD   atorvastatin (LIPITOR) 40 MG tablet Take 1 tablet by mouth nightly 12/22/21   Jameyekaterina HebertTIFFANIE Reis - CNP   aspirin 81 MG chewable tablet Take 81 mg by mouth every morning     Historical Provider, MD       Current Medications:    Current Facility-Administered Medications: dexamethasone 0.5 MG/5ML solution 0.5 mg (Patient Supplied), 0.5 mg, Swish & Spit, Lunch  everolimus (AFINITOR) chemo tablet TABS 10 mg (Patient Supplied), 10 mg, Oral, Lunch  sodium chloride flush 0.9 % injection 5-40 mL, 5-40 mL, IntraVENous, 2 times per day  sodium chloride flush 0.9 % injection 5-40 mL, 5-40 mL, IntraVENous, PRN  0.9 % sodium chloride infusion, , IntraVENous, PRN  bisacodyl (DULCOLAX) suppository 10 mg, 10 mg, Rectal, Daily PRN  heparin (porcine) injection 5,000 Units, 5,000 Units, SubCUTAneous, Q8H  aspirin chewable tablet 81 mg, 81 mg, Oral, QAM  [Held by provider] metoprolol succinate (TOPROL XL) extended release tablet 25 mg, 25 mg, Oral, Nightly  midodrine (PROAMATINE) tablet 5 mg, 5 mg, Oral, TID  potassium chloride (KLOR-CON M) extended release tablet 40 mEq, 40 mEq, Oral, BID  [Held by provider] sacubitril-valsartan (ENTRESTO) 24-26 MG per tablet 0.5 tablet, 0.5 tablet, Oral, BID  tamsulosin (FLOMAX) capsule 0.4 mg, 0.4 mg, Oral, Daily  0.9 % sodium chloride infusion, , IntraVENous, Continuous    Allergies:  Patient has no known allergies. Social History:  Former smoker and quit in 1992, occasional alcohol and       Family History: Noncontributory  Family History   Problem Relation Age of Onset    Heart Disease Father     Heart Disease Brother        REVIEW OF SYSTEMS: Constitutional: Denies fatigue, fevers, chills or night sweats. Generalized weakness/fatigue  Eyes: Denies visual changes or drainage  ENT: Denies headaches or hearing loss. No mouth sores or sore throat. No epistaxis   Cardiovascular: Denies chest pain, pressure or palpitations. No lower extremity swelling. Respiratory: Denies OLIVEIRA, cough, orthopnea or PND. No hemoptysis   Gastrointestinal: Denies hematemesis or anorexia. No hematochezia or melena    Genitourinary: Denies urgency, dysuria or hematuria. Musculoskeletal: Denies gait disturbance, weakness or joint complaints  Integumentary: Denies rash, hives or pruritis   Neurological: Denies dizziness, headaches or seizures. No numbness or tingling  Psychiatric: Denies anxiety or depression. Endocrine: Denies temperature intolerance. No recent weight change. .  Hematologic/Lymphatic: Denies abnormal bruising or bleeding. No swollen lymph nodes    PHYSICAL EXAM:   BP (!) 92/42   Pulse 58   Temp 98.2 °F (36.8 °C) (Oral)   Resp 18   Ht 6' 1\" (1.854 m)   Wt 151 lb (68.5 kg)   SpO2 97%   BMI 19.92 kg/m²   CONST:  Well developed, well nourished who appears of stated age. Awake, alert and cooperative. No apparent distress. HEENT:   Head- Normocephalic, atraumatic   Eyes- Conjunctivae pink, anicteric  Throat- Oral mucosa pink and moist  Neck-  No stridor, trachea midline, no jugular venous distention. No carotid bruit. CHEST: Chest symmetrical and non-tender to palpation. No accessory muscle use or intercostal retractions  RESPIRATORY: Lung sounds - clear throughout fields   CARDIOVASCULAR:     Heart Inspection- shows no noted pulsations  Heart Palpation- no heaves or thrills; PMI is non-displaced   Heart Ausculation- Regular rate and rhythm, no murmur. No s3, s4 or rub   PV: +1 bilateral lower extremity edema, chronic. No varicosities. Pedal pulses palpable, no clubbing or cyanosis   ABDOMEN: Soft, non-tender to light palpation. Bowel sounds present.  No palpable masses no organomegaly; no abdominal bruit  MS: Good muscle strength and tone. No atrophy or abnormal movements. : Deferred  SKIN: Warm and dry no statis dermatitis or ulcers. Pallorous  NEURO / PSYCH: Oriented to person, place and time. Speech clear and appropriate. Follows all commands. Pleasant affect     DATA:    ECG / Tele strips: Sinus bradycardia with first-degree AV block. Vent rate 50, IA interval 224, QRS duration 76, QTc 487. Telemetry: Sinus bradycardia, 57 heart rate. 1 episode NSVT 13 beat. PSVT    Diagnostic:      Labs:   CBC:   Recent Labs     01/21/23  1055 01/22/23  0424   WBC 2.6* 2.9*   HGB 8.3* 7.3*   HCT 26.5* 23.6*   * 74*     BMP:   Recent Labs     01/21/23  1055 01/22/23  0424    141   K 3.7 3.8   CO2 26 24   BUN 34* 34*   CREATININE 1.3* 1.1   LABGLOM 55 >60   CALCIUM 8.0* 7.6*       proBNP:   Lab Results   Component Value Date/Time    PROBNP 635 01/18/2023 11:06 AM    PROBNP 750 01/10/2023 08:03 AM    PROBNP 1,111 01/04/2023 10:06 AM    PROBNP 584 12/14/2022 11:30 AM    PROBNP 373 11/30/2022 11:47 AM       TROPONIN:  Lab Results   Component Value Date/Time    TROPHS 25 01/21/2023 06:02 PM    TROPHS 27 01/21/2023 12:14 PM    TROPHS 27 01/21/2023 10:55 AM    TROPHS 30 11/30/2022 03:52 PM    TROPHS 33 11/30/2022 11:47 AM     CK:  Lab Results   Component Value Date/Time    CKTOTAL 90 06/03/2012 06:25 PM     FASTING LIPID PANEL:  Lab Results   Component Value Date/Time    CHOL 166 12/18/2021 03:55 AM    HDL 53 12/18/2021 03:55 AM    LDLCALC 97 12/18/2021 03:55 AM    TRIG 82 12/18/2021 03:55 AM     LIVER PROFILE:  Recent Labs     01/21/23  1055   AST 93*   ALT 78*   LABALBU 2.9*      Latest Reference Range & Units 1/21/23 10:55 1/21/23 12:14 1/21/23 18:02   Troponin, High Sensitivity 0 - 11 ng/L 27 (H) 27 (H) 25 (H)   (H): Data is abnormally high    CT head:  Impression   No acute intracranial or cervical abnormality     CXR:  Impression   No acute process. Assessment:  Chronic HFrEF: TTE 12/2021 with EF of 15% and severe generalized hypokinesis. ACC stage III/NYHA class I. Near euvolemic on examination. History of LifeVest use, seemingly not interested in permanent ICD placement at this time. Elevated, flat troponin presentation. Patient without anginal equivalent symptoms or ischemic EKG changes. Not consistent with ACS. Fall with head injury 2/2 generalized weakness. Clinically dehydrated with positive orthostatics, receiving IVF per primary service. Labile hypotension, acute on chronic. Malnourished, total protein 5.3/albumin 2.9  Transaminitis  Worsening anemia. Patient denies dark or tarry stools or change in color of urine. Leukopenia, chronic. On chemotherapy for colon CA. Thrombocytopenia  Hypertension, hypotensive during admission. Hyperlipidemia, Lipitor on hold due to transaminitis. CVA 2016  Neuroendocrine tumor/colon CA follows with [facility] on [address] or chemo tablet daily  AAA, 3.2 cm 2/2022  PVD s/p right CEA  History of SBO  Limited CODE STATUS. Plan:  Continue Toprol-XL 25 daily. Monitor blood pressure closely. Consider starting [facility] tomorrow. Continue midodrine and aspirin. Agree with holding Lipitor 2/2 transaminitis. Continue IV fluids for 24 hours. Monitor volume status closely. 14-day Zio patch at discharge. No advanced cardiac testing at this time. Continue telemetry, monitor for further arrhythmias. Monitor electrolytes: Keep potassium> 4.0 and magnesium> 2.0. Rest per primary service other consultants. Case and subsequent orders discussed with Dr. Gabriela Duron, further recommendations to follow as per above.     Electronically signed by TIFFANIE Donohue CNP on 1/23/2022 at 10:13 AM

## 2023-01-22 NOTE — CARE COORDINATION
Social Work Consult-Met with patient and daughter Tyronne Cooks at the bedside, introduced myself and CM role in discharge planning. Patient is a resident of [facility] [phone]. Per patient report he is independent with ambulation (uses a cane) & self administers medications at the assisted living. He had a recent fall d/t dizziness, which landed him in the hospital. PT/OT ordered. Will need to confirm in am if patient can return to AL and awaiting therapy evaluations. Patient wishes to return to AL, family to transport.     Seng FARLEYN, RN  [facility]

## 2023-01-22 NOTE — H&P
[facility]  History and Physical      CHIEF COMPLAINT:    Chief Complaint   Patient presents with    Head Injury    Fall     Became dizzy and fell. Skin tear to left arm. Coccyx pain. Denies thinners or LOC.l    Hypotension        Patient of Nery Briggs MD presents with:  Orthostatic hypotension    History of Present Illness:   Patient is an 78-year-old male with a past medical history of aneurysm abdominal aorta, CAD, CHF with an EF of 15%, colon cancer, decreased dorsal pedis pulse, GERD, glaucoma, hyperlipidemia, hypertension, neuroendocrine cancer, and CVA. Patient presented to the ED after mechanical fall at facility. Patient states he was walking back from eating breakfast when he started feeling dizzy and lightheaded and fell forward hitting his head. Patient did not lose consciousness however the patient's fall was unwitnessed. Patient complained of pain in his right elbow chest and head. Patient does have multiple abrasions. Patient does not take any anticoagulation. Patient is a former smoker. ER work-up revealed pancytopenia, elevated BUN/creatinine 34/1.3, troponin 27, slightly elevated liver enzymes, all imaging negative. Patient's orthostatic BPs were positive in the ED. Patient is admitted to telemetry unit for further treatment. On evaluation he denies any chest heaviness or discomfort. No lightheadedness. Apparently his blood pressures were found to be significantly low and therefore he was sent into the emergency room due to symptoms of lightheadedness and dizziness which resulted in fall. REVIEW OF SYSTEMS:  Pertinent negatives are above in HPI. 10 point ROS otherwise negative.       Past Medical History:   Diagnosis Date    Aneurysm of abdominal aorta 09/18/2013    follows with Dr. Veronica Hughes yearly-2020    CAD (coronary artery disease)     Follows with Dr Tristan Diaz [facility])     Colon    Decreased dorsalis pedis pulse 8/5/2021    GERD (gastroesophageal reflux disease)     Glaucoma     (L) eye    Hyperlipidemia     Hypertension     Neuro-endocrine cancer (HCC)     Numbness and tingling of both feet 8/5/2021    Unspecified cerebral artery occlusion with cerebral infarction 2016    TIA         Past Surgical History:   Procedure Laterality Date    CAROTID ENDARTERECTOMY Right     Dr. Alexander Lefort CATH LAB PROCEDURE      EYE SURGERY Left Feb 2013, June 2013    glaucoma    EYE SURGERY Left     cataract    FACIAL COSMETIC SURGERY N/A 12/10/2019    EXCISION OF BASAL CELL CARCINOMA RIGHT BROW,  EXCISION OF SQUAMOUS CELL CARCINOMA RIGHT FOREARM -- FROZEN performed by Hermes Skelton MD at [facility] N/A 4/6/2022    EGD PEG TUBE PLACEMENT performed by Bria Mcwilliams MD at [facility] N/A 8/28/2020    DIAGNOSTIC LAPAROSCOPY WITH BIOPSY performed by Bria Mcwilliams MD at [facility] Right 10/16/2013    RIGHT CAROTID ENDARTERECTOMY       Medications Prior to Admission:    Medications Prior to Admission: dexamethasone 0.5 MG/5ML solution, Take 0.5 mg by mouth daily With chemo pill, to prevent mouth sores  midodrine (PROAMATINE) 5 MG tablet, Take 1 tablet by mouth 3 times daily  bumetanide (BUMEX) 1 MG tablet, Take 1 tablet by mouth daily  metoprolol succinate (TOPROL XL) 25 MG extended release tablet, Take 1 tablet by mouth nightly  ciprofloxacin (CIPRO) 250 MG tablet, Take 250 mg by mouth 2 times daily (Patient not taking: No sig reported)  tamsulosin (FLOMAX) 0.4 MG capsule, Take 0.4 mg by mouth daily  potassium chloride (MICRO-K) 10 MEQ extended release capsule, Take 2 capsules by mouth 2 times daily (Patient taking differently: Take 40 mEq by mouth 2 times daily)  sacubitril-valsartan (ENTRESTO) 24-26 MG per tablet, Take 1 tablet by mouth 2 times daily (Patient taking differently: Take 0.5 tablets by mouth 2 times daily)  everolimus (AFINITOR) 10 MG TABS chemo tablet, Take 10 mg by mouth daily  spironolactone (ALDACTONE) 25 MG tablet, Take 0.5 tablets by mouth daily  lanreotide acetate (SOMATULINE) 120 MG/0.5ML SOLN depot injection, Inject 120 mg into the skin every 28 days -[facility]-  atorvastatin (LIPITOR) 40 MG tablet, Take 1 tablet by mouth nightly  aspirin 81 MG chewable tablet, Take 81 mg by mouth every morning     Note that the patient's home medications were reviewed and the above list is accurate to the best of my knowledge at the time of the exam.    Allergies:    Patient has no known allergies. Social History:    reports that he quit smoking about 30 years ago. His smoking use included cigarettes. He smoked an average of 2 packs per day. He has never used smokeless tobacco. He reports current alcohol use. He reports that he does not use drugs. Family History:   family history includes Heart Disease in his brother and father. PHYSICAL EXAM:    Vitals:  BP (!) 93/56   Pulse 68   Temp 98.8 °F (37.1 °C) (Oral)   Resp 17   Ht 6' 1\" (1.854 m)   Wt 151 lb (68.5 kg)   SpO2 100%   BMI 19.92 kg/m²       General appearance: NAD, conversant  Eyes: Sclerae anicteric, PERRLA  HEENT: AT/NC, MMM  Neck: FROM, supple, no thyromegaly  Lymph: No cervical / supraclavicular lymphadenopathy  Lungs: Clear to auscultation, WOB normal  CV: RRR, no MRGs, no lower extremity edema  Abdomen: Soft, non-tender; no masses or HSM, +BS, PEG  Extremities: FROM without synovitis. No clubbing or cyanosis of the hands. Skin: no rash, induration, lesions, or ulcers  Psych: Calm and cooperative. Normal judgement and insight. Normal mood and affect. Neuro: Alert and interactive, face symmetric, speech fluent. LABS:  All labs reviewed.   Of note:  CBC with Differential:    Lab Results   Component Value Date/Time    WBC 2.9 01/22/2023 04:24 AM    RBC 2.46 01/22/2023 04:24 AM    HGB 7.3 01/22/2023 04:24 AM    HCT 23.6 01/22/2023 04:24 AM    PLT 74 01/22/2023 04:24 AM    MCV 95.9 01/22/2023 04:24 AM    MCH 29.7 01/22/2023 04:24 AM    MCHC 30.9 01/22/2023 04:24 AM    RDW 15.3 01/22/2023 04:24 AM    NRBC 0.0 12/02/2022 08:06 AM    LYMPHOPCT 9.0 01/22/2023 04:24 AM    MONOPCT 11.1 01/22/2023 04:24 AM    BASOPCT 0.3 01/22/2023 04:24 AM    MONOSABS 0.32 01/22/2023 04:24 AM    LYMPHSABS 0.26 01/22/2023 04:24 AM    EOSABS 0.03 01/22/2023 04:24 AM    BASOSABS 0.01 01/22/2023 04:24 AM     CMP:    Lab Results   Component Value Date/Time     01/22/2023 04:24 AM    K 3.8 01/22/2023 04:24 AM     01/22/2023 04:24 AM    CO2 24 01/22/2023 04:24 AM    BUN 34 01/22/2023 04:24 AM    CREATININE 1.1 01/22/2023 04:24 AM    GFRAA >60 10/17/2022 01:57 PM    LABGLOM >60 01/22/2023 04:24 AM    GLUCOSE 98 01/22/2023 04:24 AM    GLUCOSE 125 06/03/2012 06:25 PM    PROT 5.3 01/21/2023 10:55 AM    LABALBU 2.9 01/21/2023 10:55 AM    LABALBU 4.6 06/03/2012 06:25 PM    CALCIUM 7.6 01/22/2023 04:24 AM    BILITOT 0.3 01/21/2023 10:55 AM    ALKPHOS 116 01/21/2023 10:55 AM    AST 93 01/21/2023 10:55 AM    ALT 78 01/21/2023 10:55 AM       Imaging:  CT head: WNL    CT cervical spine: WNL    CXR: WNL    EKG:  I've personally reviewed the patient's EKG:  Sinus bradycardia    Telemetry:  I've personally reviewed the patient's telemetry:      ASSESSMENT/PLAN:  Principal Problem:    Orthostatic hypotension  Resolved Problems:    * No resolved hospital problems. *    81-year-old male with a history of GERD, cardiomyopathy ejection fifth fraction 15%, multiple malignancies, follows at the [facility] [place], hypertension presents to the ED with complaints of mechanical fall facility and is admitted to telemetry unit with    Hypotension likely associated with significant cardiomyopathy  Ortho BPs  IV hydration  DESHAUN hose  Educate on position changes  Echo-most recent 12/18/2021 reveals an EF of 15%.   Consult palliative medicine  Blood pressures remain low on my evaluation-likely from cardiomyopathy  Initiate midodrine if persistently low blood pressures,   patient does not appear to be on GDMT given above EF of 15%  Cardiology evaluation for input and recommendations on GDMT/vest  Repeat echocardiogram this admission at discretion of cardiology    LESLIE resolved  BUN/creatinine 34/1.1    Medication for other comorbidities continue as appropriate dose adjustment as necessary.     DVT prophylaxis  PT OT  Discharge planning        Code status: Full  Requires inpatient level of care    Rafaela Walton MD  6:07 AM  1/22/2023

## 2023-01-22 NOTE — CONSULTS
Palliative Care Department  [phone]  Palliative Care Initial Consult  Provider TIFFANIE Villalpando CNP      PATIENT: Shaq Marion  : 1940  MRN: [id number]  ADMISSION DATE: 2023 10:18 AM  Referring Provider: TIFFANIE Gordon CNP    Palliative Medicine was consulted on hospital day 1 for assistance with Goals of care, Code Status Discussion, Family support, Symptom management     HPI:     Charlette Jeter is a 81 y.o. y/o male with a history of neuroendocrine cancer status post G-tube for drainage, CHF with EF 15%, CAD, AAA who presented to [facility]) on 2023 with fall after becoming lightheaded and dizzy. He was found to be hypotensive. CT of the head showed no acute abnormalities. He was admitted to telemetry for further medical management. ASSESSMENT/PLAN:     Pertinent Hospital Diagnoses     Hypotension  Fall from standing  Neuroendocrine carcinoma    Palliative Care Encounter / Counseling Regarding Goals of Care  Please see detailed goals of care discussion as below  At this time, Shaq Marion, Does have capacity for medical decision-making. Capacity is time limited and situation/question specific  During encounter Jeff León was surrogate medical decision-maker  Outcome of goals of care meeting: Continue with current medical management and treatment. CODE STATUS changed to limited DNR CCA/DNI. Code status Limited DNRCCA/ DNI  Advanced Directives: no POA or living will in epic  Surrogate/Legal NOK:  Lorraine Jain (daughter) [phone]    Spiritual assessment: no spiritual distress identified  Bereavement and grief: to be determined  Referrals to: none today    Thank you for the opportunity to participate in the care of Shaq Marion. TIFFANIE Villalpando CNP   Palliative Medicine     SUBJECTIVE:     Details of Conversation: Chart reviewed and met with the patient and his daughter Jeff León at the bedside. I introduced palliative medicine.   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 They explained that at that time he was given about 6 months to live and was on hospice. He started to be able to eat and did well, and graduated from hospice. He states that currently he is on a chemotherapy pill daily and an injection every month. He states that he is not have any side effects from these medications. Daughter expresses that most of his medical complications lately have been due to his CHF, the edema and hypotension. The patient expresses that he does wish to continue with current treatment including his oncology medications and following up with the CHF clinic. He expresses that he is not ready for hospice, but he does express that he is ready to pass away whenever it is his time. Patient and daughter both confirm CODE STATUS is a limited DNR CCA/DNI. The patient denies any pain, shortness of breath, nausea, he states appetite is great, he denies anxiety or depression. He expresses that his main symptom is fatigue. We discussed increasing activity as were able to. We also discussed a zinc supplement for taste. We also discussed outpatient palliative care and the services that are offered. Family would like to talk to case management about what home health services he would be eligible for. They would like to hold off on outpatient palliative, but would be interested if symptoms worsen or condition declined. There are no further PM needs at this time. PM will now sign off. If new PM needs arise, please re-consult. Thank you.      Prognosis: Guarded    OBJECTIVE:     BP (!) 92/42   Pulse 58   Temp 98.2 °F (36.8 °C) (Oral)   Resp 18   Ht 6' 1\" (1.854 m)   Wt 151 lb (68.5 kg)   SpO2 97%   BMI 19.92 kg/m²     Physical Examination:  Gen: elderly, thin, NAD, awake, alert   HEENT: normocephalic, atraumatic, PERRL, EOMI,   Neck: trachea midline, no JVD  Lungs: respirations easy and not labored,   Heart: regular rate and rhythm, distant heart tones,   Abdomen: normoactive bowel sounds, soft, non-tender  Extremities: no clubbing, cyanosis or edema, moving all extremities    Skin: warm, dry without rashes, lesions, bruising  Neuro: awake, alert, oriented x 3, follows commands, no gross neurologic deficit    Objective data reviewed: labs, images, records, medication use, vitals, and chart    Time/Communication  Greater than 50% of time spent, total 55 minutes in counseling and coordination of care at the bedside regarding goals of care and symptom management. Thank you for allowing Palliative Medicine to participate in the care of Trini Lopez. Note: This report was completed using [employer] voiced recognition software. Every effort has been made to ensure accuracy; however, inadvertent computerized transcription errors may be present.

## 2023-01-22 NOTE — PROGRESS NOTES
Angelina Palmer was ordered Afinitor (everolimus) Chemo tablet which is a nonformulary medication. This medication is not stocked by the pharmacy and will need to be brought in from home for inpatient use. If the medication has not been administered by 1400 on the following day from the time the order was placed, a pharmacist will follow-up with the nurse of the patient to assess the capability of the patient to bring in the medication.     Thank you

## 2023-01-23 PROBLEM — I50.22 CHRONIC SYSTOLIC HEART FAILURE (HCC): Status: ACTIVE | Noted: 2023-01-23

## 2023-01-23 LAB
ANION GAP SERPL CALCULATED.3IONS-SCNC: 4 MMOL/L (ref 7–16)
BUN BLDV-MCNC: 31 MG/DL (ref 6–23)
CALCIUM SERPL-MCNC: 7.9 MG/DL (ref 8.6–10.2)
CHLORIDE BLD-SCNC: 112 MMOL/L (ref 98–107)
CO2: 24 MMOL/L (ref 22–29)
CREAT SERPL-MCNC: 0.9 MG/DL (ref 0.7–1.2)
GFR SERPL CREATININE-BSD FRML MDRD: >60 ML/MIN/1.73
GLUCOSE BLD-MCNC: 103 MG/DL (ref 74–99)
HCT VFR BLD CALC: 24.7 % (ref 37–54)
HEMOGLOBIN: 7.8 G/DL (ref 12.5–16.5)
MCH RBC QN AUTO: 30.4 PG (ref 26–35)
MCHC RBC AUTO-ENTMCNC: 31.6 % (ref 32–34.5)
MCV RBC AUTO: 96.1 FL (ref 80–99.9)
PDW BLD-RTO: 15.2 FL (ref 11.5–15)
PLATELET # BLD: 75 E9/L (ref 130–450)
PLATELET CONFIRMATION: NORMAL
PMV BLD AUTO: 11.9 FL (ref 7–12)
POTASSIUM SERPL-SCNC: 4.5 MMOL/L (ref 3.5–5)
RBC # BLD: 2.57 E12/L (ref 3.8–5.8)
SODIUM BLD-SCNC: 140 MMOL/L (ref 132–146)
WBC # BLD: 2.7 E9/L (ref 4.5–11.5)

## 2023-01-23 PROCEDURE — 6370000000 HC RX 637 (ALT 250 FOR IP): Performed by: NURSE PRACTITIONER

## 2023-01-23 PROCEDURE — 2060000000 HC ICU INTERMEDIATE R&B

## 2023-01-23 PROCEDURE — 80048 BASIC METABOLIC PNL TOTAL CA: CPT

## 2023-01-23 PROCEDURE — 36415 COLL VENOUS BLD VENIPUNCTURE: CPT

## 2023-01-23 PROCEDURE — 97161 PT EVAL LOW COMPLEX 20 MIN: CPT

## 2023-01-23 PROCEDURE — 6370000000 HC RX 637 (ALT 250 FOR IP): Performed by: INTERNAL MEDICINE

## 2023-01-23 PROCEDURE — 99223 1ST HOSP IP/OBS HIGH 75: CPT | Performed by: INTERNAL MEDICINE

## 2023-01-23 PROCEDURE — 85027 COMPLETE CBC AUTOMATED: CPT

## 2023-01-23 PROCEDURE — 2580000003 HC RX 258: Performed by: NURSE PRACTITIONER

## 2023-01-23 PROCEDURE — APPSS60 APP SPLIT SHARED TIME 46-60 MINUTES

## 2023-01-23 RX ORDER — FINASTERIDE 5 MG/1
5 TABLET, FILM COATED ORAL DAILY
Status: DISCONTINUED | OUTPATIENT
Start: 2023-01-23 | End: 2023-01-25 | Stop reason: HOSPADM

## 2023-01-23 RX ORDER — FERROUS SULFATE 325(65) MG
325 TABLET ORAL 2 TIMES DAILY WITH MEALS
Status: DISCONTINUED | OUTPATIENT
Start: 2023-01-23 | End: 2023-01-25 | Stop reason: HOSPADM

## 2023-01-23 RX ORDER — DOCUSATE SODIUM 100 MG/1
100 CAPSULE, LIQUID FILLED ORAL DAILY
Status: DISCONTINUED | OUTPATIENT
Start: 2023-01-23 | End: 2023-01-25 | Stop reason: HOSPADM

## 2023-01-23 RX ADMIN — POTASSIUM CHLORIDE 40 MEQ: 20 TABLET, EXTENDED RELEASE ORAL at 11:03

## 2023-01-23 RX ADMIN — MUPIROCIN: 20 OINTMENT TOPICAL at 16:20

## 2023-01-23 RX ADMIN — ASPIRIN 81 MG CHEWABLE TABLET 81 MG: 81 TABLET CHEWABLE at 11:03

## 2023-01-23 RX ADMIN — Medication 10 ML: at 11:04

## 2023-01-23 RX ADMIN — DEXAMETHASONE 0.5 MG: 0.5 SOLUTION ORAL at 11:04

## 2023-01-23 RX ADMIN — DOCUSATE SODIUM 100 MG: 100 CAPSULE, LIQUID FILLED ORAL at 21:04

## 2023-01-23 RX ADMIN — MIDODRINE HYDROCHLORIDE 5 MG: 5 TABLET ORAL at 21:04

## 2023-01-23 RX ADMIN — METOPROLOL SUCCINATE 25 MG: 25 TABLET, EXTENDED RELEASE ORAL at 21:04

## 2023-01-23 RX ADMIN — TAMSULOSIN HYDROCHLORIDE 0.4 MG: 0.4 CAPSULE ORAL at 11:03

## 2023-01-23 RX ADMIN — MIDODRINE HYDROCHLORIDE 5 MG: 5 TABLET ORAL at 11:03

## 2023-01-23 RX ADMIN — EVEROLIMUS 10 MG: 10 TABLET ORAL at 11:04

## 2023-01-23 RX ADMIN — POTASSIUM CHLORIDE 40 MEQ: 20 TABLET, EXTENDED RELEASE ORAL at 21:04

## 2023-01-23 RX ADMIN — Medication 10 ML: at 21:05

## 2023-01-23 ASSESSMENT — PAIN SCALES - GENERAL: PAINLEVEL_OUTOF10: 0

## 2023-01-23 NOTE — PROGRESS NOTES
Physical Therapy  Facility/Department: [facility]  Physical Therapy Initial Assessment    Name: Michelle Lopez  : 1940  MRN: [id number]  Date of Service: 2023             Patient Diagnosis(es): The primary encounter diagnosis was Orthostatic hypotension. Diagnoses of Fall, initial encounter and Injury of head, initial encounter were also pertinent to this visit. Past Medical History:  has a past medical history of Aneurysm of abdominal aorta, CAD (coronary artery disease), Cancer ([facility] [address] Decreased dorsalis pedis pulse, GERD (gastroesophageal reflux disease), Glaucoma, Hyperlipidemia, Hypertension, Neuro-endocrine cancer ([facility] [address] Numbness and tingling of both feet, and Unspecified cerebral artery occlusion with cerebral infarction. Past Surgical History:  has a past surgical history that includes hernia repair; Diagnostic Cardiac Cath Lab Procedure; Tonsillectomy; eye surgery (Left, 2013, 2013); eye surgery (Left); vascular surgery (Right, 10/16/2013); Carotid endarterectomy (Right, ); Colonoscopy; Sigmoidoscopy; Facial cosmetic surgery (N/A, 12/10/2019); laparoscopy (N/A, 2020); Gastrostomy tube placement; and Gastrostomy tube placement (N/A, 2022). Requires PT Follow-Up: Yes     Evaluating Therapist: Michael Lam PT     Referring Provider:  TIFFANIE Gordon CNP    PT order : PT eval and treat     Room #: 623   DIAGNOSIS: The primary encounter diagnosis was Orthostatic hypotension. Diagnoses of Fall, initial encounter and Injury of head, initial encounter were also pertinent to this visit. PRECAUTIONS:  falls     Social:  Pt lives at [facility]    Prior to admission pt walked with cane      Initial Evaluation  Date:  2023  Treatment      Short Term/ Long Term   Goals   Was pt agreeable to Eval/treatment? Yes      Does pt have pain?   Denies      Bed Mobility  Rolling:  independent   Supine to sit:  independent   Sit to supine:  independent    Scooting: independent   Independent    Transfers Sit to stand:  S/I   Stand to sit:  S/I   Stand pivot:  NT    Independent    Ambulation     15 and 150  feet with  IV pole push  with SBA    200  feet with  AAD  with  independent        Stair negotiation: ascended and descended NT   N/A    LE ROM  WFL     LE strength  4/ 5      AM- PAC RAW score   19/ 24            Pt is alert and Oriented      Balance: S/SBA . Fall risk due to recent fall, mildly decreased safety awareness    Endurance: WFL        ASSESSMENT  Pt displays functional ability as noted in the objective portion of this evaluation. Conditions Requiring Skilled Therapeutic Intervention:    [x]Decreased strength     []Decreased ROM  [x]Decreased functional mobility  [x]Decreased balance   [x]Decreased endurance   []Decreased posture  []Decreased sensation  []Decreased coordination   []Decreased vision  [x]Decreased safety awareness   []Increased pain         Treatment/Education:    Pt in bed  upon arrival ; agreeable to PT. Mobility as above. Pt did not  want to use a ww, preferred to push IV pole. No LOB with gait and no reported dizziness/light headedness with mobility. Was able to perform hygiene after bathroom use without assist ( changing brief, hand washing, and brushing teeth.)            Pt educated on fall risk,  safety with mobility        Patient response to education:   Pt verbalized understanding Pt demonstrated skill Pt requires further education in this area    x  With cues   x       Comments:  Pt left  in bed per pt request after session, with call light in reach. Rehab potential is Good for reaching above PT goals. Pts/ family goals   1. None stated     Patient and or family understand(s) diagnosis, prognosis, and plan of care. -  yes     PLAN  PT care will be provided in accordance with the objectives noted above. Whenever appropriate, clear delegation orders will be provided for nursing staff.   Exercises and functional mobility practice will be used as well as appropriate assistive devices or modalities to obtain goals. Patient and family education will also be administered as needed. PLAN OF CARE:    Current Treatment Recommendations     [x] Strengthening to improve independence with functional mobility   [] ROM to improve independence with functional mobility   [x] Balance Training to improve static/dynamic balance and to reduce fall risk  [x] Endurance Training to improve activity tolerance during functional mobility   [x] Transfer Training to improve safety and independence with all functional transfers   [x] Gait Training to improve gait mechanics, endurance and assess need for appropriate assistive device  [] Stair Training in preparation for safe discharge home and/or into the community   [x] Positioning to prevent skin breakdown and contractures  [x] Safety and Education Training   [x] Patient/Caregiver Education   [] HEP  [] Other     Frequency of treatments will be 2-5x/week x 7-10 days. Time in: 1030   Time out:  1045       Evaluation Time includes thorough review of current medical information, gathering information on past medical history/social history and prior level of function, completion of standardized testing/informal observation of tasks, assessment of data and education on plan of care and goals.     CPT codes:  [x] Low Complexity PT evaluation 06233  [] Moderate Complexity PT evaluation 46530  [] High Complexity PT evaluation 42171  [] PT Re-evaluation 19046  [] Gait training 81249  minutes  [] Therapeutic activities 16748  minutes  [] Therapeutic exercises 14652  minutes  [] Neuromuscular reeducation 10960  minutes       Laureen 18 number:  PT 2908

## 2023-01-23 NOTE — CARE COORDINATION
Social work / Discharge Planning:         Patient's daughter requested home care upon return to [address] [place] [address]. Referral made to [facility] [place], [facility].. Will need home care orders prior to discharge.     Electronically signed by REGGIE Cox on 1/23/2023 at 1:10 PM

## 2023-01-23 NOTE — CARE COORDINATION
Social work / Discharge Planning:         Plan is to return to [facility]. Awaiting PT/OT evaluations. Nurse to nurse should be called to [phone] when discharged. Patient is on room air.    Electronically signed by REGGIE Magaña on 1/23/2023 at 10:20 AM

## 2023-01-23 NOTE — PROGRESS NOTES
Comprehensive Nutrition Assessment    Type and Reason for Visit:  Initial, Consult (Nutrition Mgmt)    Nutrition Recommendations/Plan:    Continue current Regular diet per pt preference. Added ensure clear BID per pt preference & wound ONS for optimal nutrition. Reviewed initial diet order-moderate sodium, fluids. Pt aware of limits & expresses wants no diet restrictions. Will monitor while inpatient. Malnutrition Assessment:  Malnutrition Status: At risk for malnutrition (Comment) (catabolic illness & chemo pill) (01/23/23 1227)    Context:  Chronic Illness     Findings of the 6 clinical characteristics of malnutrition:  Energy Intake:  No significant decrease in energy intake (appetite is good-doesn't eat like he did when he was 240# in his younger yearshe says. Has Ensure at home)  Weight Loss:  Unable to assess (does not appear to be wt loss thru last yr but has CHF)     Body Fat Loss:  No significant body fat loss     Muscle Mass Loss:  No significant muscle mass loss    Fluid Accumulation:  Unable to assess (CHF)     Strength:  Not Performed    Nutrition Assessment:    ADM from [place]: Orthostatic Hypotension-fall, injury to head. PMH: Colon CA, CHF, CVA, Hx PEG (4//6/22). Met w/pt, his sister Meghan Lopez, & daughter Billie Bacon. Pt was not happy w/meals since they were unseasoned. Dr. Jose Morrison did change diet to regular. States w/his cancer he would just like to eat whatever he wants. Did discuss moderating sodium/fluids if at all possible and effect on heart. Per pt preference added ONS Clear BID. Has wounds & sending wound ONS BID. Will monitor.     Nutrition Related Findings:    A& O, flat abd, active BS,G-tube for drainage, trace edema,  elevated LFT's, hyperglycemia (corrected), low BMI, chemo, KCl. gastrostomy Wound Type: Pressure Injury, Open Wounds, Multiple (traumatic wound coccyx, forehead, bilateral armsper wound consult)       Current Nutrition Intake & Therapies:    Average Meal Intake: 51-75%, Unable to assess  Average Supplements Intake: None Ordered  ADULT DIET; Regular  ADULT ORAL NUTRITION SUPPLEMENT; Lunch, Dinner; Clear Liquid Oral Supplement  ADULT ORAL NUTRITION SUPPLEMENT; Breakfast, Dinner; Wound Healing Oral Supplement    Anthropometric Measures:  Height: 6' 1\" (185.4 cm)  Ideal Body Weight (IBW): 184 lbs (84 kg)    Admission Body Weight: 149 lb (67.6 kg) (1/18 actual standing wt)  Current Body Weight: 152 lb 6.4 oz (69.1 kg), 82.8 % IBW. Weight Source: Bed Scale (1/23 bed scale)  Current BMI (kg/m2): 20.1  Usual Body Weight: 150 lb (68 kg) (10/17 standing wt-wt stable last year)  % Weight Change (Calculated): 1.6  Weight Adjustment For: No Adjustment                 BMI Categories: Underweight (BMI less than 22) age over 72    Estimated Daily Nutrient Needs:  Energy Requirements Based On: Formula  Weight Used for Energy Requirements: Current  Energy (kcal/day): 1700- 1900  Weight Used for Protein Requirements: Current  Protein (g/day): 100-110  Method Used for Fluid Requirements: Other (Comment) (per cardiology mgmt)  Fluid (ml/day):      Nutrition Diagnosis:   Increased nutrient needs related to increase demand for energy/nutrients as evidenced by wounds    Nutrition Interventions:   Food and/or Nutrient Delivery: Continue Current Diet, Start Oral Nutrition Supplement (Added ensure clear BID per pt preference & wound ONS for optimal nutrition)  Nutrition Education/Counseling: Education completed (reviewed diet order-moderate sodium, fluids. Pt aware of limits & expresses wants no diet restrictions.)  Coordination of Nutrition Care: Continue to monitor while inpatient       Goals:   Meet at least 75% of estimated needs by next RD assessment.           Nutrition Monitoring and Evaluation:      Food/Nutrient Intake Outcomes: Food and Nutrient Intake, Supplement Intake  Physical Signs/Symptoms Outcomes: Biochemical Data, Weight, Skin, Nutrition Focused Physical Findings, Fluid Status or Edema    Discharge Planning:    Continue current diet     Linette Silvestre RD,LD  Contact: [phone]

## 2023-01-23 NOTE — PROGRESS NOTES
[facility]                                Progress note    Subjective: The patient is resting comfortably in bed  Denies any pain       Objective:    /62   Pulse 67   Temp 97.7 °F (36.5 °C) (Axillary)   Resp 16   Ht 6' 1\" (1.854 m)   Wt 152 lb 6.4 oz (69.1 kg)   SpO2 95%   BMI 20.11 kg/m²     In: -   Out: 100   In: -   Out: 100     General appearance: NAD, conversant, pale   HEENT: AT/NC, MMM  Neck: FROM, supple  Lungs: Clear to auscultation  CV: RRR, no MRGs  Vasc: Radial pulses 2+  Abdomen: Soft, non-tender; no masses or HSM  Extremities: No digital cyanosis, mild BLE edema   Skin: no rash, lesions or ulcers  Psych: Alert and oriented to person, place and time  Neuro: Alert and interactive     Recent Labs     01/21/23  1055 01/22/23  0424   WBC 2.6* 2.9*   HGB 8.3* 7.3*   HCT 26.5* 23.6*   * 74*       Recent Labs     01/21/23  1055 01/22/23  0424    141   K 3.7 3.8    109*   CO2 26 24   BUN 34* 34*   CREATININE 1.3* 1.1   CALCIUM 8.0* 7.6*       Assessment:    Principal Problem:    Orthostatic hypotension  Active Problems:    Chronic systolic heart failure (HCC)  Resolved Problems:    * No resolved hospital problems. 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   Consult palliative medicine  Blood pressures remain low on my evaluation-likely from cardiomyopathy  Initiate midodrine if persistently low blood pressures,   patient does not appear to be on GDMT given above EF of 15%  Cardiology evaluation for input and recommendations on GDMT/vest  Repeat echocardiogram this admission at discretion of cardiology     LESLIE resolved  BUN/creatinine 34/1.1    1/23/23:  -Continual drop in h/h, 7.3/23.6, some could be d/t dilution but in December hemoglobin was in the upper 10's, recheck CBC today-7.8/24.7-initiate ferrous sulfate, with Colace  - Check hemoccult   -Known cardiomyopathy, unable to do full GDMT d/t frequent hypotension.  -Will defer lifevest needs or echo to cardiology.   -Hypotension improving today, 104/62  -Entresto and lipitor held   -Continue to monitor H&H  -His CODE STATUS is limited, if they do not wish for aggressive intervention he can likely be discharged soon,  -Case discussed with family at bedside    Code status: Limited > no to all   Consultants:    DVT Prophylaxis   PT/OT  Discharge TIFFANIE Marks CNP  12:44 PM  1/23/2023     Above note edited to reflect my thoughts     I personally saw, examined and provided care for the patient. Radiographs, labs and medication list were reviewed by me independently. The case was discussed in detail and plans for care were established. Review of JADE Madera   , documentation was conducted and revisions were made as appropriate directly by me. I agree with the above documented exam, problem list, and plan of care.      Micaela Benjamin MD  5:00 PM  1/23/2023

## 2023-01-23 NOTE — PROGRESS NOTES
Message to primary team in regards to making diet adjustments. Awaiting response.     Electronically signed by Dorota Almaraz RN on 1/23/2023 at 10:50 AM

## 2023-01-23 NOTE — FLOWSHEET NOTE
Initial Inpatient Wound Care    Admit Date: 1/21/2023 10:18 AM    Reason for consult:  coccyx wound    Significant history:  adm post dizzy and fell. aneurysm abdominal aorta, CAD, CHF with an EF of 15%, colon cancer, decreased dorsal pedis pulse, GERD, glaucoma, hyperlipidemia, hypertension, neuroendocrine cancer, and CVA. Wound history:  POA      Findings:  alert and verbally appropriate. PEG tube in place attached to CD  Patient and daughter  state all injuries from fall  Rt elbow with 2 skin tears. Versatel falling of them  Elbow, flap largely attached with 3 cm dried blood ridge  Along forearm ulna area dried bloody. Dried pale wound 3x1x0.1  Bruising in area       01/23/23 1113   Wound 01/22/23 Sacrum blister open   Date First Assessed/Time First Assessed: 01/22/23 1538   Present on Hospital Admission: Yes  Primary Wound Type: Pressure Injury  Location: Sacrum  Wound Description (Comments): blister open   Wound Image    Dressing Change Due 01/23/23   Wound Length (cm) 1.4 cm   Wound Width (cm) 0.4 cm   Wound Depth (cm) 0.1 cm   Wound Surface Area (cm^2) 0.56 cm^2   Change in Wound Size % (l*w) -12   Wound Volume (cm^3) 0.056 cm^3   Wound Healing % -12   Wound Assessment   (pink, loose skin flap)   Drainage Amount Scant   Drainage Description Serosanguinous   Odor None   Large, red pink surrounding coccyx open area- some blanchable, some nonblanchable areas    Left forehead picture above, right above eyebrow  Chest wound below  2x5x0. 1 . Pink, yellow, dried blood. Patient very sensitive to adhesive removal  Bilateral heels. Intact, dry, rt heel dark pink    Impression:  traumatic wound coccyx, forehead, bilateral arms.  Has low air loss bed    Interventions in place:  dietician  Xeroform to both arm wound  Needs SOS, heel protectors and floated  Bactroban to chest wound    Betsy Cornelius RN 1/23/2023 11:23 AM

## 2023-01-23 NOTE — CONSULTS
I independently interviewed and examined the patient. I have reviewed the above documentation completed by the JAYA Adiel Zimmerman. I spent > 51% of the total time involved with completing the encounter. Please see my additional contributions to the HPI, physical exam, and assessment / medical decision making. Reason for consult: Cardiomyopathy    Patient was previously known to Dr. Phil Lopez service at [facility]) cardiology    Mr. Tonio Romero is a 60-year-old  male with history of chronic HFrEF with an EF of 15%, ischemic cardiomyopathy based on stress test with evidence of apical akinesis and generalized hypokinesis, mild MR, small pericardial effusion, limited code, history of colon cancer/neuroendocrine tumor on chemotherapy and follows with the [facility] on [facility], PVD status post right carotid endarterectomy, PEG tube placement, history of symptomatic hypertension, history of small bowel obstruction. History of orthostatic hypotension on midodrine. Patient had a Lexiscan nuclear stress test in December 2021 which showed a large apical and small anterior apical anterior and inferior wall fixed defect, EF 25% with severe global hypokinesis. Intermediate risk myocardial perfusion stress test.  Patient declined ICD. Patient presented to the emergency room on 1/21/2023 following a fall and head injury. .  Currently, patient lives in a nursing assisted living facility and presented to the emergency room with generalized weakness acute dyspnea on exertion and fall. He told me that he was walking from dining room to his room and felt generalized weakness dyspnea and then he fell and hit his head. He denies any loss of consciousness. He denied any palpitations dizziness or chest pain. Patient was noted severe hypertension with heart rate in the 50s. All his guideline directed medical therapy were held and patient was initiated on IV fluids.     Telemetry showed sinus bradycardia in the 50s 1 episode of 13 beat nonsustained VT and PSVT. Review of Systems:  Cardiac: As per HPI  General: No fever, chills  Pulmonary: As per HPI  GI: No nausea, vomiting  Musculoskeletal: SEARS x 4, no focal motor deficits  Skin: Intact, no rashes  Neuro/Psych: No headache or seizures    Physical Exam:  /62   Pulse 57   Temp 98.6 °F (37 °C) (Oral)   Resp 16   Ht 6' 1\" (1.854 m)   Wt 152 lb 6.4 oz (69.1 kg)   SpO2 97%   BMI 20.11 kg/m²   Appearance: Awake, alert, no acute respiratory distress  Skin: Intact, no rash  Head: Normocephalic, atraumatic  ENMT: MMM, no rhinorrhea  Neck: Supple, no carotid bruits  Lungs: Clear to auscultation bilaterally. No wheezes, rales, or rhonchi. Cardiac: Regular rate and rhythm, +S1S2, no murmurs apparent  Abdomen: Soft, +bowel sounds  Extremities: Moves all extremities x 4, no lower extremity edema  Neurologic: No focal motor deficits apparent, normal mood and affect    Telemetry findings reviewed: Sinus bradyca nonsustained VT new tachy/bradyarrhythmias overnight    EKG: Sinus bradycardia, first-degree AV block, nonspecific ST-T changes, abnormal EKG. Vitals and labs were reviewed: Blood pressure 104/62 pulse 67 sats 95% on room air    Labs-BUNs/creatinine 34/1.3>> 31/0.9, AST/ALT 93/78 WBC 2.7 hemoglobin 7.8 hematocrit 24.7, platelets 55,227      TTE 08/28/2015 (Dr. Bonnie Marley): Ejection fraction is visually estimated at 60%. No regional wall motion abnormalities seen. Mild left ventricular concentric hypertrophy noted. There is doppler evidence of stage I diastolic dysfunction. Physiologic and/or trace mitral regurgitation is present.    December 21, 2021 Lexiscan stress test, imaging abnormal with a large apical and small anterior apical anterior and inferior wall defect with EF 25% and apical anteroapical and inferior apical akinesis with severe global hypokinesis and moderate risk perfusion study  2D echo December 18, 2021   Left ventricular internal dimensions were normal in diastole and dilated in systole. EF 15%   Regional wall motion abnormality with akinesis of apical segment and   severe generalized hypokinesis of remaining segments. Severely reduced left ventricular ejection fraction. The left atrium is mildly dilated. Right ventricle global systolic function is reduced . Mild mitral annular calcification. Mild mitral regurgitation is present. There is a small anterior pericardial effusion noted. Moderate left pleural effusion. Assessment:  Status post fall secondary to orthostatic hypotension, no syncope per patient  Status post closed head injury  Ischemic cardiomyopathy  Chronic HFrEF EF 15%  Nonsustained VT most likely secondary to his beta-blocker withdrawal  Appears euvolemic  Hyperlipidemia  History of TIA-2016  GERD  History of neuroendocrine cancer follows at the [facility]  History of PVD s/p right carotid endarterectomy  And cytopenia mostly related to chemotherapy      Plan:   Continue IV fluids for the next 24 hours  Restart Toprol-XL 25 mg p.o. daily with close monitoring of blood pressure and heart rate. If he remains hemodynamically stable then will consider adding Entresto. Keep potassium between 4 and 5 and magnesium about 2  Hold Entresto for now  Continue midodrine 5 mg p.o. 3 times a day for hypertension  Change 14-day Holter monitor prior to discharge to evaluate cardiac arrhythmias  No advanced testing as per patient. I would also recommend changing Flomax to Proscar due to postural hypotension. Flomax causes significant orthostatic hypotension. Continue aspirin 81 mg p.o. daily  Rest of the management as per primary service    Discussed with the patient and his daughter who are at the bedside. Thank you for consulting and allowing us to participate in Mr. [facility] evangelina. Chris Galindo MD, Enrique Greene.   [facility]) Cardiology

## 2023-01-23 NOTE — PROGRESS NOTES
P Quality Flow/Interdisciplinary Rounds Progress Note        Quality Flow Rounds held on January 23, 2023    Disciplines Attending:  Bedside Nurse, , , and Nursing Unit Leadership    Kodi Larkin was admitted on 1/21/2023 10:18 AM    Anticipated Discharge Date:       Disposition:    Reid Score:  Reid Scale Score: 18    Readmission Risk              Risk of Unplanned Readmission:  20           Discussed patient goal for the day, patient clinical progression, and barriers to discharge.  The following Goal(s) of the Day/Commitment(s) have been identified:   Need PT/OT audie Covarrubias RN  January 23, 2023

## 2023-01-24 LAB
ANION GAP SERPL CALCULATED.3IONS-SCNC: 6 MMOL/L (ref 7–16)
ANISOCYTOSIS: ABNORMAL
BASOPHILS ABSOLUTE: 0.01 E9/L (ref 0–0.2)
BASOPHILS RELATIVE PERCENT: 0.4 % (ref 0–2)
BUN BLDV-MCNC: 30 MG/DL (ref 6–23)
CALCIUM SERPL-MCNC: 8.1 MG/DL (ref 8.6–10.2)
CHLORIDE BLD-SCNC: 112 MMOL/L (ref 98–107)
CO2: 24 MMOL/L (ref 22–29)
CREAT SERPL-MCNC: 0.9 MG/DL (ref 0.7–1.2)
EOSINOPHILS ABSOLUTE: 0.01 E9/L (ref 0.05–0.5)
EOSINOPHILS RELATIVE PERCENT: 0.4 % (ref 0–6)
GFR SERPL CREATININE-BSD FRML MDRD: >60 ML/MIN/1.73
GLUCOSE BLD-MCNC: 97 MG/DL (ref 74–99)
HCT VFR BLD CALC: 26.6 % (ref 37–54)
HEMOGLOBIN: 8.2 G/DL (ref 12.5–16.5)
IMMATURE GRANULOCYTES #: 0.01 E9/L
IMMATURE GRANULOCYTES %: 0.4 % (ref 0–5)
LYMPHOCYTES ABSOLUTE: 0.46 E9/L (ref 1.5–4)
LYMPHOCYTES RELATIVE PERCENT: 17.5 % (ref 20–42)
MCH RBC QN AUTO: 29.7 PG (ref 26–35)
MCHC RBC AUTO-ENTMCNC: 30.8 % (ref 32–34.5)
MCV RBC AUTO: 96.4 FL (ref 80–99.9)
MONOCYTES ABSOLUTE: 0.41 E9/L (ref 0.1–0.95)
MONOCYTES RELATIVE PERCENT: 15.6 % (ref 2–12)
NEUTROPHILS ABSOLUTE: 1.73 E9/L (ref 1.8–7.3)
NEUTROPHILS RELATIVE PERCENT: 65.7 % (ref 43–80)
PDW BLD-RTO: 15.2 FL (ref 11.5–15)
PLATELET # BLD: 82 E9/L (ref 130–450)
PLATELET CONFIRMATION: NORMAL
PMV BLD AUTO: 12.2 FL (ref 7–12)
POTASSIUM SERPL-SCNC: 4.3 MMOL/L (ref 3.5–5)
RBC # BLD: 2.76 E12/L (ref 3.8–5.8)
SODIUM BLD-SCNC: 142 MMOL/L (ref 132–146)
WBC # BLD: 2.6 E9/L (ref 4.5–11.5)

## 2023-01-24 PROCEDURE — 6370000000 HC RX 637 (ALT 250 FOR IP): Performed by: INTERNAL MEDICINE

## 2023-01-24 PROCEDURE — 80048 BASIC METABOLIC PNL TOTAL CA: CPT

## 2023-01-24 PROCEDURE — 85025 COMPLETE CBC W/AUTO DIFF WBC: CPT

## 2023-01-24 PROCEDURE — 99232 SBSQ HOSP IP/OBS MODERATE 35: CPT | Performed by: INTERNAL MEDICINE

## 2023-01-24 PROCEDURE — 2060000000 HC ICU INTERMEDIATE R&B

## 2023-01-24 PROCEDURE — 36415 COLL VENOUS BLD VENIPUNCTURE: CPT

## 2023-01-24 PROCEDURE — 2580000003 HC RX 258: Performed by: NURSE PRACTITIONER

## 2023-01-24 PROCEDURE — 6370000000 HC RX 637 (ALT 250 FOR IP): Performed by: NURSE PRACTITIONER

## 2023-01-24 RX ADMIN — Medication 10 ML: at 10:05

## 2023-01-24 RX ADMIN — SACUBITRIL AND VALSARTAN 0.5 TABLET: 24; 26 TABLET, FILM COATED ORAL at 20:39

## 2023-01-24 RX ADMIN — FINASTERIDE 5 MG: 5 TABLET, FILM COATED ORAL at 10:04

## 2023-01-24 RX ADMIN — POTASSIUM CHLORIDE 40 MEQ: 20 TABLET, EXTENDED RELEASE ORAL at 20:39

## 2023-01-24 RX ADMIN — FERROUS SULFATE TAB 325 MG (65 MG ELEMENTAL FE) 325 MG: 325 (65 FE) TAB at 18:19

## 2023-01-24 RX ADMIN — DEXAMETHASONE 0.5 MG: 0.5 SOLUTION ORAL at 11:37

## 2023-01-24 RX ADMIN — DOCUSATE SODIUM 100 MG: 100 CAPSULE, LIQUID FILLED ORAL at 10:04

## 2023-01-24 RX ADMIN — FERROUS SULFATE TAB 325 MG (65 MG ELEMENTAL FE) 325 MG: 325 (65 FE) TAB at 10:04

## 2023-01-24 RX ADMIN — EVEROLIMUS 10 MG: 10 TABLET ORAL at 11:37

## 2023-01-24 RX ADMIN — MIDODRINE HYDROCHLORIDE 5 MG: 5 TABLET ORAL at 14:19

## 2023-01-24 RX ADMIN — MUPIROCIN: 20 OINTMENT TOPICAL at 11:35

## 2023-01-24 RX ADMIN — MIDODRINE HYDROCHLORIDE 5 MG: 5 TABLET ORAL at 20:39

## 2023-01-24 RX ADMIN — Medication 10 ML: at 20:39

## 2023-01-24 RX ADMIN — ASPIRIN 81 MG CHEWABLE TABLET 81 MG: 81 TABLET CHEWABLE at 10:04

## 2023-01-24 RX ADMIN — POTASSIUM CHLORIDE 40 MEQ: 20 TABLET, EXTENDED RELEASE ORAL at 10:04

## 2023-01-24 RX ADMIN — MIDODRINE HYDROCHLORIDE 5 MG: 5 TABLET ORAL at 10:04

## 2023-01-24 ASSESSMENT — PAIN SCALES - GENERAL: PAINLEVEL_OUTOF10: 1

## 2023-01-24 NOTE — PROGRESS NOTES
INPATIENT CARDIOLOGY FOLLOW-UP    Name: Reid Lee    Age: 81 y.o. Date of Admission: 1/21/2023 10:18 AM    Date of Service: 1/24/2023    Chief Complaint: Follow-up for Cardiomyopathy and syncope    Interim History:  No new overnight cardiac complaints. No further episodes of dizziness, BP low but stable. Currently with no complaints of CP, SOB, palpitations, dizziness, or lightheadedness. SR on telemetry.     Review of Systems:   Cardiac: As per HPI  General: No fever, chills  Pulmonary: As per HPI  HEENT: No visual disturbances, difficult swallowing  GI: No nausea, vomiting  Endocrine: No thyroid disease or DM  Musculoskeletal: SEARS x 4, no focal motor deficits  Skin: Intact, no rashes  Neuro/Psych: No headache or seizures    Problem List:  Patient Active Problem List   Diagnosis    CAD (coronary artery disease)    LV dysfunction    Hyperlipidemia    Hypertension    Abdominal aortic aneurysm (AAA) without rupture    Glaucoma    S/P carotid endarterectomy    Carotid bruit    H/O: CVA (cerebrovascular accident)    Post-op pain    Abdominal pain    Decreased dorsalis pedis pulse    Numbness and tingling of both feet    Volume overload    Hypokalemia    Complication of gastrostomy tube (HCC)    Small bowel obstruction (HCC)    LESLIE (acute kidney injury) ([facility] [address]    Hypotension    Orthostatic hypotension    Chronic systolic heart failure (HCC)       Allergies:  No Known Allergies    Current Medications:  Current Facility-Administered Medications   Medication Dose Route Frequency Provider Last Rate Last Admin    mupirocin (BACTROBAN) 2 % ointment   Topical Daily Mounika Thorne MD   Given at 01/23/23 1620    docusate sodium (COLACE) capsule 100 mg  100 mg Oral Daily Mounika Thorne MD   100 mg at 01/24/23 1004    ferrous sulfate (IRON 325) tablet 325 mg  325 mg Oral BID  Mounika Thorne MD   325 mg at 01/24/23 1004    finasteride (PROSCAR) tablet 5 mg  5 mg Oral Daily Prabhakar Maher MD   5 mg at 01/24/23 1004 dexamethasone 0.5 MG/5ML solution 0.5 mg (Patient Supplied)  0.5 mg Bayron Clinton MD   0.5 mg at 01/23/23 1104    everolimus (AFINITOR) chemo tablet TABS 10 mg (Patient Supplied)  10 mg Oral Lunch Sixto Carroll MD   10 mg at 01/23/23 1104    sodium chloride flush 0.9 % injection 5-40 mL  5-40 mL IntraVENous 2 times per day April FRITZ VictorN - CNP   10 mL at 01/24/23 1005    sodium chloride flush 0.9 % injection 5-40 mL  5-40 mL IntraVENous PRN April Valente APRN - CNP        0.9 % sodium chloride infusion   IntraVENous PRN April FRITZ VictorN - CNP        bisacodyl (DULCOLAX) suppository 10 mg  10 mg Rectal Daily PRN April FRITZ VictorN - CNP        [Held by provider] heparin (porcine) injection 5,000 Units  5,000 Units SubCUTAneous Q8H April FRITZ VictorN - CNP        aspirin chewable tablet 81 mg  81 mg Oral QAM April Valente, APRN - CNP   81 mg at 01/24/23 1004    metoprolol succinate (TOPROL XL) extended release tablet 25 mg  25 mg Oral Nightly April FRITZ VictorN - CNP   25 mg at 01/23/23 2104    midodrine (PROAMATINE) tablet 5 mg  5 mg Oral TID April FRITZ VictorN - CNP   5 mg at 01/24/23 1004    potassium chloride (KLOR-CON M) extended release tablet 40 mEq  40 mEq Oral BID April FRITZ VictorN - CNP   40 mEq at 01/24/23 1004    [Held by provider] sacubitril-valsartan (ENTRESTO) 24-26 MG per tablet 0.5 tablet  0.5 tablet Oral BID April FRITZ VictorN - CNP        0.9 % sodium chloride infusion   IntraVENous Continuous April FRITZ VictorN - CNP 50 mL/hr at 01/21/23 2108 New Bag at 01/21/23 2108      sodium chloride      sodium chloride 50 mL/hr at 01/21/23 2108       Physical Exam:  /62   Pulse 54   Temp 98.5 °F (36.9 °C) (Oral)   Resp 18   Ht 6' 1\" (1.854 m)   Wt 156 lb 9.6 oz (71 kg)   SpO2 97%   BMI 20.66 kg/m²   Wt Readings from Last 3 Encounters:   01/24/23 156 lb 9.6 oz (71 kg) 01/18/23 149 lb (67.6 kg)   01/10/23 151 lb 12.8 oz (68.9 kg)     Appearance: Awake, alert, no acute respiratory distress  Skin: Intact, no rash  Head: Normocephalic, atraumatic  Eyes: EOMI, no conjunctival erythema  ENMT: No pharyngeal erythema, MMM, no rhinorrhea  Neck: Supple, no elevated JVP, no carotid bruits  Lungs: Clear to auscultation bilaterally. No wheezes, rales, or rhonchi. Cardiac: Regular rate and rhythm, +S1S2, no murmurs apparent  Abdomen: Soft, nontender, +bowel sounds  Extremities: Moves all extremities x 4, no lower extremity edema  Neurologic: No focal motor deficits apparent, normal mood and affect  Peripheral Pulses: Intact posterior tibial pulses bilaterally    Intake/Output:  No intake or output data in the 24 hours ending 01/24/23 1040  No intake/output data recorded.     Laboratory Tests:  Recent Labs     01/22/23  0424 01/23/23  1550 01/24/23  0429    140 142   K 3.8 4.5 4.3   * 112* 112*   CO2 24 24 24   BUN 34* 31* 30*   CREATININE 1.1 0.9 0.9   GLUCOSE 98 103* 97   CALCIUM 7.6* 7.9* 8.1*     Lab Results   Component Value Date/Time    MG 1.9 12/02/2022 08:06 AM     Recent Labs     01/21/23  1055   ALKPHOS 116   ALT 78*   AST 93*   PROT 5.3*   BILITOT 0.3   LABALBU 2.9*     Recent Labs     01/22/23  0424 01/23/23  1550 01/24/23  0429   WBC 2.9* 2.7* 2.6*   RBC 2.46* 2.57* 2.76*   HGB 7.3* 7.8* 8.2*   HCT 23.6* 24.7* 26.6*   MCV 95.9 96.1 96.4   MCH 29.7 30.4 29.7   MCHC 30.9* 31.6* 30.8*   RDW 15.3* 15.2* 15.2*   PLT 74* 75* 82*   MPV 12.3* 11.9 12.2*     Lab Results   Component Value Date    CKTOTAL 90 06/03/2012    CKMB 1.6 06/03/2012    TROPONINI <0.01 06/03/2012     Lab Results   Component Value Date    INR 1.2 10/09/2013    INR 1.1 06/03/2012    PROTIME 12.6 10/09/2013    PROTIME 11.7 06/03/2012     No results found for: TSHFT4, TSH  No results found for: LABA1C  No results found for: EAG  Lab Results   Component Value Date    CHOL 166 12/18/2021     Lab Results Component Value Date    TRIG 82 12/18/2021     Lab Results   Component Value Date    HDL 53 12/18/2021     Lab Results   Component Value Date    LDLCALC 97 12/18/2021     Lab Results   Component Value Date    LABVLDL 16 12/18/2021     No results found for: [facility]    Cardiac Tests:  Telemetry findings reviewed: Sinus bradyca nonsustained VT new tachy/bradyarrhythmias overnight     EKG: Sinus bradycardia, first-degree AV block, nonspecific ST-T changes, abnormal EKG. Vitals and labs were reviewed: Blood pressure 112/62  pulse 54 sats 95% on room air     Labs-BUNs/creatinine 34/1.3>> 31/0.9>>30/0.9, AST/ALT 93/78 WBC 2.7 hemoglobin 7.8 >>8. 2hematocrit 24.7, platelets 29,675        TTE 08/28/2015 (Dr. Tristan Lantigua): Ejection fraction is visually estimated at 60%. No regional wall motion abnormalities seen. Mild left ventricular concentric hypertrophy noted. There is doppler evidence of stage I diastolic dysfunction. Physiologic and/or trace mitral regurgitation is present. December 21, 2021 Lexiscan stress test, imaging abnormal with a large apical and small anterior apical anterior and inferior wall defect with EF 25% and apical anteroapical and inferior apical akinesis with severe global hypokinesis and moderate risk perfusion study  2D echo December 18, 2021   Left ventricular internal dimensions were normal in diastole and dilated in systole. EF 15%   Regional wall motion abnormality with akinesis of apical segment and   severe generalized hypokinesis of remaining segments. Severely reduced left ventricular ejection fraction. The left atrium is mildly dilated. Right ventricle global systolic function is reduced . Mild mitral annular calcification. Mild mitral regurgitation is present. There is a small anterior pericardial effusion noted. Moderate left pleural effusion.         Assessment:  Status post fall secondary to orthostatic hypotension, no syncope per patient, no further episodes  Status post closed head injury  Ischemic cardiomyopathy  Chronic HFrEF EF 15%  Nonsustained VT most likely secondary to his beta-blocker withdrawal, no further episodes over 24 hours. Appears euvolemic  Hyperlipidemia  History of TIA-2016  GERD  History of neuroendocrine cancer follows at the [facility]  History of PVD s/p right carotid endarterectomy  And cytopenia mostly related to chemotherapy        Plan:   Stop IV fluids. Continue Toprol-XL 25 mg p.o. daily with close monitoring of blood pressure and heart rate. Hemodynamically stable, restart entresto 24/26 mg 0.5 tab twice a day  If he remains hemodynamically stable then will consider adding Entresto. Keep potassium between 4 and 5 and magnesium about 2  Hold Entresto for now  Continue midodrine 5 mg p.o. 3 times a day for hypertension  Arrange 14-day Holter monitor prior to discharge to evaluate cardiac arrhythmias  No advanced testing as per patient. Continue Proscar due to postural hypotension. Flomax causes significant orthostatic hypotension. Continue aspirin 81 mg p.o. daily  Rest of the management as per primary service  He is stable from the cardiology standpoint. Regino Ramirez MD., Deep Galaviz.   [facility]) Cardiology

## 2023-01-24 NOTE — PROGRESS NOTES
Occupational Therapy      Occupational Therapy referral received  Attempt made - patient lying in bed. Reported he had just gotten into bed - prior he used the bathroom and walked the hallway with the student nurse. Wanted to rest for awhile, stating he didn't get much sleep last night.

## 2023-01-24 NOTE — PLAN OF CARE
Patient's chart updated to reflect:      . - HF care plan, HF education points and HF discharge instructions.  -Orders: 2 gram sodium diet, daily weights, I/O.  -PCP and cardiology follow up appointments to be scheduled within 7 days of hospital discharge. -CHF education session will be provided to the patient prior to hospital discharge.     Hugo Foss RN BSN  Heart Failure Navigator

## 2023-01-24 NOTE — CONSULTS
CHF NURSE NAVIGATOR CONSULT NOTE:    Patient currently admitted with diagnosis of Systolic heart failure. Patient was awake and alert, laying in bed during the consultation. He was engaged and asked appropriate questions throughout the education session. He is agreeable to heart failure education and self monitoring once discharged. He is also agreeable to continuing the low salt diet. Scheduling with the CHF clinic Yes. Future Appointments   Date Time Provider Gretchen Merritti   2/2/2023  8:30 AM MAXIME [facility] ROOM 1 [facility] [place] HOD   8/3/2023  9:00 AM Haywood Gowers, MD [facility]       Barriers to Care:  Contributing risk factors for Heart Failure are identified as none. The patient is ordered:  Diet: ADULT ORAL NUTRITION SUPPLEMENT; Lunch, Dinner; Clear Liquid Oral Supplement  ADULT ORAL NUTRITION SUPPLEMENT; Breakfast, Dinner; Wound Healing Oral Supplement  ADULT DIET; Regular; Low Sodium (2 gm)   Sodium controlled diet Yes  Fluid restriction daily ordered (fluid restriction recommended if patient is hyponatremic and/or diuretic is initiated or increased) No  FR:   Daily Weights: Patient Vitals for the past 96 hrs (Last 3 readings):   Weight   01/24/23 0118 156 lb 9.6 oz (71 kg)   01/23/23 1119 152 lb 6.4 oz (69.1 kg)   01/21/23 1026 151 lb (68.5 kg)     I/O: No intake or output data in the 24 hours ending 01/24/23 1321           We reviewed the introduction to Heart Failure, the HF zones, signs and symptoms to report on day 1 of onset, medications, medication compliance, the importance of obtaining daily weights, following a low sodium diet, reading food labels for the sodium content, keeping physician appointments, and smoking cessation. We discussed writing / tracking daily weights on a calendar / log because a 5 pound gain in 1 week can sneak up if you are not tracking it.           I advised patient they can reduce the risk for Heart Failure exacerbations by modifying / controlling the risk factors. We discussed self-managed care which includes the following:  to take medications as prescribed, report any intolerable side effects of medications to the cardiologist / doctor, do not just stop taking the medication; follow a cardiac heart healthy / low sodium diet; weigh yourself daily, exercise regularly- per doctor recommendation and not to smoke or use an excess amount of alcohol. We discussed calling the cardiologist / doctor with a weight gain of 3 pounds in one day or a total of 5 pounds or more in one week. Also, if you should have a significant weight loss of 3# or more in one day to call the doctor, they may need to decrease or hold the diuretic dose. On days you feels nauseated and not eating / drinking, having emesis or diarrhea,  informed to call the cardiologist  / doctor, they may need to decrease or hold diuretic to avoid dehydration. I stressed the importance of informing their cardiologist the first day of onset of any of the signs and symptoms in the \"Yellow Zone\" of the HF Zones. Patient verbalizes understanding. Greater than 30 minutes was spent educating patient. The Heart Failure Booklet given to the patient with additional patient education addressing:  What is Heart Failure? Things You Can Do to Live Well with HF  How to Take Your Medications  How to Eat Less Salt  Morris its Salt?   Exercising Well with Heart Failure  Signs and symptoms of HF to report  Weight Yourself Each Day  Home Self Management- activity, weight tracking, taking medications as prescribed, meals /diet planning (sodium and fluid restriction), how to read food labels, keeping physician follow ups, smoking cessation, follow the Heart Failure Zones  The Heart Failure zones  Every Dose Every Day      Instructed  to call 911 if you have any of the following symptoms:       Struggling to breathe unrelieved with rest,     Having chest pain     Have confusion or cant think clearly          Korin Dobbins RN BSN, RN  Heart Failure Navigator        CONGESTIVE HEART FAILURE (CHF) AHA GUIDELINES  (Must be completed for Primary Diagnosis CHF or History of CHF)    Discharge Plan:  I placed the Heart Failure Home Instructions in patient's discharge instructions. Per Heart Failure GWTG, the patient should have a follow-up appointment made within 7 days of discharge.     New Diagnosis No    ECHO Results most recent:  Lab Results   Component Value Date    LVEF 25 2021                                        Social History     Tobacco Use   Smoking Status Former    Packs/day: 2.00    Types: Cigarettes    Quit date: 10/9/1992    Years since quittin.3   Smokeless Tobacco Never        Immunization History   Administered Date(s) Administered    Td (Adult), 5 Lf Tetanus Toxoid, Pf (Tenivac, Decavac) 2023          Angiotensin-Converting-Enzyme (ACE) inhibitor ordered:  [] Yes  [x] No (specify contraindication): Entresto     [] Contraindicated  [] Hypotensive patient who was at immediate risk of cardiogenic shock  [] Hospitalized patient who experienced marked azotemia  [] Other Contraindications  [] Not Eligible  [] Not Tolerant  [] Patient Reason  [] System Reason  [] Other Reason    Angiotensin II receptor blockers (ARB) ordered:  [] Yes  [x] No (specify contraindication): Entresto     [] Contraindicated  [] Hypotensive patient who was at immediate risk of cardiogenic shock  [] Hospitalized patient who experienced marked azotemia  [] Other Contraindications    ARNI - Angiotensin Receptor Neprilysin Inhibitor ordered:  [x] Yes  [] No (specify contraindication):    [] ACE inhibitor use within the prior 36 hours  [] Allergy  [] Hyperkalemia  [] Hypotension  [] Renal dysfunction defined as creatinine > 2.5 mg/dL in men or > 2.0 mg/dL in women  [] Other Contraindications  [] Not Eligible  [] Not Tolerant  [] Patient Reason  []System Reason  []Other Reason      Beta Blocker ordered: [x] Yes  [] No (specify contraindication):    [] Contraindicated  [] Asthma  [] Fluid Overload  [] Low Blood Pressure  [] Patient recently treated with an intravenous positive inotropic agent  [] Other Contraindications  [] Not Eligible  [] Not Tolerant  [] Patient Reason  [] System Reason    SGLT2 Inhibitor ordered:  [] Yes  [x] No (specify contraindication):    [] Contraindicated  [] Patient currently on dialysis  [] Ketoacidosis  [] Known hypersensitivity to the medication  [] Type I diabetes (not approved for use in patients with Type I diabetes due to increased risk of ketoacidosis)  [] Other Contraindications  [] Not Eligible  [] Not Tolerant  [] Patient Reason  [] System Reason  [x] Other Reason    Aldosterone Antagonist ordered:  [] Yes  [x] No (specify contraindication):    [] Contraindicated  [] Allergy due to aldosterone receptor antagonist  [] Hyperkalemia  [] Renal dysfunction defined as creatinine >2.5 mg/dL in men or >2.0 mg/dL in women.   [] Other contraindications  [] Not Eligible  [] Not Tolerant  [] Patient Reason  [] System Reason  [x] Other Reason

## 2023-01-24 NOTE — DISCHARGE INSTRUCTIONS
***HEART FAILURE - CONGESTIVE HEART FAILURE***  DISCHARGE INSTRUCTIONS:  GUIDELINES TO FOLLOW AT [facility]/SNF/ Assisted Living    Future Appointments   Date Time Provider Gretchen Merritti   2/2/2023  8:30 AM MAXIME [facility] ROOM 1 [facility] [place] HOD   8/3/2023  9:00 AM Rossana Sellers MD [facility]          MEDICATIONS:  Please notify the doctor if patient is not able to take their medications or if medications are being held for any reasons (such as low blood pressure ect.)  Do not give the patient ibuprofen (Advil or Motrin), naproxen (Aleve) without talking to the doctor first. This could make their heart failure worse. WEIGHT MONITORING:   Weigh patient every day in the morning after they void (If patient is able to stand, please get a standing weight.)   Notify the doctor of a weight gain of 3 pounds or more in 1 day   OR  a total of 5 pounds or more in 1 week             DIET   Cardiac heart healthy diet:  Low sodium diet: no  more than 2,000mg (2 grams) of salt / sodium per day (which equals to a little less than  a teaspoon of salt)/ Cardiac Diet: Low saturated / low trans fat, no added salt, caffeine restricted    If patient is there for rehab and will be returning home in the near future; reinforce with the patient and the family to follow a low sodium diet (2,000 mg)- avoid using salt at the table, avoid / limit use of canned soups, processed / packaged foods, salted snacks, olives and pickles. Do not use a salt substitute without checking with the doctor. (Mrs. Berna Craft is safe to use).        NOTIFY THE DOCTOR THE FIRST DAY OF ONSET OF ANY OF THESE   SYMPTOMS:   Weight gain of 3 pounds or more in 1 day         OR 5 pounds or more in one week  More shortness of breath  More swelling in stomach, legs, ankles or feet  Feeling more tired, No energy  Dry hacky cough  Dizziness  More chest pain / discomfort  Hard time breathing laying down

## 2023-01-24 NOTE — PROGRESS NOTES
Perfect Serve to Dr. Leon Kayser with the following message: Dr. Dean Harrell would like cardiology input in regards to restarting Entresto.     Electronically signed by Malini Gage RN on 1/24/2023 at 10:26 AM

## 2023-01-24 NOTE — PROGRESS NOTES
[facility]                                Progress note    Subjective: The patient is resting comfortably in bed  Denies any pain   Feels much better       Objective:    /69   Pulse 58   Temp 98.2 °F (36.8 °C) (Oral)   Resp 18   Ht 6' 1\" (1.854 m)   Wt 156 lb 9.6 oz (71 kg)   SpO2 98%   BMI 20.66 kg/m²     No intake/output data recorded. No intake/output data recorded. General appearance: NAD, conversant  HEENT: AT/NC, MMM  Neck: FROM, supple  Lungs: Clear to auscultation  CV: RRR, no MRGs  Vasc: Radial pulses 2+  Abdomen: Soft, non-tender; no masses or HSM  Extremities: No digital cyanosis, mild BLE edema   Skin: no rash, lesions or ulcers  Psych: Alert and oriented to person, place and time  Neuro: Alert and interactive     Recent Labs     01/22/23  0424 01/23/23  1550 01/24/23  0429   WBC 2.9* 2.7* 2.6*   HGB 7.3* 7.8* 8.2*   HCT 23.6* 24.7* 26.6*   PLT 74* 75* 82*         Recent Labs     01/22/23  0424 01/23/23  1550 01/24/23  0429    140 142   K 3.8 4.5 4.3   * 112* 112*   CO2 24 24 24   BUN 34* 31* 30*   CREATININE 1.1 0.9 0.9   CALCIUM 7.6* 7.9* 8.1*         Assessment:    Principal Problem:    Orthostatic hypotension  Active Problems:    Chronic systolic heart failure (HCC)  Resolved Problems:    * No resolved hospital problems. *      Plan:  59-year-old male with a history of GERD, cardiomyopathy ejection fifth fraction 15%, multiple malignancies, follows at the [facility], hypertension presents to the ED with complaints of mechanical fall facility and is admitted to telemetry unit with     Hypotension likely associated with significant cardiomyopathy  Ortho BPs  IV hydration  DESHAUN hose  Educate on position changes  Echo-most recent 12/18/2021 reveals an EF of 15%.   Consult palliative medicine  Blood pressures remain low on my evaluation-likely from cardiomyopathy  Initiate midodrine if persistently low blood pressures,   patient does not appear to be on GDMT given above EF of 15%  Cardiology evaluation for input and recommendations on GDMT/vest  Repeat echocardiogram this admission at discretion of cardiology     LESLIE resolved  BUN/creatinine 34/1.1    1/23/23:  -Continual drop in h/h, 7.3/23.6, some could be d/t dilution but in December hemoglobin was in the upper 10's, recheck CBC today-7.8/24.7-initiate ferrous sulfate, with Colace  - Check hemoccult   -Known cardiomyopathy, unable to do full GDMT d/t frequent hypotension.  -Will defer lifevest needs or echo to cardiology.   -Hypotension improving today, 104/62  -Entresto and lipitor held   -Continue to monitor H&H  -His CODE STATUS is limited, if they do not wish for aggressive intervention he can likely be discharged soon,  -Case discussed with family at bedside      1/24/23:  -Patient's Beta Blocker was started again last night by cardiology, mild hypotension this AM   -Entresto started this afternoon by cardio, will monitor BP's with these initiated   -Continue midodrine 5 mg TID  -IVF discontinued   -continue iron supplements   -Discharge home when ok with cardiology     Code status: Limited > no to all   Consultants: Cardio      DVT Prophylaxis PCD's  PT/OT  Discharge [facility] TIFFANIE Johnson CNP  3:33 PM  1/24/2023     Above note edited to reflect my thoughts     Reintroduce Entresto at discretion of cardiology  If he tolerates it well without hypotension, discharge plan tomorrow  Recheck a.m. labs    I personally saw, examined and provided care for the patient. Radiographs, labs and medication list were reviewed by me independently. The case was discussed in detail and plans for care were established. Review of JADE Phillips   , documentation was conducted and revisions were made as appropriate directly by me. I agree with the above documented exam, problem list, and plan of care.      Army Tamiko MD  7:24 PM  1/24/2023

## 2023-01-24 NOTE — PROGRESS NOTES
[facility] Quality Flow/Interdisciplinary Rounds Progress Note        Quality Flow Rounds held on January 24, 2023    Disciplines Attending:  Bedside Nurse, , , and Nursing Unit Leadership    Monta Severance was admitted on 1/21/2023 10:18 AM    Anticipated Discharge Date:       Disposition:    Reid Score:  Reid Scale Score: 18    Readmission Risk              Risk of Unplanned Readmission:  22           Discussed patient goal for the day, patient clinical progression, and barriers to discharge.   The following Goal(s) of the Day/Commitment(s) have been identified:   Monitor BP with resumption of Entresto, discharge      Chava Ventura RN  January 24, 2023

## 2023-01-24 NOTE — CARE COORDINATION
Social work / Discharge Planning:          AM PAC 19/24. Plan is to return to [address] [place] [address] Road. Referral made to [facility].. Will need home care orders prior to discharge. Amb[place]lette form completed. Patient's sister states she might be able to transport back to [place]. Nurse to nurse call to [phone].   Electronically signed by REGGIE Ventura on 1/24/2023 at 9:47 AM

## 2023-01-25 ENCOUNTER — HOSPITAL ENCOUNTER (OUTPATIENT)
Dept: OTHER | Age: 83
Setting detail: THERAPIES SERIES
Discharge: HOME | End: 2023-01-25
Payer: MEDICARE

## 2023-01-25 VITALS
BODY MASS INDEX: 20.97 KG/M2 | HEIGHT: 73 IN | SYSTOLIC BLOOD PRESSURE: 119 MMHG | DIASTOLIC BLOOD PRESSURE: 68 MMHG | RESPIRATION RATE: 18 BRPM | OXYGEN SATURATION: 99 % | WEIGHT: 158.2 LBS | TEMPERATURE: 98.6 F | HEART RATE: 63 BPM

## 2023-01-25 LAB
ANION GAP SERPL CALCULATED.3IONS-SCNC: 6 MMOL/L (ref 7–16)
BASOPHILS ABSOLUTE: 0 E9/L (ref 0–0.2)
BASOPHILS RELATIVE PERCENT: 0 % (ref 0–2)
BUN BLDV-MCNC: 31 MG/DL (ref 6–23)
CALCIUM SERPL-MCNC: 8.2 MG/DL (ref 8.6–10.2)
CHLORIDE BLD-SCNC: 113 MMOL/L (ref 98–107)
CO2: 23 MMOL/L (ref 22–29)
CREAT SERPL-MCNC: 0.9 MG/DL (ref 0.7–1.2)
EOSINOPHILS ABSOLUTE: 0.06 E9/L (ref 0.05–0.5)
EOSINOPHILS RELATIVE PERCENT: 1.8 % (ref 0–6)
GFR SERPL CREATININE-BSD FRML MDRD: >60 ML/MIN/1.73
GLUCOSE BLD-MCNC: 122 MG/DL (ref 74–99)
HCT VFR BLD CALC: 28.8 % (ref 37–54)
HEMOGLOBIN: 8.9 G/DL (ref 12.5–16.5)
LYMPHOCYTES ABSOLUTE: 0.41 E9/L (ref 1.5–4)
LYMPHOCYTES RELATIVE PERCENT: 11.5 % (ref 20–42)
MCH RBC QN AUTO: 30 PG (ref 26–35)
MCHC RBC AUTO-ENTMCNC: 30.9 % (ref 32–34.5)
MCV RBC AUTO: 97 FL (ref 80–99.9)
MONOCYTES ABSOLUTE: 0.17 E9/L (ref 0.1–0.95)
MONOCYTES RELATIVE PERCENT: 5.3 % (ref 2–12)
NEUTROPHILS ABSOLUTE: 2.75 E9/L (ref 1.8–7.3)
NEUTROPHILS RELATIVE PERCENT: 81.4 % (ref 43–80)
NUCLEATED RED BLOOD CELLS: 0 /100 WBC
PDW BLD-RTO: 15 FL (ref 11.5–15)
PLATELET # BLD: 103 E9/L (ref 130–450)
PMV BLD AUTO: 11.9 FL (ref 7–12)
POTASSIUM SERPL-SCNC: 4.3 MMOL/L (ref 3.5–5)
RBC # BLD: 2.97 E12/L (ref 3.8–5.8)
RBC # BLD: NORMAL 10*6/UL
SODIUM BLD-SCNC: 142 MMOL/L (ref 132–146)
WBC # BLD: 3.4 E9/L (ref 4.5–11.5)

## 2023-01-25 PROCEDURE — 2580000003 HC RX 258: Performed by: NURSE PRACTITIONER

## 2023-01-25 PROCEDURE — 80048 BASIC METABOLIC PNL TOTAL CA: CPT

## 2023-01-25 PROCEDURE — 85025 COMPLETE CBC W/AUTO DIFF WBC: CPT

## 2023-01-25 PROCEDURE — 6370000000 HC RX 637 (ALT 250 FOR IP): Performed by: INTERNAL MEDICINE

## 2023-01-25 PROCEDURE — 6370000000 HC RX 637 (ALT 250 FOR IP): Performed by: NURSE PRACTITIONER

## 2023-01-25 PROCEDURE — 36415 COLL VENOUS BLD VENIPUNCTURE: CPT

## 2023-01-25 PROCEDURE — 99232 SBSQ HOSP IP/OBS MODERATE 35: CPT | Performed by: INTERNAL MEDICINE

## 2023-01-25 RX ORDER — BUMETANIDE 1 MG/1
0.5 TABLET ORAL DAILY
Status: DISCONTINUED | OUTPATIENT
Start: 2023-01-25 | End: 2023-01-25 | Stop reason: HOSPADM

## 2023-01-25 RX ORDER — POTASSIUM CHLORIDE 750 MG/1
40 CAPSULE, EXTENDED RELEASE ORAL 2 TIMES DAILY
Qty: 120 CAPSULE | Refills: 0 | Status: SHIPPED | OUTPATIENT
Start: 2023-01-25

## 2023-01-25 RX ORDER — FINASTERIDE 5 MG/1
5 TABLET, FILM COATED ORAL DAILY
Qty: 30 TABLET | Refills: 3 | Status: SHIPPED | OUTPATIENT
Start: 2023-01-26

## 2023-01-25 RX ORDER — BUMETANIDE 0.5 MG/1
0.5 TABLET ORAL DAILY
Qty: 30 TABLET | Refills: 3 | Status: SHIPPED | OUTPATIENT
Start: 2023-01-25

## 2023-01-25 RX ORDER — FERROUS SULFATE 325(65) MG
325 TABLET ORAL 2 TIMES DAILY WITH MEALS
Qty: 30 TABLET | Refills: 3 | Status: SHIPPED | OUTPATIENT
Start: 2023-01-25

## 2023-01-25 RX ADMIN — POTASSIUM CHLORIDE 40 MEQ: 20 TABLET, EXTENDED RELEASE ORAL at 11:03

## 2023-01-25 RX ADMIN — FINASTERIDE 5 MG: 5 TABLET, FILM COATED ORAL at 11:06

## 2023-01-25 RX ADMIN — MIDODRINE HYDROCHLORIDE 5 MG: 5 TABLET ORAL at 11:03

## 2023-01-25 RX ADMIN — DEXAMETHASONE 0.5 MG: 0.5 SOLUTION ORAL at 11:57

## 2023-01-25 RX ADMIN — MIDODRINE HYDROCHLORIDE 5 MG: 5 TABLET ORAL at 16:21

## 2023-01-25 RX ADMIN — BUMETANIDE 0.5 MG: 1 TABLET ORAL at 16:22

## 2023-01-25 RX ADMIN — FERROUS SULFATE TAB 325 MG (65 MG ELEMENTAL FE) 325 MG: 325 (65 FE) TAB at 11:02

## 2023-01-25 RX ADMIN — SACUBITRIL AND VALSARTAN 0.5 TABLET: 24; 26 TABLET, FILM COATED ORAL at 11:02

## 2023-01-25 RX ADMIN — DOCUSATE SODIUM 100 MG: 100 CAPSULE, LIQUID FILLED ORAL at 11:03

## 2023-01-25 RX ADMIN — MUPIROCIN: 20 OINTMENT TOPICAL at 11:04

## 2023-01-25 RX ADMIN — Medication 10 ML: at 11:04

## 2023-01-25 RX ADMIN — FERROUS SULFATE TAB 325 MG (65 MG ELEMENTAL FE) 325 MG: 325 (65 FE) TAB at 16:22

## 2023-01-25 RX ADMIN — EVEROLIMUS 10 MG: 10 TABLET ORAL at 11:56

## 2023-01-25 RX ADMIN — ASPIRIN 81 MG CHEWABLE TABLET 81 MG: 81 TABLET CHEWABLE at 11:02

## 2023-01-25 NOTE — PROGRESS NOTES
INPATIENT CARDIOLOGY FOLLOW-UP    Name: Monta Severance    Age: 81 y.o. Date of Admission: 1/21/2023 10:18 AM    Date of Service: 1/25/2023    Chief Complaint: Follow-up for Cardiomyopathy and syncope    Interim History:  No new overnight cardiac complaints. No further episodes of dizziness, BP stable. Tolerating Entresto without hypotension or renal issues currently with no complaints of CP, SOB, palpitations, dizziness, or lightheadedness. SR on telemetry.     Review of Systems:   Cardiac: As per HPI  General: No fever, chills  Pulmonary: As per HPI  HEENT: No visual disturbances, difficult swallowing  GI: No nausea, vomiting  Endocrine: No thyroid disease or DM  Musculoskeletal: SEARS x 4, no focal motor deficits  Skin: Intact, no rashes  Neuro/Psych: No headache or seizures    Problem List:  Patient Active Problem List   Diagnosis    CAD (coronary artery disease)    LV dysfunction    Hyperlipidemia    Hypertension    Abdominal aortic aneurysm (AAA) without rupture    Glaucoma    S/P carotid endarterectomy    Carotid bruit    H/O: CVA (cerebrovascular accident)    Post-op pain    Abdominal pain    Decreased dorsalis pedis pulse    Numbness and tingling of both feet    Volume overload    Hypokalemia    Complication of gastrostomy tube (HCC)    Small bowel obstruction (HCC)    LESLIE (acute kidney injury) ([facility] [address]    Hypotension    Orthostatic hypotension    Chronic systolic heart failure (HCC)       Allergies:  No Known Allergies    Current Medications:  Current Facility-Administered Medications   Medication Dose Route Frequency Provider Last Rate Last Admin    mupirocin (BACTROBAN) 2 % ointment   Topical Daily Jose Mcghee MD   Given at 01/25/23 1104    docusate sodium (COLACE) capsule 100 mg  100 mg Oral Daily Jose Mcghee MD   100 mg at 01/25/23 1103    ferrous sulfate (IRON 325) tablet 325 mg  325 mg Oral BID  Jose Mcghee MD   325 mg at 01/25/23 1102    finasteride (PROSCAR) tablet 5 mg  5 mg Oral Daily Denise Estephania Frances MD   5 mg at 01/25/23 1106    dexamethasone 0.5 MG/5ML solution 0.5 mg (Patient Supplied)  0.5 mg Omega & Lori Arias MD   0.5 mg at 01/24/23 1137    everolimus (AFINITOR) chemo tablet TABS 10 mg (Patient Supplied)  10 mg Oral Lunch Mounika Thorne MD   10 mg at 01/24/23 1137    sodium chloride flush 0.9 % injection 5-40 mL  5-40 mL IntraVENous 2 times per day April TIFFANIE Victor - CNP   10 mL at 01/25/23 1104    sodium chloride flush 0.9 % injection 5-40 mL  5-40 mL IntraVENous PRN April TIFFANIE Victor CNP        0.9 % sodium chloride infusion   IntraVENous PRN April TIFFANIE Victor CNP        bisacodyl (DULCOLAX) suppository 10 mg  10 mg Rectal Daily PRN April TIFFANIE Victor CNP        [Held by provider] heparin (porcine) injection 5,000 Units  5,000 Units SubCUTAneous Q8H April FRITZ VictorN - CNP        aspirin chewable tablet 81 mg  81 mg Oral QAM April TIFFANIE Victor - CNP   81 mg at 01/25/23 1102    metoprolol succinate (TOPROL XL) extended release tablet 25 mg  25 mg Oral Nightly April TIFFANIE Victor - CNP   25 mg at 01/23/23 2104    midodrine (PROAMATINE) tablet 5 mg  5 mg Oral TID April TIFFANIE Victor - CNP   5 mg at 01/25/23 1103    potassium chloride (KLOR-CON M) extended release tablet 40 mEq  40 mEq Oral BID April TIFFANIE Victor - CNP   40 mEq at 01/25/23 1103    sacubitril-valsartan (ENTRESTO) 24-26 MG per tablet 0.5 tablet  0.5 tablet Oral BID April TIFFANIE Victor - CNP   0.5 tablet at 01/25/23 1102      sodium chloride         Physical Exam:  BP (!) 118/56 Comment: prior to entresto  Pulse 60   Temp 98 °F (36.7 °C) (Oral)   Resp 18   Ht 6' 1\" (1.854 m)   Wt 158 lb 3.2 oz (71.8 kg)   SpO2 97%   BMI 20.87 kg/m²   Wt Readings from Last 3 Encounters:   01/25/23 158 lb 3.2 oz (71.8 kg)   01/18/23 149 lb (67.6 kg)   01/10/23 151 lb 12.8 oz (68.9 kg)     Appearance: Awake, alert, no acute respiratory distress  Skin: Intact, no rash  Head: Normocephalic, atraumatic  Eyes: EOMI, no conjunctival erythema  ENMT: No pharyngeal erythema, MMM, no rhinorrhea  Neck: Supple, no elevated JVP, no carotid bruits  Lungs: Clear to auscultation bilaterally. No wheezes, rales, or rhonchi. Cardiac: Regular rate and rhythm, +S1S2, no murmurs apparent  Abdomen: Soft, nontender, +bowel sounds  Extremities: Moves all extremities x 4, no lower extremity edema  Neurologic: No focal motor deficits apparent, normal mood and affect  Peripheral Pulses: Intact posterior tibial pulses bilaterally    Intake/Output:    Intake/Output Summary (Last 24 hours) at 1/25/2023 1226  Last data filed at 1/25/2023 0153  Gross per 24 hour   Intake --   Output 400 ml   Net -400 ml     No intake/output data recorded. Laboratory Tests:  Recent Labs     01/23/23  1550 01/24/23  0429 01/25/23  0321    142 142   K 4.5 4.3 4.3   * 112* 113*   CO2 24 24 23   BUN 31* 30* 31*   CREATININE 0.9 0.9 0.9   GLUCOSE 103* 97 122*   CALCIUM 7.9* 8.1* 8.2*     Lab Results   Component Value Date/Time    MG 1.9 12/02/2022 08:06 AM     No results for input(s): ALKPHOS, ALT, AST, PROT, BILITOT, BILIDIR, LABALBU in the last 72 hours.     Recent Labs     01/23/23  1550 01/24/23  0429 01/25/23  0321   WBC 2.7* 2.6* 3.4*   RBC 2.57* 2.76* 2.97*   HGB 7.8* 8.2* 8.9*   HCT 24.7* 26.6* 28.8*   MCV 96.1 96.4 97.0   MCH 30.4 29.7 30.0   MCHC 31.6* 30.8* 30.9*   RDW 15.2* 15.2* 15.0   PLT 75* 82* 103*   MPV 11.9 12.2* 11.9     Lab Results   Component Value Date    CKTOTAL 90 06/03/2012    CKMB 1.6 06/03/2012    TROPONINI <0.01 06/03/2012     Lab Results   Component Value Date    INR 1.2 10/09/2013    INR 1.1 06/03/2012    PROTIME 12.6 10/09/2013    PROTIME 11.7 06/03/2012     No results found for: TSHFT4, TSH  No results found for: LABA1C  No results found for: EAG  Lab Results   Component Value Date    CHOL 166 12/18/2021     Lab Results   Component Value Date TRIG 82 12/18/2021     Lab Results   Component Value Date    HDL 53 12/18/2021     Lab Results   Component Value Date    LDLCALC 97 12/18/2021     Lab Results   Component Value Date    LABVLDL 16 12/18/2021     No results found for: CHOLHDLRATIO    Cardiac Tests:  Telemetry findings reviewed: Bradycardia nonsustained VT new tachy/bradyarrhythmias overnight     EKG: Sinus bradycardia, first-degree AV block, nonspecific ST-T changes, abnormal EKG. Vitals and labs were reviewed: Blood pressure 118/56 pulse 54 sats 95% on room air     Labs-BUNs/creatinine 34/1.3>> 31/0.9>>30/0.9>>> 31/0.9, AST/ALT 93/78 WBC 2.7 hemoglobin 7.8 >>8. 2hematocrit 24.7, platelets 10,967        TTE 08/28/2015 (Dr. Joanne Elizabeth): Ejection fraction is visually estimated at 60%. No regional wall motion abnormalities seen. Mild left ventricular concentric hypertrophy noted. There is doppler evidence of stage I diastolic dysfunction. Physiologic and/or trace mitral regurgitation is present. December 21, 2021 Lexiscan stress test, imaging abnormal with a large apical and small anterior apical anterior and inferior wall defect with EF 25% and apical anteroapical and inferior apical akinesis with severe global hypokinesis and moderate risk perfusion study  2D echo December 18, 2021   Left ventricular internal dimensions were normal in diastole and dilated in systole. EF 15%   Regional wall motion abnormality with akinesis of apical segment and   severe generalized hypokinesis of remaining segments. Severely reduced left ventricular ejection fraction. The left atrium is mildly dilated. Right ventricle global systolic function is reduced . Mild mitral annular calcification. Mild mitral regurgitation is present. There is a small anterior pericardial effusion noted. Moderate left pleural effusion.         Assessment:  Status post fall secondary to orthostatic hypotension, no syncope per patient, no further episodes, tolerating Entresto and Toprol. Status post closed head injury, no IC bleed  Ischemic cardiomyopathy  Chronic HFrEF EF 15%  Nonsustained VT most likely secondary to his beta-blocker withdrawal, no further episodes over 24 hours. Appears euvolemic  Hyperlipidemia  History of TIA-2016  GERD  History of neuroendocrine cancer follows at the [facility]  History of PVD s/p right carotid endarterectomy  And cytopenia mostly related to chemotherapy        Plan:   Stop IV fluids. Continue Toprol-XL 25 mg p.o. daily with close monitoring of blood pressure and heart rate. Hemodynamically stable, continue entresto 24/26 mg 0.5 tab twice a day  If he remains hemodynamically stable then will consider adding Entresto. Keep potassium between 4 and 5 and magnesium about 2  Hold Entresto for now  Continue midodrine 5 mg p.o. 3 times a day for hypertension  Arrange 14-day Holter monitor prior to discharge to evaluate cardiac arrhythmias  No advanced testing as per patient. Continue Proscar due to postural hypotension. Flomax causes significant orthostatic hypotension. Continue aspirin 81 mg p.o. daily  Rest of the management as per primary service  He is stable for discharge from the cardiology standpoint. Follow-up with cardiology service in couple of weeks. We will sign off, please call us if you have any further questions or concerns. Chris Galindo MD., Enrique Greene.   [facility]) Cardiology

## 2023-01-25 NOTE — PROGRESS NOTES
Occupational Therapy      Occupational Therapy attempt   Patient resting in bed, reports he is going home. States he has been getting up and using the bathroom , feels comfortable with doing his own self care, has a walker at home he is going to use.

## 2023-01-25 NOTE — PROGRESS NOTES
Discharge instructions explained to pt and daughter at bedside and copy given. Both verbalize understanding and are in agreement with dc plan to AL with [facility]. All dressings changed prior to discharge and incontinence care given. Transport staff notified of need for w/c  assist off unit. Telemetry removed, sanitized and returned to storage. Pt to be transported home with daughter via private car.

## 2023-01-25 NOTE — CARE COORDINATION
Social work / Discharge Planning:       Patient is from [address] [place] [address] Road. [facility] updated on discharge. RN needs to call nurse to nurse [phone].     Electronically signed by REGGIE Amin on 1/25/2023 at 3:05 PM

## 2023-01-25 NOTE — PROGRESS NOTES
[facility] Quality Flow/Interdisciplinary Rounds Progress Note        Quality Flow Rounds held on January 25, 2023    Disciplines Attending:  Bedside Nurse, , , and Nursing Unit [address] Maria was admitted on 1/21/2023 10:18 AM    Anticipated Discharge Date:       Disposition:    Reid Score:  Reid Scale Score: 19    Readmission Risk              Risk of Unplanned Readmission:  22           Discussed patient goal for the day, patient clinical progression, and barriers to discharge. The following Goal(s) of the Day/Commitment(s) have been identified:   Check cardiology plan, and discharge planning to return to [address] [place] [address].       Lyle Johnston RN  January 25, 2023

## 2023-01-25 NOTE — DISCHARGE SUMMARY
[facility]   Discharge summary   Patient ID:  Gee Arriaga  [id number]  81 y.o.  1940    Admit date: 1/21/2023    Discharge date and time: 1/25/2023    Admission Diagnoses:   Patient Active Problem List   Diagnosis    CAD (coronary artery disease)    LV dysfunction    Hyperlipidemia    Hypertension    Abdominal aortic aneurysm (AAA) without rupture    Glaucoma    S/P carotid endarterectomy    Carotid bruit    H/O: CVA (cerebrovascular accident)    Post-op pain    Abdominal pain    Decreased dorsalis pedis pulse    Numbness and tingling of both feet    Volume overload    Hypokalemia    Complication of gastrostomy tube (HCC)    Small bowel obstruction (HCC)    LESLIE (acute kidney injury) ([facility]    Hypotension    Orthostatic hypotension    Chronic systolic heart failure ([facility] [address]       Discharge Diagnoses: Orthostatic hypotension     Consults: cardiology    Procedures: none    Hospital Course:   60-year-old male with a history of GERD, cardiomyopathy ejection fifth fraction 15%, multiple malignancies, follows at the [facility], hypertension presents to the ED with complaints of mechanical fall facility and is admitted to telemetry unit with     Hypotension likely associated with significant cardiomyopathy  Ortho BPs  IV hydration  DESHAUN hose  Educate on position changes  Echo-most recent 12/18/2021 reveals an EF of 15%.   Consult palliative medicine  Blood pressures remain low on my evaluation-likely from cardiomyopathy  Initiate midodrine if persistently low blood pressures,   patient does not appear to be on GDMT given above EF of 15%  Cardiology evaluation for input and recommendations on GDMT/vest  Repeat echocardiogram this admission at discretion of cardiology     LESLIE resolved  BUN/creatinine 34/1.1     1/23/23:  -Continual drop in h/h, 7.3/23.6, some could be d/t dilution but in December hemoglobin was in the upper 10's, recheck CBC today-7.8/24.7-initiate ferrous sulfate, with Colace  - Check hemoccult   -Known cardiomyopathy, unable to do full GDMT d/t frequent hypotension.  -Will defer lifevest needs or echo to cardiology.   -Hypotension improving today, 104/62  -Entresto and lipitor held   -Continue to monitor H&H  -His CODE STATUS is limited, if they do not wish for aggressive intervention he can likely be discharged soon,  -Case discussed with family at bedside        1/24/23:  -Patient's Beta Blocker was started again last night by cardiology, mild hypotension this AM   -Entresto started this afternoon by cardio, will monitor BP's with these initiated   -Continue midodrine 5 mg TID  -IVF discontinued   -continue iron supplements   -Discharge home when ok with cardiology        1/25/23:  -Continue home dose of metoprolol XL 25 mg nightly  -Continue home dose of entresto and aldactone  -Decrease Bumex 0.5 mg daily  -Follow up with PCP and cardiology outpatient      Recent Labs     01/23/23  1550 01/24/23  0429 01/25/23  0321   WBC 2.7* 2.6* 3.4*   HGB 7.8* 8.2* 8.9*   HCT 24.7* 26.6* 28.8*   PLT 75* 82* 103*       Recent Labs     01/23/23  1550 01/24/23  0429 01/25/23  0321    142 142   K 4.5 4.3 4.3   * 112* 113*   CO2 24 24 23   BUN 31* 30* 31*   CREATININE 0.9 0.9 0.9   CALCIUM 7.9* 8.1* 8.2*       XR ELBOW RIGHT (2 VIEWS)    Result Date: 1/21/2023  EXAMINATION: TWO XRAY VIEWS OF THE RIGHT ELBOW 1/21/2023 11:16 am COMPARISON: None. HISTORY: ORDERING SYSTEM PROVIDED HISTORY: fall; overlying abrasion TECHNOLOGIST PROVIDED HISTORY: Reason for exam:->fall; overlying abrasion FINDINGS: There is no elbow effusion. There is no acute fracture or dislocation. Alignment is normal.     No acute abnormality.      CT Head W/O Contrast    Result Date: 1/21/2023  EXAMINATION: CT OF THE HEAD WITHOUT CONTRAST; CT OF THE CERVICAL SPINE WITHOUT CONTRAST 1/21/2023 11:30 am TECHNIQUE: CT of the head was performed without the administration of intravenous contrast. Automated exposure control, iterative reconstruction, and/or weight based adjustment of the mA/kV was utilized to reduce the radiation dose to as low as reasonably achievable.; CT of the cervical spine was performed without the administration of intravenous contrast. Multiplanar reformatted images are provided for review. Automated exposure control, iterative reconstruction, and/or weight based adjustment of the mA/kV was utilized to reduce the radiation dose to as low as reasonably achievable. COMPARISON: 08/17/2015 HISTORY: ORDERING SYSTEM PROVIDED HISTORY: fall TECHNOLOGIST PROVIDED HISTORY: Reason for exam:->fall Has a \"code stroke\" or \"stroke alert\" been called? ->No Decision Support Exception - unselect if not a suspected or confirmed emergency medical condition->Emergency Medical Condition (MA) FINDINGS: BRAIN/VENTRICLES: There is no acute intracranial hemorrhage, mass effect or midline shift. No abnormal extra-axial fluid collection. The gray-white differentiation is maintained without evidence of an acute infarct. There is no evidence of hydrocephalus. There is an old infarct in the right occipital lobe. There is mild chronic ischemic/degenerative changes in the white matter. ORBITS: The visualized portion of the orbits demonstrate no acute abnormality. SINUSES: The visualized paranasal sinuses and mastoid air cells demonstrate no acute abnormality. There is a small amount of mucosal thickening in each maxillary sinus SOFT TISSUES/SKULL/cervical spine: No acute abnormality of the visualized skull or soft tissues. Vertebral body height and alignment in the cervical spine is unremarkable. There are no acute fractures. There are some posterior spurs at C3-4. There is moderate disc space narrowing at C5-6 and C6-7 with anterior and posterior spurs. There is some facet arthrosis at C4-5 and C5-6 bilaterally. There is mild central stenosis at C5-6 and C6-7. There is moderate bilateral carotid arterial vascular plaque.      No acute intracranial or cervical abnormality. CT CERVICAL SPINE WO CONTRAST    Result Date: 1/21/2023  EXAMINATION: CT OF THE HEAD WITHOUT CONTRAST; CT OF THE CERVICAL SPINE WITHOUT CONTRAST 1/21/2023 11:30 am TECHNIQUE: CT of the head was performed without the administration of intravenous contrast. Automated exposure control, iterative reconstruction, and/or weight based adjustment of the mA/kV was utilized to reduce the radiation dose to as low as reasonably achievable.; CT of the cervical spine was performed without the administration of intravenous contrast. Multiplanar reformatted images are provided for review. Automated exposure control, iterative reconstruction, and/or weight based adjustment of the mA/kV was utilized to reduce the radiation dose to as low as reasonably achievable. COMPARISON: 08/17/2015 HISTORY: ORDERING SYSTEM PROVIDED HISTORY: fall TECHNOLOGIST PROVIDED HISTORY: Reason for exam:->fall Has a \"code stroke\" or \"stroke alert\" been called? ->No Decision Support Exception - unselect if not a suspected or confirmed emergency medical condition->Emergency Medical Condition (MA) FINDINGS: BRAIN/VENTRICLES: There is no acute intracranial hemorrhage, mass effect or midline shift. No abnormal extra-axial fluid collection. The gray-white differentiation is maintained without evidence of an acute infarct. There is no evidence of hydrocephalus. There is an old infarct in the right occipital lobe. There is mild chronic ischemic/degenerative changes in the white matter. ORBITS: The visualized portion of the orbits demonstrate no acute abnormality. SINUSES: The visualized paranasal sinuses and mastoid air cells demonstrate no acute abnormality. There is a small amount of mucosal thickening in each maxillary sinus SOFT TISSUES/SKULL/cervical spine: No acute abnormality of the visualized skull or soft tissues. Vertebral body height and alignment in the cervical spine is unremarkable.   There are no acute fractures. There are some posterior spurs at C3-4. There is moderate disc space narrowing at C5-6 and C6-7 with anterior and posterior spurs. There is some facet arthrosis at C4-5 and C5-6 bilaterally. There is mild central stenosis at C5-6 and C6-7. There is moderate bilateral carotid arterial vascular plaque. No acute intracranial or cervical abnormality. XR CHEST PORTABLE    Result Date: 1/21/2023  EXAMINATION: ONE XRAY VIEW OF THE CHEST 1/21/2023 11:16 am COMPARISON: None. HISTORY: ORDERING SYSTEM PROVIDED HISTORY: fall TECHNOLOGIST PROVIDED HISTORY: Reason for exam:->fall FINDINGS: The lungs are without acute focal process. There is no effusion or pneumothorax. The cardiomediastinal silhouette is without acute process. The osseous structures are without acute process. There is no pneumothorax     No acute process. Discharge Exam:    HEENT: NCAT,  PERRLA, No JVD  Heart:  RRR, no murmurs, gallops, or rubs.   Lungs:  CTA bilaterally, no wheeze, rales or rhonchi  Abd: bowel sounds present, nontender, nondistended, no masses  Extrem:  No clubbing, cyanosis, or edema    Disposition: home     Patient Condition at Discharge: Stable     Patient Instructions:      Medication List        START taking these medications      ferrous sulfate 325 (65 Fe) MG tablet  Commonly known as: IRON 325  Take 1 tablet by mouth 2 times daily (with meals)     finasteride 5 MG tablet  Commonly known as: PROSCAR  Take 1 tablet by mouth daily  Start taking on: January 26, 2023            CHANGE how you take these medications      bumetanide 0.5 MG tablet  Commonly known as: BUMEX  Take 1 tablet by mouth daily  What changed:   medication strength  how much to take            CONTINUE taking these medications      aspirin 81 MG chewable tablet     atorvastatin 40 MG tablet  Commonly known as: LIPITOR  Take 1 tablet by mouth nightly     dexamethasone 0.5 MG/5ML solution     everolimus 10 MG Tabs chemo tablet  Commonly known as: AFINITOR     lanreotide acetate 120 MG/0.5ML Soln depot injection  Commonly known as: SOMATULINE     metoprolol succinate 25 MG extended release tablet  Commonly known as: TOPROL XL  Take 1 tablet by mouth nightly     midodrine 5 MG tablet  Commonly known as: PROAMATINE  Take 1 tablet by mouth 3 times daily     potassium chloride 10 MEQ extended release capsule  Commonly known as: MICRO-K  Take 4 capsules by mouth 2 times daily     sacubitril-valsartan 24-26 MG per tablet  Commonly known as: ENTRESTO  Take 0.5 tablets by mouth 2 times daily     spironolactone 25 MG tablet  Commonly known as: ALDACTONE  Take 0.5 tablets by mouth daily            STOP taking these medications      ciprofloxacin 250 MG tablet  Commonly known as: CIPRO     tamsulosin 0.4 MG capsule  Commonly known as: FLOMAX               Where to Get Your Medications        These medications were sent to [address] [place] [address] [phone] Esvin Dunlap [phone]  [facility], [address] [address]      Phone: [phone]   bumetanide 0.5 MG tablet  ferrous sulfate 325 (65 Fe) MG tablet  finasteride 5 MG tablet  potassium chloride 10 MEQ extended release capsule  sacubitril-valsartan 24-26 MG per tablet       Activity: activity as tolerated  Diet: cardiac diet    Pt has been advised to: Follow-up with Loretta Leonardo MD in 1 week. Follow-up with consultants as recommended by them    Note that over 30 minutes was spent in preparing discharge papers, discussing discharge with patient, medication review, etc.    Signed:  TIFFANIE Ureña CNP  1/25/2023  2:30 PM     Above note edited to reflect my thoughts     I personally saw, examined and provided care for the patient. Radiographs, labs and medication list were reviewed by me independently. The case was discussed in detail and plans for care were established.  Review of JADE Ureña   , documentation was conducted and revisions were made as appropriate directly by me. I agree with the above documented exam, problem list, and plan of care.      Tram Zapata MD  1/25/2023

## 2023-01-25 NOTE — PROGRESS NOTES
Physical Therapy    Pt kindly refused Rx today, states leaving later today. Wishes respected.    Cosme Wiley PTA [id number]

## 2023-02-02 ENCOUNTER — HOSPITAL ENCOUNTER (OUTPATIENT)
Dept: OTHER | Age: 83
Discharge: HOME | End: 2023-02-02

## 2023-02-08 ENCOUNTER — HOSPITAL ENCOUNTER (OUTPATIENT)
Dept: OTHER | Age: 83
Setting detail: THERAPIES SERIES
Discharge: HOME | End: 2023-02-08
Payer: MEDICARE

## 2023-02-08 VITALS
SYSTOLIC BLOOD PRESSURE: 88 MMHG | BODY MASS INDEX: 18.21 KG/M2 | HEART RATE: 55 BPM | RESPIRATION RATE: 16 BRPM | OXYGEN SATURATION: 95 % | DIASTOLIC BLOOD PRESSURE: 40 MMHG | WEIGHT: 138 LBS

## 2023-02-08 LAB
ANION GAP SERPL CALCULATED.3IONS-SCNC: 8 MMOL/L (ref 7–16)
BUN BLDV-MCNC: 32 MG/DL (ref 6–23)
CALCIUM SERPL-MCNC: 8 MG/DL (ref 8.6–10.2)
CHLORIDE BLD-SCNC: 109 MMOL/L (ref 98–107)
CO2: 23 MMOL/L (ref 22–29)
CREAT SERPL-MCNC: 1.1 MG/DL (ref 0.7–1.2)
GFR SERPL CREATININE-BSD FRML MDRD: >60 ML/MIN/1.73
GLUCOSE BLD-MCNC: 133 MG/DL (ref 74–99)
POTASSIUM SERPL-SCNC: 4 MMOL/L (ref 3.5–5)
PRO-BNP: 591 PG/ML (ref 0–450)
SODIUM BLD-SCNC: 140 MMOL/L (ref 132–146)

## 2023-02-08 PROCEDURE — 36415 COLL VENOUS BLD VENIPUNCTURE: CPT

## 2023-02-08 PROCEDURE — 83880 ASSAY OF NATRIURETIC PEPTIDE: CPT

## 2023-02-08 PROCEDURE — 99214 OFFICE O/P EST MOD 30 MIN: CPT

## 2023-02-08 PROCEDURE — 80048 BASIC METABOLIC PNL TOTAL CA: CPT

## 2023-02-08 RX ORDER — SPIRONOLACTONE 25 MG/1
12.5 TABLET ORAL DAILY
Qty: 90 TABLET | Refills: 3 | Status: SHIPPED | OUTPATIENT
Start: 2023-02-08

## 2023-02-08 RX ORDER — LEVOTHYROXINE SODIUM 0.03 MG/1
25 TABLET ORAL DAILY
COMMUNITY

## 2023-02-08 NOTE — PROGRESS NOTES
Congestive Heart Failure [address]   1940    Referring Provider: Dr. Shazia Quinn  Primary Care Physician: Dr. Chase Villalobos  Cardiologist: Dr. Edgardo Valadez  Nephrologist:     History of Present Illness:     Analy Santos is a 81 y.o. male with a history of HFrEF, most recent EF 15% 12/18/2021. Patient Story:    He does not have dyspnea with exertion, shortness of breath, or decline in overall functional capacity. He does not have orthopnea, PND, nocturnal cough or hemoptysis. He does not have abdominal distention or bloating, early satiety, anorexia/change in appetite. He does have a good urinary response to oral diuretic. He does have lower extremity edema. He does not have lightheadedness, dizziness. He denies palpitations, syncope or near syncope. He does not complain of chest pain, pressure, discomfort. No Known Allergies    Prior to Visit Medications    Medication Sig Taking?  Authorizing Provider   levothyroxine (SYNTHROID) 25 MCG tablet Take 25 mcg by mouth Daily Yes Historical Provider, MD   sacubitril-valsartan (ENTRESTO) 24-26 MG per tablet Take 0.5 tablets by mouth 2 times daily  TIFFANIE Pakrer CNP   bumetanide (BUMEX) 0.5 MG tablet Take 1 tablet by mouth daily  TIFFANIE Parker CNP   ferrous sulfate (IRON 325) 325 (65 Fe) MG tablet Take 1 tablet by mouth 2 times daily (with meals)  TIFFANIE Parker CNP   potassium chloride (MICRO-K) 10 MEQ extended release capsule Take 4 capsules by mouth 2 times daily  Patient taking differently: Take 10 mEq by mouth 2 times daily 40meq BID 20meq tablets  TIFFANIE Parker CNP   finasteride (PROSCAR) 5 MG tablet Take 1 tablet by mouth daily  TIFFANIE Parker CNP   dexamethasone 0.5 MG/5ML solution Take 0.5 mg by mouth daily With chemo pill, to prevent mouth sores  Historical Provider, MD   midodrine (PROAMATINE) 5 MG tablet Take 1 tablet by mouth 3 times daily Melody Current, APRN - CNP   metoprolol succinate (TOPROL XL) 25 MG extended release tablet Take 1 tablet by mouth nightly  Melody Current, APRN - CNP   everolimus (AFINITOR) 10 MG TABS chemo tablet Take 10 mg by mouth daily  Historical Provider, MD   spironolactone (ALDACTONE) 25 MG tablet Take 0.5 tablets by mouth daily  Melody Current, APRN - CNP   lanreotide acetate (SOMATULINE) 120 MG/0.5ML SOLN depot injection Inject 120 mg into the skin every 28 days -[facility]- due 2-23  Historical Provider, MD   atorvastatin (LIPITOR) 40 MG tablet Take 1 tablet by mouth nightly  Kiersten Villegas Learn, APRN - CNP   aspirin 81 MG chewable tablet Take 81 mg by mouth every morning   Historical Provider, MD     Guideline directed medical:  ARNI/ACE I/ARB:YES  Beta blocker:   Yes  Aldosterone antagonist: Yes  SGLT2: No    Physical Examination:     BP (!) 88/40 Comment: manual  Pulse 55   Resp 16   Wt 138 lb (62.6 kg)   SpO2 95%   BMI 18.21 kg/m²     Assessment  Charting Type: Shift assessment (chf clinic)               Respiratory  Respiratory Quality/Effort: Unlabored  Chest Assessment: Chest expansion symmetrical  Breath Sounds  Right Upper Lobe: Clear  Right Middle Lobe: Clear  Right Lower Lobe: Clear  Left Upper Lobe: Clear  Left Lower Lobe: Clear     Cardiac  Cardiac Rhythm: Sinus heidy    Rhythm Interpretation  Heart Rate: 55     Gastrointestinal  Abdominal (WDL): Exceptions to WDL  Abdomen Inspection: Soft (gastrostomy tube)           Peripheral Vascular  Peripheral Vascular (WDL): Exceptions to WDL  Edema: Right lower extremity, Left lower extremity  RLE Edema: Trace  LLE Edema: Trace           Genitourinary  Genitourinary (WDL): Within Defined Limits  Psychosocial  Psychosocial (WDL): Within Defined Limits  Pain Assessment  Pain Assessment: None - Denies Pain           Heart Rate: 55           LAB DATA:    Last 3 BMP      Sodium (mmol/L)   Date Value   02/08/2023 140   01/25/2023 142   01/24/2023 142 Potassium (mmol/L)   Date Value   02/08/2023 4.0   01/25/2023 4.3   01/24/2023 4.3     Potassium reflex Magnesium (mmol/L)   Date Value   01/22/2023 3.8   12/02/2022 3.5   11/30/2022 2.7 (LL)     Chloride (mmol/L)   Date Value   02/08/2023 109 (H)   01/25/2023 113 (H)   01/24/2023 112 (H)     CO2 (mmol/L)   Date Value   02/08/2023 23   01/25/2023 23   01/24/2023 24     BUN (mg/dL)   Date Value   02/08/2023 32 (H)   01/25/2023 31 (H)   01/24/2023 30 (H)     Glucose (mg/dL)   Date Value   02/08/2023 133 (H)   01/25/2023 122 (H)   01/24/2023 97   06/03/2012 125 (H)     Calcium (mg/dL)   Date Value   02/08/2023 8.0 (L)   01/25/2023 8.2 (L)   01/24/2023 8.1 (L)       Last 3 BNP       Pro-BNP (pg/mL)   Date Value   02/08/2023 591 (H)   01/18/2023 635 (H)   01/10/2023 750 (H)      CBC: No results for input(s): WBC, HGB, PLT in the last 72 hours. BMP:    Recent Labs     02/08/23  0746      K 4.0   *   CO2 23   BUN 32*   CREATININE 1.1   GLUCOSE 133*                       Hepatic:   No results for input(s): AST, ALT, ALB, BILITOT, ALKPHOS in the last 72 hours. Troponin: No results for input(s): TROPONINI in the last 72 hours. BNP: No results for input(s): BNP in the last 72 hours. Lipids: No results for input(s): CHOL, HDL in the last 72 hours. Invalid input(s): LDLCALCU  INR: No results for input(s): INR in the last 72 hours. WEIGHTS:    Wt Readings from Last 3 Encounters:   02/08/23 138 lb (62.6 kg)   01/25/23 158 lb 3.2 oz (71.8 kg)   01/18/23 149 lb (67.6 kg)       TELEMETRY:  Cardiac Regularity: Regular  Cardiac Rhythm/Interpretation: NSR    Assessment: Patients weight is down 11lbs since last CHF clinic visit 1-18-23. Patient states he is tired and he had appointment with Dr Mitzy Albright and he has ordered Synthroid 25mcq daily patient is going to [ from pharmacy today. Patients lungs are clear and he denies SOB.  Patient reeducated on importance of hydrating with 64fld ounces and he verbalizes understanding. Interventions completed this visit:  IV diuretics given-no  Lab work obtained yes, proBNP/CMP  Reviewed currently prescribed medications with patient, educated on importance of compliance and answered any questions regarding their medication  Educated on signs and symptoms of HF  Educated on low sodium diet    PLAN:  Scheduled to follow up in CHF clinic on  : Future Appointments   Date Time Provider Gretchen Lorenzo   2/22/2023  8:15 AM [facility] ROOM 2 [facility]   8/3/2023  9:00 AM Rosa De La Torre MD [facility]     Given clinic phone number [phone] and aware of signs and symptoms to call with any HF change in symptoms.

## 2023-02-22 ENCOUNTER — HOSPITAL ENCOUNTER (OUTPATIENT)
Dept: OTHER | Age: 83
Setting detail: THERAPIES SERIES
Discharge: HOME | End: 2023-02-22
Payer: MEDICARE

## 2023-02-22 VITALS
WEIGHT: 128 LBS | BODY MASS INDEX: 16.89 KG/M2 | RESPIRATION RATE: 18 BRPM | HEART RATE: 54 BPM | SYSTOLIC BLOOD PRESSURE: 90 MMHG | OXYGEN SATURATION: 97 % | DIASTOLIC BLOOD PRESSURE: 54 MMHG

## 2023-02-22 PROCEDURE — 99214 OFFICE O/P EST MOD 30 MIN: CPT

## 2023-02-22 PROCEDURE — 36415 COLL VENOUS BLD VENIPUNCTURE: CPT

## 2023-02-22 NOTE — PROGRESS NOTES
Congestive Heart Failure [address] Mon   1940    Referring Provider: Dr. Aide Sher  Primary Care Physician: Dr. Lee [facility]  Cardiologist: Dr. Zenon Smith  Nephrologist:     History of Present Illness:     Carrie Carnes is a 81 y.o. male with a history of HFrEF, most recent EF 15% 12/18/2021. Patient Story:    He does not have dyspnea with exertion, shortness of breath, or decline in overall functional capacity. He does not have orthopnea, PND, nocturnal cough or hemoptysis. He does not have abdominal distention or bloating, early satiety, anorexia/change in appetite. He does have a good urinary response to oral diuretic. He does not have lower extremity edema. He does not have lightheadedness, dizziness. He denies palpitations, syncope or near syncope. He does not complain of chest pain, pressure, discomfort. No Known Allergies    Prior to Visit Medications    Medication Sig Taking?  Authorizing Provider   levothyroxine (SYNTHROID) 25 MCG tablet Take 25 mcg by mouth Daily  Historical Provider, MD   spironolactone (ALDACTONE) 25 MG tablet Take 0.5 tablets by mouth daily  TIFFANIE Witt CNP   sacubitril-valsartan (ENTRESTO) 24-26 MG per tablet Take 0.5 tablets by mouth 2 times daily  TIFFANIE Rizvi CNP   bumetanide (BUMEX) 0.5 MG tablet Take 1 tablet by mouth daily  TIFFANIE Rizvi CNP   ferrous sulfate (IRON 325) 325 (65 Fe) MG tablet Take 1 tablet by mouth 2 times daily (with meals)  TIFFANIE Rizvi CNP   potassium chloride (MICRO-K) 10 MEQ extended release capsule Take 4 capsules by mouth 2 times daily  Patient taking differently: Take 20 mEq by mouth 2 times daily BID 20meq x2 tablets  TIFFANIE Rizvi CNP   finasteride (PROSCAR) 5 MG tablet Take 1 tablet by mouth daily  TIFFANIE Rizvi CNP   dexamethasone 0.5 MG/5ML solution Take 0.5 mg by mouth daily With chemo pill, to prevent mouth sores Historical Provider, MD   midodrine (PROAMATINE) 5 MG tablet Take 1 tablet by mouth 3 times daily  TIFFANIE Cortes CNP   metoprolol succinate (TOPROL XL) 25 MG extended release tablet Take 1 tablet by mouth nightly  TIFFANIE Cortes CNP   everolimus (AFINITOR) 10 MG TABS chemo tablet Take 10 mg by mouth daily  Historical Provider, MD   lanreotide acetate (SOMATULINE) 120 MG/0.5ML SOLN depot injection Inject 120 mg into the skin every 28 days -[facility]- due 2-23  Historical Provider, MD   atorvastatin (LIPITOR) 40 MG tablet Take 1 tablet by mouth nightly  TIFFANIE Persaud CNP   aspirin 81 MG chewable tablet Take 81 mg by mouth every morning   Historical Provider, MD     Guideline directed medical:  ARNI/ACE I/ARB: Yes  Beta blocker: Yes  Aldosterone antagonist: Yes  SGLT2: No    Physical Examination:     BP (!) 90/54   Pulse 54   Resp 18   Wt 128 lb (58.1 kg)   SpO2 97%   BMI 16.89 kg/m²     Assessment  Charting Type: Shift assessment (chf clinic)               Respiratory  Respiratory Pattern: Regular  Respiratory Depth: Normal  L Breath Sounds: Clear  R Breath Sounds: Clear  Breath Sounds  Right Upper Lobe: Clear  Right Middle Lobe: Clear  Right Lower Lobe: Clear  Left Upper Lobe: Clear  Left Lower Lobe: Clear     Cardiac  Cardiac Regularity: Regular  Heart Sounds: S1, S2  Cardiac Rhythm: Sinus heidy    Rhythm Interpretation  Heart Rate: 54     Gastrointestinal  Abdominal (WDL): Exceptions to WDL  Abdomen Inspection: Soft (gastrostomy tube)  RUQ Bowel Sounds: Active  LUQ Bowel Sounds: Active  RLQ Bowel Sounds: Active  LLQ Bowel Sounds: Active      Bowel Sounds  RUQ Bowel Sounds: Active  LUQ Bowel Sounds: Active  RLQ Bowel Sounds: Active  LLQ Bowel Sounds:  Active    Peripheral Vascular  Peripheral Vascular (WDL): Exceptions to WDL  Edema: Right lower extremity, Left lower extremity  RLE Edema: Trace, Non-pitting  LLE Edema: Trace, Non-pitting        Musculoskeletal  RL Extremity: Unsteady  LL Extremity: Unsteady  Genitourinary  Genitourinary (WDL): Within Defined Limits  Psychosocial  Psychosocial (WDL): Within Defined Limits              Heart Rate: 54           LAB DATA:    Last 3 BMP      Sodium (mmol/L)   Date Value   02/08/2023 140   01/25/2023 142   01/24/2023 142     Potassium (mmol/L)   Date Value   02/08/2023 4.0   01/25/2023 4.3   01/24/2023 4.3     Potassium reflex Magnesium (mmol/L)   Date Value   01/22/2023 3.8   12/02/2022 3.5   11/30/2022 2.7 (LL)     Chloride (mmol/L)   Date Value   02/08/2023 109 (H)   01/25/2023 113 (H)   01/24/2023 112 (H)     CO2 (mmol/L)   Date Value   02/08/2023 23   01/25/2023 23   01/24/2023 24     BUN (mg/dL)   Date Value   02/08/2023 32 (H)   01/25/2023 31 (H)   01/24/2023 30 (H)     Glucose (mg/dL)   Date Value   02/08/2023 133 (H)   01/25/2023 122 (H)   01/24/2023 97   06/03/2012 125 (H)     Calcium (mg/dL)   Date Value   02/08/2023 8.0 (L)   01/25/2023 8.2 (L)   01/24/2023 8.1 (L)       Last 3 BNP       Pro-BNP (pg/mL)   Date Value   02/08/2023 591 (H)   01/18/2023 635 (H)   01/10/2023 750 (H)      CBC: No results for input(s): WBC, HGB, PLT in the last 72 hours. BMP:    No results for input(s): NA, K, CL, CO2, BUN, CREATININE, GLUCOSE in the last 72 hours. Hepatic:   No results for input(s): AST, ALT, ALB, BILITOT, ALKPHOS in the last 72 hours. Troponin: No results for input(s): TROPONINI in the last 72 hours. BNP: No results for input(s): BNP in the last 72 hours. Lipids: No results for input(s): CHOL, HDL in the last 72 hours. Invalid input(s): LDLCALCU  INR: No results for input(s): INR in the last 72 hours.     WEIGHTS:    Wt Readings from Last 3 Encounters:   02/22/23 128 lb (58.1 kg)   02/08/23 138 lb (62.6 kg)   01/25/23 158 lb 3.2 oz (71.8 kg)       TELEMETRY:  Cardiac Regularity: Regular  Cardiac Rhythm/Interpretation: SB    Assessment: Flo Peres has weight loss of 10 pounds and no edema in BLE or JVD and abdomen is soft. He denies lightheadedness or dizziness but says he has been feeling more tired. He admits to drinking only about 4 cups daily. Interventions completed this visit:  IV diuretics given-no  Lab work obtained No-Call placed to [place] and spoke with nurse Len Garza who will draw labs on Friday, 2/24/2023 and fax to [phone]. Reviewed currently prescribed medications with patient, educated on importance of compliance and answered any questions regarding their medication  Educated on signs and symptoms of HF  Educated on low sodium diet    PLAN:  Scheduled to follow up in CHF clinic on  : Future Appointments   Date Time Provider Gretchen Lorenzo   3/23/2023 10:00 AM [facility]   8/3/2023  9:00 AM Tracy Hurt MD [facility]     Given clinic phone number [phone] and aware of signs and symptoms to call with any HF change in symptoms.

## 2023-02-22 NOTE — PROGRESS NOTES
Patient and daughter Gemma Delgado notified that labs need re-drawn but patient says he is on his way home from breakfast and does not have the time to return for lab re-draw. Patient says he will call if he has any issues or any worsening in symptoms. Call placed to [facility] and spoke with nurse Darwin Luis who will draw labs on Friday, 2/24/2023 and fax to [phone].     Electronically signed by Maryann Neff RN on 2/22/2023 at 12:14 PM

## 2023-03-23 ENCOUNTER — HOSPITAL ENCOUNTER (OUTPATIENT)
Dept: OTHER | Age: 83
Discharge: HOME | End: 2023-03-23

## 2023-05-04 ENCOUNTER — APPOINTMENT (OUTPATIENT)
Dept: CT IMAGING | Age: 83
End: 2023-05-04
Payer: MEDICARE

## 2023-05-04 ENCOUNTER — HOSPITAL ENCOUNTER (INPATIENT)
Age: 83
LOS: 1 days | Discharge: HOSPICE | End: 2023-05-05
Attending: STUDENT IN AN ORGANIZED HEALTH CARE EDUCATION/TRAINING PROGRAM | Admitting: FAMILY MEDICINE
Payer: MEDICARE

## 2023-05-04 ENCOUNTER — APPOINTMENT (OUTPATIENT)
Dept: GENERAL RADIOLOGY | Age: 83
End: 2023-05-04
Payer: MEDICARE

## 2023-05-04 DIAGNOSIS — K56.609 SMALL BOWEL OBSTRUCTION (HCC): Primary | ICD-10-CM

## 2023-05-04 LAB
ALBUMIN SERPL-MCNC: 3.3 G/DL (ref 3.5–5.2)
ALP SERPL-CCNC: 106 U/L (ref 40–129)
ALT SERPL-CCNC: 38 U/L (ref 0–40)
ANION GAP SERPL CALCULATED.3IONS-SCNC: 6 MMOL/L (ref 7–16)
AST SERPL-CCNC: 39 U/L (ref 0–39)
BASOPHILS # BLD: 0.03 E9/L (ref 0–0.2)
BASOPHILS NFR BLD: 0.6 % (ref 0–2)
BILIRUB SERPL-MCNC: 0.5 MG/DL (ref 0–1.2)
BUN SERPL-MCNC: 30 MG/DL (ref 6–23)
CALCIUM SERPL-MCNC: 8.8 MG/DL (ref 8.6–10.2)
CHLORIDE SERPL-SCNC: 100 MMOL/L (ref 98–107)
CO2 SERPL-SCNC: 29 MMOL/L (ref 22–29)
CREAT SERPL-MCNC: 1.1 MG/DL (ref 0.7–1.2)
EOSINOPHIL # BLD: 0.03 E9/L (ref 0.05–0.5)
EOSINOPHIL NFR BLD: 0.6 % (ref 0–6)
ERYTHROCYTE [DISTWIDTH] IN BLOOD BY AUTOMATED COUNT: 15.4 FL (ref 11.5–15)
GLUCOSE SERPL-MCNC: 111 MG/DL (ref 74–99)
HCT VFR BLD AUTO: 35.2 % (ref 37–54)
HGB BLD-MCNC: 11.5 G/DL (ref 12.5–16.5)
IMM GRANULOCYTES # BLD: 0.01 E9/L
IMM GRANULOCYTES NFR BLD: 0.2 % (ref 0–5)
LACTATE BLDV-SCNC: 1 MMOL/L (ref 0.5–2.2)
LIPASE: 10 U/L (ref 13–60)
LYMPHOCYTES # BLD: 0.64 E9/L (ref 1.5–4)
LYMPHOCYTES NFR BLD: 13.4 % (ref 20–42)
MCH RBC QN AUTO: 33 PG (ref 26–35)
MCHC RBC AUTO-ENTMCNC: 32.7 % (ref 32–34.5)
MCV RBC AUTO: 100.9 FL (ref 80–99.9)
MONOCYTES # BLD: 0.53 E9/L (ref 0.1–0.95)
MONOCYTES NFR BLD: 11.1 % (ref 2–12)
NEUTROPHILS # BLD: 3.55 E9/L (ref 1.8–7.3)
NEUTS SEG NFR BLD: 74.1 % (ref 43–80)
PLATELET # BLD AUTO: 147 E9/L (ref 130–450)
PMV BLD AUTO: 11.2 FL (ref 7–12)
POTASSIUM SERPL-SCNC: 4.5 MMOL/L (ref 3.5–5)
PROT SERPL-MCNC: 6 G/DL (ref 6.4–8.3)
RBC # BLD AUTO: 3.49 E12/L (ref 3.8–5.8)
SODIUM SERPL-SCNC: 135 MMOL/L (ref 132–146)
WBC # BLD: 4.8 E9/L (ref 4.5–11.5)

## 2023-05-04 PROCEDURE — 2580000003 HC RX 258: Performed by: RADIOLOGY

## 2023-05-04 PROCEDURE — 2060000000 HC ICU INTERMEDIATE R&B

## 2023-05-04 PROCEDURE — 74177 CT ABD & PELVIS W/CONTRAST: CPT

## 2023-05-04 PROCEDURE — 74018 RADEX ABDOMEN 1 VIEW: CPT

## 2023-05-04 PROCEDURE — 83690 ASSAY OF LIPASE: CPT

## 2023-05-04 PROCEDURE — 99285 EMERGENCY DEPT VISIT HI MDM: CPT

## 2023-05-04 PROCEDURE — 2580000003 HC RX 258

## 2023-05-04 PROCEDURE — 80053 COMPREHEN METABOLIC PANEL: CPT

## 2023-05-04 PROCEDURE — 83605 ASSAY OF LACTIC ACID: CPT

## 2023-05-04 PROCEDURE — 36415 COLL VENOUS BLD VENIPUNCTURE: CPT

## 2023-05-04 PROCEDURE — 6360000004 HC RX CONTRAST MEDICATION: Performed by: RADIOLOGY

## 2023-05-04 PROCEDURE — 2580000003 HC RX 258: Performed by: FAMILY MEDICINE

## 2023-05-04 PROCEDURE — 85025 COMPLETE CBC W/AUTO DIFF WBC: CPT

## 2023-05-04 RX ORDER — FINASTERIDE 5 MG/1
5 TABLET, FILM COATED ORAL DAILY
Status: DISCONTINUED | OUTPATIENT
Start: 2023-05-05 | End: 2023-05-05

## 2023-05-04 RX ORDER — ACETAMINOPHEN 650 MG/1
650 SUPPOSITORY RECTAL EVERY 6 HOURS PRN
Status: DISCONTINUED | OUTPATIENT
Start: 2023-05-04 | End: 2023-05-05 | Stop reason: HOSPADM

## 2023-05-04 RX ORDER — SODIUM CHLORIDE 0.9 % (FLUSH) 0.9 %
10 SYRINGE (ML) INJECTION PRN
Status: COMPLETED | OUTPATIENT
Start: 2023-05-04 | End: 2023-05-04

## 2023-05-04 RX ORDER — ENOXAPARIN SODIUM 100 MG/ML
40 INJECTION SUBCUTANEOUS DAILY
Status: DISCONTINUED | OUTPATIENT
Start: 2023-05-05 | End: 2023-05-05 | Stop reason: HOSPADM

## 2023-05-04 RX ORDER — ONDANSETRON 2 MG/ML
4 INJECTION INTRAMUSCULAR; INTRAVENOUS EVERY 6 HOURS PRN
Status: DISCONTINUED | OUTPATIENT
Start: 2023-05-04 | End: 2023-05-05 | Stop reason: HOSPADM

## 2023-05-04 RX ORDER — SODIUM CHLORIDE 9 MG/ML
INJECTION, SOLUTION INTRAVENOUS CONTINUOUS
Status: DISCONTINUED | OUTPATIENT
Start: 2023-05-04 | End: 2023-05-05 | Stop reason: HOSPADM

## 2023-05-04 RX ORDER — EVEROLIMUS 10 MG/1
10 TABLET ORAL DAILY
Status: DISCONTINUED | OUTPATIENT
Start: 2023-05-05 | End: 2023-05-05

## 2023-05-04 RX ORDER — ACETAMINOPHEN 325 MG/1
650 TABLET ORAL EVERY 6 HOURS PRN
Status: DISCONTINUED | OUTPATIENT
Start: 2023-05-04 | End: 2023-05-05 | Stop reason: HOSPADM

## 2023-05-04 RX ORDER — LANREOTIDE ACETATE 120 MG/.5ML
120 INJECTION SUBCUTANEOUS SEE ADMIN INSTRUCTIONS
Status: DISCONTINUED | OUTPATIENT
Start: 2023-05-04 | End: 2023-05-05

## 2023-05-04 RX ORDER — METOPROLOL SUCCINATE 25 MG/1
25 TABLET, EXTENDED RELEASE ORAL NIGHTLY
Status: DISCONTINUED | OUTPATIENT
Start: 2023-05-04 | End: 2023-05-05

## 2023-05-04 RX ORDER — SPIRONOLACTONE 25 MG/1
12.5 TABLET ORAL DAILY
Status: DISCONTINUED | OUTPATIENT
Start: 2023-05-05 | End: 2023-05-05

## 2023-05-04 RX ORDER — POTASSIUM CHLORIDE 750 MG/1
40 TABLET, EXTENDED RELEASE ORAL 2 TIMES DAILY
Status: DISCONTINUED | OUTPATIENT
Start: 2023-05-04 | End: 2023-05-05 | Stop reason: HOSPADM

## 2023-05-04 RX ORDER — LEVOTHYROXINE SODIUM 0.03 MG/1
25 TABLET ORAL DAILY
Status: DISCONTINUED | OUTPATIENT
Start: 2023-05-05 | End: 2023-05-05

## 2023-05-04 RX ORDER — ATORVASTATIN CALCIUM 40 MG/1
40 TABLET, FILM COATED ORAL NIGHTLY
Status: DISCONTINUED | OUTPATIENT
Start: 2023-05-04 | End: 2023-05-05

## 2023-05-04 RX ORDER — POTASSIUM CHLORIDE 20 MEQ/1
40 TABLET, EXTENDED RELEASE ORAL PRN
Status: DISCONTINUED | OUTPATIENT
Start: 2023-05-04 | End: 2023-05-05 | Stop reason: HOSPADM

## 2023-05-04 RX ORDER — FERROUS SULFATE 325(65) MG
325 TABLET ORAL 2 TIMES DAILY WITH MEALS
Status: DISCONTINUED | OUTPATIENT
Start: 2023-05-05 | End: 2023-05-05

## 2023-05-04 RX ORDER — SODIUM CHLORIDE 0.9 % (FLUSH) 0.9 %
5-40 SYRINGE (ML) INJECTION EVERY 12 HOURS SCHEDULED
Status: DISCONTINUED | OUTPATIENT
Start: 2023-05-04 | End: 2023-05-05 | Stop reason: HOSPADM

## 2023-05-04 RX ORDER — MIDODRINE HYDROCHLORIDE 5 MG/1
5 TABLET ORAL 3 TIMES DAILY
Status: DISCONTINUED | OUTPATIENT
Start: 2023-05-05 | End: 2023-05-05 | Stop reason: HOSPADM

## 2023-05-04 RX ORDER — POTASSIUM CHLORIDE 7.45 MG/ML
10 INJECTION INTRAVENOUS PRN
Status: DISCONTINUED | OUTPATIENT
Start: 2023-05-04 | End: 2023-05-05 | Stop reason: HOSPADM

## 2023-05-04 RX ORDER — SODIUM CHLORIDE 9 MG/ML
INJECTION, SOLUTION INTRAVENOUS PRN
Status: DISCONTINUED | OUTPATIENT
Start: 2023-05-04 | End: 2023-05-05 | Stop reason: HOSPADM

## 2023-05-04 RX ORDER — ONDANSETRON 4 MG/1
4 TABLET, ORALLY DISINTEGRATING ORAL EVERY 8 HOURS PRN
Status: DISCONTINUED | OUTPATIENT
Start: 2023-05-04 | End: 2023-05-05 | Stop reason: HOSPADM

## 2023-05-04 RX ORDER — 0.9 % SODIUM CHLORIDE 0.9 %
500 INTRAVENOUS SOLUTION INTRAVENOUS ONCE
Status: COMPLETED | OUTPATIENT
Start: 2023-05-04 | End: 2023-05-04

## 2023-05-04 RX ORDER — BUMETANIDE 1 MG/1
0.5 TABLET ORAL DAILY
Status: DISCONTINUED | OUTPATIENT
Start: 2023-05-05 | End: 2023-05-05 | Stop reason: HOSPADM

## 2023-05-04 RX ORDER — DEXAMETHASONE 1 MG
0.5 TABLET ORAL DAILY
Status: DISCONTINUED | OUTPATIENT
Start: 2023-05-05 | End: 2023-05-05

## 2023-05-04 RX ORDER — SODIUM CHLORIDE 0.9 % (FLUSH) 0.9 %
10 SYRINGE (ML) INJECTION PRN
Status: DISCONTINUED | OUTPATIENT
Start: 2023-05-04 | End: 2023-05-05 | Stop reason: HOSPADM

## 2023-05-04 RX ADMIN — SODIUM CHLORIDE 500 ML: 9 INJECTION, SOLUTION INTRAVENOUS at 17:47

## 2023-05-04 RX ADMIN — SODIUM CHLORIDE, PRESERVATIVE FREE 10 ML: 5 INJECTION INTRAVENOUS at 18:44

## 2023-05-04 RX ADMIN — IOPAMIDOL 75 ML: 755 INJECTION, SOLUTION INTRAVENOUS at 18:45

## 2023-05-04 RX ADMIN — SODIUM CHLORIDE: 9 INJECTION, SOLUTION INTRAVENOUS at 22:28

## 2023-05-04 NOTE — ED PROVIDER NOTES
Department of Emergency Medicine   ED Provider Note  Admit Date/RoomTime: 5/4/2023  5:07 PM  ED Room: 0630/0630-A          History of Present Illness:  5/4/23, Time: 5:23 PM EDT       Adiel Villar is a 81 y.o. male presenting to the ED for abd pain and no bowel movements. He has a past few days he has not had a bowel movement and has had decreased output from his drain. He states that he has also been having abdominal pain for the past 3 days. Patient reports the pain seems to come and go, is worsened when \"his stomach starts to squeeze. \"  He denies any fevers or chills, nausea or vomiting, dysuria, hematuria, swelling, rashes, chest pain, shortness of breath. Review of Systems:     Pertinent positives and negatives are stated within HPI.      --------------------------------------------- PAST HISTORY ---------------------------------------------  Past Medical History:  has a past medical history of Aneurysm of abdominal aorta ([facility] [address] CAD (coronary artery disease), Cancer ([facility] [address] Decreased dorsalis pedis pulse, GERD (gastroesophageal reflux disease), Glaucoma, Hyperlipidemia, Hypertension, Neuro-endocrine cancer ([facility] [address] Numbness and tingling of both feet, and Unspecified cerebral artery occlusion with cerebral infarction. Past Surgical History:  has a past surgical history that includes hernia repair; Diagnostic Cardiac Cath Lab Procedure; Tonsillectomy; eye surgery (Left, Feb 2013, June 2013); eye surgery (Left); vascular surgery (Right, 10/16/2013); Carotid endarterectomy (Right, ); Colonoscopy; Sigmoidoscopy; Facial cosmetic surgery (N/A, 12/10/2019); laparoscopy (N/A, 8/28/2020); Gastrostomy tube placement; and Gastrostomy tube placement (N/A, 4/6/2022). Social History:  reports that he quit smoking about 30 years ago. His smoking use included cigarettes. He smoked an average of 2 packs per day. He has never used smokeless tobacco. He reports current alcohol use.  He reports that he does not 02-Sep-2022 02:23

## 2023-05-05 ENCOUNTER — APPOINTMENT (OUTPATIENT)
Dept: CT IMAGING | Age: 83
End: 2023-05-05
Payer: MEDICARE

## 2023-05-05 VITALS
TEMPERATURE: 98.7 F | WEIGHT: 134.6 LBS | RESPIRATION RATE: 18 BRPM | OXYGEN SATURATION: 93 % | HEART RATE: 76 BPM | SYSTOLIC BLOOD PRESSURE: 137 MMHG | HEIGHT: 73 IN | DIASTOLIC BLOOD PRESSURE: 81 MMHG | BODY MASS INDEX: 17.84 KG/M2

## 2023-05-05 LAB
ANION GAP SERPL CALCULATED.3IONS-SCNC: 5 MMOL/L (ref 7–16)
BUN SERPL-MCNC: 29 MG/DL (ref 6–23)
CALCIUM SERPL-MCNC: 8.6 MG/DL (ref 8.6–10.2)
CHLORIDE SERPL-SCNC: 101 MMOL/L (ref 98–107)
CO2 SERPL-SCNC: 27 MMOL/L (ref 22–29)
CREAT SERPL-MCNC: 1 MG/DL (ref 0.7–1.2)
GLUCOSE SERPL-MCNC: 95 MG/DL (ref 74–99)
POTASSIUM SERPL-SCNC: 4.4 MMOL/L (ref 3.5–5)
SODIUM SERPL-SCNC: 133 MMOL/L (ref 132–146)

## 2023-05-05 PROCEDURE — 36415 COLL VENOUS BLD VENIPUNCTURE: CPT

## 2023-05-05 PROCEDURE — 6360000004 HC RX CONTRAST MEDICATION: Performed by: RADIOLOGY

## 2023-05-05 PROCEDURE — 80048 BASIC METABOLIC PNL TOTAL CA: CPT

## 2023-05-05 PROCEDURE — 74176 CT ABD & PELVIS W/O CONTRAST: CPT

## 2023-05-05 RX ADMIN — IOPAMIDOL 18 ML: 755 INJECTION, SOLUTION INTRAVENOUS at 11:40

## 2023-05-05 ASSESSMENT — PAIN SCALES - GENERAL: PAINLEVEL_OUTOF10: 0

## 2023-05-05 NOTE — PROGRESS NOTES
Call placed to Lee Ann Vuong NP to inform on med rec update. Pt states he is on hospice and only takes Midodine,Potassium and Bumex at home. He is also from an assisted living facility. Also inquired about NG tube orders as it is currently clamped.

## 2023-05-05 NOTE — CONSULTS
extended release tablet Take 1 tablet by mouth nightly  Patient not taking: Reported on 2023   TIFFANIE Wilhelm CNP   everolimus (AFINITOR) 10 MG TABS chemo tablet Take 10 mg by mouth daily  Patient not taking: Reported on 2023    Historical Provider, MD   lanreotide acetate (SOMATULINE) 120 MG/0.5ML SOLN depot injection Inject 120 mg into the skin every 28 days -[facility]- due   Patient not taking: Reported on 2023    Historical Provider, MD   atorvastatin (LIPITOR) 40 MG tablet Take 1 tablet by mouth nightly  Patient not taking: Reported on 21   TIFFANIE Roberts CNP   aspirin 81 MG chewable tablet Take 81 mg by mouth every morning   Patient not taking: Reported on 2023    Historical Provider, MD       No Known Allergies    Family History   Problem Relation Age of Onset    Heart Disease Father     Heart Disease Brother        Social History     Tobacco Use    Smoking status: Former     Packs/day: 2.00     Types: Cigarettes     Quit date: 10/9/1992     Years since quittin.5    Smokeless tobacco: Never   Vaping Use    Vaping Use: Never used   Substance Use Topics    Alcohol use: Yes     Comment: rare    Drug use: No         Review of Systems: pertinent ROS listed in HPI, all others negative       PHYSICAL EXAM:    Vitals:    23 0842   BP: (!) 149/73   Pulse: 60   Resp: 18   Temp: 98.3 °F (36.8 °C)   SpO2: 93%       GENERAL:  NAD. A&Ox3. HEAD:  Normocephalic. Atraumatic. EYES:   No scleral icterus. PERRL. LUNGS:  No increased work of breathing. CARDIOVASCULAR: RR  ABDOMEN:  Soft,  distended. Minimally tender. PEG to gravity with minimal output   EXTREMITIES:   MAEx4. Atraumatic. No LE edema.   SKIN:  Warm and dry      ASSESSMENT/PLAN:  81 y.o. 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

## 2023-05-05 NOTE — H&P
History and Physical      CHIEF COMPLAINT:      History of Present Illness: Britni Cerna is a 81 y.o. male w hx of neuroendocrine carcinomatosis who presented to the ED on 5/4/2023 for evaluation of abdominal pain and constipation. He states had not had a BM in 3 days. Normally has chronic diarrhea 2/2 neuroendocrine tumor. He relays that he is \"hospice\". His papers are in the chart. His code status is DNR-CC, however was entered as full code. The patient was diagnosed with metastatic neuroendocrine disease in 2022 which had since causing partial GOO and has a venting PEG. In the ED, CT AP showing multiple dilated loops of bowel with wall thickening and unchanged calcified R abdominal mass as well as constipation w/ enteritis, small ascites, and enlarged prostate. There was also possibility of acute vs subacute fractures of the t11 and t12 vertebral bodies. labs drawn showed macrocytic anemia. He had low albumin at 3.3, low total protein at 6.0, bun 30, glucose 111 and anion gap 6. Lactic acid 1.0. lipase 10. An NG tube was placed in the ER. General surgery was consulted for possible small bowel obstruction. The patient states he has been having abdominal pain with a \"squeezing\" sensation in his abdomen and noticed a \"mass\" due to the distention. He has been able to pass flatus but has not had a bowel movement in at least 3 days. He denies any nausea or vomiting. No fever or chills. No urinary complaints. No chest pain or SOB. He was admitted for further w/u, evaluation and management.           Past Medical History:   Diagnosis Date    Aneurysm of abdominal aorta ([facility] [address] 09/18/2013    follows with Dr. Milad Rodriguez yearly-2020    CAD (coronary artery disease)     Follows with Dr Albin Schaumann [facility])     Colon    Decreased dorsalis pedis pulse 8/5/2021    GERD (gastroesophageal reflux disease)     Glaucoma     (L) eye    Hyperlipidemia     Hypertension     Neuro-endocrine cancer (HCC)     Numbness and tingling of

## 2023-05-05 NOTE — PLAN OF CARE
Problem: Safety - Adult  Goal: Free from fall injury  Outcome: Progressing  Flowsheets (Taken 5/5/2023 0900)  Free From Fall Injury: Instruct family/caregiver on patient safety     Problem: Skin/Tissue Integrity  Goal: Absence of new skin breakdown  Description: 1. Monitor for areas of redness and/or skin breakdown  2. Assess vascular access sites hourly  3. Every 4-6 hours minimum:  Change oxygen saturation probe site  4. Every 4-6 hours:  If on nasal continuous positive airway pressure, respiratory therapy assess nares and determine need for appliance change or resting period.   Outcome: Progressing     Problem: Chronic Conditions and Co-morbidities  Goal: Patient's chronic conditions and co-morbidity symptoms are monitored and maintained or improved  Outcome: Progressing

## 2023-05-05 NOTE — PROGRESS NOTES
Consult redness. Patient awake and verbal. Family present  Sacral coccyx   Dark reddened. No open areas. Hx problems to air. States is going home today. If remains in hospital, needs lo air loss  Mepilex  Theresa Vega, CNS, Wound Care

## 2023-05-05 NOTE — CARE COORDINATION
CM made in room visit to pt and explained role of CM. Pt states he is from [address] [place] [address] and plans to return w/sister to transport. Pt confirms he is active w/[facility] and will resume upon discharge. Hospice order, demographic sheet and clinicals faxed to [facility], [phone] and [address] [place] [address], [phone]. Facility notified the pt will return, no Covid test needed. Will follow. LEROY Lemus, RN  [facility] Case Management  [phone]  Case Management Assessment  Initial Evaluation    Date/Time of Evaluation: 5/5/2023 1:43 PM  Assessment Completed by: Chante Malin RN    If patient is discharged prior to next notation, then this note serves as note for discharge by case management. Patient Name: Augusto Cantu                   YOB: 1940  Diagnosis: Small bowel obstruction [facility]) [V31.189]                   Date / Time: 5/4/2023  5:07 PM    Patient Admission Status: Inpatient   Readmission Risk (Low < 19, Mod (19-27), High > 27): Readmission Risk Score: 14.4    Current PCP: Fernandez Navarro MD  PCP verified by CM? Yes    Chart Reviewed: Yes      History Provided by: Patient  Patient Orientation: Alert and Oriented    Patient Cognition: Alert    Hospitalization in the last 30 days (Readmission):  No    If yes, Readmission Assessment in CM Navigator will be completed. Advance Directives:      Code Status: DNR-CC   Patient's Primary Decision Maker is: Legal Next of Kin      Discharge Planning:    Patient lives with:  Other (Comment) (assisted) Type of Home: Assisted living ([facility])  Primary Care Giver: Self ([facility]  Patient Support Systems include: Family Members   Current Financial resources:    Current community resources:    Current services prior to admission: None            Current DME:              Type of Home Care services:  None    ADLS  Prior functional level: Assistance with the following:, Shopping, Housework, Cooking  Current functional level: Assistance with

## 2023-05-05 NOTE — PROGRESS NOTES
Spoke with Dr. Dov Trevino to remove NG and start diet. Plan from Dr. Enedelia Hodgson to d/c this afternoon. CTAP with contrast performed already.

## 2023-05-05 NOTE — DISCHARGE SUMMARY
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 Aortic dilation measures up to 3.3 cm AP and 3.2 cm transverse. Enlarged prostate. Pelvis otherwise negative. Small ascites. Unchanged calcified abdominal mass in the right abdomen as seen previously. Subtle sclerosis in the T11 and T12 vertebral bodies suspicious for acute/subacute nondisplaced fractures. No significant vertebral body height loss. Enteritis and suspected partial small bowel obstruction. Constipation. Right lower lobe pulmonary nodule measuring up to 1.5 cm. Recommend follow-up or comparison with priors to confirm stability. Dilation of the infrarenal abdominal aorta. Enlarged prostate. Small ascites. Unchanged calcified right abdominal mass as seen previously. Possible subtle acute/subacute T11 and T12 vertebral bodies. Correlate with focal pain and tenderness in this area and consider follow-up with MRI as indicated. XR ABDOMEN FOR NG/OG/NE TUBE PLACEMENT    Result Date: 5/4/2023  EXAMINATION: ONE SUPINE XRAY VIEW(S) OF THE ABDOMEN 5/4/2023 8:59 pm COMPARISON: 04/05/2022 HISTORY: ORDERING SYSTEM PROVIDED HISTORY: Confirmation of course of NG/OG/NE tube and location of tip of tube TECHNOLOGIST PROVIDED HISTORY: Reason for exam:->Confirmation of course of NG/OG/NE tube and location of tip of tube Portable? ->Yes FINDINGS: Nonspecific bowel gas pattern without evidence of obstruction. No abnormal calcifications. No acute osseous abnormality. Enteric tube tip in the fundus of the stomach. Colonic fecal retention. Enteric tube tip in the fundus of the stomach. Discharge Exam:    HEENT: NCAT,  PERRLA, No JVD  Heart:  RRR, no murmurs, gallops, or rubs.   Lungs:  CTA bilaterally, no wheeze, rales or rhonchi  Abd: bowel sounds present, nontender, nondistended, no masses  Extrem:  No clubbing, cyanosis, or edema    Disposition: {disposition:33341}     Patient Condition at Discharge: ***    Patient Instructions:

## 2023-05-05 NOTE — PROGRESS NOTES
Brandie Calk was ordered everolimus Fabiana Rodriges) and lanreotide acetate (SOMATULINE) which are nonformulary medications. The patient has indicated that the home supply of these medications will be brought in to the hospital for inpatient use. If the medications have not been administered by 1400 on the following day from the time the orders were placed, a pharmacist will follow-up with the nurse of the patient to assess the capability of the patient to bring in the medications. If it is determined that the patient cannot supply the medications and they are not available to be dispensed from the pharmacy, a call will be placed to the ordering provider to discuss alternative options.   Minh Riley, [facility]  5/4/2023  10:23 PM

## 2023-07-27 DIAGNOSIS — I65.23 BILATERAL CAROTID ARTERY STENOSIS: Primary | ICD-10-CM

## (undated) DEVICE — SYRINGE MED 10ML TRNSLUC BRL PLUNG BLK MRK POLYPR CTRL

## (undated) DEVICE — STERILE POLYISOPRENE POWDER-FREE SURGICAL GLOVES: Brand: PROTEXIS

## (undated) DEVICE — FORCEPS MARYLAND DISSECTOR

## (undated) DEVICE — INTENDED FOR TISSUE SEPARATION, AND OTHER PROCEDURES THAT REQUIRE A SHARP SURGICAL BLADE TO PUNCTURE OR CUT.: Brand: BARD-PARKER ® STAINLESS STEEL BLADES

## (undated) DEVICE — TROCAR: Brand: KII® SLEEVE

## (undated) DEVICE — BLOCK BITE 60FR RUBBER ADLT DENTAL

## (undated) DEVICE — PAD,NON-ADHERENT,2X3,STERILE,LF,1/PK: Brand: MEDLINE

## (undated) DEVICE — SURGICAL PROCEDURE PACK EENT CUST

## (undated) DEVICE — Device

## (undated) DEVICE — TOWEL,OR,DSP,ST,BLUE,STD,6/PK,12PK/CS: Brand: MEDLINE

## (undated) DEVICE — APPLICATOR PREP 26ML 0.7% IOD POVACRYLEX 74% ISO ALC ST

## (undated) DEVICE — INSUFFLATION TUBING SET WITH FILTER, FUNNEL CONNECTOR AND LUER LOCK: Brand: JOSNOE MEDICAL INC

## (undated) DEVICE — SURGICAL PROCEDURE PACK BASIC

## (undated) DEVICE — TROCAR: Brand: KII FIOS FIRST ENTRY

## (undated) DEVICE — INSUFFLATION NEEDLE TO ESTABLISH PNEUMOPERITONEUM.: Brand: INSUFFLATION NEEDLE

## (undated) DEVICE — DOUBLE BASIN SET: Brand: MEDLINE INDUSTRIES, INC.

## (undated) DEVICE — TRAY,SKIN SCRUB,DRY,W/GAUZE: Brand: MEDLINE

## (undated) DEVICE — GOWN,SIRUS,FABRNF,XL,20/CS: Brand: MEDLINE

## (undated) DEVICE — PACK PROCEDURE SURG GEN CUST

## (undated) DEVICE — SUTURE VCRL + SZ 3-0 L18IN ABSRB UD PS-1 L24MM 3/8 CIR PRIM VCP683G

## (undated) DEVICE — SEALER ENDOSCP L37CM NANO COAT BLNT TIP LAP DIV

## (undated) DEVICE — MICRODISSECTION NEEDLE STRAIGHT SLEEVE: Brand: COLORADO

## (undated) DEVICE — SYRINGE 20ML LL S/C 50

## (undated) DEVICE — SPONGE,LAP,4"X18",XR,ST,5/PK,40PK/CS: Brand: MEDLINE INDUSTRIES, INC.

## (undated) DEVICE — NEEDLE HYPO 27GA L1.25IN GRY POLYPR HUB S STL REG BVL STR

## (undated) DEVICE — SCISSORS ENDOSCP DIA5MM CRV MPLR CAUT W/ RATCH HNDL

## (undated) DEVICE — SOLUTION IV IRRIG POUR BRL 0.9% SODIUM CHL 2F7124

## (undated) DEVICE — MARKER SURG SKIN FULL SZ PUR TRAD INK REGULAR ULTRA FN TWIN

## (undated) DEVICE — GOWN,SIRUS,FABRNF,L,20/CS: Brand: MEDLINE

## (undated) DEVICE — NEEDLE CLOSURE OMNICLOSE

## (undated) DEVICE — ADHESIVE SKIN CLSR 0.7ML TOP DERMBND ADV

## (undated) DEVICE — SET INSTRUMENT MINOR PLASTICS

## (undated) DEVICE — KIT,ANTI FOG,W/SPONGE & FLUID,SOFT PACK: Brand: MEDLINE

## (undated) DEVICE — TUBING SUCT 12FR MAL ALUM SHFT FN CAP VENT UNIV CONN W/ OBT

## (undated) DEVICE — SYRINGE IRRIG 60ML SFT PLIABLE BLB EZ TO GRP 1 HND USE W/

## (undated) DEVICE — TROCAR: Brand: KII SHIELDED BLADED ACCESS SYSTEM

## (undated) DEVICE — SYRINGE MED 10ML POLYPR LUERSLIP TIP FLAT TOP W/O SFTY DISP

## (undated) DEVICE — BLADE CLIPPER GEN PURP NS

## (undated) DEVICE — ELECTRODE PT RET AD L9FT HI MOIST COND ADH HYDRGEL CORDED

## (undated) DEVICE — TISSUE RETRIEVAL SYSTEM: Brand: INZII RETRIEVAL SYSTEM

## (undated) DEVICE — PATIENT RETURN ELECTRODE, SINGLE-USE, CONTACT QUALITY MONITORING, ADULT, WITH 9FT CORD, FOR PATIENTS WEIGING OVER 33LBS. (15KG): Brand: MEGADYNE

## (undated) DEVICE — SPONGE GZ W4XL4IN RAYON POLY FILL CVR W/ NONWOVEN FAB

## (undated) DEVICE — DRAPE,CHEST,FENES,15X10,STERIL: Brand: MEDLINE

## (undated) DEVICE — BLADE ES ELASTOMERIC COAT INSUL DURABLE BEND UPTO 90DEG

## (undated) DEVICE — GRADUATE TRIANG MEASURE 1000ML BLK PRNT

## (undated) DEVICE — SPONGE,DRAIN,NONWVN,4"X4",6PLY,STRL,LF: Brand: MEDLINE

## (undated) DEVICE — 3M™ MICROPORE™ TAPE, 1530-2: Brand: 3M™ MICROPORE™